# Patient Record
Sex: FEMALE | Race: WHITE | NOT HISPANIC OR LATINO | Employment: FULL TIME | URBAN - METROPOLITAN AREA
[De-identification: names, ages, dates, MRNs, and addresses within clinical notes are randomized per-mention and may not be internally consistent; named-entity substitution may affect disease eponyms.]

---

## 2017-02-24 ENCOUNTER — OFFICE VISIT (OUTPATIENT)
Dept: OPHTHALMOLOGY | Facility: CLINIC | Age: 52
End: 2017-02-24
Payer: COMMERCIAL

## 2017-02-24 ENCOUNTER — CLINICAL SUPPORT (OUTPATIENT)
Dept: OPHTHALMOLOGY | Facility: CLINIC | Age: 52
End: 2017-02-24
Payer: COMMERCIAL

## 2017-02-24 ENCOUNTER — OFFICE VISIT (OUTPATIENT)
Dept: OPTOMETRY | Facility: CLINIC | Age: 52
End: 2017-02-24
Payer: COMMERCIAL

## 2017-02-24 DIAGNOSIS — H52.4 PRESBYOPIA: ICD-10-CM

## 2017-02-24 DIAGNOSIS — Z96.1 PSEUDOPHAKIA OF RIGHT EYE: ICD-10-CM

## 2017-02-24 DIAGNOSIS — Z98.41 S/P CATARACT SURGERY, RIGHT: ICD-10-CM

## 2017-02-24 DIAGNOSIS — H40.89 GLAUCOMA DUE TO COMBINATION OF MECHANISMS: ICD-10-CM

## 2017-02-24 DIAGNOSIS — H40.043 STEROID RESPONDERS BORDERLINE GLAUCOMA, BILATERAL: ICD-10-CM

## 2017-02-24 DIAGNOSIS — H52.7 REFRACTIVE ERRORS: ICD-10-CM

## 2017-02-24 DIAGNOSIS — H40.89 GLAUCOMA DUE TO COMBINATION OF MECHANISMS: Primary | ICD-10-CM

## 2017-02-24 DIAGNOSIS — H25.12 NUCLEAR SCLEROSIS, LEFT: ICD-10-CM

## 2017-02-24 DIAGNOSIS — H43.813 POSTERIOR VITREOUS DETACHMENT, BILATERAL: Primary | ICD-10-CM

## 2017-02-24 PROCEDURE — 92133 CPTRZD OPH DX IMG PST SGM ON: CPT | Mod: S$GLB,,, | Performed by: OPHTHALMOLOGY

## 2017-02-24 PROCEDURE — 92015 DETERMINE REFRACTIVE STATE: CPT | Mod: S$GLB,,, | Performed by: OPTOMETRIST

## 2017-02-24 PROCEDURE — 92083 EXTENDED VISUAL FIELD XM: CPT | Mod: S$GLB,,, | Performed by: OPHTHALMOLOGY

## 2017-02-24 PROCEDURE — 99999 PR PBB SHADOW E&M-EST. PATIENT-LVL II: CPT | Mod: PBBFAC,,, | Performed by: OPHTHALMOLOGY

## 2017-02-24 PROCEDURE — 92014 COMPRE OPH EXAM EST PT 1/>: CPT | Mod: S$GLB,,, | Performed by: OPHTHALMOLOGY

## 2017-02-24 PROCEDURE — 99999 PR PBB SHADOW E&M-EST. PATIENT-LVL II: CPT | Mod: PBBFAC,,, | Performed by: OPTOMETRIST

## 2017-02-24 PROCEDURE — 92012 INTRM OPH EXAM EST PATIENT: CPT | Mod: S$GLB,,, | Performed by: OPTOMETRIST

## 2017-02-24 RX ORDER — TIMOLOL MALEATE 5 MG/ML
1 SOLUTION OPHTHALMIC EVERY MORNING
Qty: 5 ML | Refills: 12 | Status: SHIPPED | OUTPATIENT
Start: 2017-02-24 | End: 2018-02-23

## 2017-02-24 NOTE — PROGRESS NOTES
HPI     Glaucoma    Additional comments: HVF and OCT review today           Eye Problem    Additional comments: Pt c/o difficulty parking her car and depth   perception           Comments   DLS: 7/12/16 ([place] Pt)  **Pt saw  earlier this morning    1. MMG OU  2. PVD OU  3. Vitreous Opacity OD  4. PCIOL OD  5. Steroid Responder    MEDS:  T1/2 GFS QAM OU       Last edited by Ellie Dent MA on 2/24/2017  9:06 AM. (History)            Assessment /Plan     For exam results, see Encounter Report.    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 Mixed mechanism Glaucoma   H/O narrow angles - S/P PI's, + residual OHT   First HVF 1998   First photos 1999   Former [facility] , retired for a few years and went back to work as a    (mom with same problem with narrow angles and residual OHT)     Family history + mom - Narrow angles with residual OHT   Glaucoma meds - timolol gfs ou   H/O adverse rxn to glaucoma drops latanoprost - eye irritation  LASERS PI's ou   GLAUCOMA SURGERIES none   OTHER EYE SURGERIES - PC IOL OD 12/17/2014 - [facility]  CDR 0.4/0.4   Tbase 23-36 / 23-35 ou   Tmax 36/35   Ttarget 30/30  HVF 13 test 24-2 ss ou 1996 to 2014 ([facility])            3 SWAP test 2010 to 2012 - Full ou            ( repeat HVF from 8/2013 to 9/2013 - new SAD od confirmed)            [place] VF's - 1 test 2016 to 2016 - SNS /INS od // full os   Gonio +3 ou S/P PI's   /618   OCT 3 test 2005 -  2014 ([facility]) - RNFL - nl od/ nl os   HRT - 4 test at [facility] - 2014 to 2016 -MR -  nl od // nl os /// CDR 0.46 od // 0.47 os  - older test at [facility]  Disc photos 1999, 2004, 2013 - slides  // 5/13/2010 - [facility]     - Ttoday  24// 21  - Test done today - HVF/OCT/DFE    2. PVD ou - with dense vitreous veil OD Saw [facility] 4/23/2012     3. Vitreous opacity OD     4. Steroid responder - had an injection / ? Spironolactone / aldactone - not currently on any steroids    5. Refractive error   Was bilateral hyperope / presbyope   Is anisopmetropic post CE od    Glasses from [place] and doing well     6. H/O eyelid papilloma Left - S/P removal - [facility]     7. Pseudophakia od   PC IOL od 12/17/2014 - SN60wf 20.5   Had a large myopic shift pre surgery from +1.00 to -3.00    8.  I see her father in law - he has glaucoma - and I did his cataracts    I see her mother - she has similar issues to this patient     Plan   MMG  / OHT ou  S/P PI's ou   ? Early SAD changes od  IOP stable on timolol - tolerating well   Intolerant to latanoprost - red irritated eye    Cont timolol gfs ou q am     F/U 6 months with  HRT/Gonio - [place] - update flow sheet with todays  VF and OCT - 2/23/2017     Pt is going to try CTL's // ? Monovision with dr. June    I have seen and personally examined the patient.  I agree with the findings, assessment and plan of the resident and/or fellow.     Josey Camacho MD

## 2017-02-24 NOTE — MR AVS SNAPSHOT
[facility]  [address]  [facility] [address]  Phone: [phone]  Fax: [phone]                  Darling Quach   2017 8:00 AM   Office Visit    Description:  Female : 1965   Provider:  Jairon June, HORACIO   Department:  [facility]           Reason for Visit     Eye Exam                To Do List           Future Appointments        Provider Department Dept Phone    2017 8:45 AM PERIMETRY, HUMPH [facility] Ophthalmology [phone]    2017 9:45 AM MD [facility] Cespedes [facility] Ophthalmology [phone]    3/3/2017 1:30 PM [facility] [phone]    3/16/2017 1:30 PM El Mayes IV, MD [facility] - OB/GYN [address] [phone]    2017 1:00 PM Kimberly Ellison MD [facility] - Internal Medicine [phone]      Goals (5 Years of Data)     None      [facility] On Call     [facility] On Call Nurse Care Line -  Assistance  Registered nurses in the [facility] On Call Center provide clinical advisement, health education, appointment booking, and other advisory services.  Call for this free service at [phone].             Medications           Message regarding Medications     Verify the changes and/or additions to your medication regime listed below are the same as discussed with your clinician today.  If any of these changes or additions are incorrect, please notify your healthcare provider.             Verify that the below list of medications is an accurate representation of the medications you are currently taking.  If none reported, the list may be blank. If incorrect, please contact your healthcare provider. Carry this list with you in case of emergency.           Current Medications     conjugated estrogens (PREMARIN) vaginal cream Place 1 g vaginally twice a week.    ORACEA 40 mg capsule     timolol maleate 0.5% (TIMOPTIC-XE) 0.5 % SolG Place 1 drop into both eyes every morning.           Clinical  Reference Information           Allergies as of 2/24/2017     Latanoprost      Immunizations Administered on Date of Encounter - 2/24/2017     None      Instructions    Bilateral mixed-mechanism glaucoma. Followed by Dr. Camacho.  Using Timolol 0.5% non-gel-forming solution in both eyes every morning.  Continue drops in both eyes, and follow up with Dr. Camacho as she recommends.  Scheduled for repeat HVF and follow-up with Dr. Camacho today  S/P cataract surgery in the right eye, with posterior chamber intraocular lens.  Early nuclear sclerosis of lens of the left eye.  Refractive error in each eye, with very satisfactory correctable VA in each eye.  No change in refraction in OD, and only minor change in refraction in OS  Presbyopia.  New spectacle lens Rx issued for full-time wear.  Interested in trying CLs.  Discussed CL options.  Will return in one week for CL trial with monovision SCLs  Recheck refraction in one year.           Language Assistance Services     ATTENTION: Language assistance services are available, free of charge. Please call [phone].      ATENCIÓN: Si janessa ernst, tiene a ang disposición servicios gratuitos de asistencia lingüística. Llame al [phone].     MINNA Ý: N?u b?n nói Ti?ng Vi?t, có các d?ch v? h? tr? ngôn ng? mi?n phí dành cho b?n. G?i s? [phone].         [facility] complies with applicable Federal civil rights laws and does not discriminate on the basis of race, color, national origin, age, disability, or sex.

## 2017-02-24 NOTE — PROGRESS NOTES
HPI     Eye Exam    Additional comments: refraction only.  Will be having HVF test and   follow-up with Dr. Camacho later this morning.  Feels that Rx in   spectacle lenses needs modification, as she has notieced some decrease in   VA with glasses.            Comments   Patient's age: 51 y.o. WF    Wears glasses? yes     If yes, wears  Full-time or part-time?  Full-time  Present glasses are: Bifocal, SV Distance, SV Reading?  Progressive add   lenses  Approximate age of present glasses:  One year   Got new glasses following last exam, or subsequently?:  yes   Any problem with VA with glasses?  As noted above  Wears CLs?:  No, but interested in trying CLs - has never worn CLs.         Headaches?  no  Eye pain/discomfort?  no                                                                                     Flashes?  No more - flashes have gone away  Floaters?  no  Diplopia/Double vision?  no  Patient's Ocular History:         Any eye surgeries? Laser procedure for glaucoma OU - s/p cataract   surgery in OD only two years ago         Any eye injury?  no         Any treatment for eye disease?  Mixed mechanism glaucoma  Family history of eye disease?  M. Grandmother, mother: glaucoma  Significant patient medical history:         1. Diabetes?  no       If yes, IDDM or NIDDM? n/a   2. HBP?  no              3. Other (describe):  None    ! OTC eyedrops currently using:  no   ! Prescription eye meds currently using:  Timolol 0.5% (not gel-forming)   q AM OU   ! Any history of allergy/adverse reaction to any eye meds used   previously?  Adverse reaction to Timolol 0.5% GFS     ! Patient okay with use of anesthetic eyedrops to check eye pressure?  No   - will be having HVF and consult with Dr. Camacho later this morning       ! Patient okay with use of eyedrops to dilate pupils today?  See above   !  Allergies/Medications/Medical History/Family History reviewed today?    yes      PD =   61/57  Desired reading distance =   "17"                                                                    Last edited by Jairon June, OD on 2/24/2017  7:41 AM. (History)            Assessment /Plan     For exam results, see Encounter Report.    1. Glaucoma due to combination of mechanisms - Both Eyes     2. Nuclear sclerosis, left     3. S/P cataract surgery, right     4. Pseudophakia of right eye     5. Refractive errors     6. Presbyopia                    Bilateral mixed-mechanism glaucoma. Followed by Dr. Camacho.  Using Timolol 0.5% non-gel-forming solution in both eyes every morning.  Continue drops in both eyes, and follow up with Dr. Camacho as she recommends.  Scheduled for repeat HVF and follow-up with Dr. Camacho today  S/P cataract surgery in the right eye, with posterior chamber intraocular lens.  Early nuclear sclerosis of lens of the left eye.  Refractive error in each eye, with very satisfactory correctable VA in each eye.  No change in refraction in OD, and only minor change in refraction in OS  Presbyopia.  New spectacle lens Rx issued for full-time wear.  Interested in trying CLs.  Discussed CL options.  Will return in one week for CL trial with monovision SCLs  Recheck refraction in one year.        "

## 2017-02-24 NOTE — PATIENT INSTRUCTIONS
Bilateral mixed-mechanism glaucoma. Followed by Dr. Camacho.  Using Timolol 0.5% non-gel-forming solution in both eyes every morning.  Continue drops in both eyes, and follow up with Dr. Camacho as she recommends.  Scheduled for repeat HVF and follow-up with Dr. Camacho today  S/P cataract surgery in the right eye, with posterior chamber intraocular lens.  Early nuclear sclerosis of lens of the left eye.  Refractive error in each eye, with very satisfactory correctable VA in each eye.  No change in refraction in OD, and only minor change in refraction in OS  Presbyopia.  New spectacle lens Rx issued for full-time wear.  Interested in trying CLs.  Discussed CL options.  Will return in one week for CL trial with monovision SCLs  Recheck refraction in one year.

## 2017-03-03 ENCOUNTER — TELEPHONE (OUTPATIENT)
Dept: SURGERY | Facility: CLINIC | Age: 52
End: 2017-03-03

## 2017-03-03 NOTE — TELEPHONE ENCOUNTER
Pt wanted to know if she had to bring any records. She has not had surgery for hemorrhoids but has had a colonoscopy. Asked she bring that report.

## 2017-03-03 NOTE — TELEPHONE ENCOUNTER
----- Message from Megha Khan sent at 3/3/2017  8:10 AM CST -----  Contact: Pt# [phone]  Pt states she have some questions about her records and would like to speak to the nurse

## 2017-03-08 ENCOUNTER — OFFICE VISIT (OUTPATIENT)
Dept: SURGERY | Facility: CLINIC | Age: 52
End: 2017-03-08
Payer: COMMERCIAL

## 2017-03-08 VITALS
SYSTOLIC BLOOD PRESSURE: 103 MMHG | HEIGHT: 62 IN | DIASTOLIC BLOOD PRESSURE: 72 MMHG | HEART RATE: 79 BPM | BODY MASS INDEX: 24.18 KG/M2 | WEIGHT: 131.38 LBS

## 2017-03-08 DIAGNOSIS — K60.1 CHRONIC ANAL FISSURE: Primary | ICD-10-CM

## 2017-03-08 PROCEDURE — 99999 PR PBB SHADOW E&M-EST. PATIENT-LVL III: CPT | Mod: PBBFAC,,, | Performed by: COLON & RECTAL SURGERY

## 2017-03-08 PROCEDURE — 1160F RVW MEDS BY RX/DR IN RCRD: CPT | Mod: S$GLB,,, | Performed by: COLON & RECTAL SURGERY

## 2017-03-08 PROCEDURE — 99214 OFFICE O/P EST MOD 30 MIN: CPT | Mod: S$GLB,,, | Performed by: COLON & RECTAL SURGERY

## 2017-03-08 NOTE — PATIENT INSTRUCTIONS
Recommend:    1. SOAK TID in tub warm water only (or hand held shower to anus)  2. Diltiazem cream TID  3.  High fiber diet - 25 grams per day.  Emphasis on whole grain breads such as double fiber wheat bread and high fiber cereals and bars.    Drink 8 glasses of water per day 64 - 96 oz per day  Fiber supplements such as KONSYL, METAMUCIL can be taken 1-2 x per day  Regular exercise - 30 min brisk walking 5 days per week  4.  RTC 6 weeks.    5.  Begin miralax daily prn

## 2017-03-08 NOTE — MR AVS SNAPSHOT
[facility]  [address]  [facility] [address]  Phone: [phone]                  Darling Quach   3/8/2017 2:00 PM   Office Visit    Description:  Female : 1965   Provider:  LITZY Hurt MD   Department:  [facility]           Reason for Visit     Hemorrhoids                To Do List           Future Appointments        Provider Department Dept Phone    3/16/2017 1:30 PM El Mayes IV, MD [facility] - OB/GYN [address] [phone]    2017 1:00 PM Kimberly Ellison MD [facility] - Internal Medicine [phone]      Goals (5 Years of Data)     None       These Medications        Disp Refills Start End    DILTIAZEM HCL (DILTIAZEM 2% CREAM) 30 g 0 3/8/2017     Apply topically 3 (three) times daily. Apply topically to anal area. - Topical (Top)    Pharmacy: [facility] - [address] #: [phone]         [facility]     [facility] On [facility] Nurse Care Line -  Assistance  Registered nurses in the [facility] On Call Center provide clinical advisement, health education, appointment booking, and other advisory services.  Call for this free service at [phone].             Medications           Message regarding Medications     Verify the changes and/or additions to your medication regime listed below are the same as discussed with your clinician today.  If any of these changes or additions are incorrect, please notify your healthcare provider.        START taking these NEW medications        Refills    DILTIAZEM HCL (DILTIAZEM 2% CREAM) 0    Sig: Apply topically 3 (three) times daily. Apply topically to anal area.    Class: Print    Route: Topical (Top)           Verify that the below list of medications is an accurate representation of the medications you are currently taking.  If none reported, the list may be blank. If incorrect, please contact your healthcare provider. Carry this  "list with you in case of emergency.           Current Medications     timolol maleate 0.5% (TIMOPTIC-XE) 0.5 % SolG Place 1 drop into both eyes every morning.    conjugated estrogens (PREMARIN) vaginal cream Place 1 g vaginally twice a week.    DILTIAZEM HCL (DILTIAZEM 2% CREAM) Apply topically 3 (three) times daily. Apply topically to anal area.           Clinical Reference Information           Your Vitals Were     BP Pulse Height Weight BMI    103/72 79 5' 2" (1.575 m) 59.6 kg (131 lb 6.3 oz) 24.03 kg/m2      Blood Pressure          Most Recent Value    BP  103/72      Allergies as of 3/8/2017     Latanoprost      Immunizations Administered on Date of Encounter - 3/8/2017     None      Instructions    Recommend:    1. SOAK TID in tub warm water only (or hand held shower to anus)  2. Diltiazem cream TID  3.  High fiber diet - 25 grams per day.  Emphasis on whole grain breads such as double fiber wheat bread and high fiber cereals and bars.    Drink 8 glasses of water per day 64 - 96 oz per day  Fiber supplements such as KONSYL, METAMUCIL can be taken 1-2 x per day  Regular exercise - 30 min brisk walking 5 days per week  4.  RTC 6 weeks.    5.  Begin miralax daily prn       Language Assistance Services     ATTENTION: Language assistance services are available, free of charge. Please call [phone].      ATENCIÓN: Si habla español, tiene a ang disposición servicios gratuitos de asistencia lingüística. Llame al [phone].     [facility] Ý: N?u b?n nói Ti?ng Vi?t, có các d?ch v? h? tr? ngôn ng? mi?n phí dành cho b?n. G?i s? [phone].         [facility] complies with applicable Federal civil rights laws and does not discriminate on the basis of race, color, national origin, age, disability, or sex.        "

## 2017-03-08 NOTE — PROGRESS NOTES
Anal pain and bleeding with BM.  BM infrequent - 1 per week.  Started dulcolax last week to have a BM.  Always has had slow BM.  Once a week.  Now can't go without dulcolax  Pain lasts for 2 hours after BM.      Past Medical History:   Diagnosis Date    Cataract     Glaucoma     Hemangioma     cavernous    History of acne 2004    dr lopez prescribed accutane    Hyperlipidemia     Venous angioma of brain     Vertigo      Past Surgical History:   Procedure Laterality Date    CATARACT EXTRACTION W/  INTRAOCULAR LENS IMPLANT Right 12/17/14    Dr Camacho     HYSTERECTOMY       Review of patient's allergies indicates:   Allergen Reactions    Latanoprost Itching     Red itchy eyes     Current Outpatient Prescriptions on File Prior to Visit   Medication Sig Dispense Refill    timolol maleate 0.5% (TIMOPTIC-XE) 0.5 % SolG Place 1 drop into both eyes every morning. 5 mL 12    conjugated estrogens (PREMARIN) vaginal cream Place 1 g vaginally twice a week. 42.5 g 1     No current facility-administered medications on file prior to visit.      Family History   Problem Relation Age of Onset    Fibroids Mother     Migraines Mother     Glaucoma Mother     Heart disease Father     Depression Brother     No Known Problems Sister     Diabetes Maternal Grandmother     Glaucoma Maternal Grandmother     Cancer Maternal Aunt      breast    Breast cancer Maternal Aunt     No Known Problems Maternal Uncle     No Known Problems Paternal Aunt     No Known Problems Paternal Uncle     No Known Problems Maternal Grandfather     No Known Problems Paternal Grandmother     No Known Problems Paternal Grandfather     Macular degeneration Neg Hx     Blindness Neg Hx     Melanoma Neg Hx     Psoriasis Neg Hx     Lupus Neg Hx     Eczema Neg Hx     Acne Neg Hx     Cataracts Neg Hx     Amblyopia Neg Hx     Retinal detachment Neg Hx     Strabismus Neg Hx     Stroke Neg Hx     Thyroid disease Neg Hx      "Hypertension Neg Hx      Social History     Social History    Marital status:      Spouse name: N/A    Number of children: N/A    Years of education: N/A     Occupational History    Not on file.     Social History Main Topics    Smoking status: Never Smoker    Smokeless tobacco: Never Used    Alcohol use No    Drug use: No    Sexual activity: Yes     Partners: Male     Birth control/ protection: Surgical      Comment: TIN 9/2010     Other Topics Concern    Are You Pregnant Or Think You May Be? No    Breast-Feeding No     Social History Narrative     ROS:  GENERAL: No fever, chills, fatigability or weight loss.  Integument:  No rashes, redness, icterus  CHEST: Denies COLMENARES, cyanosis, wheezing, cough and sputum production.  CARDIOVASCULAR: Denies chest pain, PND, orthopnea or reduced exercise tolerance.  GI:  Denies abd pain, dysphagia, nausea, vomiting, no hematemesis  : Denies burning on urination, no hematuria, no bacteriuria  MSK:  No deformities, swelling, joint pain swelling  Neurologic:  No HAs, seizures, weakness, paresthesias, gait problems    Exam  Appears well  /72  Pulse 79  Ht 5' 2" (1.575 m)  Wt 59.6 kg (131 lb 6.3 oz)  BMI 24.03 kg/m2  Rectal      Posterior fissure in ano.    Marked anal spasm      Assessment  Acute anal fissure  Internal / external hemorrhoids  Constipation    Recommend:    1. SOAK TID in tub warm water only (or hand held shower to anus)  2. Diltiazem cream TID  3.  High fiber diet - 25 grams per day.  Emphasis on whole grain breads such as double fiber wheat bread and high fiber cereals and bars.    Drink 8 glasses of water per day 64 - 96 oz per day  Fiber supplements such as KONSYL, METAMUCIL can be taken 1-2 x per day  Regular exercise - 30 min brisk walking 5 days per week  4.  RTC 6 weeks.    5.  Begin miralax daily prn      "

## 2017-03-16 ENCOUNTER — OFFICE VISIT (OUTPATIENT)
Dept: OBSTETRICS AND GYNECOLOGY | Facility: CLINIC | Age: 52
End: 2017-03-16
Payer: COMMERCIAL

## 2017-03-16 VITALS
WEIGHT: 121.94 LBS | HEIGHT: 62 IN | BODY MASS INDEX: 22.44 KG/M2 | SYSTOLIC BLOOD PRESSURE: 100 MMHG | DIASTOLIC BLOOD PRESSURE: 50 MMHG

## 2017-03-16 DIAGNOSIS — Z12.39 BREAST CANCER SCREENING: ICD-10-CM

## 2017-03-16 DIAGNOSIS — Z01.419 ROUTINE GYNECOLOGICAL EXAMINATION: Primary | ICD-10-CM

## 2017-03-16 PROBLEM — Z90.710 S/P LAPAROSCOPIC HYSTERECTOMY: Status: ACTIVE | Noted: 2017-03-16

## 2017-03-16 PROCEDURE — 99999 PR PBB SHADOW E&M-EST. PATIENT-LVL III: CPT | Mod: PBBFAC,,, | Performed by: OBSTETRICS & GYNECOLOGY

## 2017-03-16 PROCEDURE — 99396 PREV VISIT EST AGE 40-64: CPT | Mod: S$GLB,,, | Performed by: OBSTETRICS & GYNECOLOGY

## 2017-03-16 NOTE — PROGRESS NOTES
Well Woman Exam:    HPI:  Darling Quach is a 51 y.o. year old  who presents for annual exam.  She is S/P hysterectomy.  Patient denies significant menopausal symptoms.  Patient reports mild vaginal dryness.  Patient is currently not on ERT.    Past Medical History:   Diagnosis Date    Cataract     Glaucoma     Hemangioma     cavernous    History of acne     dr lopez prescribed accutane    Hyperlipidemia     Venous angioma of brain     Vertigo        Past Surgical History:   Procedure Laterality Date    CATARACT EXTRACTION W/  INTRAOCULAR LENS IMPLANT Right 14    Dr Camacho     HYSTERECTOMY         OB History      Para Term  AB TAB SAB Ectopic Multiple Living    3 3        3          ROS:  GENERAL: Feeling well overall.   SKIN: Denies rash or lesions.   HEAD: Denies head injury or headache.   NODES: Denies enlarged lymph nodes.   CHEST: Denies chest pain or shortness of breath.   CARDIOVASCULAR: Denies palpitations or left sided chest pain.   ABDOMEN: No abdominal pain, nausea, vomiting or rectal bleeding.   URINARY: No dysuria, hematuria, or burning on urination.  REPRODUCTIVE: See HPI.   BREASTS: Denies pain, lumps, or nipple discharge.   HEMATOLOGIC: No easy bruisability or excessive bleeding.   MUSCULOSKELETAL: Denies joint pain or swelling.   NEUROLOGIC: Denies syncope or weakness.   PSYCHIATRIC: Denies depression.    PE:   APPEARANCE: Well nourished, well developed, in no acute distress.  NECK: Neck symmetric without masses or thyromegaly.  NODES: No inguinal lymph node enlargement.  ABDOMEN: Soft. No tenderness or masses. No hernias.  BREASTS: Symmetrical, no skin changes or visible lesions. No palpable masses, nipple discharge or adenopathy bilaterally.  PELVIC: Normal external female genitalia without lesions. Normal hair distribution. Adequate perineal body, normal urethral meatus. Vagina mildly atrophic - without lesions or discharge. No significant cystocele  or rectocele. Uterus and cervix surgically absent. Bimanual exam revealed no masses, tenderness or abnormality.  ANUS: Normal. + external hemorrhoids.    Diagnosis:  1. Routine gynecological examination    2. Breast cancer screening      PLAN:    Orders Placed This Encounter    Mammo Digital Screening Bilat with Tomosynthesis CAD       Patient was counseled today on postmenopausal issues.     Orders as above    Papsmear not done/not indicated.    ERT r/b/a discussed    Keep f/u with Dr. KENY Ellison (PCP)    Follow-up in 1 year.    El Mayes IV, MD

## 2017-03-16 NOTE — MR AVS SNAPSHOT
"    [facility] [address]  [address]  [place] [address]  Phone: [phone]  Fax: [phone]                  Darling Quach   3/16/2017 1:30 PM   Office Visit    Description:  Female : 1965   Provider:  El Mayes IV, MD   Department:  [facility] [address]           Reason for Visit     Well Woman                To Do List           Future Appointments        Provider Department Dept Phone    2017 1:00 PM MD [facility] - Internal Medicine [phone]    2017 2:15 PM MD [facility] [phone]      Goals (5 Years of Data)     None      [facility]     [facility] -  Assistance  Registered nurses in the [facility] provide clinical advisement, health education, appointment booking, and other advisory services.  Call for this free service at [phone].             Medications           Message regarding Medications     Verify the changes and/or additions to your medication regime listed below are the same as discussed with your clinician today.  If any of these changes or additions are incorrect, please notify your healthcare provider.        STOP taking these medications     conjugated estrogens (PREMARIN) vaginal cream Place 1 g vaginally twice a week.           Verify that the below list of medications is an accurate representation of the medications you are currently taking.  If none reported, the list may be blank. If incorrect, please contact your healthcare provider. Carry this list with you in case of emergency.           Current Medications     DILTIAZEM HCL (DILTIAZEM 2% CREAM) Apply topically 3 (three) times daily. Apply topically to anal area.    timolol maleate 0.5% (TIMOPTIC-XE) 0.5 % SolG Place 1 drop into both eyes every morning.           Clinical Reference Information           Your Vitals Were     BP Height Weight BMI       100/50 5' 2" (1.575 " m) 55.3 kg (121 lb 14.6 oz) 22.3 kg/m2       Blood Pressure          Most Recent Value    BP  (!)  100/50      Allergies as of 3/16/2017     Latanoprost      Immunizations Administered on Date of Encounter - 3/16/2017     None      Language Assistance Services     ATTENTION: Language assistance services are available, free of charge. Please call [phone].      ATENCIÓN: Si habla español, tiene a ang disposición servicios gratuitos de asistencia lingüística. Llame al [phone].     CHÚ Ý: N?u b?n nói Ti?ng Vi?t, có các d?ch v? h? tr? ngôn ng? mi?n phí dành cho b?n. G?i s? [phone].         [facility] [address] complies with applicable Federal civil rights laws and does not discriminate on the basis of race, color, national origin, age, disability, or sex.

## 2017-04-06 ENCOUNTER — OFFICE VISIT (OUTPATIENT)
Dept: INTERNAL MEDICINE | Facility: CLINIC | Age: 52
End: 2017-04-06
Payer: COMMERCIAL

## 2017-04-06 VITALS
HEART RATE: 67 BPM | WEIGHT: 117.5 LBS | OXYGEN SATURATION: 99 % | SYSTOLIC BLOOD PRESSURE: 118 MMHG | HEIGHT: 62 IN | DIASTOLIC BLOOD PRESSURE: 74 MMHG | BODY MASS INDEX: 21.62 KG/M2

## 2017-04-06 DIAGNOSIS — Z98.890 HX OF COLONOSCOPY: ICD-10-CM

## 2017-04-06 DIAGNOSIS — Z00.00 ANNUAL PHYSICAL EXAM: Primary | ICD-10-CM

## 2017-04-06 DIAGNOSIS — K60.2 ANAL FISSURE: ICD-10-CM

## 2017-04-06 DIAGNOSIS — D18.00 HEMANGIOMA: ICD-10-CM

## 2017-04-06 DIAGNOSIS — Z12.31 ENCOUNTER FOR SCREENING MAMMOGRAM FOR BREAST CANCER: ICD-10-CM

## 2017-04-06 DIAGNOSIS — H40.89 GLAUCOMA DUE TO COMBINATION OF MECHANISMS: ICD-10-CM

## 2017-04-06 DIAGNOSIS — Z00.00 ENCOUNTER FOR WELLNESS EXAMINATION: ICD-10-CM

## 2017-04-06 DIAGNOSIS — E78.00 PURE HYPERCHOLESTEROLEMIA: ICD-10-CM

## 2017-04-06 DIAGNOSIS — K60.3 ANAL FISTULA: ICD-10-CM

## 2017-04-06 PROBLEM — K60.30 ANAL FISTULA: Status: RESOLVED | Noted: 2017-04-06 | Resolved: 2017-04-06

## 2017-04-06 PROBLEM — K60.30 ANAL FISTULA: Status: ACTIVE | Noted: 2017-04-06

## 2017-04-06 PROCEDURE — 99999 PR PBB SHADOW E&M-EST. PATIENT-LVL III: CPT | Mod: PBBFAC,,, | Performed by: INTERNAL MEDICINE

## 2017-04-06 PROCEDURE — 99396 PREV VISIT EST AGE 40-64: CPT | Mod: S$GLB,,, | Performed by: INTERNAL MEDICINE

## 2017-04-06 NOTE — PROGRESS NOTES
Progress note:Annual Exam    Anal fissure: pain, patient will call    Menopausal symptoms:patient will call Dr. Mayes to institute hormones    Annual exam: 4/6/2017  Colonoscopy: 10/10/2016:follow up in 7 years  Mammogram: orders  Gyn: Dr. Mayes   Optho:glaucoma Cataract Surgery in 12/2014 and has appt scheduled twice a year, Dr. Camacho  Flu: 2016  Tetanus:7/7/2016  Shingles: not due  Pneumovax : not due  Prevnar: not due     Consultants:  Derm: Dr. Cristian June    Brother:Bipolar but health is good  Siblings: 5 all are good      All below are reviewed  Problem list is discussed in detail  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    Vitreous opacity - Right Eye    Venous angioma of brain    S/P hysterectomy    Hx of colonoscopy:10/10/2016    Anal fissure        Past Medical History:   Diagnosis Date    Cataract     Glaucoma     Hemangioma     cavernous    History of acne 2004    dr lopez prescribed accutane    Hyperlipidemia     Venous angioma of brain     Vertigo         Past Surgical History:   Procedure Laterality Date    CATARACT EXTRACTION W/  INTRAOCULAR LENS IMPLANT Right 12/17/14    Dr Camacho     HYSTERECTOMY          Review of patient's allergies indicates:   Allergen Reactions    Latanoprost Itching     Red itchy eyes       Meds updated     Family History   Problem Relation Age of Onset    Fibroids Mother     Migraines Mother     Glaucoma Mother     Heart disease Father     Depression Brother     No Known Problems Sister     Diabetes Maternal Grandmother     Glaucoma Maternal Grandmother     Cancer Maternal Aunt      breast    Breast cancer Maternal Aunt     No Known Problems Maternal Uncle     No Known Problems Paternal Aunt     No Known Problems  Paternal Uncle     No Known Problems Maternal Grandfather     No Known Problems Paternal Grandmother     No Known Problems Paternal Grandfather     Macular degeneration Neg Hx     Blindness Neg Hx     Melanoma Neg Hx     Psoriasis Neg Hx     Lupus Neg Hx     Eczema Neg Hx     Acne Neg Hx     Cataracts Neg Hx     Amblyopia Neg Hx     Retinal detachment Neg Hx     Strabismus Neg Hx     Stroke Neg Hx     Thyroid disease Neg Hx     Hypertension Neg Hx        Social History   Substance Use Topics    Smoking status: Never Smoker    Smokeless tobacco: Never Used    Alcohol use No       Labs reviewed        Review of Systems   Constitutional: Negative for chills, fever and unexpected weight change.   HENT: Negative for trouble swallowing.   Respiratory: Negative for cough, shortness of breath and wheezing.   Cardiovascular: Negative for chest pain and palpitations.   Gastrointestinal: Negative for abdominal distention, abdominal pain, blood in stool and vomiting.   Musculoskeletal: Negative for back pain.      Vitals:    04/06/17 1300   BP: 118/74   Pulse: 67           Physical Exam   Constitutional: She is oriented to person, place, and time. She appears well-developed and well-nourished. No distress.   Neck: Carotid bruit is not present. No thyromegaly present.   Cardiovascular: Normal rate, regular rhythm and normal heart sounds. PMI is not displaced.   Pulmonary/Chest: Effort normal and breath sounds normal. No respiratory distress.   Abdominal: Soft. Bowel sounds are normal. She exhibits no distension. There is no tenderness.   Musculoskeletal: She exhibits no edema.   Neurological: She is alert and oriented to person, place, and time.     Darling was seen today for annual exam.    Diagnoses and all orders for this visit:    Annual physical exam health maintenance reviewed and updated        Hx of colonoscopy:10/10/2016    Anal fissure followed in CRS    Encounter for screening mammogram for breast  cancer  -     Mammo Digital Screening Bilat with Tomosynthesis CAD; Future    Glaucoma due to combination of mechanisms - Both Eyes followed in ophthalmology    Hemangioma stable and asymptomatic    Pure hypercholesterolemia lipid profile will be ordered    Encounter for wellness examination  -     CBC auto differential; Future  -     Comprehensive metabolic panel; Future  -     Lipid panel; Future      Return in about 1 year (around 4/6/2018) for Annual exam.      Current Outpatient Prescriptions on File Prior to Visit   Medication Sig Dispense Refill    DILTIAZEM HCL (DILTIAZEM 2% CREAM) Apply topically 3 (three) times daily. Apply topically to anal area. 30 g 0    timolol maleate 0.5% (TIMOPTIC-XE) 0.5 % SolG Place 1 drop into both eyes every morning. 5 mL 12     No current facility-administered medications on file prior to visit.

## 2017-04-06 NOTE — MR AVS SNAPSHOT
[facility] - Internal Medicine  [address]  [facility] [address]  Phone: [phone]  Fax: [phone]                  Darling Quach   2017 1:00 PM   Office Visit    Description:  Female : 1965   Provider:  Kimberly Ellison MD   Department:  [facility] - Internal Medicine           Reason for Visit     Annual Exam           Diagnoses this Visit        Comments    Annual physical exam    -  Primary     Anal fistula         Hx of colonoscopy         Anal fissure         Encounter for screening mammogram for breast cancer         Glaucoma due to combination of mechanisms         Hemangioma         Pure hypercholesterolemia         Encounter for wellness examination                To Do List           Future Appointments        Provider Department Dept Phone    2017 8:40 AM LAB, APPOINTMENT [facility] IN[facility]ED [facility] [phone]    2017 2:15 PM LITZY Hurt MD [facility] [phone]    2017 9:00 AM [facility] MAMMO2 SCREEN [facility] [phone]      Goals (5 Years of Data)     None      Follow-Up and Disposition     Return in about 1 year (around 2018) for Annual exam.      [facility]     [facility] Nurse Care [facility] -  Assistance  Unless otherwise directed by your provider, please contact [facility], our nurse care line that is available for  assistance.     Registered nurses in the [facility] Center provide: appointment scheduling, clinical advisement, health education, and other advisory services.  Call: [phone] (toll free)               Medications           Message regarding Medications     Verify the changes and/or additions to your medication regime listed below are the same as discussed with your clinician today.  If any of these changes or additions are incorrect, please notify your healthcare provider.             Verify that the below list of medications is an accurate  "representation of the medications you are currently taking.  If none reported, the list may be blank. If incorrect, please contact your healthcare provider. Carry this list with you in case of emergency.           Current Medications     DILTIAZEM HCL (DILTIAZEM 2% CREAM) Apply topically 3 (three) times daily. Apply topically to anal area.    timolol maleate 0.5% (TIMOPTIC-XE) 0.5 % SolG Place 1 drop into both eyes every morning.           Clinical Reference Information           Your Vitals Were     BP Pulse Height Weight SpO2 BMI    118/74 67 5' 2" (1.575 m) 53.3 kg (117 lb 8.1 oz) 99% 21.49 kg/m2      Blood Pressure          Most Recent Value    BP  118/74      Allergies as of 4/6/2017     Latanoprost      Immunizations Administered on Date of Encounter - 4/6/2017     None      Orders Placed During Today's Visit     Future Labs/Procedures Expected by Expires    CBC auto differential  4/6/2017 6/5/2017    Comprehensive metabolic panel  4/6/2017 (Approximate) 6/5/2017    Lipid panel  4/6/2017 (Approximate) 6/5/2017    Mammo Digital Screening Bilat with Tomosynthesis CAD  4/6/2017 6/6/2018      Language Assistance Services     ATTENTION: Language assistance services are available, free of charge. Please call [phone].      ATENCIÓN: Si davidla klever, tiene a ang disposición servicios gratuitos de asistencia lingüística. Llame al [phone].     [facility] Ý: N?u b?n nói Ti?ng Vi?t, có các d?ch v? h? tr? ngôn ng? mi?n phí dành cho b?n. G?i s? [phone].         [facility] complies with applicable Federal civil rights laws and does not discriminate on the basis of race, color, national origin, age, disability, or sex.        "

## 2017-04-07 ENCOUNTER — LAB VISIT (OUTPATIENT)
Dept: LAB | Facility: HOSPITAL | Age: 52
End: 2017-04-07
Attending: INTERNAL MEDICINE
Payer: COMMERCIAL

## 2017-04-07 ENCOUNTER — PATIENT MESSAGE (OUTPATIENT)
Dept: SURGERY | Facility: CLINIC | Age: 52
End: 2017-04-07

## 2017-04-07 ENCOUNTER — PATIENT MESSAGE (OUTPATIENT)
Dept: OBSTETRICS AND GYNECOLOGY | Facility: CLINIC | Age: 52
End: 2017-04-07

## 2017-04-07 DIAGNOSIS — Z00.00 ENCOUNTER FOR WELLNESS EXAMINATION: ICD-10-CM

## 2017-04-07 LAB
ALBUMIN SERPL BCP-MCNC: 3.9 G/DL
ALP SERPL-CCNC: 58 U/L
ALT SERPL W/O P-5'-P-CCNC: 8 U/L
ANION GAP SERPL CALC-SCNC: 5 MMOL/L
AST SERPL-CCNC: 18 U/L
BASOPHILS # BLD AUTO: 0.09 K/UL
BASOPHILS NFR BLD: 2.2 %
BILIRUB SERPL-MCNC: 0.7 MG/DL
BUN SERPL-MCNC: 21 MG/DL
CALCIUM SERPL-MCNC: 9.5 MG/DL
CHLORIDE SERPL-SCNC: 103 MMOL/L
CHOLEST/HDLC SERPL: 2.9 {RATIO}
CO2 SERPL-SCNC: 32 MMOL/L
CREAT SERPL-MCNC: 1.1 MG/DL
DIFFERENTIAL METHOD: ABNORMAL
EOSINOPHIL # BLD AUTO: 0.2 K/UL
EOSINOPHIL NFR BLD: 4.1 %
ERYTHROCYTE [DISTWIDTH] IN BLOOD BY AUTOMATED COUNT: 12.4 %
EST. GFR  (AFRICAN AMERICAN): >60 ML/MIN/1.73 M^2
EST. GFR  (NON AFRICAN AMERICAN): 58.7 ML/MIN/1.73 M^2
GLUCOSE SERPL-MCNC: 86 MG/DL
HCT VFR BLD AUTO: 38.1 %
HDL/CHOLESTEROL RATIO: 35.1 %
HDLC SERPL-MCNC: 268 MG/DL
HDLC SERPL-MCNC: 94 MG/DL
HGB BLD-MCNC: 13.3 G/DL
LDLC SERPL CALC-MCNC: 160 MG/DL
LYMPHOCYTES # BLD AUTO: 1.3 K/UL
LYMPHOCYTES NFR BLD: 31 %
MCH RBC QN AUTO: 29 PG
MCHC RBC AUTO-ENTMCNC: 34.9 %
MCV RBC AUTO: 83 FL
MONOCYTES # BLD AUTO: 0.4 K/UL
MONOCYTES NFR BLD: 9.2 %
NEUTROPHILS # BLD AUTO: 2.2 K/UL
NEUTROPHILS NFR BLD: 53.5 %
NONHDLC SERPL-MCNC: 174 MG/DL
PLATELET # BLD AUTO: 220 K/UL
PMV BLD AUTO: 10.1 FL
POTASSIUM SERPL-SCNC: 4.1 MMOL/L
PROT SERPL-MCNC: 7.4 G/DL
RBC # BLD AUTO: 4.58 M/UL
SODIUM SERPL-SCNC: 140 MMOL/L
TRIGL SERPL-MCNC: 70 MG/DL
WBC # BLD AUTO: 4.13 K/UL

## 2017-04-07 PROCEDURE — 80053 COMPREHEN METABOLIC PANEL: CPT

## 2017-04-07 PROCEDURE — 85025 COMPLETE CBC W/AUTO DIFF WBC: CPT

## 2017-04-07 PROCEDURE — 36415 COLL VENOUS BLD VENIPUNCTURE: CPT

## 2017-04-07 PROCEDURE — 80061 LIPID PANEL: CPT

## 2017-04-07 NOTE — TELEPHONE ENCOUNTER
----- Message from Megha Khan sent at 4/7/2017  8:13 AM CDT -----  Contact: Pt# [phone]   Pt states she was calling to give the nurse her Pharmacy([email or web address])#[phone] so that she can get her Rx.

## 2017-04-07 NOTE — TELEPHONE ENCOUNTER
----- Message from Teresa Handy sent at 4/5/2017  3:19 PM CDT -----  Contact: Pt:[phone]  .                              Prescription Refill Request  Name of medication and dose:DILTIAZEM HCL (DILTIAZEM 2% CREAM)    Pharmacy :A compound pharmacy    Are you completely out of your medication Yes    Additional Information:

## 2017-04-18 ENCOUNTER — PATIENT MESSAGE (OUTPATIENT)
Dept: OBSTETRICS AND GYNECOLOGY | Facility: CLINIC | Age: 52
End: 2017-04-18

## 2017-04-20 ENCOUNTER — PATIENT MESSAGE (OUTPATIENT)
Dept: OBSTETRICS AND GYNECOLOGY | Facility: CLINIC | Age: 52
End: 2017-04-20

## 2017-04-20 ENCOUNTER — TELEPHONE (OUTPATIENT)
Dept: OBSTETRICS AND GYNECOLOGY | Facility: CLINIC | Age: 52
End: 2017-04-20

## 2017-04-25 ENCOUNTER — OFFICE VISIT (OUTPATIENT)
Dept: SURGERY | Facility: CLINIC | Age: 52
End: 2017-04-25
Payer: COMMERCIAL

## 2017-04-25 VITALS
HEIGHT: 62 IN | SYSTOLIC BLOOD PRESSURE: 107 MMHG | WEIGHT: 116.88 LBS | DIASTOLIC BLOOD PRESSURE: 68 MMHG | BODY MASS INDEX: 21.51 KG/M2 | HEART RATE: 78 BPM

## 2017-04-25 DIAGNOSIS — K64.4 EXTERNAL HEMORRHOIDS WITH OTHER COMPLICATION: ICD-10-CM

## 2017-04-25 DIAGNOSIS — K62.89 RECTAL OR ANAL PAIN: Primary | ICD-10-CM

## 2017-04-25 PROCEDURE — 1160F RVW MEDS BY RX/DR IN RCRD: CPT | Mod: S$GLB,,, | Performed by: COLON & RECTAL SURGERY

## 2017-04-25 PROCEDURE — 99999 PR PBB SHADOW E&M-EST. PATIENT-LVL III: CPT | Mod: PBBFAC,,, | Performed by: COLON & RECTAL SURGERY

## 2017-04-25 PROCEDURE — 99213 OFFICE O/P EST LOW 20 MIN: CPT | Mod: S$GLB,,, | Performed by: COLON & RECTAL SURGERY

## 2017-04-27 ENCOUNTER — PATIENT MESSAGE (OUTPATIENT)
Dept: INTERNAL MEDICINE | Facility: CLINIC | Age: 52
End: 2017-04-27

## 2017-04-28 ENCOUNTER — PATIENT MESSAGE (OUTPATIENT)
Dept: OBSTETRICS AND GYNECOLOGY | Facility: CLINIC | Age: 52
End: 2017-04-28

## 2017-04-28 ENCOUNTER — OFFICE VISIT (OUTPATIENT)
Dept: OPHTHALMOLOGY | Facility: CLINIC | Age: 52
End: 2017-04-28
Payer: COMMERCIAL

## 2017-04-28 DIAGNOSIS — Z96.1 PSEUDOPHAKIA OF RIGHT EYE: ICD-10-CM

## 2017-04-28 DIAGNOSIS — H25.12 NUCLEAR SCLEROSIS, LEFT: ICD-10-CM

## 2017-04-28 DIAGNOSIS — H52.7 REFRACTION ERROR: ICD-10-CM

## 2017-04-28 DIAGNOSIS — H43.813 POSTERIOR VITREOUS DETACHMENT, BILATERAL: ICD-10-CM

## 2017-04-28 DIAGNOSIS — H40.043 STEROID RESPONDERS BORDERLINE GLAUCOMA, BILATERAL: ICD-10-CM

## 2017-04-28 DIAGNOSIS — H40.89 GLAUCOMA DUE TO COMBINATION OF MECHANISMS: Primary | ICD-10-CM

## 2017-04-28 DIAGNOSIS — H10.11 ALLERGIC CONJUNCTIVITIS, RIGHT: ICD-10-CM

## 2017-04-28 PROCEDURE — 99999 PR PBB SHADOW E&M-EST. PATIENT-LVL II: CPT | Mod: PBBFAC,,, | Performed by: OPHTHALMOLOGY

## 2017-04-28 PROCEDURE — 92012 INTRM OPH EXAM EST PATIENT: CPT | Mod: S$GLB,,, | Performed by: OPHTHALMOLOGY

## 2017-04-28 NOTE — PROGRESS NOTES
"HPI     DLS: 2/24/17    Pt c/o OD itchy and pressure sensation but was blood shot red a couple of   days ago "My eye just felt like something was in it."    Meds: T 1/2 GFS qam ou             Sterile Original Eye drops (for redness reliever and irritated   eyes)    1. Posterior vitreous detachment, bilateral  2. Glaucoma due to combination of mechanisms, both eyes  3. Nuclear sclerosis, left  4. Steroid responders borderline glaucoma, bilateral  5. Pseudophakia, right eye       Last edited by Nivia Cox on 4/28/2017  8:46 AM.         Assessment /Plan     For exam results, see Encounter Report.    Glaucoma due to combination of mechanisms - Both Eyes    Posterior vitreous detachment, bilateral    Nuclear sclerosis, left    Steroid responders borderline glaucoma, bilateral    Refraction error    Pseudophakia of right eye    Allergic conjunctivitis, right      PT IS TRANSFER [facility]G FROM [facility] TO [place] - PHOTO file is no in  [facility]    1. Mixed mechanism Glaucoma   H/O narrow angles - S/P PI's, + residual OHT   First HVF 1998   First photos 1999   Former [facility] , retired for a few years and went back to work as a    (mom with same problem with narrow angles and residual OHT)     Family history + mom - Narrow angles with residual OHT   Glaucoma meds - timolol gfs ou   H/O adverse rxn to glaucoma drops latanoprost - eye irritation  LASERS PI's ou   GLAUCOMA SURGERIES none   OTHER EYE SURGERIES - PC IOL OD 12/17/2014 - [facility]  CDR 0.4/0.4   Tbase 23-36 / 23-35 ou   Tmax 36/35   Ttarget 30/30  HVF 13 test 24-2 ss ou 1996 to 2014 ([facility])            3 SWAP test 2010 to 2012 - Full ou            ( repeat HVF from 8/2013 to 9/2013 - new SAD od confirmed)            [facility] VF's - 1 test 2016 to 2016 - SNS /INS od // full os   Gonio +3 ou S/P PI's   /618   OCT 3 test 2005 -  2014 ([facility]) - RNFL - nl od/ nl os   HRT - 4 test at [facility] - 2014 to 2016 -MR -  nl od // nl os " /// CDR 0.46 od // 0.47 os  - older test at [facility]  Disc photos 1999, 2004, 2013 - slides // 5/13/2010 - [facility]     - Ttoday  24// 21  - Test done today - HVF/OCT/DFE    2. PVD ou - with dense vitreous veil OD Saw [facility] 4/23/2012     3. Vitreous opacity OD     4. Steroid responder - had an injection / ? Spironolactone / aldactone - not currently on any steroids    5. Refractive error   Was bilateral hyperope / presbyope   Is anisopmetropic post CE od    Glasses from [facility] and doing well     6. H/O eyelid papilloma Left - S/P removal - [facility]     7. Pseudophakia od   PC IOL od 12/17/2014 - SN60wf 20.5   Had a large myopic shift pre surgery from +1.00 to -3.00    8.  I see her father in law - he has glaucoma - and I did his cataracts    I see her mother - she has similar issues to this patient     Plan   MMG  / OHT ou  S/P PI's ou   ? Early SAD changes od  IOP stable on timolol - tolerating well   Intolerant to latanoprost - red irritated eye    Cont timolol gfs ou q am     F/U 6 months with  HRT/Gonio - [place] - update flow sheet with todays  VF and OCT - 2/23/2017     Pt is going to try CTL's // ? Monovision with dr. June       4/27/2016 - [facility] - red eye / irritated - OD   [t presents with slight red / irritated right eye   Has some conjunctival in growth towards her cataract incision - almost like a penguecula /pterygium   Slightly irritated   Try 2 week break from timolol gfs   Add pres free AT's qid   Ok to use szaditor or alaway allergy drops     Keep reugula F/U - 4 months - [facility] - with HRT / gonio

## 2017-05-01 DIAGNOSIS — N95.1 MENOPAUSAL SYMPTOMS: ICD-10-CM

## 2017-05-01 DIAGNOSIS — N95.2 VAGINAL ATROPHY: Primary | ICD-10-CM

## 2017-05-01 RX ORDER — ESTRADIOL 0.07 MG/D
1 FILM, EXTENDED RELEASE TRANSDERMAL
Qty: 8 PATCH | Refills: 11 | Status: SHIPPED | OUTPATIENT
Start: 2017-05-01 | End: 2017-09-12

## 2017-05-01 NOTE — TELEPHONE ENCOUNTER
Per Dr. Gerber Trinidad dot .75 to be sent to patient's pharmacy, if patient does not notice improvement.  medication can be increased.

## 2017-05-02 NOTE — PROGRESS NOTES
In general better.  Pain far less though intermittent.  Occasional swelling of hemorrhoids with difficult BM    Exam  Perianal swelling.  No thrombosis.    Posterior fissure - less deep  DESTINEY poorly tolerated.  No mass    Assessment  Anal fissure improved  Hemorrhoids    Recommend  Topical cream prn  High fiber diet - 25 grams per day.  Emphasis on whole grain breads such as double fiber wheat bread and high fiber cereals and bars.    Drink 8 glasses of water per day 64 - 96 oz per day  Fiber supplements such as KONSYL, METAMUCIL can be taken 1-2 x per day  Regular exercise - 30 min brisk walking 5 days per week  OV 6 weeks for follow up.

## 2017-06-22 ENCOUNTER — HOSPITAL ENCOUNTER (OUTPATIENT)
Dept: RADIOLOGY | Facility: HOSPITAL | Age: 52
Discharge: HOME | End: 2017-06-22
Attending: OBSTETRICS & GYNECOLOGY
Payer: COMMERCIAL

## 2017-06-22 VITALS — HEIGHT: 62 IN | BODY MASS INDEX: 21.35 KG/M2 | WEIGHT: 116 LBS

## 2017-06-22 DIAGNOSIS — Z12.39 BREAST CANCER SCREENING: ICD-10-CM

## 2017-06-22 DIAGNOSIS — Z12.31 VISIT FOR SCREENING MAMMOGRAM: ICD-10-CM

## 2017-06-22 PROCEDURE — 77067 SCR MAMMO BI INCL CAD: CPT | Mod: 26,,, | Performed by: RADIOLOGY

## 2017-06-22 PROCEDURE — 77067 SCR MAMMO BI INCL CAD: CPT | Mod: TC

## 2017-06-22 PROCEDURE — 77063 BREAST TOMOSYNTHESIS BI: CPT | Mod: 26,,, | Performed by: RADIOLOGY

## 2017-07-14 ENCOUNTER — TELEPHONE (OUTPATIENT)
Dept: NEUROSURGERY | Facility: CLINIC | Age: 52
End: 2017-07-14

## 2017-07-14 ENCOUNTER — HOSPITAL ENCOUNTER (OUTPATIENT)
Facility: HOSPITAL | Age: 52
Discharge: HOME | End: 2017-07-15
Attending: EMERGENCY MEDICINE | Admitting: EMERGENCY MEDICINE
Payer: COMMERCIAL

## 2017-07-14 ENCOUNTER — NURSE TRIAGE (OUTPATIENT)
Dept: ADMINISTRATIVE | Facility: CLINIC | Age: 52
End: 2017-07-14

## 2017-07-14 DIAGNOSIS — R56.9 SEIZURE: ICD-10-CM

## 2017-07-14 PROBLEM — E87.1 HYPONATREMIA: Status: ACTIVE | Noted: 2017-07-14

## 2017-07-14 PROBLEM — E83.39 HYPOPHOSPHATEMIA: Status: ACTIVE | Noted: 2017-07-14

## 2017-07-14 LAB
ALBUMIN SERPL BCP-MCNC: 3.7 G/DL
ALP SERPL-CCNC: 58 U/L
ALT SERPL W/O P-5'-P-CCNC: 15 U/L
AMPHET+METHAMPHET UR QL: NEGATIVE
ANION GAP SERPL CALC-SCNC: 9 MMOL/L
APTT BLDCRRT: 21.7 SEC
AST SERPL-CCNC: 25 U/L
BACTERIA #/AREA URNS AUTO: NORMAL /HPF
BARBITURATES UR QL SCN>200 NG/ML: NEGATIVE
BASOPHILS # BLD AUTO: 0.02 K/UL
BASOPHILS NFR BLD: 0.2 %
BENZODIAZ UR QL SCN>200 NG/ML: NEGATIVE
BILIRUB SERPL-MCNC: 0.6 MG/DL
BILIRUB UR QL STRIP: NEGATIVE
BUN SERPL-MCNC: 15 MG/DL
BZE UR QL SCN: NEGATIVE
CALCIUM SERPL-MCNC: 9 MG/DL
CANNABINOIDS UR QL SCN: NEGATIVE
CHLORIDE SERPL-SCNC: 98 MMOL/L
CLARITY UR REFRACT.AUTO: CLEAR
CO2 SERPL-SCNC: 24 MMOL/L
COLOR UR AUTO: ABNORMAL
CREAT SERPL-MCNC: 0.9 MG/DL
CREAT UR-MCNC: 38 MG/DL
DIFFERENTIAL METHOD: ABNORMAL
EOSINOPHIL # BLD AUTO: 0 K/UL
EOSINOPHIL NFR BLD: 0 %
ERYTHROCYTE [DISTWIDTH] IN BLOOD BY AUTOMATED COUNT: 12.6 %
EST. GFR  (AFRICAN AMERICAN): >60 ML/MIN/1.73 M^2
EST. GFR  (NON AFRICAN AMERICAN): >60 ML/MIN/1.73 M^2
ETHANOL UR-MCNC: <10 MG/DL
FOLATE SERPL-MCNC: 17.9 NG/ML
GLUCOSE SERPL-MCNC: 106 MG/DL
GLUCOSE UR QL STRIP: NEGATIVE
HCT VFR BLD AUTO: 34.4 %
HGB BLD-MCNC: 12.1 G/DL
HGB UR QL STRIP: ABNORMAL
INR PPP: 1
KETONES UR QL STRIP: NEGATIVE
LEUKOCYTE ESTERASE UR QL STRIP: NEGATIVE
LYMPHOCYTES # BLD AUTO: 1 K/UL
LYMPHOCYTES NFR BLD: 8.8 %
MAGNESIUM SERPL-MCNC: 2.1 MG/DL
MCH RBC QN AUTO: 28.2 PG
MCHC RBC AUTO-ENTMCNC: 35.2 %
MCV RBC AUTO: 80 FL
METHADONE UR QL SCN>300 NG/ML: NEGATIVE
MICROSCOPIC COMMENT: NORMAL
MONOCYTES # BLD AUTO: 0.8 K/UL
MONOCYTES NFR BLD: 7 %
NEUTROPHILS # BLD AUTO: 9.8 K/UL
NEUTROPHILS NFR BLD: 83.8 %
NITRITE UR QL STRIP: NEGATIVE
OPIATES UR QL SCN: NEGATIVE
PCP UR QL SCN>25 NG/ML: NEGATIVE
PH UR STRIP: 5 [PH] (ref 5–8)
PHOSPHATE SERPL-MCNC: 2.6 MG/DL
PLATELET # BLD AUTO: 228 K/UL
PMV BLD AUTO: 9.1 FL
POTASSIUM SERPL-SCNC: 3.5 MMOL/L
PROT SERPL-MCNC: 7.2 G/DL
PROT UR QL STRIP: NEGATIVE
PROTHROMBIN TIME: 10.6 SEC
RBC # BLD AUTO: 4.29 M/UL
RBC #/AREA URNS AUTO: 0 /HPF (ref 0–4)
SODIUM SERPL-SCNC: 131 MMOL/L
SP GR UR STRIP: 1 (ref 1–1.03)
SQUAMOUS #/AREA URNS AUTO: 2 /HPF
TOXICOLOGY INFORMATION: NORMAL
TSH SERPL DL<=0.005 MIU/L-ACNC: 1.32 UIU/ML
URN SPEC COLLECT METH UR: ABNORMAL
UROBILINOGEN UR STRIP-ACNC: NEGATIVE EU/DL
VIT B12 SERPL-MCNC: 437 PG/ML
WBC # BLD AUTO: 11.69 K/UL
WBC #/AREA URNS AUTO: 1 /HPF (ref 0–5)

## 2017-07-14 PROCEDURE — 93005 ELECTROCARDIOGRAM TRACING: CPT

## 2017-07-14 PROCEDURE — 81001 URINALYSIS AUTO W/SCOPE: CPT

## 2017-07-14 PROCEDURE — 85730 THROMBOPLASTIN TIME PARTIAL: CPT

## 2017-07-14 PROCEDURE — 80053 COMPREHEN METABOLIC PANEL: CPT

## 2017-07-14 PROCEDURE — 99219 PR INITIAL OBSERVATION CARE,LEVL II: CPT | Mod: ,,, | Performed by: HOSPITALIST

## 2017-07-14 PROCEDURE — 63600175 PHARM REV CODE 636 W HCPCS: Performed by: EMERGENCY MEDICINE

## 2017-07-14 PROCEDURE — 96365 THER/PROPH/DIAG IV INF INIT: CPT

## 2017-07-14 PROCEDURE — G0378 HOSPITAL OBSERVATION PER HR: HCPCS

## 2017-07-14 PROCEDURE — 84100 ASSAY OF PHOSPHORUS: CPT

## 2017-07-14 PROCEDURE — 93010 ELECTROCARDIOGRAM REPORT: CPT | Mod: ,,, | Performed by: INTERNAL MEDICINE

## 2017-07-14 PROCEDURE — 82746 ASSAY OF FOLIC ACID SERUM: CPT

## 2017-07-14 PROCEDURE — 25000003 PHARM REV CODE 250: Performed by: HOSPITALIST

## 2017-07-14 PROCEDURE — 99220 PR INITIAL OBSERVATION CARE,LEVL III: CPT | Mod: ,,, | Performed by: PSYCHIATRY & NEUROLOGY

## 2017-07-14 PROCEDURE — 25500020 PHARM REV CODE 255: Performed by: EMERGENCY MEDICINE

## 2017-07-14 PROCEDURE — A9585 GADOBUTROL INJECTION: HCPCS | Performed by: EMERGENCY MEDICINE

## 2017-07-14 PROCEDURE — 25000003 PHARM REV CODE 250: Performed by: EMERGENCY MEDICINE

## 2017-07-14 PROCEDURE — 80307 DRUG TEST PRSMV CHEM ANLYZR: CPT

## 2017-07-14 PROCEDURE — 99284 EMERGENCY DEPT VISIT MOD MDM: CPT | Mod: 25

## 2017-07-14 PROCEDURE — 84443 ASSAY THYROID STIM HORMONE: CPT

## 2017-07-14 PROCEDURE — 83735 ASSAY OF MAGNESIUM: CPT

## 2017-07-14 PROCEDURE — 85610 PROTHROMBIN TIME: CPT

## 2017-07-14 PROCEDURE — 85025 COMPLETE CBC W/AUTO DIFF WBC: CPT

## 2017-07-14 PROCEDURE — 99285 EMERGENCY DEPT VISIT HI MDM: CPT | Mod: ,,, | Performed by: EMERGENCY MEDICINE

## 2017-07-14 PROCEDURE — 82607 VITAMIN B-12: CPT

## 2017-07-14 RX ORDER — LEVETIRACETAM 500 MG/1
500 TABLET ORAL 2 TIMES DAILY
Status: DISCONTINUED | OUTPATIENT
Start: 2017-07-14 | End: 2017-07-15 | Stop reason: HOSPADM

## 2017-07-14 RX ORDER — SODIUM,POTASSIUM PHOSPHATES 280-250MG
1 POWDER IN PACKET (EA) ORAL
Status: COMPLETED | OUTPATIENT
Start: 2017-07-14 | End: 2017-07-15

## 2017-07-14 RX ORDER — LORAZEPAM 2 MG/ML
1 INJECTION INTRAMUSCULAR EVERY 4 HOURS PRN
Status: DISCONTINUED | OUTPATIENT
Start: 2017-07-14 | End: 2017-07-15 | Stop reason: HOSPADM

## 2017-07-14 RX ORDER — LORAZEPAM 2 MG/ML
2 INJECTION INTRAMUSCULAR EVERY 4 HOURS PRN
Status: DISCONTINUED | OUTPATIENT
Start: 2017-07-14 | End: 2017-07-14

## 2017-07-14 RX ORDER — TIMOLOL MALEATE 5 MG/ML
1 SOLUTION/ DROPS OPHTHALMIC EVERY MORNING
Status: DISCONTINUED | OUTPATIENT
Start: 2017-07-15 | End: 2017-07-15 | Stop reason: HOSPADM

## 2017-07-14 RX ORDER — LEVETIRACETAM 10 MG/ML
1000 INJECTION INTRAVASCULAR
Status: COMPLETED | OUTPATIENT
Start: 2017-07-14 | End: 2017-07-14

## 2017-07-14 RX ORDER — GADOBUTROL 604.72 MG/ML
5 INJECTION INTRAVENOUS
Status: COMPLETED | OUTPATIENT
Start: 2017-07-14 | End: 2017-07-14

## 2017-07-14 RX ORDER — ACETAMINOPHEN 500 MG
1000 TABLET ORAL
Status: COMPLETED | OUTPATIENT
Start: 2017-07-14 | End: 2017-07-14

## 2017-07-14 RX ORDER — ACETAMINOPHEN 325 MG/1
650 TABLET ORAL EVERY 6 HOURS PRN
Status: DISCONTINUED | OUTPATIENT
Start: 2017-07-14 | End: 2017-07-15 | Stop reason: HOSPADM

## 2017-07-14 RX ADMIN — POTASSIUM & SODIUM PHOSPHATES POWDER PACK 280-160-250 MG 1 PACKET: 280-160-250 PACK at 06:07

## 2017-07-14 RX ADMIN — POTASSIUM & SODIUM PHOSPHATES POWDER PACK 280-160-250 MG 1 PACKET: 280-160-250 PACK at 09:07

## 2017-07-14 RX ADMIN — LEVETIRACETAM 1000 MG: 10 INJECTION INTRAVENOUS at 11:07

## 2017-07-14 RX ADMIN — GADOBUTROL 5 ML: 604.72 INJECTION INTRAVENOUS at 04:07

## 2017-07-14 RX ADMIN — ACETAMINOPHEN 1000 MG: 500 TABLET ORAL at 01:07

## 2017-07-14 NOTE — ED TRIAGE NOTES
"Patient came home 4 hours ago, seizure like activity. No prior seizure activity, h/o brain tumor. Spouse states 1 1/2 min shaking, foaming at the mouth, he rolled her on her side, she was "completely unresponsive" and after approx 5 min, began moving left arm. Patient currently c/o severe headache and dizziness.  "

## 2017-07-14 NOTE — ED NOTES
Transport called @ [phone]. Spoke with Malia. Pt will go directly from MRI to assigned room 923B. Pt's family in ER 19 notified and assisted with directions to 9th floor. Pt belongings sent with pt's mother in pt belongings bag

## 2017-07-14 NOTE — ED PROVIDER NOTES
"Encounter Date: 7/14/2017    SCRIBE #1 NOTE: I, Zamzam Bazan, am scribing for, and in the presence of,  Dr. Hughes. I have scribed the entire note.       History     Chief Complaint   Patient presents with    Seizures     seizure this am at approx 0550     Time patient was seen by the provider: 10:54 AM      The patient is a 52 y.o. female with hx of: vertigo, HLD, and hemangioma followed by Dr. Matson that presents to the ED with a complaint of seizure which occurred 5 hours ago. Patient's spouse observed that while patient was laying in bed, she started experiencing tremors of her lower extremities and body while upper extremities were clenched and still. She was foaming at the mouth and unresponsive to any stimuli after  rolled her onto her side. Patient cannot remember what occurred this morning, and  reports that she was confused for 25-30 minutes. She reports feeling "kind of weak" for the last few days and mentions having a headache last night, which is not typical for her, for which she took 2 aspirin prior to going to bed. She denies any other pain, difficulty sleeping, recent routine changes, or new medications recently. Patient denies a history of seizures in the past.             Review of patient's allergies indicates:   Allergen Reactions    Latanoprost Itching     Red itchy eyes     Past Medical History:   Diagnosis Date    Cataract     Glaucoma     Hemangioma     cavernous    History of acne 2004    dr lopez prescribed accutane    Hyperlipidemia     Venous angioma of brain     Vertigo      Past Surgical History:   Procedure Laterality Date    CATARACT EXTRACTION W/  INTRAOCULAR LENS IMPLANT Right 12/17/14    Dr Camacho     HYSTERECTOMY       Family History   Problem Relation Age of Onset    Fibroids Mother     Migraines Mother     Glaucoma Mother     Heart disease Father     Depression Brother     No Known Problems Sister     Diabetes Maternal Grandmother  "    Glaucoma Maternal Grandmother     Cancer Maternal Aunt      breast    Breast cancer Maternal Aunt     No Known Problems Maternal Uncle     No Known Problems Paternal Aunt     No Known Problems Paternal Uncle     No Known Problems Maternal Grandfather     No Known Problems Paternal Grandmother     No Known Problems Paternal Grandfather     Macular degeneration Neg Hx     Blindness Neg Hx     Melanoma Neg Hx     Psoriasis Neg Hx     Lupus Neg Hx     Eczema Neg Hx     Acne Neg Hx     Cataracts Neg Hx     Amblyopia Neg Hx     Retinal detachment Neg Hx     Strabismus Neg Hx     Stroke Neg Hx     Thyroid disease Neg Hx     Hypertension Neg Hx      Social History   Substance Use Topics    Smoking status: Never Smoker    Smokeless tobacco: Never Used    Alcohol use No     Review of Systems   Constitutional: Negative for activity change, appetite change and chills.   HENT: Negative for congestion.    Eyes: Negative for redness.   Respiratory: Negative for cough and shortness of breath.    Cardiovascular: Negative for chest pain.   Gastrointestinal: Negative for abdominal pain and vomiting.   Genitourinary: Negative for difficulty urinating and dysuria.   Musculoskeletal: Negative for back pain and neck pain.   Skin: Negative for rash.   Neurological: Positive for seizures, weakness and headaches.   Psychiatric/Behavioral: Negative for sleep disturbance.       Physical Exam     Initial Vitals [07/14/17 1045]   BP Pulse Resp Temp SpO2   131/67 87 17 98.9 °F (37.2 °C) 100 %      MAP       88.33         Physical Exam    Nursing note and vitals reviewed.  Constitutional: She appears well-developed and well-nourished. She is not diaphoretic. No distress.   HENT:   Head: Normocephalic and atraumatic.   Mouth/Throat: Oropharynx is clear and moist.   Eyes: Conjunctivae and EOM are normal. Pupils are equal, round, and reactive to light.   Neck: Normal range of motion. Neck supple. No JVD present.    Cardiovascular: Normal rate, regular rhythm and normal heart sounds.   No murmur heard.  Pulmonary/Chest: Breath sounds normal. No respiratory distress. She has no wheezes. She has no rhonchi. She has no rales.   Abdominal: Soft. Bowel sounds are normal. She exhibits no distension and no mass. There is no tenderness. There is no rebound and no guarding.   Musculoskeletal: Normal range of motion. She exhibits no edema or tenderness.   Neurological: She is alert and oriented to person, place, and time. She has normal strength. No cranial nerve deficit or sensory deficit.   Skin: Skin is warm and dry. No rash noted.   Psychiatric: She has a normal mood and affect.         ED Course   Procedures  Labs Reviewed   CBC W/ AUTO DIFFERENTIAL - Abnormal; Notable for the following:        Result Value    Hematocrit 34.4 (*)     MCV 80 (*)     MPV 9.1 (*)     Gran # 9.8 (*)     Gran% 83.8 (*)     Lymph% 8.8 (*)     All other components within normal limits   COMPREHENSIVE METABOLIC PANEL - Abnormal; Notable for the following:     Sodium 131 (*)     All other components within normal limits   URINALYSIS - Abnormal; Notable for the following:     Occult Blood UA 1+ (*)     All other components within normal limits   PHOSPHORUS - Abnormal; Notable for the following:     Phosphorus 2.6 (*)     All other components within normal limits   APTT   PROTIME-INR   MAGNESIUM   URINALYSIS MICROSCOPIC   TOXICOLOGY SCREEN, URINE, RANDOM (COMPLIANCE)   TSH   VITAMIN B12   FOLATE   VITAMIN B12    Narrative:     ADD-ON VITAMIN B12 #[id number]; FOLATE #[id number] PER MARILEE TAN MD 16:09  07/14/2017    FOLATE   TOXICOLOGY SCREEN, URINE, RANDOM (COMPLIANCE)     EKG Readings: (Independently Interpreted)   Rhythm: Normal Sinus Rhythm. Heart Rate: 71.   No ischemic changes        X-Rays:   Independently Interpreted Readings:   Chest X-Ray: Normal heart and lungs   Head CT: No acute bleed     Medical Decision Making:   History:   Old Medical  Records: I decided to obtain old medical records.  Initial Assessment:   Patient with seizure and history of hemangioma. Will obtain head CT and basic labs. Will give initial dose of Keppra while we do a work up.   Independently Interpreted Test(s):   I have ordered and independently interpreted X-rays - see prior notes.  I have ordered and independently interpreted EKG Reading(s) - see prior notes  Clinical Tests:   Lab Tests: Ordered and Reviewed  Radiological Study: Ordered and Reviewed  Other:   I have discussed this case with another health care provider.            Scribe Attestation:   Scribe #1: I performed the above scribed service and the documentation accurately describes the services I performed. I attest to the accuracy of the note.    Attending Attestation:           Physician Attestation for Scribe:  Physician Attestation Statement for Scribe #1: I, Dr. Hughes, reviewed documentation, as scribed by Zamzam Bazan in my presence, and it is both accurate and complete.                 ED Course     Clinical Impression:   The encounter diagnosis was Seizure.    Disposition:   Disposition: Placed in Observation                        Akbar Hughes MD  07/14/17 5045

## 2017-07-14 NOTE — TELEPHONE ENCOUNTER
SW PTS , DISCUSSED SEIZURE LIKE EPISODE, CONVULSIONS & FOAMING AT THE MOUTH. PT REFUSED TO GO TO THE ED. ADVISED TO CALL DR DENNEY (PTS PCP) AND DISCUSS WITH HER. IF DR DENNEY FEELS THAT WE NEED TO SEE HER IN NEUROSURGERY, SHE WOULD NEED TO ORDER AM MRI BRAIN PRIOR TO APPT. SHE WAS LAST SEEN FOR A CAVERNOUS MALFORMATION AND WITH NO SIGN OF ENLARGEMENT IN 6 YRS. PT WAS DC FROM [facility]. IF DR DENNEY WANTS HER TO SEE DR TREJO WITH A NEW MRI THE PTS  CAN CALL ME BACK. HE UNDERSTOOD.

## 2017-07-14 NOTE — HPI
"Per ED: The patient is a 52 y.o. female with hx of: vertigo, HLD, and hemangioma followed by Dr. Matson that presents to the ED with a complaint of seizure which occurred 5 hours ago. Patient's spouse observed that while patient was laying in bed, she started experiencing tremors of her lower extremities and body while upper extremities were clenched and still. She was foaming at the mouth and unresponsive to any stimuli after  rolled her onto her side. Patient cannot remember what occurred this morning, and  reports that she was confused for 25-30 minutes. She reports feeling "kind of weak" for the last few days and mentions having a headache last night, which is not typical for her, for which she took 2 aspirin prior to going to bed. She denies any other pain, difficulty sleeping, recent routine changes, or new medications recently. Patient denies a history of seizures in the past.     Patient reports no prior history of seizures. Patient states that yesterday (07/13/17) afternoon she began to have a mild HA for which she took two aspirin. During this time she also states that she "felt a little dizzy". Patient denies any symptoms of syncope prior to this event. The patient states that she was asleep prior to "seizure" and that she has no recollection of anything leading up to the event. The first thing that she can remember is being on the floor with her  present. Per family the patient was profoundly confused for approximately 30 minutes following the event. The patient denies any loss of bowel or bladder function during this event. Per family the patient experienced shaking of her lower extremities and clenching of the upper extremities as well as foaming at the mouth. The event lasted approximately 2 minutes. Patient denies any fever or illness preceding this event. CT head shows motion limited examination without evidence for definite acute intercranial hemorrhage. Ill-defined hyperdense " lesion right superior medial temporal lobe compatible with known cavernoma correspond to abnormality seen on prior MRI. CXR shows no convincing evidence of intrathoracic disease identified. EKG shows normal sinus rhythm. Patient is currently awaiting MRI. Patient given Keppra 1000MG in ED.

## 2017-07-14 NOTE — TELEPHONE ENCOUNTER
Spoke to pt's  and he stated that pt had a seizure like activity this morning on 7/14/17. The episode lasted about 5 minutes with symptoms such as shaking, foaming at the mouth, and disoriented. Pt is currently awake and oriented but complaining of a major headache. Advised that PCP is out of the office today and advised  to take pt to the ED. He stated that he would like to check with pt's neurologist before bringing pt to the ED. Advised to give us a call back if having any questions or concerns. Please advise

## 2017-07-14 NOTE — H&P
[facility] Hospital Medicine  History & Physical    Patient Name: Darling Quach  MRN: [id number]  Admission Date: 7/14/2017  Attending Physician: Akbar Hughes MD   Primary Care Provider: Kimberly Ellison MD    [facility] Medicine Team: [facility] MED  Hayley Arredondo MD     Patient information was obtained from patient, relative(s), past medical records and ER records.     Subjective:     Principal Problem:Seizure    Chief Complaint:   Chief Complaint   Patient presents with    Seizures     seizure this am at approx 0550        HPI: 53 yo F w/ pmhx of glaucoma, hemangioma presented from home w/ report of seizure. Pt sedated on exam with her mother at the bedside. Pt's mother reports that around 6 AM the pt's  noticed she was foaming at the mouth and began to shake violently w/ clenching of the upper extremities. States it lasted for about 90 sec and then she became unresponsive per family until later she started to move her L arm and started to arouse. Family notes pt was confused after the episode. Denied loss of bowel/bladder or tongue biting. Per pt's mother no prior hx of seizures. Pt does report having a headache in the posterior portion of the head for the past few days which is unusual for her. Pt brought to ER. CT head showed stable cavernoma otherwise unremarkable. Pt given IV keppra x1 and neurology consulted. MRI/MRA brain ordered        Past Medical History:   Diagnosis Date    Cataract     Glaucoma     Hemangioma     cavernous    History of acne 2004    dr lopez prescribed accutane    Hyperlipidemia     Venous angioma of brain     Vertigo        Past Surgical History:   Procedure Laterality Date    CATARACT EXTRACTION W/  INTRAOCULAR LENS IMPLANT Right 12/17/14    Dr Camacho     HYSTERECTOMY         Review of patient's allergies indicates:   Allergen Reactions    Latanoprost Itching     Red itchy eyes       No current facility-administered medications on  file prior to encounter.      Current Outpatient Prescriptions on File Prior to Encounter   Medication Sig    DILTIAZEM HCL (DILTIAZEM 2% CREAM) Apply topically 3 (three) times daily. Apply topically to anal area.    estradiol (VIVELLE-DOT) 0.075 mg/24 hr Place 1 patch onto the skin twice a week.    timolol maleate 0.5% (TIMOPTIC-XE) 0.5 % SolG Place 1 drop into both eyes every morning.     Family History     Problem Relation (Age of Onset)    Breast cancer Maternal Aunt    Cancer Maternal Aunt    Depression Brother    Diabetes Maternal Grandmother    Fibroids Mother    Glaucoma Mother, Maternal Grandmother    Heart disease Father    Migraines Mother    No Known Problems Sister, Maternal Uncle, Paternal Aunt, Paternal Uncle, Maternal Grandfather, Paternal Grandmother, Paternal Grandfather        Social History Main Topics    Smoking status: Never Smoker    Smokeless tobacco: Never Used    Alcohol use No    Drug use: No    Sexual activity: Yes     Partners: Male     Birth control/ protection: Surgical      Comment: , [facility] 9/2010     Review of Systems   Constitutional: Negative for chills and fever.   Eyes: Negative.    Respiratory: Negative.    Genitourinary: Negative for difficulty urinating and dysuria.   Neurological: Positive for seizures, weakness and headaches.     Limited as pt sedated on exam. Does awaken to answer some questions   Objective:     Vital Signs (Most Recent):  Temp: 98.9 °F (37.2 °C) (07/14/17 1045)  Pulse: 61 (07/14/17 1528)  Resp: 17 (07/14/17 1528)  BP: (!) 102/55 (07/14/17 1528)  SpO2: 98 % (07/14/17 1528) Vital Signs (24h Range):  Temp:  [98.9 °F (37.2 °C)] 98.9 °F (37.2 °C)  Pulse:  [61-87] 61  Resp:  [17-18] 17  SpO2:  [98 %-100 %] 98 %  BP: (102-134)/(55-71) 102/55     Weight: 51.7 kg (114 lb)  Body mass index is 20.85 kg/m².    Physical Exam   Constitutional: She is oriented to person, place, and time. She appears well-developed. No distress.   HENT:   Head: Normocephalic  and atraumatic.   Nose: Nose normal.   Mouth/Throat: Oropharynx is clear and moist. No oropharyngeal exudate.   Eyes: Conjunctivae are normal. Pupils are equal, round, and reactive to light. Right eye exhibits no discharge. Left eye exhibits no discharge.   Neck: Normal range of motion. Neck supple.   Cardiovascular: Normal rate, regular rhythm and normal heart sounds.  Exam reveals no friction rub.    No murmur heard.  Pulmonary/Chest: Effort normal and breath sounds normal. No respiratory distress. She has no wheezes.   Abdominal: Soft. Bowel sounds are normal. She exhibits no distension. There is no tenderness.   Musculoskeletal: Normal range of motion. She exhibits no edema or deformity.   Neurological: She is oriented to person, place, and time. No cranial nerve deficit.   Sedated on exam   Skin: Skin is warm and dry.   Psychiatric: She has a normal mood and affect.   Vitals reviewed.       Significant Labs:   BMP:   Recent Labs  Lab 07/14/17  1115      *   K 3.5   CL 98   CO2 24   BUN 15   CREATININE 0.9   CALCIUM 9.0   MG 2.1     CBC:   Recent Labs  Lab 07/14/17  1115   WBC 11.69   HGB 12.1   HCT 34.4*        CMP:   Recent Labs  Lab 07/14/17  1115   *   K 3.5   CL 98   CO2 24      BUN 15   CREATININE 0.9   CALCIUM 9.0   PROT 7.2   ALBUMIN 3.7   BILITOT 0.6   ALKPHOS 58   AST 25   ALT 15   ANIONGAP 9   EGFRNONAA >60.0     Magnesium:   Recent Labs  Lab 07/14/17  1115   MG 2.1     TSH: No results for input(s): TSH in the last 4320 hours.  Urine Studies:   Recent Labs  Lab 07/14/17  1215   COLORU Straw   APPEARANCEUA Clear   PHUR 5.0   SPECGRAV 1.005   PROTEINUA Negative   GLUCUA Negative   KETONESU Negative   BILIRUBINUA Negative   OCCULTUA 1+*   NITRITE Negative   UROBILINOGEN Negative   LEUKOCYTESUR Negative   RBCUA 0   WBCUA 1   BACTERIA Rare   SQUAMEPITHEL 2     All pertinent labs within the past 24 hours have been reviewed.    Significant Imaging: CT: I have reviewed all  pertinent results/findings within the past 24 hours and my personal findings are:  no acute process. noted cavernoma- present on prior imaging  EKG: I have reviewed all pertinent results/findings within the past 24 hours and my personal findings are: NSR. no st elevation. t wave abnl- nonspecific   I have reviewed all pertinent imaging results/findings within the past 24 hours.  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 Ativan prn   Neurology consulted. Appreciate assistance            VTE Risk Mitigation         Ordered     Place LENIN hose  Until discontinued      07/14/17 1545     Medium Risk of VTE  Once      07/14/17 1406        Hayley Arredondo MD  Department of Hospital Medicine   [facility]

## 2017-07-14 NOTE — TELEPHONE ENCOUNTER
----- Message from Marcia Turner sent at 7/14/2017  8:05 AM CDT -----  Contact: griffin () @ [phone]  Would like to speak with dr kang concerning pts condition.  pls call.

## 2017-07-14 NOTE — TELEPHONE ENCOUNTER
Spoke to pt and notified pt that her PCP advised pt to go to the ED. Pt stated understanding and will go the ED

## 2017-07-14 NOTE — SUBJECTIVE & OBJECTIVE
Past Medical History:   Diagnosis Date    Cataract     Glaucoma     Hemangioma     cavernous    History of acne 2004    dr lopez prescribed accutane    Hyperlipidemia     Venous angioma of brain     Vertigo        Past Surgical History:   Procedure Laterality Date    CATARACT EXTRACTION W/  INTRAOCULAR LENS IMPLANT Right 12/17/14    Dr Camacho     HYSTERECTOMY         Review of patient's allergies indicates:   Allergen Reactions    Latanoprost Itching     Red itchy eyes       Current Neurological Medications: keppra 1000MG     No current facility-administered medications on file prior to encounter.      Current Outpatient Prescriptions on File Prior to Encounter   Medication Sig    DILTIAZEM HCL (DILTIAZEM 2% CREAM) Apply topically 3 (three) times daily. Apply topically to anal area.    estradiol (VIVELLE-DOT) 0.075 mg/24 hr Place 1 patch onto the skin twice a week.    timolol maleate 0.5% (TIMOPTIC-XE) 0.5 % SolG Place 1 drop into both eyes every morning.     Family History     Problem Relation (Age of Onset)    Breast cancer Maternal Aunt    Cancer Maternal Aunt    Depression Brother    Diabetes Maternal Grandmother    Fibroids Mother    Glaucoma Mother, Maternal Grandmother    Heart disease Father    Migraines Mother    No Known Problems Sister, Maternal Uncle, Paternal Aunt, Paternal Uncle, Maternal Grandfather, Paternal Grandmother, Paternal Grandfather        Social History Main Topics    Smoking status: Never Smoker    Smokeless tobacco: Never Used    Alcohol use No    Drug use: No    Sexual activity: Yes     Partners: Male     Birth control/ protection: Surgical      Comment: , [facility] 9/2010     Review of Systems   Constitutional: Negative for chills and fever.   Eyes: Negative for photophobia and visual disturbance.   Respiratory: Negative for cough, chest tightness and shortness of breath.    Cardiovascular: Negative for chest pain and palpitations.   Gastrointestinal: Negative  for abdominal pain, constipation, diarrhea and nausea.   Musculoskeletal: Negative for neck pain and neck stiffness.   Neurological: Positive for seizures, weakness and headaches. Negative for dizziness, syncope, facial asymmetry, speech difficulty, light-headedness and numbness.     Objective:     Vital Signs (Most Recent):  Temp: 98.9 °F (37.2 °C) (07/14/17 1045)  Pulse: 69 (07/14/17 1340)  Resp: 17 (07/14/17 1340)  BP: 117/60 (07/14/17 1340)  SpO2: 100 % (07/14/17 1340) Vital Signs (24h Range):  Temp:  [98.9 °F (37.2 °C)] 98.9 °F (37.2 °C)  Pulse:  [69-87] 69  Resp:  [17-18] 17  SpO2:  [100 %] 100 %  BP: (117-134)/(60-71) 117/60     Weight: 51.7 kg (114 lb)  Body mass index is 20.85 kg/m².    Physical Exam   Constitutional: She is oriented to person, place, and time. She appears well-developed and well-nourished. No distress.   HENT:   Head: Normocephalic and atraumatic.   Eyes: EOM are normal. Pupils are equal, round, and reactive to light.   Cardiovascular: Normal rate and normal heart sounds.    Pulmonary/Chest: Effort normal and breath sounds normal.   Abdominal: Soft. There is no tenderness.   Neurological: She is alert and oriented to person, place, and time. She has normal strength and normal reflexes. She displays normal reflexes. No cranial nerve deficit. She exhibits normal muscle tone.   Reflex Scores:       Bicep reflexes are 2+ on the right side and 2+ on the left side.       Brachioradialis reflexes are 2+ on the right side and 2+ on the left side.       Patellar reflexes are 2+ on the right side and 2+ on the left side.       Achilles reflexes are 2+ on the right side and 2+ on the left side.  Psychiatric: She has a normal mood and affect.       NEUROLOGICAL EXAMINATION:     MENTAL STATUS   Oriented to person, place, and time.   Level of consciousness: alert    CRANIAL NERVES   Cranial nerves II through XII intact.     CN III, IV, VI   Pupils are equal, round, and reactive to light.  Extraocular  motions are normal.     MOTOR EXAM   Muscle bulk: normal  Overall muscle tone: normal    Strength   Strength 5/5 throughout.     REFLEXES     Reflexes   Right brachioradialis: 2+  Left brachioradialis: 2+  Right biceps: 2+  Left biceps: 2+  Right patellar: 2+  Left patellar: 2+  Right achilles: 2+  Left achilles: 2+    SENSORY EXAM   Light touch normal.       Significant Labs:   CBC:   Recent Labs  Lab 07/14/17  1115   WBC 11.69   HGB 12.1   HCT 34.4*        CMP:   Recent Labs  Lab 07/14/17  1115      *   K 3.5   CL 98   CO2 24   BUN 15   CREATININE 0.9   CALCIUM 9.0   MG 2.1   PROT 7.2   ALBUMIN 3.7   BILITOT 0.6   ALKPHOS 58   AST 25   ALT 15   ANIONGAP 9   EGFRNONAA >60.0     Urine Studies:   Recent Labs  Lab 07/14/17  1215   COLORU Straw   APPEARANCEUA Clear   PHUR 5.0   SPECGRAV 1.005   PROTEINUA Negative   GLUCUA Negative   KETONESU Negative   BILIRUBINUA Negative   OCCULTUA 1+*   NITRITE Negative   UROBILINOGEN Negative   LEUKOCYTESUR Negative   RBCUA 0   WBCUA 1   BACTERIA Rare   SQUAMEPITHEL 2     All pertinent lab results from the past 24 hours have been reviewed.    Significant Imaging: I have reviewed all pertinent imaging results/findings within the past 24 hours.

## 2017-07-14 NOTE — TELEPHONE ENCOUNTER
called for patient- reports seizure like activity this morning. The episode lasted about 5 minutes in which the patient went from shaking to limp. He also reports foaming at the mouth. Pt currently is awake and oriented. She is complaining of a headache.     is not wanting to go to the ER- he would like to speak to the patient's PCP    Please contact  to advise at number listed below    [phone]    Reason for Disposition   Severe headache    (Note: most seizure victims will have a headache after a seizure)    Protocols used: ST [id number]

## 2017-07-14 NOTE — HPI
53 yo F w/ pmhx of glaucoma, hemangioma presented from home w/ report of seizure. Pt sedated on exam with her mother at the bedside. Pt's mother reports that around 6 AM the pt's  noticed she was foaming at the mouth and began to shake violently w/ clenching of the upper extremities. States it lasted for about 90 sec and then she became unresponsive per family until later she started to move her L arm and started to arouse. Family notes pt was confused after the episode. Denied loss of bowel/bladder or tongue biting. Per pt's mother no prior hx of seizures. Pt does report having a headache in the posterior portion of the head for the past few days which is unusual for her. Pt brought to ER. CT head showed stable cavernoma otherwise unremarkable. Pt given IV keppra x1 and neurology consulted. MRI/MRA brain ordered

## 2017-07-14 NOTE — ASSESSMENT & PLAN NOTE
Unclear etiology at this time  Check b12, folate, tsh, f/u electrolytes, urine tox   MRI/MRA brain ordered  Neuro checks q4h   Seizure precautions. Ativan prn   Neurology consulted. Appreciate assistance

## 2017-07-14 NOTE — SUBJECTIVE & OBJECTIVE
Past Medical History:   Diagnosis Date    Cataract     Glaucoma     Hemangioma     cavernous    History of acne 2004    dr lopez prescribed accutane    Hyperlipidemia     Venous angioma of brain     Vertigo        Past Surgical History:   Procedure Laterality Date    CATARACT EXTRACTION W/  INTRAOCULAR LENS IMPLANT Right 12/17/14    Dr Camacho     HYSTERECTOMY         Review of patient's allergies indicates:   Allergen Reactions    Latanoprost Itching     Red itchy eyes       No current facility-administered medications on file prior to encounter.      Current Outpatient Prescriptions on File Prior to Encounter   Medication Sig    DILTIAZEM HCL (DILTIAZEM 2% CREAM) Apply topically 3 (three) times daily. Apply topically to anal area.    estradiol (VIVELLE-DOT) 0.075 mg/24 hr Place 1 patch onto the skin twice a week.    timolol maleate 0.5% (TIMOPTIC-XE) 0.5 % SolG Place 1 drop into both eyes every morning.     Family History     Problem Relation (Age of Onset)    Breast cancer Maternal Aunt    Cancer Maternal Aunt    Depression Brother    Diabetes Maternal Grandmother    Fibroids Mother    Glaucoma Mother, Maternal Grandmother    Heart disease Father    Migraines Mother    No Known Problems Sister, Maternal Uncle, Paternal Aunt, Paternal Uncle, Maternal Grandfather, Paternal Grandmother, Paternal Grandfather        Social History Main Topics    Smoking status: Never Smoker    Smokeless tobacco: Never Used    Alcohol use No    Drug use: No    Sexual activity: Yes     Partners: Male     Birth control/ protection: Surgical      Comment: , DL 9/2010     Review of Systems   Constitutional: Negative for chills and fever.   Eyes: Negative.    Respiratory: Negative.    Genitourinary: Negative for difficulty urinating and dysuria.   Neurological: Positive for seizures, weakness and headaches.     Limited as pt sedated on exam. Does awaken to answer some questions   Objective:     Vital Signs  (Most Recent):  Temp: 98.9 °F (37.2 °C) (07/14/17 1045)  Pulse: 61 (07/14/17 1528)  Resp: 17 (07/14/17 1528)  BP: (!) 102/55 (07/14/17 1528)  SpO2: 98 % (07/14/17 1528) Vital Signs (24h Range):  Temp:  [98.9 °F (37.2 °C)] 98.9 °F (37.2 °C)  Pulse:  [61-87] 61  Resp:  [17-18] 17  SpO2:  [98 %-100 %] 98 %  BP: (102-134)/(55-71) 102/55     Weight: 51.7 kg (114 lb)  Body mass index is 20.85 kg/m².    Physical Exam   Constitutional: She is oriented to person, place, and time. She appears well-developed. No distress.   HENT:   Head: Normocephalic and atraumatic.   Nose: Nose normal.   Mouth/Throat: Oropharynx is clear and moist. No oropharyngeal exudate.   Eyes: Conjunctivae are normal. Pupils are equal, round, and reactive to light. Right eye exhibits no discharge. Left eye exhibits no discharge.   Neck: Normal range of motion. Neck supple.   Cardiovascular: Normal rate, regular rhythm and normal heart sounds.  Exam reveals no friction rub.    No murmur heard.  Pulmonary/Chest: Effort normal and breath sounds normal. No respiratory distress. She has no wheezes.   Abdominal: Soft. Bowel sounds are normal. She exhibits no distension. There is no tenderness.   Musculoskeletal: Normal range of motion. She exhibits no edema or deformity.   Neurological: She is oriented to person, place, and time. No cranial nerve deficit.   Sedated on exam   Skin: Skin is warm and dry.   Psychiatric: She has a normal mood and affect.   Vitals reviewed.       Significant Labs:   BMP:   Recent Labs  Lab 07/14/17  1115      *   K 3.5   CL 98   CO2 24   BUN 15   CREATININE 0.9   CALCIUM 9.0   MG 2.1     CBC:   Recent Labs  Lab 07/14/17  1115   WBC 11.69   HGB 12.1   HCT 34.4*        CMP:   Recent Labs  Lab 07/14/17  1115   *   K 3.5   CL 98   CO2 24      BUN 15   CREATININE 0.9   CALCIUM 9.0   PROT 7.2   ALBUMIN 3.7   BILITOT 0.6   ALKPHOS 58   AST 25   ALT 15   ANIONGAP 9   EGFRNONAA >60.0     Magnesium:   Recent  Labs  Lab 07/14/17  1115   MG 2.1     TSH: No results for input(s): TSH in the last 4320 hours.  Urine Studies:   Recent Labs  Lab 07/14/17  1215   COLORU Straw   APPEARANCEUA Clear   PHUR 5.0   SPECGRAV 1.005   PROTEINUA Negative   GLUCUA Negative   KETONESU Negative   BILIRUBINUA Negative   OCCULTUA 1+*   NITRITE Negative   UROBILINOGEN Negative   LEUKOCYTESUR Negative   RBCUA 0   WBCUA 1   BACTERIA Rare   SQUAMEPITHEL 2     All pertinent labs within the past 24 hours have been reviewed.    Significant Imaging: CT: I have reviewed all pertinent results/findings within the past 24 hours and my personal findings are:  no acute process. noted cavernoma- present on prior imaging  EKG: I have reviewed all pertinent results/findings within the past 24 hours and my personal findings are: NSR. no st elevation. t wave abnl- nonspecific   I have reviewed all pertinent imaging results/findings within the past 24 hours.  MRI and MRA brain: pending

## 2017-07-14 NOTE — ASSESSMENT & PLAN NOTE
Assessment:  -?Seizure vs Syncope  -Patient with observed seizure like activity, tonic/clonic, post-ictal confusion, no bladder bowel incontinence  -Patient with diagnosed ~2cm cavernoma   -CT head shows motion limited examination without evidence for definite acute intercranial hemorrhage. Ill-defined hyperdense lesion right superior medial temporal lobe compatible with known cavernoma correspond to abnormality seen on prior MRI.   -CXR shows no convincing evidence of intrathoracic disease identified.   -EKG shows normal sinus rhythm   -Hyponatremic; Na 131, phosphate 2.6; other electrolytes WNL  -UA negative   -Vitals stable    Plan:  -Lorazepam 1MG q15min for seizure breakthrough   -Keppra 500MG BID   -Correct and monitor electrolytes as needed  -Follow MRI brain  -Follow MRA brain   -Seizure precautions

## 2017-07-15 VITALS
HEART RATE: 60 BPM | RESPIRATION RATE: 18 BRPM | WEIGHT: 114 LBS | SYSTOLIC BLOOD PRESSURE: 100 MMHG | DIASTOLIC BLOOD PRESSURE: 46 MMHG | OXYGEN SATURATION: 97 % | HEIGHT: 62 IN | BODY MASS INDEX: 20.98 KG/M2 | TEMPERATURE: 99 F

## 2017-07-15 LAB
ALBUMIN SERPL BCP-MCNC: 3.2 G/DL
ALP SERPL-CCNC: 53 U/L
ALT SERPL W/O P-5'-P-CCNC: 13 U/L
ANION GAP SERPL CALC-SCNC: 7 MMOL/L
AST SERPL-CCNC: 24 U/L
BASOPHILS # BLD AUTO: 0.1 K/UL
BASOPHILS NFR BLD: 1.6 %
BILIRUB SERPL-MCNC: 0.5 MG/DL
BUN SERPL-MCNC: 17 MG/DL
CALCIUM SERPL-MCNC: 8.8 MG/DL
CHLORIDE SERPL-SCNC: 105 MMOL/L
CHOLEST/HDLC SERPL: 3 {RATIO}
CO2 SERPL-SCNC: 28 MMOL/L
CREAT SERPL-MCNC: 1 MG/DL
DIFFERENTIAL METHOD: ABNORMAL
EOSINOPHIL # BLD AUTO: 0.1 K/UL
EOSINOPHIL NFR BLD: 1.3 %
ERYTHROCYTE [DISTWIDTH] IN BLOOD BY AUTOMATED COUNT: 13.1 %
EST. GFR  (AFRICAN AMERICAN): >60 ML/MIN/1.73 M^2
EST. GFR  (NON AFRICAN AMERICAN): >60 ML/MIN/1.73 M^2
ESTIMATED AVG GLUCOSE: 105 MG/DL
GLUCOSE SERPL-MCNC: 77 MG/DL
HBA1C MFR BLD HPLC: 5.3 %
HCT VFR BLD AUTO: 35.7 %
HDL/CHOLESTEROL RATIO: 32.8 %
HDLC SERPL-MCNC: 259 MG/DL
HDLC SERPL-MCNC: 85 MG/DL
HGB BLD-MCNC: 11.8 G/DL
LDLC SERPL CALC-MCNC: 154 MG/DL
LYMPHOCYTES # BLD AUTO: 1.9 K/UL
LYMPHOCYTES NFR BLD: 30.9 %
MAGNESIUM SERPL-MCNC: 2 MG/DL
MCH RBC QN AUTO: 27.5 PG
MCHC RBC AUTO-ENTMCNC: 33.1 %
MCV RBC AUTO: 83 FL
MONOCYTES # BLD AUTO: 0.6 K/UL
MONOCYTES NFR BLD: 9.2 %
NEUTROPHILS # BLD AUTO: 3.5 K/UL
NEUTROPHILS NFR BLD: 56.8 %
NONHDLC SERPL-MCNC: 174 MG/DL
PHOSPHATE SERPL-MCNC: 3.2 MG/DL
PLATELET # BLD AUTO: 200 K/UL
PMV BLD AUTO: 9.8 FL
POTASSIUM SERPL-SCNC: 3.8 MMOL/L
PROT SERPL-MCNC: 6.4 G/DL
RBC # BLD AUTO: 4.29 M/UL
SODIUM SERPL-SCNC: 140 MMOL/L
TRIGL SERPL-MCNC: 100 MG/DL
WBC # BLD AUTO: 6.18 K/UL

## 2017-07-15 PROCEDURE — 85025 COMPLETE CBC W/AUTO DIFF WBC: CPT

## 2017-07-15 PROCEDURE — 99225 PR SUBSEQUENT OBSERVATION CARE,LEVEL II: CPT | Mod: ,,, | Performed by: PSYCHIATRY & NEUROLOGY

## 2017-07-15 PROCEDURE — 99217 PR OBSERVATION CARE DISCHARGE: CPT | Mod: ,,, | Performed by: HOSPITALIST

## 2017-07-15 PROCEDURE — 80061 LIPID PANEL: CPT

## 2017-07-15 PROCEDURE — 83036 HEMOGLOBIN GLYCOSYLATED A1C: CPT

## 2017-07-15 PROCEDURE — 36415 COLL VENOUS BLD VENIPUNCTURE: CPT

## 2017-07-15 PROCEDURE — 80053 COMPREHEN METABOLIC PANEL: CPT

## 2017-07-15 PROCEDURE — 84100 ASSAY OF PHOSPHORUS: CPT

## 2017-07-15 PROCEDURE — G0378 HOSPITAL OBSERVATION PER HR: HCPCS

## 2017-07-15 PROCEDURE — 83735 ASSAY OF MAGNESIUM: CPT

## 2017-07-15 PROCEDURE — 25000003 PHARM REV CODE 250: Performed by: HOSPITALIST

## 2017-07-15 RX ORDER — LEVETIRACETAM 500 MG/1
500 TABLET ORAL 2 TIMES DAILY
Qty: 60 TABLET | Refills: 3 | Status: SHIPPED | OUTPATIENT
Start: 2017-07-15 | End: 2017-07-18 | Stop reason: DRUGHIGH

## 2017-07-15 RX ADMIN — POTASSIUM & SODIUM PHOSPHATES POWDER PACK 280-160-250 MG 1 PACKET: 280-160-250 PACK at 09:07

## 2017-07-15 RX ADMIN — POTASSIUM & SODIUM PHOSPHATES POWDER PACK 280-160-250 MG 1 PACKET: 280-160-250 PACK at 11:07

## 2017-07-15 RX ADMIN — LEVETIRACETAM 500 MG: 500 TABLET, FILM COATED ORAL at 04:07

## 2017-07-15 RX ADMIN — TIMOLOL MALEATE 1 DROP: 5 SOLUTION OPHTHALMIC at 06:07

## 2017-07-15 RX ADMIN — LEVETIRACETAM 500 MG: 500 TABLET, FILM COATED ORAL at 09:07

## 2017-07-15 NOTE — ASSESSMENT & PLAN NOTE
Assessment:  -?Seizure vs Syncope  -Patient with observed seizure like activity, tonic/clonic, post-ictal confusion, no bladder bowel incontinence  -Patient with diagnosed ~2cm cavernoma   -CT head shows motion limited examination without evidence for definite acute intercranial hemorrhage. Ill-defined hyperdense lesion right superior medial temporal lobe compatible with known cavernoma correspond to abnormality seen on prior MRI.   -CXR shows no convincing evidence of intrathoracic disease identified.   -EKG shows normal sinus rhythm   -Hyponatremic; Na 131, phosphate 2.6; other electrolytes WNL  -UA negative   -Vitals stable    - F/u MRI:  Heterogeneous signal intra-axial lesion right mesial temporal lobe remains compatible with cavernoma overall slightly increased in size from prior. MRA negative for AVMs'  - no seizure events over the last 24 hrs    Plan:   -D/c on Keppra 500MG BID   -Counseled on Seizure precautions    Patient shall f/u in resident clinic with Dr. Gary Townsend / staffed by Dr. Barahona in 3-4 months.        Fall, initial encounter

## 2017-07-15 NOTE — PROGRESS NOTES
Notified Dr. Arredondo with IM BP 97/57. Pt asymptomatic. No new orders received. Will continue to monitor pt

## 2017-07-15 NOTE — SUBJECTIVE & OBJECTIVE
Past Medical History:   Diagnosis Date    Cataract     Glaucoma     Hemangioma     cavernous    History of acne 2004    dr lopez prescribed accutane    Hyperlipidemia     Venous angioma of brain     Vertigo        Past Surgical History:   Procedure Laterality Date    CATARACT EXTRACTION W/  INTRAOCULAR LENS IMPLANT Right 12/17/14    Dr Camacho     HYSTERECTOMY         Review of patient's allergies indicates:   Allergen Reactions    Latanoprost Itching     Red itchy eyes       Current Neurological Medications: keppra 1000MG     No current facility-administered medications on file prior to encounter.      Current Outpatient Prescriptions on File Prior to Encounter   Medication Sig    DILTIAZEM HCL (DILTIAZEM 2% CREAM) Apply topically 3 (three) times daily. Apply topically to anal area.    estradiol (VIVELLE-DOT) 0.075 mg/24 hr Place 1 patch onto the skin twice a week.    timolol maleate 0.5% (TIMOPTIC-XE) 0.5 % SolG Place 1 drop into both eyes every morning.     Family History     Problem Relation (Age of Onset)    Breast cancer Maternal Aunt    Cancer Maternal Aunt    Depression Brother    Diabetes Maternal Grandmother    Fibroids Mother    Glaucoma Mother, Maternal Grandmother    Heart disease Father    Migraines Mother    No Known Problems Sister, Maternal Uncle, Paternal Aunt, Paternal Uncle, Maternal Grandfather, Paternal Grandmother, Paternal Grandfather        Social History Main Topics    Smoking status: Never Smoker    Smokeless tobacco: Never Used    Alcohol use No    Drug use: No    Sexual activity: Yes     Partners: Male     Birth control/ protection: Surgical      Comment: , DL 9/2010     Review of Systems   Constitutional: Negative for chills and fever.   Eyes: Negative for photophobia and visual disturbance.   Respiratory: Negative for cough, chest tightness and shortness of breath.    Cardiovascular: Negative for chest pain and palpitations.   Gastrointestinal: Negative  for abdominal pain, constipation, diarrhea and nausea.   Musculoskeletal: Negative for neck pain and neck stiffness.   Neurological: Negative for dizziness, seizures, syncope, facial asymmetry, speech difficulty, weakness, light-headedness, numbness and headaches.     Objective:     Vital Signs (Most Recent):  Temp: 97.9 °F (36.6 °C) (07/15/17 0735)  Pulse: (!) 58 (07/15/17 0735)  Resp: 18 (07/15/17 0735)  BP: (!) 97/57 (07/15/17 0735)  SpO2: 95 % (07/15/17 0735) Vital Signs (24h Range):  Temp:  [97.9 °F (36.6 °C)-98.7 °F (37.1 °C)] 97.9 °F (36.6 °C)  Pulse:  [56-69] 58  Resp:  [17-18] 18  SpO2:  [95 %-100 %] 95 %  BP: ()/(55-64) 97/57     Weight: 51.7 kg (114 lb)  Body mass index is 20.85 kg/m².    Physical Exam   Constitutional: She is oriented to person, place, and time. She appears well-developed and well-nourished. No distress.   HENT:   Head: Normocephalic and atraumatic.   Eyes: EOM are normal. Pupils are equal, round, and reactive to light.   Cardiovascular: Normal rate and normal heart sounds.    Pulmonary/Chest: Effort normal and breath sounds normal.   Abdominal: Soft. There is no tenderness.   Neurological: She is alert and oriented to person, place, and time. She has normal strength and normal reflexes. She displays normal reflexes. No cranial nerve deficit. She exhibits normal muscle tone. She has a normal Finger-Nose-Finger Test.   Reflex Scores:       Bicep reflexes are 2+ on the right side and 2+ on the left side.       Brachioradialis reflexes are 2+ on the right side and 2+ on the left side.       Patellar reflexes are 2+ on the right side and 2+ on the left side.       Achilles reflexes are 2+ on the right side and 2+ on the left side.  Psychiatric: She has a normal mood and affect. Her speech is normal.       NEUROLOGICAL EXAMINATION:     MENTAL STATUS   Oriented to person, place, and time.   Registration: recalls 1 of 3 objects. Recall at 5 minutes: recalls 1 of 3 objects.   Attention:  normal. Concentration: normal.   Speech: speech is normal   Level of consciousness: alert    CRANIAL NERVES   Cranial nerves II through XII intact.     CN II   Visual fields full to confrontation.     CN III, IV, VI   Pupils are equal, round, and reactive to light.  Extraocular motions are normal.   CN III: no CN III palsy  CN VI: no CN VI palsy  Nystagmus: none     CN V   Facial sensation intact.     CN VIII   CN VIII normal.     CN IX, X   CN IX normal.   CN X normal.     CN XI   CN XI normal.     CN XII   CN XII normal.     MOTOR EXAM   Muscle bulk: normal  Overall muscle tone: normal    Strength   Strength 5/5 throughout.     REFLEXES     Reflexes   Right brachioradialis: 2+  Left brachioradialis: 2+  Right biceps: 2+  Left biceps: 2+  Right patellar: 2+  Left patellar: 2+  Right achilles: 2+  Left achilles: 2+    SENSORY EXAM   Light touch normal.   Vibration normal.   Proprioception normal.     GAIT AND COORDINATION      Coordination   Finger to nose coordination: normal    Tremor   Resting tremor: absent  Intention tremor: absent  Action tremor: absent      Significant Labs:   CBC:     Recent Labs  Lab 07/14/17  1115 07/15/17  0452   WBC 11.69 6.18   HGB 12.1 11.8*   HCT 34.4* 35.7*    200     CMP:     Recent Labs  Lab 07/14/17  1115 07/15/17  0452    77   * 140   K 3.5 3.8   CL 98 105   CO2 24 28   BUN 15 17   CREATININE 0.9 1.0   CALCIUM 9.0 8.8   MG 2.1 2.0   PROT 7.2 6.4   ALBUMIN 3.7 3.2*   BILITOT 0.6 0.5   ALKPHOS 58 53*   AST 25 24   ALT 15 13   ANIONGAP 9 7*   EGFRNONAA >60.0 >60.0     Urine Studies:     Recent Labs  Lab 07/14/17  1215   COLORU Straw   APPEARANCEUA Clear   PHUR 5.0   SPECGRAV 1.005   PROTEINUA Negative   GLUCUA Negative   KETONESU Negative   BILIRUBINUA Negative   OCCULTUA 1+*   NITRITE Negative   UROBILINOGEN Negative   LEUKOCYTESUR Negative   RBCUA 0   WBCUA 1   BACTERIA Rare   SQUAMEPITHEL 2     All pertinent lab results from the past 24 hours have been  reviewed.    Significant Imaging: I have reviewed all pertinent imaging results/findings within the past 24 hours.     MRI Brain:  Heterogeneous signal intra-axial lesion right mesial temporal lobe remains compatible with cavernoma overall slightly increased in size from prior.    No evidence for edema about this to suggest acute hemorrhage.    No evidence for acute infarction.    MRA: negative    Unremarkable MRA of the head specifically without evidence for focal stenosis or intracranial aneurysm.    No evidence for abnormal flow related enhancement associated with the known right temporal cavernoma to suggest arterial vascular malformation As detailed above

## 2017-07-15 NOTE — PROGRESS NOTES
[facility]  Neurology  Progress Note    Patient Name: Darling Quach  MRN: [id number]  Admission Date: 7/14/2017  Hospital Length of Stay: 0 days  Code Status: Full Code   Attending Provider: Hayley Arredondo MD  Primary Care Physician: Kimberly Ellison MD   Principal Problem:Seizure    Past Medical History:   Diagnosis Date    Cataract     Glaucoma     Hemangioma     cavernous    History of acne 2004    dr lopez prescribed accutane    Hyperlipidemia     Venous angioma of brain     Vertigo        Past Surgical History:   Procedure Laterality Date    CATARACT EXTRACTION W/  INTRAOCULAR LENS IMPLANT Right 12/17/14    Dr Camacho     HYSTERECTOMY         Review of patient's allergies indicates:   Allergen Reactions    Latanoprost Itching     Red itchy eyes       Current Neurological Medications: keppra 1000MG     No current facility-administered medications on file prior to encounter.      Current Outpatient Prescriptions on File Prior to Encounter   Medication Sig    DILTIAZEM HCL (DILTIAZEM 2% CREAM) Apply topically 3 (three) times daily. Apply topically to anal area.    estradiol (VIVELLE-DOT) 0.075 mg/24 hr Place 1 patch onto the skin twice a week.    timolol maleate 0.5% (TIMOPTIC-XE) 0.5 % SolG Place 1 drop into both eyes every morning.     Family History     Problem Relation (Age of Onset)    Breast cancer Maternal Aunt    Cancer Maternal Aunt    Depression Brother    Diabetes Maternal Grandmother    Fibroids Mother    Glaucoma Mother, Maternal Grandmother    Heart disease Father    Migraines Mother    No Known Problems Sister, Maternal Uncle, Paternal Aunt, Paternal Uncle, Maternal Grandfather, Paternal Grandmother, Paternal Grandfather        Social History Main Topics    Smoking status: Never Smoker    Smokeless tobacco: Never Used    Alcohol use No    Drug use: No    Sexual activity: Yes     Partners: Male     Birth control/ protection: Surgical      Comment:  , [facility] 9/2010     Interval History:     NAEON. No further concerns for seizures.     Review of Systems   Constitutional: Negative for chills and fever.   Eyes: Negative for photophobia and visual disturbance.   Respiratory: Negative for cough, chest tightness and shortness of breath.    Cardiovascular: Negative for chest pain and palpitations.   Gastrointestinal: Negative for abdominal pain, constipation, diarrhea and nausea.   Musculoskeletal: Negative for neck pain and neck stiffness.   Neurological: Negative for dizziness, seizures, syncope, facial asymmetry, speech difficulty, weakness, light-headedness, numbness and headaches.     Objective:     Vital Signs (Most Recent):  Temp: 97.9 °F (36.6 °C) (07/15/17 0735)  Pulse: (!) 58 (07/15/17 0735)  Resp: 18 (07/15/17 0735)  BP: (!) 97/57 (07/15/17 0735)  SpO2: 95 % (07/15/17 0735) Vital Signs (24h Range):  Temp:  [97.9 °F (36.6 °C)-98.7 °F (37.1 °C)] 97.9 °F (36.6 °C)  Pulse:  [56-69] 58  Resp:  [17-18] 18  SpO2:  [95 %-100 %] 95 %  BP: ()/(55-64) 97/57     Weight: 51.7 kg (114 lb)  Body mass index is 20.85 kg/m².    Physical Exam   Constitutional: She is oriented to person, place, and time. She appears well-developed and well-nourished. No distress.   HENT:   Head: Normocephalic and atraumatic.   Eyes: EOM are normal. Pupils are equal, round, and reactive to light.   Cardiovascular: Normal rate and normal heart sounds.    Pulmonary/Chest: Effort normal and breath sounds normal.   Abdominal: Soft. There is no tenderness.   Neurological: She is alert and oriented to person, place, and time. She has normal strength and normal reflexes. She displays normal reflexes. No cranial nerve deficit. She exhibits normal muscle tone. She has a normal Finger-Nose-Finger Test.   Reflex Scores:       Bicep reflexes are 2+ on the right side and 2+ on the left side.       Brachioradialis reflexes are 2+ on the right side and 2+ on the left side.       Patellar reflexes  are 2+ on the right side and 2+ on the left side.       Achilles reflexes are 2+ on the right side and 2+ on the left side.  Psychiatric: She has a normal mood and affect. Her speech is normal.       NEUROLOGICAL EXAMINATION:     MENTAL STATUS   Oriented to person, place, and time.   Registration: recalls 1 of 3 objects. Recall at 5 minutes: recalls 1 of 3 objects.   Attention: normal. Concentration: normal.   Speech: speech is normal   Level of consciousness: alert    CRANIAL NERVES   Cranial nerves II through XII intact.     CN II   Visual fields full to confrontation.     CN III, IV, VI   Pupils are equal, round, and reactive to light.  Extraocular motions are normal.   CN III: no CN III palsy  CN VI: no CN VI palsy  Nystagmus: none     CN V   Facial sensation intact.     CN VIII   CN VIII normal.     CN IX, X   CN IX normal.   CN X normal.     CN XI   CN XI normal.     CN XII   CN XII normal.     MOTOR EXAM   Muscle bulk: normal  Overall muscle tone: normal    Strength   Strength 5/5 throughout.     REFLEXES     Reflexes   Right brachioradialis: 2+  Left brachioradialis: 2+  Right biceps: 2+  Left biceps: 2+  Right patellar: 2+  Left patellar: 2+  Right achilles: 2+  Left achilles: 2+    SENSORY EXAM   Light touch normal.   Vibration normal.   Proprioception normal.     GAIT AND COORDINATION      Coordination   Finger to nose coordination: normal    Tremor   Resting tremor: absent  Intention tremor: absent  Action tremor: absent      Significant Labs:   CBC:     Recent Labs  Lab 07/14/17  1115 07/15/17  0452   WBC 11.69 6.18   HGB 12.1 11.8*   HCT 34.4* 35.7*    200     CMP:     Recent Labs  Lab 07/14/17  1115 07/15/17  0452    77   * 140   K 3.5 3.8   CL 98 105   CO2 24 28   BUN 15 17   CREATININE 0.9 1.0   CALCIUM 9.0 8.8   MG 2.1 2.0   PROT 7.2 6.4   ALBUMIN 3.7 3.2*   BILITOT 0.6 0.5   ALKPHOS 58 53*   AST 25 24   ALT 15 13   ANIONGAP 9 7*   EGFRNONAA >60.0 >60.0     Urine Studies:      Recent Labs  Lab 07/14/17  1215   COLORU Straw   APPEARANCEUA Clear   PHUR 5.0   SPECGRAV 1.005   PROTEINUA Negative   GLUCUA Negative   KETONESU Negative   BILIRUBINUA Negative   OCCULTUA 1+*   NITRITE Negative   UROBILINOGEN Negative   LEUKOCYTESUR Negative   RBCUA 0   WBCUA 1   BACTERIA Rare   SQUAMEPITHEL 2     All pertinent lab results from the past 24 hours have been reviewed.    Significant Imaging: I have reviewed all pertinent imaging results/findings within the past 24 hours.     MRI Brain:  Heterogeneous signal intra-axial lesion right mesial temporal lobe remains compatible with cavernoma overall slightly increased in size from prior.    No evidence for edema about this to suggest acute hemorrhage.    No evidence for acute infarction.    MRA: negative    Unremarkable MRA of the head specifically without evidence for focal stenosis or intracranial aneurysm.    No evidence for abnormal flow related enhancement associated with the known right temporal cavernoma to suggest arterial vascular malformation As detailed above    Assessment and Plan:     * Seizure    Assessment:  -?Seizure vs Syncope  -Patient with observed seizure like activity, tonic/clonic, post-ictal confusion, no bladder bowel incontinence  -Patient with diagnosed ~2cm cavernoma   -CT head shows motion limited examination without evidence for definite acute intercranial hemorrhage. Ill-defined hyperdense lesion right superior medial temporal lobe compatible with known cavernoma correspond to abnormality seen on prior MRI.   -CXR shows no convincing evidence of intrathoracic disease identified.   -EKG shows normal sinus rhythm   -Hyponatremic; Na 131, phosphate 2.6; other electrolytes WNL  -UA negative   -Vitals stable    - F/u MRI:  Heterogeneous signal intra-axial lesion right mesial temporal lobe remains compatible with cavernoma overall slightly increased in size from prior. MRA negative for AVMs'  - no seizure events over the last 24  hrs    Plan:   -D/c on Keppra 500MG BID   -Counseled on Seizure precautions    Patient shall f/u in resident clinic with Dr. Gary Townsend / staffed by Dr. Barahona in 3-4 months.                 VTE Risk Mitigation         Ordered     Place LENIN hose  Until discontinued      07/14/17 1545     Medium Risk of VTE  Once      07/14/17 1406          Gary Townsend MD  Neurology  [facility]

## 2017-07-17 ENCOUNTER — PATIENT MESSAGE (OUTPATIENT)
Dept: INTERNAL MEDICINE | Facility: CLINIC | Age: 52
End: 2017-07-17

## 2017-07-18 ENCOUNTER — OFFICE VISIT (OUTPATIENT)
Dept: NEUROLOGY | Facility: CLINIC | Age: 52
End: 2017-07-18
Payer: COMMERCIAL

## 2017-07-18 ENCOUNTER — PATIENT MESSAGE (OUTPATIENT)
Dept: OBSTETRICS AND GYNECOLOGY | Facility: CLINIC | Age: 52
End: 2017-07-18

## 2017-07-18 ENCOUNTER — OFFICE VISIT (OUTPATIENT)
Dept: INTERNAL MEDICINE | Facility: CLINIC | Age: 52
End: 2017-07-18
Payer: COMMERCIAL

## 2017-07-18 VITALS
WEIGHT: 115.31 LBS | SYSTOLIC BLOOD PRESSURE: 120 MMHG | HEART RATE: 72 BPM | BODY MASS INDEX: 21.22 KG/M2 | DIASTOLIC BLOOD PRESSURE: 85 MMHG | HEIGHT: 62 IN

## 2017-07-18 VITALS
BODY MASS INDEX: 21.06 KG/M2 | HEIGHT: 62 IN | HEART RATE: 86 BPM | SYSTOLIC BLOOD PRESSURE: 118 MMHG | WEIGHT: 114.44 LBS | DIASTOLIC BLOOD PRESSURE: 82 MMHG | OXYGEN SATURATION: 99 %

## 2017-07-18 DIAGNOSIS — R56.9 NEW ONSET SEIZURE: Primary | ICD-10-CM

## 2017-07-18 DIAGNOSIS — D18.00 HEMANGIOMA: ICD-10-CM

## 2017-07-18 DIAGNOSIS — G40.209 COMPLEX PARTIAL SEIZURES EVOLVING TO GENERALIZED TONIC-CLONIC SEIZURES: Primary | ICD-10-CM

## 2017-07-18 PROCEDURE — 99999 PR PBB SHADOW E&M-EST. PATIENT-LVL IV: CPT | Mod: PBBFAC,,, | Performed by: INTERNAL MEDICINE

## 2017-07-18 PROCEDURE — 99214 OFFICE O/P EST MOD 30 MIN: CPT | Mod: S$GLB,,, | Performed by: INTERNAL MEDICINE

## 2017-07-18 PROCEDURE — 99999 PR PBB SHADOW E&M-EST. PATIENT-LVL III: CPT | Mod: PBBFAC,,, | Performed by: PSYCHIATRY & NEUROLOGY

## 2017-07-18 PROCEDURE — 99215 OFFICE O/P EST HI 40 MIN: CPT | Mod: S$GLB,,, | Performed by: PSYCHIATRY & NEUROLOGY

## 2017-07-18 RX ORDER — LEVETIRACETAM 500 MG/1
1000 TABLET, EXTENDED RELEASE ORAL DAILY
Qty: 60 TABLET | Refills: 0 | Status: SHIPPED | OUTPATIENT
Start: 2017-07-18 | End: 2018-02-23

## 2017-07-18 NOTE — LETTER
July 18, 2017      Kimberly Ellison MD  [address]  [facility] [address]           [facility] - Neurology Epilepsy  [address], [address]  [facility] [address]  Phone: [phone]  Fax: [phone]          Patient: Darling Quach   MR Number: [id number]   YOB: 1965   Date of Visit: 7/18/2017       Dear Dr. Kimberly Ellison:    Thank you for referring Darling Quach to me for evaluation. Attached you will find relevant portions of my assessment and plan of care.    If you have questions, please do not hesitate to call me. I look forward to following Darling Quach along with you.    Sincerely,    Josie Sahu MD    Enclosure  CC:  No Recipients    If you would like to receive this communication electronically, please contact [email or web address] or [phone] to request more information on [employer] access.    For providers and/or their staff who would like to refer a patient to [facility], please contact us through our one-stop-shop provider referral line, [facility], at [phone].    If you feel you have received this communication in error or would no longer like to receive these types of communications, please e-mail [email or web address]

## 2017-07-18 NOTE — ASSESSMENT & PLAN NOTE
MRI Brain on 7/14/17: Impression:   Heterogeneous signal intra-axial lesion right mesial temporal lobe remains compatible with cavernoma overall slightly increased in size from prior.    - need NSG follow-up: patient has appt scheduled with  on 7/20/17

## 2017-07-18 NOTE — ASSESSMENT & PLAN NOTE
53yo RH F presenting after 1st seizure on 7/14/17  Has hx of right temporal cavernous malformation on MRI - previously f/u by NSG (last visit in 2013)    - currently on Levetiracetam 500mg bid: reports feeling tired but no other side-effects    Continue Levetiracetam - change to XR 1000mg q day (per patient preference)  - check LEV levels in 2-3 weeks    Extended (> 1 hour) EEG

## 2017-07-18 NOTE — PROGRESS NOTES
EPILEPSY CLINIC:   INITIAL VISIT    Name: Darling Quach  MRN:[id number]   CSN: [id number]  Date of service: 7/18/2017    Age:52 y.o.   Gender:female     Referring Physician/Service: Kimberly Ellison MD  [address] [place] [address]   The patient is here today with mother  History obtained from the patient and her mother.    CHIEF COMPLAINT:   - evaluation of 1st seizure on 7/14/17    PRESENT ILLNESS:    This is a 52 y.o. right handed female who presents with a chief complaint evaluation of the 1st seizure .    Hx of occasional headaches that are preceded by a weird taste with nausea - not consistently - last such episode was in 2012.  However, patient reports at least 2-3 such aura in the preceding week - last episode was 2 days prior the seizure.    On early morning of 7/14/17, patient was asleep and reportedly her  felt the bed shaking: found patient to be stiff and shaking all over with foaming at the mouth, unresponsive, lasting about 1 1/2 min. No hx of tongue bite or bladder/bowel incontinence.   sprayed water on her face and shortly after patient was responsive but confused and slept afterward.   did not call EMS but family brought her to ED (see notes below)    Triggers for seizures: Started estrogen patch q 2 weeks since Apr 2017.  Compliance with anti-epileptic medications:  Yes        Possible lateralizing signs by history: (Neurological signs): none    Recent admission: 7/14/17  Per ED: The patient is a 52 y.o. female with hx of: vertigo, HLD, and hemangioma followed by Dr. Matson that presents to the ED with a complaint of seizure which occurred 5 hours ago. Patient's spouse observed that while patient was laying in bed, she started experiencing tremors of her lower extremities and body while upper extremities were clenched and still. She was foaming at the mouth and unresponsive to any stimuli after  rolled her onto her side. Patient cannot remember what occurred  "this morning, and  reports that she was confused for 25-30 minutes. She reports feeling "kind of weak" for the last few days and mentions having a headache last night, which is not typical for her, for which she took 2 aspirin prior to going to bed. She denies any other pain, difficulty sleeping, recent routine changes, or new medications recently. Patient denies a history of seizures in the past.   Per Neurology consult:   Patient reports no prior history of seizures. Patient states that yesterday (07/13/17) afternoon she began to have a mild HA for which she took two aspirin. During this time she also states that she "felt a little dizzy". Patient denies any symptoms of syncope prior to this event. The patient states that she was asleep prior to "seizure" and that she has no recollection of anything leading up to the event. The first thing that she can remember is being on the floor with her  present. Per family the patient was profoundly confused for approximately 30 minutes following the event. The patient denies any loss of bowel or bladder function during this event. Per family the patient experienced shaking of her lower extremities and clenching of the upper extremities as well as foaming at the mouth. The event lasted approximately 2 minutes. Patient denies any fever or illness preceding this event. CT head shows motion limited examination without evidence for definite acute intercranial hemorrhage. Ill-defined hyperdense lesion right superior medial temporal lobe compatible with known cavernoma correspond to abnormality seen on prior MRI. CXR shows no convincing evidence of intrathoracic disease identified. EKG shows normal sinus rhythm. Patient is currently awaiting MRI. Patient given Keppra 1000MG in ED.     Any other relevant information:  Per last NSG () notes of 2013 (last clinic visit): Onset of HA x 2005 and evaluation led to the diagnosis of right temporal cavernous " malformation; no enlargement of the lesion over the six-year period and no evidence for bleeding.    PREVIOUS EVALUATIONS:    Previous EEGs: none  Previous CT Head: as noted    MRI Brain:  Results for orders placed or performed during the hospital encounter of 07/14/17   MRI Brain W WO Contrast    Narrative    Procedure: MRI the brain with andwithout contrast.    Technique: Sagittal and axial T1, axial T2, axial FLAIR, axial gradient, axial diffusion, and axial, sagittal, and coronal postcontrast T1 images of the whole brain. 9 ml of Gadavist injected intravenously.         Comparison: CT earlier today and MRI 05/16/2013    Findings: Heterogeneous signal intra-axial focus within the mesial right temporal lobe with complex T1/T2 hyper-and hypointensity with predominance of intermediate signal and internal enhancement. This measures 1.4 X 1.5 X 1.6 CM AP X TV X CC dimensions respectively. Previously measured approximately 1.2 x 1.4 x 1.5 CM remains most compatible with cavernoma overall slightly increased from prior. There is no surrounding T2 or flair signal hyperintensities to suggest recent hemorrhage with scattered subacute to chronic regions of hemorrhage.    Localized mass effect without midline shift. Ventricles stable without hydrocephalus. The major and cranial T2 flow voids are present. There is no restricted diffusion to suggest acute infarction. Miguelangel. intracranial T2 flow voids are present.    Remote operative changes from right cataract repair.    Impression     Heterogeneous signal intra-axial lesion right mesial temporal lobe remains compatible with cavernoma overall slightly increased in size from prior.    No evidence for edema about this to suggest acute hemorrhage.    No evidence for acute infarction.    Ventricles stable without hydrocephalus. Clinical correlation and continued followup advised.      Electronically signed by: NAUN SAN DO  Date:     07/14/17  Time:    17:05    MRA Brain without  contrast    Narrative    Procedure: MRA of the head     Technique: noncontrast 3-D time of flight MRA head. please note mra head technique includes skull base to superior 2/3 of the cerebral hemispheres.        Comparison: None    Findings:    MRA head:    Anterior circulation:  The bilateral distal cervical, Gisela, cavernous and supraclinoid segments of the ICA's and the visualized bilateral anterior and middle cerebral arteries are patent without evidence for significant focal stenosis or aneurysm. There is no abnormal flow related enhancement within the known right mesial temporal lobe cavernoma to suggest arterial vascular malformation allowing for T1 hyperintense signal related to subacute age blood products.    Posterior circulation:  The distal vertebral arteries, basilar artery and posterior cerebral arteries are patent without evidence for significant focal stenosis or aneurysm.    Impression     Unremarkable MRA of the head specifically without evidence for focal stenosis or intracranial aneurysm.    No evidence for abnormal flow related enhancement associated with the known right temporal cavernoma to suggest arterial vascular malformation As detailed above        Electronically signed by: NAUN SAN DO  Date:     07/14/17  Time:    17:00    CT Head Without Contrast    Narrative    CT brain without contrast.    Comparison: MRI 06/08/2012    Technique: Multiple 5 mm axial images of the head were obtained without intravenous contrast.    Results: Ill-defined region of hyperdensity within the right superior medial temporal lobe corresponding to known cavernoma identified on prior MRI. Allowing for patient motion there is no evidence for acute intracranial hemorrhage. Ventricles stable without hydrocephalus. Clinical correlation and further evaluation is warranted.    Impression     Motion limited examination without evidence for definite acute intercranial hemorrhage.    Ill-defined hyperdense lesion  right superior medial temporal lobe compatible with known cavernoma correspond to abnormality seen on prior MRI.    Further evaluation as warranted clinically.          Electronically signed by: NAUN SAN DO  Date:     07/14/17  Time:    12:32       Additional tests:  1)CT Scan: as noted   2) EEG\Video Monitoring: no  3) PET Scan: no  4) Neuropsychological evaluation: no  5) DEXA Scan: no   6) Others: no    RISK FACTORS FOR SEIZURES:    1. Head Trauma:  No    2. CNS Infections:  No  3. CNS Tumors: Yes     4. CNS Vascular Disease: No     5. Febrile Seizures: No    6. Developmental Delay: No       7. Family History of Seizures: No    8. Birth history: FTNVD    Pregnancy/Labor/Delivery: FTNVD    CURRENT MEDICATIONS:   Current Outpatient Prescriptions   Medication Sig Dispense Refill    DILTIAZEM HCL (DILTIAZEM 2% CREAM) Apply topically 3 (three) times daily. Apply topically to anal area. 30 g 0    estradiol (VIVELLE-DOT) 0.075 mg/24 hr Place 1 patch onto the skin twice a week. 8 patch 11    levetiracetam (KEPPRA) 500 MG Tab Take 1 tablet (500 mg total) by mouth 2 (two) times daily. 60 tablet 3    timolol maleate 0.5% (TIMOPTIC-XE) 0.5 % SolG Place 1 drop into both eyes every morning. 5 mL 12     No current facility-administered medications for this visit.       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 12/18/2012    Vitreous opacity - Right Eye 12/18/2012    Venous angioma of brain 12/15/2014    S/P hysterectomy 03/16/2017    Hx of colonoscopy:10/10/2016 04/06/2017    Anal fissure 04/06/2017    Seizure  07/14/2017    Hyponatremia 07/14/2017    Hypophosphatemia 07/14/2017     Resolved Ambulatory Problems     Diagnosis Date Noted    Nuclear sclerosis - Both Eyes 12/18/2012    Glaucoma 12/15/2014    Cataract 12/15/2014    Post-operative state 12/23/2014    Anal fistula 04/06/2017     Past Medical History:   Diagnosis Date    Cataract     Glaucoma     Hemangioma     History of acne 2004    Hyperlipidemia     Venous angioma of brain     Vertigo       PAST PSYCHIATRIC HISTORY:  no    PAST SURGICAL HISTORY including Epilepsy surgery:   Past Surgical History:   Procedure Laterality Date    CATARACT EXTRACTION W/  INTRAOCULAR LENS IMPLANT Right 12/17/14    Dr Camacho     HYSTERECTOMY          FAMILY HISTORY: Brother and maternal cousins - bipolar disorder; Maternal grand-uncle - schizophrenia  Family History   Problem Relation Age of Onset    Fibroids Mother     Migraines Mother     Glaucoma Mother     Heart disease Father     Depression Brother     No Known Problems Sister     Diabetes Maternal Grandmother     Glaucoma Maternal Grandmother     Cancer Maternal Aunt      breast    Breast cancer Maternal Aunt     No Known Problems Maternal Uncle     No Known Problems Paternal Aunt     No Known Problems Paternal Uncle     No Known Problems Maternal Grandfather     No Known Problems Paternal Grandmother     No Known Problems Paternal Grandfather     Macular degeneration Neg Hx     Blindness Neg Hx     Melanoma Neg Hx     Psoriasis Neg Hx     Lupus Neg Hx     Eczema Neg Hx     Acne Neg Hx     Cataracts Neg Hx     Amblyopia Neg Hx     Retinal detachment Neg Hx     Strabismus Neg Hx     Stroke Neg Hx     Thyroid disease Neg Hx     Hypertension Neg Hx          SOCIAL HISTORY:   Social History     Social History    Marital status:      Spouse name: N/A    Number of children: N/A    Years of education: N/A     Occupational History    Not on file.     Social History Main  Topics    Smoking status: Never Smoker    Smokeless tobacco: Never Used    Alcohol use No    Drug use: No    Sexual activity: Yes     Partners: Male     Birth control/ protection: Surgical      Comment: , DLH 9/2010     Other Topics Concern    Are You Pregnant Or Think You May Be? No    Breast-Feeding No     Social History Narrative    No narrative on file      a) Marital status:                                                     b) Living situation: patient lives with , 1 (of 3) daughter and a dog  c) Employed/Unemployed/Other: Employed full time     DRIVING HISTORY:  Currently driving: No      LEVEL OF EDUCATION: Masters degree in [employer] science    SUBSTANCE USE: no hx of tobacco, etoh or other substance use    ALLERGIES: Latanoprost     REVIEW OF SYSTEMS:  Review of Systems   Constitutional: Negative for appetite change and fatigue.   HENT: Negative for dental problem and sore throat.    Eyes: Negative for photophobia and pain.   Respiratory: Negative for cough and shortness of breath.    Cardiovascular: Negative for chest pain and palpitations.   Gastrointestinal: Negative for nausea and vomiting.   Endocrine: Negative for polydipsia and polyuria.   Genitourinary: Negative for menstrual problem and vaginal bleeding.   Musculoskeletal: Negative for arthralgias and joint swelling.   Skin: Negative for rash and wound.   Allergic/Immunologic: Negative for immunocompromised state.   All other systems reviewed and are negative.      GENERAL EXAMINATION:  There were no vitals taken for this visit.     Limited exam due to lack of time    This is an average built female who appears well.  HEENT: There are no dysmorphic features  NEUROLOGICAL EXAMINATION:  Mental status: Alert and oriented x 4; pleasant and cooperative with exam  Memory: Recent memory intact, Remote memory intact   Attention and concentration: intact  Fund of knowledge: adequate  Speech: normal  Dysarthria: No    Eyes: PERRL; EOM intact; No nystagmus.  Fundoscopic Exam: not tested  No facial asymmetry.  Hearing was intact  Motor examination: No focal deficits noted; abnormal movements: none  Sensory examination: not tested  Gait: Normal gait and station     OTHER RELEVANT LABS AND TESTS: none    IMPRESSION:  Complex partial seizures evolving to generalized tonic-clonic seizures  53yo RH F presenting after 1st seizure on 7/14/17  Has hx of right temporal cavernous malformation on MRI - previously f/u by NSG (last visit in 2013)    - currently on Levetiracetam 500mg bid: reports feeling tired but no other side-effects    Continue Levetiracetam - change to XR 1000mg q day (per patient preference)  - check LEV levels in 2-3 weeks    Extended (> 1 hour) EEG    Hemangioma  MRI Brain on 7/14/17: Impression:   Heterogeneous signal intra-axial lesion right mesial temporal lobe remains compatible with cavernoma overall slightly increased in size from prior.    - need NSG follow-up: patient has appt scheduled with  on 7/20/17     The patient was asked to call me/the clinic 1 week after the test(s) are done to obtain results.    >1 hour time was spent with patient and more than 50% of the time with the patient (as well as her mother) was spent on face-to-face counseling:  Some of the issues discussed include:  1. Diagnosis, plans, prognosis, medications and possible side-effects, risks and benefits of treatment, other alternatives to AEDs.  2. Risks related to continued seizures, status epilepticus, SUDEP, driving restrictions and seizure precautions ( no baths but showers are ok, no swimming unsupervised, no use of heavy machinery, no use of sharp moving objects, avoid heights).   3. Issues related to pregnancy, OCP and breast feeding as it relates to epilepsy.  4. The potential of teratogenicity and suicidal risks of anti-epileptic medications.  5.Avoid any activity that compromise patient safety related to  seizures.    Questions and concerns raised by the patient and family/care-giver(s) were addressed and they indicated understanding of everything discussed and agreed to plans as above.    Return in 3 months or earlier gabriella Sahu MD, HERNANDO(), FACNS.  Neurology-Epilepsy.

## 2017-07-18 NOTE — PROGRESS NOTES
Subjective:      Patient ID: Darling Quach is a 52 y.o. female.    Chief Complaint: Hospital Follow Up (discuss meds )    HPI:  HPI   Patient is here for follow up of new onset seizure:    Reviewed ER and hospitalization:Patient has known cavernous hemangioma and followed by Dr. Vicente Matson. She had a headache before going to bed on Thursday waking up on Friday with a witnessed seizure by her .    ER HPI is below    HPI: 53 yo F w/ pmhx of glaucoma, hemangioma presented from home w/ report of seizure. Pt sedated on exam with her mother at the bedside. Pt's mother reports that around 6 AM the pt's  noticed she was foaming at the mouth and began to shake violently w/ clenching of the upper extremities. States it lasted for about 90 sec and then she became unresponsive per family until later she started to move her L arm and started to arouse. Family notes pt was confused after the episode. Denied loss of bowel/bladder or tongue biting. Per pt's mother no prior hx of seizures. Pt does report having a headache in the posterior portion of the head for the past few days which is unusual for her. Pt brought to ER. CT head showed stable cavernoma otherwise unremarkable. Pt given IV keppra x1 and neurology consulted. MRI/MRA brain ordered     Patient was discharged on Keppra 500 mg twice a day, has not taken it consistently because she does not feel well when taking it.    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    Vitreous opacity - Right Eye    Venous angioma of brain    S/P hysterectomy    Hx of colonoscopy:10/10/2016    Anal fissure    Seizure    Hyponatremia    Hypophosphatemia     Past Medical History:   Diagnosis Date    Cataract     Glaucoma     Hemangioma     cavernous    History of acne 2004     "dr lopez prescribed accutane    Hyperlipidemia     Venous angioma of brain     Vertigo      Past Surgical History:   Procedure Laterality Date    CATARACT EXTRACTION W/  INTRAOCULAR LENS IMPLANT Right 12/17/14    Dr Camacho     HYSTERECTOMY       Family History   Problem Relation Age of Onset    Fibroids Mother     Migraines Mother     Glaucoma Mother     Heart disease Father     Depression Brother     No Known Problems Sister     Diabetes Maternal Grandmother     Glaucoma Maternal Grandmother     Cancer Maternal Aunt      breast    Breast cancer Maternal Aunt     No Known Problems Maternal Uncle     No Known Problems Paternal Aunt     No Known Problems Paternal Uncle     No Known Problems Maternal Grandfather     No Known Problems Paternal Grandmother     No Known Problems Paternal Grandfather     Macular degeneration Neg Hx     Blindness Neg Hx     Melanoma Neg Hx     Psoriasis Neg Hx     Lupus Neg Hx     Eczema Neg Hx     Acne Neg Hx     Cataracts Neg Hx     Amblyopia Neg Hx     Retinal detachment Neg Hx     Strabismus Neg Hx     Stroke Neg Hx     Thyroid disease Neg Hx     Hypertension Neg Hx      Review of Systems   Constitutional: Negative for chills, fever and unexpected weight change.        Patient feels poorly on the Keppra   HENT: Negative for trouble swallowing.    Respiratory: Negative for cough, shortness of breath and wheezing.    Cardiovascular: Negative for chest pain and palpitations.   Gastrointestinal: Negative for abdominal distention, abdominal pain, blood in stool and vomiting.   Musculoskeletal: Negative for back pain.     Objective:     Vitals:    07/18/17 0900   BP: 118/82   Pulse: 86   SpO2: 99%   Weight: 51.9 kg (114 lb 6.7 oz)   Height: 5' 2" (1.575 m)   PainSc:   2   PainLoc: Head     Body mass index is 20.93 kg/m².  Physical Exam   Constitutional: She is oriented to person, place, and time. She appears well-developed and well-nourished. No " distress.   Neck: Carotid bruit is not present. No thyromegaly present.   Cardiovascular: Normal rate, regular rhythm and normal heart sounds.  PMI is not displaced.    Pulmonary/Chest: Effort normal and breath sounds normal. No respiratory distress.   Abdominal: Soft. Bowel sounds are normal. She exhibits no distension. There is no tenderness.   Musculoskeletal: She exhibits no edema.   Neurological: She is alert and oriented to person, place, and time.     Assessment:     1. New onset seizure    2. Hemangioma      Plan:   Darling was seen today for hospital follow up.    Diagnoses and all orders for this visit:    New onset seizure  Comments:  It is important for the patient to see an epilepsy specialist as soon as possible  Orders:  -     Ambulatory consult to Neurology  -     Ambulatory consult to Neurosurgery    Hemangioma  Comments:  Known cavernous hemangioma  Orders:  -     Ambulatory consult to Neurosurgery    The etiology of this seizure is not clear to me and I would like her to see Neurology and Neurosurgery as soon as possible    Return in about 1 month (around 8/18/2017) for Follow up.         Medication List          Accurate as of 7/18/17 10:59 AM. If you have any questions, ask your nurse or doctor.               CONTINUE taking these medications    DILTIAZEM 2% CREAM  Apply topically 3 (three) times daily. Apply topically to anal area.     estradiol 0.075 mg/24 hr  Commonly known as:  VIVELLE-DOT  Place 1 patch onto the skin twice a week.     levetiracetam 500 MG Tab  Commonly known as:  KEPPRA  Take 1 tablet (500 mg total) by mouth 2 (two) times daily.     timolol maleate 0.5% 0.5 % Solg  Commonly known as:  TIMOPTIC-XE  Place 1 drop into both eyes every morning.

## 2017-07-20 ENCOUNTER — PATIENT MESSAGE (OUTPATIENT)
Dept: NEUROLOGY | Facility: CLINIC | Age: 52
End: 2017-07-20

## 2017-07-20 ENCOUNTER — OFFICE VISIT (OUTPATIENT)
Dept: NEUROSURGERY | Facility: CLINIC | Age: 52
End: 2017-07-20
Payer: COMMERCIAL

## 2017-07-20 VITALS
SYSTOLIC BLOOD PRESSURE: 96 MMHG | TEMPERATURE: 98 F | HEIGHT: 62 IN | DIASTOLIC BLOOD PRESSURE: 57 MMHG | HEART RATE: 72 BPM | WEIGHT: 115 LBS | BODY MASS INDEX: 21.16 KG/M2

## 2017-07-20 DIAGNOSIS — Q28.3 CEREBRAL CAVERNOUS MALFORMATION: Primary | ICD-10-CM

## 2017-07-20 DIAGNOSIS — G40.909 SEIZURE DISORDER: ICD-10-CM

## 2017-07-20 PROCEDURE — 99999 PR PBB SHADOW E&M-EST. PATIENT-LVL III: CPT | Mod: PBBFAC,,, | Performed by: NEUROLOGICAL SURGERY

## 2017-07-20 PROCEDURE — 99204 OFFICE O/P NEW MOD 45 MIN: CPT | Mod: S$GLB,,, | Performed by: NEUROLOGICAL SURGERY

## 2017-07-20 NOTE — PROGRESS NOTES
This office note has been dictated.  July 20, 2017    Kimberly Ellison M.D.  Department of Internal Medicine  [facility]    RE:  DARLING KENNEDY  [facility] No:  [id number]    Dear Kimberly:    Darling Kennedy was seen in neurosurgical consultation at the office this morning.    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  She complained of headaches and some dizziness and   disorientation in 2005 and MRI in 2007 showed a cavernous malformation in the   right mesial temporal lobe.  It was never convincing that she was having   seizures.  I followed her for the next few years and last saw her on June 2013.    She continued to have intermittent headaches, but otherwise was neurologically   asymptomatic.  On 07/14/17, her  noted her shaking in bed at 5:30 a.m.    He thought she was having a nightmare, but then realized that she was shaking   and having a probable seizure.  This probably lasted 2-3 minutes.  Afterwards,   she was drowsy and confused and taken to [facility], where an MRI again showed   the right temporal cavernous malformation.  By that time she was awake, she was   given intravenous Keppra and has been followed by Dr. Sahu in on the Neurology   Epilepsy Service.  She is currently taking Keppra 1000 mg once a day.  She does   feel that the medication makes her more sluggish and affects her concentration.    She has had no recurrent seizures.  She has no difficulty with vision or   hearing, no problem speaking or swallowing, weakness, or numbness in   extremities, difficulty with gait or balance.  She has had no new medical issues   over the past several years.  She is not diabetic or hypertensive.    On physical examination she is a well-developed, thin white lady, who is alert   and cooperative.  Examination of the head shows no tenderness over the scalp.    Eyes show full extraocular movements and equal, reactive pupils.  Disk margins    appear clear.  I could not see the entire fundus.  Hearing is intact to finger   rubbing.  The neck is supple.  On neurological examination, she is speaking   clearly and memory seems good.  Finger-to-nose and gait were unremarkable.    Cranial nerves are otherwise intact.  She has normal facial sensation and   movement.  The tongue protrudes in the midline.  She shows good strength in   extremities, normal sensation, quite hyperactive, but symmetrical deep tendon   reflexes.    CT scan of the head done at [facility] on 07/14/17 was reviewed.  The cavernous   malformation is seen as a hyperdense lesion in the mesial right temporal lobe.    There is no evidence for acute bleeding.  MRI done the same day again shows the   cavernous malformation.  This was compared to her study of 07/11/07.  There has   been mild increase in the size of the lesion.  The overall spherical shape is   unchanged.  There is no edema in the surrounding brain.  No real atrophy of the   parahippocampal gyrus.    ASSESSMENT:  1.  Right temporal cavernous malformation.  2.  Seizure disorder.    RECOMMENDATIONS:  She will need to be on anticonvulsant therapy for now.  The   location of the malformation is in an epileptogenic area, but this is her first   seizure at age 52.  I will have a followup MRI done in about six months to see   if there is any change in the lesion.    Thank you for the opportunity to see her in neurosurgical consultation.    Sincerely yours,      TYE  dd: 07/20/2017 11:42:35 (CDT)  td: 07/21/2017 00:09:42 (CDT)  Doc ID   #[id number]  Job ID #[id number]    CC: Darling Ellisno M.D.

## 2017-07-20 NOTE — LETTER
July 20, 2017      Kimberly Ellison MD  [address]  [facility] [address]           [facility] 7th Fl  [address]  [facility] [address]  Phone: [phone]          Patient: Darling Quach   MR Number: [id number]   YOB: 1965   Date of Visit: 7/20/2017       Dear Dr. Kimberly Ellison:    Thank you for referring Darling Quach to me for evaluation. Attached you will find relevant portions of my assessment and plan of care.    If you have questions, please do not hesitate to call me. I look forward to following Darling Quach along with you.    Sincerely,    Vicente Matson MD    Enclosure  CC:  No Recipients    If you would like to receive this communication electronically, please contact [email or web address] or [phone] to request more information on [email or web address] access.    For providers and/or their staff who would like to refer a patient to [facility], please contact us through our one-stop-shop provider referral line, [facility], at [phone].    If you feel you have received this communication in error or would no longer like to receive these types of communications, please e-mail [email or web address]

## 2017-07-27 ENCOUNTER — HOSPITAL ENCOUNTER (OUTPATIENT)
Dept: NEUROLOGY | Facility: CLINIC | Age: 52
Discharge: HOME | End: 2017-07-27
Payer: COMMERCIAL

## 2017-07-27 ENCOUNTER — PATIENT MESSAGE (OUTPATIENT)
Dept: NEUROLOGY | Facility: CLINIC | Age: 52
End: 2017-07-27

## 2017-07-27 DIAGNOSIS — D18.00 HEMANGIOMA: ICD-10-CM

## 2017-07-27 DIAGNOSIS — G40.209 COMPLEX PARTIAL SEIZURES EVOLVING TO GENERALIZED TONIC-CLONIC SEIZURES: ICD-10-CM

## 2017-07-27 PROCEDURE — 95816 EEG AWAKE AND DROWSY: CPT | Mod: S$GLB,,, | Performed by: PSYCHIATRY & NEUROLOGY

## 2017-07-27 PROCEDURE — 95816 PR EEG,W/AWAKE & DROWSY RECORD: ICD-10-PCS | Mod: S$GLB,,, | Performed by: PSYCHIATRY & NEUROLOGY

## 2017-07-27 NOTE — DISCHARGE SUMMARY
[facility] Hospital Medicine  Discharge Summary      Patient Name: Darling Quach  MRN: [id number]  Admission Date: 7/14/2017  Hospital Length of Stay: 0 days  Discharge Date and Time: 7/15/2017 12:59 PM  Attending Physician: No att. providers found   Discharging Provider: Hayley Arredondo MD  Primary Care Provider: Kimberly Ellison MD  [facility] Medicine Team: [facility] G Hayley Arredondo MD    HPI:   53 yo F w/ pmhx of glaucoma, hemangioma presented from home w/ report of seizure. Pt sedated on exam with her mother at the bedside. Pt's mother reports that around 6 AM the pt's  noticed she was foaming at the mouth and began to shake violently w/ clenching of the upper extremities. States it lasted for about 90 sec and then she became unresponsive per family until later she started to move her L arm and started to arouse. Family notes pt was confused after the episode. Denied loss of bowel/bladder or tongue biting. Per pt's mother no prior hx of seizures. Pt does report having a headache in the posterior portion of the head for the past few days which is unusual for her. Pt brought to ER. CT head showed stable cavernoma otherwise unremarkable. Pt given IV keppra x1 and neurology consulted. MRI/MRA brain ordered        * No surgery found *      Indwelling Lines/Drains at time of discharge:   Lines/Drains/Airways          No matching active lines, drains, or airways        Hospital Course:   Admitted for observation. Neurology consulted during stay. Pt placed on keppra. No further seizures. D/cd home in stable condition. F/u with pcp and neurology in clinic.      Consults:   Consults         Status Ordering Provider     Inpatient consult to neurology  Once     Provider:  (Not yet assigned)    Completed YVON MITCHELL           Review of Systems   Constitutional: Negative for chills and fever.   Eyes: Negative.    Respiratory: Negative.    Genitourinary: Negative for difficulty urinating  and dysuria.   Neurological: Negative for seizures or Headache    Objective:      Vitals:    07/15/17 1109   BP: (!) 100/46   Pulse: 60   Resp: 18   Temp: 98.5 °F (36.9 °C)       Physical Exam   Constitutional: She is oriented to person, place, and time. She appears well-developed. No distress.   HENT:   Head: Normocephalic and atraumatic.   Nose: Nose normal.   Mouth/Throat: Oropharynx is clear and moist. No oropharyngeal exudate.   Eyes: Conjunctivae are normal. Pupils are equal, round, and reactive to light. Right eye exhibits no discharge. Left eye exhibits no discharge.   Neck: Normal range of motion. Neck supple.   Cardiovascular: Normal rate, regular rhythm and normal heart sounds.  Exam reveals no friction rub.    No murmur heard.  Pulmonary/Chest: Effort normal and breath sounds normal. No respiratory distress. She has no wheezes.   Abdominal: Soft. Bowel sounds are normal. She exhibits no distension. There is no tenderness.   Musculoskeletal: Normal range of motion. She exhibits no edema or deformity.   Neurological: She is oriented to person, place, and time. No cranial nerve deficit. Awake, alert  Skin: Skin is warm and dry.   Psychiatric: She has a normal mood and affect.   Vitals reviewed.        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 Results showed no acute abnormality   Neuro checks q4h   Seizure precautions. Ativan prn   Neurology consulted. Appreciate assistance  Pt placed on keppra  No further episodes  D/c home and f/u in neuro clinic               Discharged Condition: stable    Disposition: Home or Self Care    Follow Up:  Follow-up Information     Kimberly Ellison MD In 1 week.    Specialty:  Internal Medicine  Contact information:  [address]  [place] [address]  [phone]             [facility] - Neurology.    Specialty:  Neurology  Contact information:  [address] [facility] [address] [phone]  Additional information:  7th Floor - [facility] [place]               Patient Instructions:     Ambulatory Referral to Neurology   Referral Priority: Routine Referral Type: Consultation   Referral Reason: Specialty Services Required    Referred to Provider: FERNANDO ALMANZAR Requested Specialty: Neurology   Number of Visits Requested: 1      Diet general     Activity as tolerated     Call MD for:  increased confusion or weakness       Medications:  Reconciled Home Medications:   Discharge Medication List as of 7/15/2017 11:56 AM      START taking these medications    Details   levetiracetam (KEPPRA) 500 MG Tab Take 1 tablet (500 mg total) by mouth 2 (two) times daily., Starting Sat 7/15/2017, Until Sun 7/15/2018, Normal         CONTINUE these medications which have NOT CHANGED    Details   DILTIAZEM HCL (DILTIAZEM 2% CREAM) Apply topically 3 (three) times daily. Apply topically to anal area., Starting 4/25/2017, Until Discontinued, Print      estradiol (VIVELLE-DOT) 0.075 mg/24 hr Place 1 patch onto the skin twice a week., Starting 5/1/2017, Until Tue 5/1/18, Normal      timolol maleate 0.5% (TIMOPTIC-XE) 0.5 % SolG Place 1 drop into both eyes every morning., Starting 2/24/2017, Until Discontinued,  Normal           Time spent on the discharge of patient: 40 minutes    Hayley Arredondo MD  Department of Hospital Medicine  [facility]

## 2017-07-27 NOTE — ASSESSMENT & PLAN NOTE
Unclear etiology at this time  Check b12, folate, tsh, f/u electrolytes, urine tox   MRI/MRA brain ordered. Results showed no acute abnormality   Neuro checks q4h   Seizure precautions. Ativan prn   Neurology consulted. Appreciate assistance  Pt placed on keppra  No further episodes  D/c home and f/u in neuro clinic

## 2017-07-27 NOTE — HOSPITAL COURSE
Admitted for observation. Neurology consulted during stay. Pt placed on keppra. No further seizures. D/cd home in stable condition. F/u with pcp and neurology in clinic.

## 2017-07-28 ENCOUNTER — PATIENT MESSAGE (OUTPATIENT)
Dept: NEUROLOGY | Facility: CLINIC | Age: 52
End: 2017-07-28

## 2017-07-28 NOTE — PROCEDURES
DATE OF SERVICE:  07/27/2017    LOCATION:  [facility].    ELECTROENCEPHALOGRAM REPORT    METHODOLOGY:  Electroencephalographic (EEG) recording is recorded with   electrodes placed according to the International 10-20 placement system.  Thirty   two (32) channels of digital signal (sampling rate of 512/sec), including T1   and T2, were simultaneously recorded from the scalp and may include EKG, EMG,   and/or eye monitors.  Recording band pass was 0.1 to 512 Hz.  Digital video   recording of the patient is simultaneously recorded with the EEG.  The patient   is instructed to report clinical symptoms which may occur during the recording   session.  EEG and video recording are stored and archived in digital format.    Activation procedures, which include photic stimulation, hyperventilation and   instructing patients to perform simple tasks, are done in selected patients.    The EEG is displayed on a monitor screen and can be reviewed using different   montages.  Computer assisted-analysis is employed to detect spike and   electrographic seizure activity.  The entire record is submitted for computer   analysis.  The entire recording is visually reviewed, and the times identified   by computer analysis as being spikes or seizures are reviewed again.    Compressed spectral analysis (CSA) is also performed on the activity recorded   from each individual channel.  This is displayed as a power display of   frequencies from 0 to 30 Hz over time.  The CSA is reviewed looking for   asymmetries in power between homologous areas of the scalp, then compared with   the original EEG recording.    Qwiqq software was also utilized in the review of this study.  This software   suite analyzes the EEG recording in multiple domains.  Coherence and rhythmicity   are computed to identify EEG sections which may contain organized seizures.    Each channel undergoes analysis to detect the presence of spike and sharp waves   which have  special and morphological characteristics of epileptic activity.  The   routine EEG recording is converted from special into frequency domain.  This is   then displayed comparing homologous areas to identify areas of significant   asymmetry.  Algorithm to identify non-cortically generated artifact is used to   separate artifact from the EEG.    EEG FINDINGS:  At the onset, the study begins with the patient awake.  An   appropriate mix of alpha and beta frequency activity and eye blink artifact seen   bilaterally.  As the study progresses, photic stimulation is initially   performed revealing a bilateral symmetric driving response.    Intermittent slowing is seen in the temporal regions, independently left and   right temporal, but far more in the left temporal region than in the right   temporal region.  Additionally, sharp waves, sometimes in trains and sometimes   associated with rhythmic delta activity, are seen, particularly in the left   temporal region.  To some extent, similar findings are seen in the right   temporal region, but to a much lesser extent.    Otherwise, the record consisted of an appropriate posterior dominant rhythm at   10 Hz and an appropriate of drowsiness in the middle of the study and   eventually, stage N2 sleep with K complexes and sleep spindles clearly evident.    During N2 sleep, the left-sided sharp discharges, which are broad field and   centered at the T1 electrode with a field that extends from F7 to T3, become   more evident.  No seizures were seen during this study.    INTERPRETATION:  Abnormal EEG due to bitemporal slowing and sharp waves, worse   in the left temporal region than the right temporal region with sharp activity,   maximal at the T1 electrode, most likely representing interictal epileptiform   discharges.      HUSAM/CAMRYN  dd: 07/27/2017 11:02:32 (CDT)  td: 07/28/2017 01:51:26 (CDT)  Doc ID   #[id number]  Job ID #[id number]    CC:

## 2017-07-29 ENCOUNTER — PATIENT MESSAGE (OUTPATIENT)
Dept: NEUROLOGY | Facility: CLINIC | Age: 52
End: 2017-07-29

## 2017-07-31 ENCOUNTER — PATIENT MESSAGE (OUTPATIENT)
Dept: NEUROLOGY | Facility: CLINIC | Age: 52
End: 2017-07-31

## 2017-08-09 ENCOUNTER — TELEPHONE (OUTPATIENT)
Dept: NEUROLOGY | Facility: CLINIC | Age: 52
End: 2017-08-09

## 2017-08-09 ENCOUNTER — LAB VISIT (OUTPATIENT)
Dept: LAB | Facility: HOSPITAL | Age: 52
End: 2017-08-09
Attending: PSYCHIATRY & NEUROLOGY
Payer: COMMERCIAL

## 2017-08-09 DIAGNOSIS — D18.00 HEMANGIOMA: ICD-10-CM

## 2017-08-09 DIAGNOSIS — G40.209 COMPLEX PARTIAL SEIZURES EVOLVING TO GENERALIZED TONIC-CLONIC SEIZURES: ICD-10-CM

## 2017-08-09 PROCEDURE — 80177 DRUG SCRN QUAN LEVETIRACETAM: CPT

## 2017-08-09 PROCEDURE — 36415 COLL VENOUS BLD VENIPUNCTURE: CPT

## 2017-08-09 NOTE — TELEPHONE ENCOUNTER
----- Message from Neptali Lopez sent at 8/9/2017  2:59 PM CDT -----  Contact: Self @ [phone]  Pt is calling to speak with someone about lab doctor is recommending. Pls call.

## 2017-08-11 DIAGNOSIS — G40.209 COMPLEX PARTIAL SEIZURES EVOLVING TO GENERALIZED TONIC-CLONIC SEIZURES: ICD-10-CM

## 2017-08-11 DIAGNOSIS — D18.00 HEMANGIOMA: ICD-10-CM

## 2017-08-11 LAB — LEVETIRACETAM SERPL-MCNC: 23.3 UG/ML (ref 3–60)

## 2017-08-11 RX ORDER — LEVETIRACETAM 500 MG/1
1000 TABLET, EXTENDED RELEASE ORAL DAILY
Qty: 60 TABLET | Refills: 0 | Status: CANCELLED | OUTPATIENT
Start: 2017-08-11 | End: 2017-09-10

## 2017-08-11 NOTE — TELEPHONE ENCOUNTER
----- Message from Angela Ashley sent at 8/11/2017 12:17 PM CDT -----  Contact: PT  PT calling to get Medication needed refilled:    levetiracetam XR (KEPPRA XR) 500 mg Tb24 24 hr tablet    Callback: [phone]

## 2017-08-15 DIAGNOSIS — D18.00 HEMANGIOMA: ICD-10-CM

## 2017-08-15 DIAGNOSIS — G40.209 COMPLEX PARTIAL SEIZURES EVOLVING TO GENERALIZED TONIC-CLONIC SEIZURES: ICD-10-CM

## 2017-08-15 RX ORDER — LEVETIRACETAM 500 MG/1
1000 TABLET, EXTENDED RELEASE ORAL DAILY
Qty: 60 TABLET | Refills: 0 | Status: CANCELLED | OUTPATIENT
Start: 2017-08-15 | End: 2017-09-14

## 2017-08-15 NOTE — TELEPHONE ENCOUNTER
Patient is requesting a refill on her Keppra.Please advise.Patient is running out of her medication.

## 2017-08-16 ENCOUNTER — PATIENT MESSAGE (OUTPATIENT)
Dept: NEUROLOGY | Facility: CLINIC | Age: 52
End: 2017-08-16

## 2017-08-16 NOTE — TELEPHONE ENCOUNTER
Called in Keppra per orders of Dr Sahu to [facility].Patient requests 1,000 mg tablets.Change OK'd by Dr Sahu.

## 2017-08-17 NOTE — PROGRESS NOTES
EPILEPSY CLINIC:   FOLLOW UP VISIT    Name: Darling Quach  MRN:[id number]   CSN: [id number]  Date of service: 8/17/2017    Last (1st) clinic visit: 7/18/17    Age:52 y.o.   Gender:female     The patient is here today with her mother  History obtained from patient and her mother    CHIEF COMPLAINT:    - follow-up for seizures    INTERVAL HISTORY (Since last visit):    This is a 52 y.o. right handed female who presents for follow-up of seizures    Reports feeling fatigued, memory difficulties and irritability since starting Levetiracetam - states that side-effects are interfering with her work and life.  Mother also reinforces significant behavioral changes    No seizures since last visit (last sz: 7/14/17)    Was seen by  ([facility] [place] on [place]20/17 - plan is to follow-up with repeat MRI q 6months    SEIZURE HISTORY: 1st clinic visit 7/18/17:  Hx of occasional headaches that are preceded by a weird taste with nausea - not consistently - last such episode was in 2012.  However, patient reports at least 2-3 such aura in the preceding week - last episode was 2 days prior the seizure.  On early morning of 7/14/17, patient was asleep and reportedly her  felt the bed shaking: found patient to be stiff and shaking all over with foaming at the mouth, unresponsive, lasting about 1 1/2 min. No hx of tongue bite or bladder/bowel incontinence.   sprayed water on her face and shortly after patient was responsive but confused and slept afterward.   did not call EMS but family brought her to ED (see notes below)  Triggers for seizures: Started estrogen patch q 2 weeks since Apr 2017.  Compliance with anti-epileptic medications:  Yes    Possible lateralizing signs by history: (Neurological signs): none     Recent admission: 7/14/17  Per ED: The patient is a 52 y.o. female with hx of: vertigo, HLD, and hemangioma followed by Dr. Matson that presents to the ED with a complaint of seizure which occurred 5 hours ago.  "Patient's spouse observed that while patient was laying in bed, she started experiencing tremors of her lower extremities and body while upper extremities were clenched and still. She was foaming at the mouth and unresponsive to any stimuli after  rolled her onto her side. Patient cannot remember what occurred this morning, and  reports that she was confused for 25-30 minutes. She reports feeling "kind of weak" for the last few days and mentions having a headache last night, which is not typical for her, for which she took 2 aspirin prior to going to bed. She denies any other pain, difficulty sleeping, recent routine changes, or new medications recently. Patient denies a history of seizures in the past.   Per Neurology consult:   Patient reports no prior history of seizures. Patient states that yesterday (07/13/17) afternoon she began to have a mild HA for which she took two aspirin. During this time she also states that she "felt a little dizzy". Patient denies any symptoms of syncope prior to this event. The patient states that she was asleep prior to "seizure" and that she has no recollection of anything leading up to the event. The first thing that she can remember is being on the floor with her  present. Per family the patient was profoundly confused for approximately 30 minutes following the event. The patient denies any loss of bowel or bladder function during this event. Per family the patient experienced shaking of her lower extremities and clenching of the upper extremities as well as foaming at the mouth. The event lasted approximately 2 minutes. Patient denies any fever or illness preceding this event. CT head shows motion limited examination without evidence for definite acute intercranial hemorrhage. Ill-defined hyperdense lesion right superior medial temporal lobe compatible with known cavernoma correspond to abnormality seen on prior MRI. CXR shows no convincing evidence of " intrathoracic disease identified. EKG shows normal sinus rhythm. Patient is currently awaiting MRI. Patient given Keppra 1000MG in ED.      Any other relevant information:  Seen by NSGY () notes of 2013 (last clinic visit): Onset of HA x 2005 and evaluation led to the diagnosis of right temporal cavernous malformation; no enlargement of the lesion over the six-year period and no evidence for bleeding.    RELEVANT LABS AND TESTS SINCE LAST VISIT:   R-EEG, 8/9/17: INTERPRETATION:  Abnormal EEG due to bitemporal slowing and sharp waves, worse in the left temporal region than the right temporal region with sharp activity, maximal at the T1 electrode, most likely representing interictal epileptiform discharges.    MRI Brain:  Results for orders placed or performed during the hospital encounter of 07/14/17   MRI Brain W WO Contrast    Narrative    Procedure: MRI the brain with andwithout contrast.    Technique: Sagittal and axial T1, axial T2, axial FLAIR, axial gradient, axial diffusion, and axial, sagittal, and coronal postcontrast T1 images of the whole brain. 9 ml of Gadavist injected intravenously.         Comparison: CT earlier today and MRI 05/16/2013    Findings: Heterogeneous signal intra-axial focus within the mesial right temporal lobe with complex T1/T2 hyper-and hypointensity with predominance of intermediate signal and internal enhancement. This measures 1.4 X 1.5 X 1.6 CM AP X TV X CC dimensions respectively. Previously measured approximately 1.2 x 1.4 x 1.5 CM remains most compatible with cavernoma overall slightly increased from prior. There is no surrounding T2 or flair signal hyperintensities to suggest recent hemorrhage with scattered subacute to chronic regions of hemorrhage.    Localized mass effect without midline shift. Ventricles stable without hydrocephalus. The major and cranial T2 flow voids are present. There is no restricted diffusion to suggest acute infarction. Miguelangel. intracranial T2  flow voids are present.    Remote operative changes from right cataract repair.    Impression     Heterogeneous signal intra-axial lesion right mesial temporal lobe remains compatible with cavernoma overall slightly increased in size from prior.    No evidence for edema about this to suggest acute hemorrhage.    No evidence for acute infarction.    Ventricles stable without hydrocephalus. Clinical correlation and continued followup advised.      Electronically signed by: NAUN SAN DO  Date:     07/14/17  Time:    17:05    MRA Brain without contrast    Narrative    Procedure: MRA of the head     Technique: noncontrast 3-D time of flight MRA head. please note mra head technique includes skull base to superior 2/3 of the cerebral hemispheres.        Comparison: None    Findings:    MRA head:    Anterior circulation:  The bilateral distal cervical, Gisela, cavernous and supraclinoid segments of the ICA's and the visualized bilateral anterior and middle cerebral arteries are patent without evidence for significant focal stenosis or aneurysm. There is no abnormal flow related enhancement within the known right mesial temporal lobe cavernoma to suggest arterial vascular malformation allowing for T1 hyperintense signal related to subacute age blood products.    Posterior circulation:  The distal vertebral arteries, basilar artery and posterior cerebral arteries are patent without evidence for significant focal stenosis or aneurysm.    Impression     Unremarkable MRA of the head specifically without evidence for focal stenosis or intracranial aneurysm.    No evidence for abnormal flow related enhancement associated with the known right temporal cavernoma to suggest arterial vascular malformation As detailed above        Electronically signed by: NAUN SAN DO  Date:     07/14/17  Time:    17:00    CT Head Without Contrast    Narrative    CT brain without contrast.    Comparison: MRI 06/08/2012    Technique: Multiple 5  mm axial images of the head were obtained without intravenous contrast.    Results: Ill-defined region of hyperdensity within the right superior medial temporal lobe corresponding to known cavernoma identified on prior MRI. Allowing for patient motion there is no evidence for acute intracranial hemorrhage. Ventricles stable without hydrocephalus. Clinical correlation and further evaluation is warranted.    Impression     Motion limited examination without evidence for definite acute intercranial hemorrhage.    Ill-defined hyperdense lesion right superior medial temporal lobe compatible with known cavernoma correspond to abnormality seen on prior MRI.    Further evaluation as warranted clinically.          Electronically signed by: NAUN SAN DO  Date:     07/14/17  Time:    12:32       Additional tests:  1)CT Scan: as noted   2) EEG\Video Monitoring: no  3) PET Scan: no  4) Neuropsychological evaluation: no  5) DEXA Scan: no   6) Others: no     RISK FACTORS FOR SEIZURES:    1. Head Trauma:  No    2. CNS Infections:  No  3. CNS Tumors: Yes     4. CNS Vascular Disease: No     5. Febrile Seizures: No    6. Developmental Delay: No       7. Family History of Seizures: No    8. Birth history: FTNVD     Pregnancy/Labor/Delivery: FTNVD    CURRENT MEDICATIONS:   Current Outpatient Prescriptions   Medication Sig Dispense Refill    DILTIAZEM HCL (DILTIAZEM 2% CREAM) Apply topically 3 (three) times daily. Apply topically to anal area. 30 g 0    estradiol (VIVELLE-DOT) 0.075 mg/24 hr Place 1 patch onto the skin twice a week. 8 patch 11    levetiracetam XR (KEPPRA XR) 500 mg Tb24 24 hr tablet Take 2 tablets (1,000 mg total) by mouth once daily. 60 tablet 0    timolol maleate 0.5% (TIMOPTIC-XE) 0.5 % SolG Place 1 drop into both eyes every morning. 5 mL 12     No current facility-administered medications for this visit.       Folic acid: no    CURRENT ANTI EPILEPTIC MEDICATIONS:   - Levetiracetam XR 1000mg q day     Results  for JENY KENNEDY (MRN [id number]) as of 8/17/2017 18:15   Ref. 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 12/18/2012    Posterior vitreous detachment - Both Eyes 12/18/2012    Steroid responders borderline glaucoma - Both Eyes 12/18/2012    Vitreous opacity - Right Eye 12/18/2012    Venous angioma of brain 12/15/2014    S/P hysterectomy 03/16/2017    Hx of colonoscopy:10/10/2016 04/06/2017    Anal fissure 04/06/2017    Hyponatremia 07/14/2017    Hypophosphatemia 07/14/2017    Complex partial seizures evolving to generalized tonic-clonic seizures 07/18/2017     Resolved Ambulatory Problems     Diagnosis Date Noted    Nuclear sclerosis - Both Eyes 12/18/2012    Glaucoma 12/15/2014    Cataract 12/15/2014    Post-operative state 12/23/2014    Anal fistula 04/06/2017    Seizure 07/14/2017     Past Medical History:   Diagnosis Date    Cataract     Glaucoma     Hemangioma     History of acne 2004    Hyperlipidemia     Venous angioma of brain     Vertigo       PAST PSYCHIATRIC HISTORY:  no    PAST SURGICAL HISTORY including Epilepsy surgery:   Past Surgical History:   Procedure Laterality Date    CATARACT EXTRACTION W/  INTRAOCULAR LENS IMPLANT Right 12/17/14    Dr Camacho     HYSTERECTOMY          FAMILY HISTORY:   Family History   Problem Relation Age of Onset    Fibroids Mother     Migraines Mother     Glaucoma Mother     Heart disease Father     Depression Brother     No Known Problems Sister     Diabetes Maternal Grandmother     Glaucoma Maternal Grandmother     Cancer Maternal Aunt      breast    Breast cancer Maternal Aunt     No Known Problems Maternal Uncle     No Known Problems Paternal Aunt      No Known Problems Paternal Uncle     No Known Problems Maternal Grandfather     No Known Problems Paternal Grandmother     No Known Problems Paternal Grandfather     Macular degeneration Neg Hx     Blindness Neg Hx     Melanoma Neg Hx     Psoriasis Neg Hx     Lupus Neg Hx     Eczema Neg Hx     Acne Neg Hx     Cataracts Neg Hx     Amblyopia Neg Hx     Retinal detachment Neg Hx     Strabismus Neg Hx     Stroke Neg Hx     Thyroid disease Neg Hx     Hypertension Neg Hx          SOCIAL HISTORY:   Social History     Social History    Marital status:      Spouse name: N/A    Number of children: N/A    Years of education: N/A     Occupational History    Not on file.     Social History Main Topics    Smoking status: Never Smoker    Smokeless tobacco: Never Used    Alcohol use No    Drug use: No    Sexual activity: Yes     Partners: Male     Birth control/ protection: Surgical      Comment: , [facility] 9/2010     Other Topics Concern    Are You Pregnant Or Think You May Be? No    Breast-Feeding No     Social History Narrative    No narrative on file      a) Marital status:                                                     b) Living situation: patient lives with , 1 (of 3) daughter and a dog  c) Employed/Unemployed/Other: Employed full time      DRIVING HISTORY:  Currently driving: No       LEVEL OF EDUCATION: Masters degree in "[employer] science     SUBSTANCE USE: no hx of tobacco, etoh or other substance use     ALLERGIES: Latanoprost     REVIEW OF SYSTEMS:  Review of Systems     GENERAL EXAMINATION:  There were no vitals taken for this visit.     GENERAL EXAMINATION:  General Appearance:    This is an average built female who appears well.  HEENT: There are no dysmorphic features  Skin: There are no obvious stigmata for neurocutaneous disorders.  Neck: supple   Cardiovascular: regular rate and rhythm  Lungs: clear  Abdomen: deferred  The spine is non-tender.   Kyphosis:  NoScoliosis: No   Extremities: Warm and well perfused    NEUROLOGICAL EXAMINATION:  Mental status: Alert and oriented x 4; pleasant and cooperative with exam  Memory: Recent memory intact, Remote memory intact, Age correct, Month correct  Attention and concentration: intact  Fund of knowledge: adequate  Speech: normal  Dysarthria: No   Eyes: PERRL; EOM intact; No nystagmus.The visual pursuits  were smooth with normal saccadic eye movements.   Fundoscopic Exam: deferred  No facial asymmetry. Intact facial sensation bilaterally.  Hearing was intact bilaterally to finger rub  Tongue and palate was in the midline  Intact SCM and trapezii bilaterally     Motor examination:  Normal bulk and tone bilaterally. Power: no pronater drift; 5/5 bilaterally symmetric UE/LE  Abnormal movements: none  Deep tendon reflexes: 2+ bilaterally symmetric UE/LE with flexor plantars  Dysmetria: No     Sensory examination:   Normal, light touch, pin prick, and vibration bilaterally symmetric UE/LE    Gait:  Normal gait and station; able to tandem walk without any difficulty    OTHER RELEVANT LABS AND TESTS:    IMPRESSION:   Complex partial seizures evolving to generalized tonic-clonic seizures  53yo RH F with hx of seizures x 7/14/17  - R temporal cavernous malformation    No seizures since maintained of Levetiracetam XR 1000mg q day - however reporting significant (especially behavioral) side-effects    Plan:  Will change LEV to BRIV   Wants EEG in a disc to take to [facility] for 2nd opinion    Plan of care was discussed in detail with patient and her mother - answered/addressed all their questions and concerns      Cavernoma  - followed by NSGY    The patient was asked to call me/the clinic 1 week after the test(s) are done to obtain results.    More than 50% of the time with the patient (as well as family/caregiver(s) was spent on face-to-face counseling about:  1. Diagnosis, plans, prognosis, medications and possible  side-effects, risks and benefits of treatment, other alternatives to AEDs.  2. Risks related to continued seizures, status epilepticus, SUDEP, driving restrictions and seizure precautions ( no baths but showers are ok, no swimming unsupervised, no use of heavy machinery, no use of sharp moving objects, avoid heights).   3. Issues related to pregnancy, OCP and breast feeding as it relates to epilepsy (in female patients).  4. The potential of teratogenicity (for female patients) and suicidal risks of anti-epileptic medications.  5.Avoid any activity that compromise patient safety related to seizures.    Questions and concerns raised by the patient and family/care-giver(s) were addressed and they indicated understanding of everything discussed and agreed to plans as above.    Return in ~ 3 months    Josie Sahu MD, HERNANDO(), FACNS.  Neurology-Epilepsy.

## 2017-08-18 ENCOUNTER — OFFICE VISIT (OUTPATIENT)
Dept: NEUROLOGY | Facility: CLINIC | Age: 52
End: 2017-08-18
Payer: COMMERCIAL

## 2017-08-18 ENCOUNTER — OFFICE VISIT (OUTPATIENT)
Dept: OPHTHALMOLOGY | Facility: CLINIC | Age: 52
End: 2017-08-18
Attending: OPHTHALMOLOGY
Payer: COMMERCIAL

## 2017-08-18 ENCOUNTER — TELEPHONE (OUTPATIENT)
Dept: NEUROLOGY | Facility: CLINIC | Age: 52
End: 2017-08-18

## 2017-08-18 VITALS
DIASTOLIC BLOOD PRESSURE: 76 MMHG | WEIGHT: 116.38 LBS | SYSTOLIC BLOOD PRESSURE: 126 MMHG | BODY MASS INDEX: 21.42 KG/M2 | HEIGHT: 62 IN | HEART RATE: 65 BPM

## 2017-08-18 DIAGNOSIS — Z51.81 ENCOUNTER FOR MEDICATION TITRATION: ICD-10-CM

## 2017-08-18 DIAGNOSIS — G40.209 COMPLEX PARTIAL SEIZURES EVOLVING TO GENERALIZED TONIC-CLONIC SEIZURES: Primary | ICD-10-CM

## 2017-08-18 DIAGNOSIS — H52.7 REFRACTION ERROR: ICD-10-CM

## 2017-08-18 DIAGNOSIS — H40.89 GLAUCOMA DUE TO COMBINATION OF MECHANISMS: Primary | ICD-10-CM

## 2017-08-18 DIAGNOSIS — D18.00 CAVERNOMA: ICD-10-CM

## 2017-08-18 DIAGNOSIS — H40.89 GLAUCOMA DUE TO COMBINATION OF MECHANISMS: ICD-10-CM

## 2017-08-18 DIAGNOSIS — Z96.1 PSEUDOPHAKIA OF RIGHT EYE: ICD-10-CM

## 2017-08-18 DIAGNOSIS — H43.813 POSTERIOR VITREOUS DETACHMENT, BILATERAL: ICD-10-CM

## 2017-08-18 DIAGNOSIS — H25.12 NUCLEAR SCLEROSIS, LEFT: ICD-10-CM

## 2017-08-18 PROCEDURE — 3008F BODY MASS INDEX DOCD: CPT | Mod: S$GLB,,, | Performed by: PSYCHIATRY & NEUROLOGY

## 2017-08-18 PROCEDURE — 92012 INTRM OPH EXAM EST PATIENT: CPT | Mod: S$GLB,,, | Performed by: OPHTHALMOLOGY

## 2017-08-18 PROCEDURE — 92020 GONIOSCOPY: CPT | Mod: S$GLB,,, | Performed by: OPHTHALMOLOGY

## 2017-08-18 PROCEDURE — 99999 PR PBB SHADOW E&M-EST. PATIENT-LVL I: CPT | Mod: PBBFAC,,, | Performed by: OPHTHALMOLOGY

## 2017-08-18 PROCEDURE — 92134 CPTRZ OPH DX IMG PST SGM RTA: CPT | Mod: S$GLB,,, | Performed by: OPHTHALMOLOGY

## 2017-08-18 PROCEDURE — 99215 OFFICE O/P EST HI 40 MIN: CPT | Mod: S$GLB,,, | Performed by: PSYCHIATRY & NEUROLOGY

## 2017-08-18 PROCEDURE — 99999 PR PBB SHADOW E&M-EST. PATIENT-LVL III: CPT | Mod: PBBFAC,,, | Performed by: PSYCHIATRY & NEUROLOGY

## 2017-08-18 NOTE — TELEPHONE ENCOUNTER
briviact 100mg BID called into pharmacy but needs approval. I called [employer] for peer to peer review, but could only leave a message

## 2017-08-18 NOTE — LETTER
August 21, 2017      Kimberly Ellison MD  [address]  [facility] [address]           [facility] - Neurology Epilepsy  [address]  [facility] [address]  Phone: [phone]  Fax: [phone]          Patient: Darling Quach   MR Number: [id number]   YOB: 1965   Date of Visit: 8/18/2017       Dear Dr. Kimberly Ellison:    Thank you for referring Darling Quach to me for evaluation. Attached you will find relevant portions of my assessment and plan of care.    If you have questions, please do not hesitate to call me. I look forward to following Darling Quach along with you.    Sincerely,    Josie Sahu MD    Enclosure  CC:  No Recipients    If you would like to receive this communication electronically, please contact [email or web address] or [phone] to request more information on [employer] access.    For providers and/or their staff who would like to refer a patient to [facility], please contact us through our one-stop-shop provider referral line, [facility], at [phone].    If you feel you have received this communication in error or would no longer like to receive these types of communications, please e-mail [email or web address]

## 2017-08-18 NOTE — PROGRESS NOTES
HPI     DLS:4/28/17    Pt here for HRT review;  Pt states about 5 weeks ago she had a seizure. Pt states she has been off   her Timolol drops for about a month now.    Meds: T 1/2 GFS qam ou                 1. Glaucoma due to combination of mechanisms, both eyes  2. Posterior vitreous detachment, bilateral  3. Nuclear sclerosis, left   4. Steroid responders borderline glaucoma, bilateral  5. Refraction error  6. Pseudophakia, right eye  7. Allergic conjunctivitis, right       Last edited by Nivia Cox on 8/18/2017  8:41 AM. (History)          Assessment /Plan     For exam results, see Encounter Report.    Glaucoma due to combination of mechanisms - Both Eyes    Posterior vitreous detachment, bilateral    Nuclear sclerosis, left    Refraction error    Pseudophakia of right eye        PT IS TRANSFER ING FROM [facility] TO [place] - PHOTO file is no in  [facility]    1. Mixed mechanism Glaucoma   H/O narrow angles - S/P PI's, + residual OHT   First HVF 1998   First photos 1999   Former [facility] , retired for a few years and went back to work as a    (mom with same problem with narrow angles and residual OHT)     Family history + mom - Narrow angles with residual OHT   Glaucoma meds - timolol gfs ou   H/O adverse rxn to glaucoma drops latanoprost - eye irritation  LASERS PI's ou   GLAUCOMA SURGERIES none   OTHER EYE SURGERIES - PC IOL OD 12/17/2014 - [facility]  CDR 0.4/0.4   Tbase 23-36 / 23-35 ou   Tmax 36/35   Ttarget 30/30  HVF 13 test 24-2 ss ou 1996 to 2014 ([facility])            3 SWAP test 2010 to 2012 - Full ou            ( repeat HVF from 8/2013 to 9/2013 - new SAD od confirmed)            [place] VF's - 1 test 2016 to 2016 - SNS /INS od // full os   Gonio +3 ou S/P PI's   /618   OCT 3 test 2005 -  2014 ([facility]) - RNFL - nl od/ nl os   HRT - 4 test at [facility] - 2014 to 2016 -MR -  nl od // nl os /// CDR 0.46 od // 0.47 os  - older test at [facility]  Disc photos 1999,  2004, 2013 - slides // 5/13/2010 - [facility]     - Ttoday  26//25  - Test done today - HRT / gonio    2. PVD ou - with dense vitreous veil OD Saw [facility] 4/23/2012     3. Vitreous opacity OD     4. Steroid responder - had an injection / ? Spironolactone / aldactone - not currently on any steroids    5. Refractive error   Was bilateral hyperope / presbyope   Is anisopmetropic post CE od    Glasses from [facility] and doing well     6. H/O eyelid papilloma Left - S/P removal - [facility]     7. Pseudophakia od   PC IOL od 12/17/2014 - SN60wf 20.5   Had a large myopic shift pre surgery from +1.00 to -3.00    8.  I see her father in law - he has glaucoma - and I did his cataracts    I see her mother - she has similar issues to this patient     Plan   MMG  / OHT ou  S/P PI's ou   ? Early SAD changes od  IOP stable on timolol - tolerating well   Intolerant to latanoprost - red irritated eye    Cont timolol gfs ou q am     Pt is going to try CTL's // ? Monovision with dr. June       4/27/2016 - [facility] - red eye / irritated - OD   [t presents with slight red / irritated right eye   Has some conjunctival in growth towards her cataract incision - almost like a penguecula /pterygium   Slightly irritated   Stay off timolol for now - reconsider after next VF's and if IOP goes up to 30   Add pres free AT's qid   Ok to use szaditor or alaway allergy drops     Keep reugula F/U - 6 months - Friday - her day off - main campus with HVF / DFE / OCT

## 2017-08-20 ENCOUNTER — PATIENT MESSAGE (OUTPATIENT)
Dept: INTERNAL MEDICINE | Facility: CLINIC | Age: 52
End: 2017-08-20

## 2017-08-20 ENCOUNTER — PATIENT MESSAGE (OUTPATIENT)
Dept: NEUROLOGY | Facility: CLINIC | Age: 52
End: 2017-08-20

## 2017-08-21 ENCOUNTER — TELEPHONE (OUTPATIENT)
Dept: NEUROLOGY | Facility: CLINIC | Age: 52
End: 2017-08-21

## 2017-08-21 NOTE — ASSESSMENT & PLAN NOTE
53yo RH F with hx of seizures x 7/14/17  - R temporal cavernous malformation    No seizures since maintained of Levetiracetam XR 1000mg q day - however reporting significant (especially behavioral) side-effects    Plan:  Will change LEV to BRIV   Wants EEG in a disc to take to [facility] for 2nd opinion    Plan of care was discussed in detail with patient and her mother - answered/addressed all their questions and concerns

## 2017-08-25 ENCOUNTER — TELEPHONE (OUTPATIENT)
Dept: NEUROLOGY | Facility: CLINIC | Age: 52
End: 2017-08-25

## 2017-08-25 NOTE — TELEPHONE ENCOUNTER
----- Message from Dianeaster Ludwig sent at 8/25/2017  9:57 AM CDT -----  Contact: Patient [phone]  Patient is calling to see if she can get a new patient appt with Dr. LEWIS for Seizures patient states she is not pleased with Dr. Sahu and her staff response time to messages and requests for her concerns, patient states she heard that Dr. LEWIS was a very good doctor, I mentioned that Ramonita is a very nice nurse, patient would like to speak to nurse to get a feel for how the office is and to see the return time on messages sent, this means a lot to patient in respect to her care. Please call

## 2017-08-25 NOTE — TELEPHONE ENCOUNTER
Spoke to patient who has been advised that Dr. Garrido is a memory disorder specialist, and does not treat for seizure disorder. Suggested to her  at [facility] or Dr.Robin Mesa in [place] [phone]# [phone]. She will contact  office to discuss possibility of appointment.

## 2017-08-29 ENCOUNTER — TELEPHONE (OUTPATIENT)
Dept: NEUROLOGY | Facility: CLINIC | Age: 52
End: 2017-08-29

## 2017-08-29 ENCOUNTER — PATIENT MESSAGE (OUTPATIENT)
Dept: NEUROLOGY | Facility: CLINIC | Age: 52
End: 2017-08-29

## 2017-08-29 ENCOUNTER — TELEPHONE (OUTPATIENT)
Dept: OBSTETRICS AND GYNECOLOGY | Facility: CLINIC | Age: 52
End: 2017-08-29

## 2017-08-29 NOTE — TELEPHONE ENCOUNTER
Pt states she should be available Friday morning to come  the EEG disc. I advised her to call me so I would know to expect her.

## 2017-08-29 NOTE — TELEPHONE ENCOUNTER
Patient states since stopping HRT 7/14/2017, due to seizures, having hot flashes and trouble sleeping. Patient informed SSRIs have been given to help with symptoms, Brisdelle is prescribed for hot flashes. Patient will f/u with neurology.

## 2017-08-29 NOTE — TELEPHONE ENCOUNTER
----- Message from Angel Doan sent at 8/28/2017  4:53 PM CDT -----  Contact: pt  x_ 1st Request   _ 2nd Request   _ 3rd Request     Who: JENY KENNEDY [[id number]]    Why: pt is requesting a call back in reference to discussing options to Rx estradiol (VIVELLE-DOT) 0.075 mg/24 hr.  She has been off since her seizure.  Pt asked if you can not reach her by phone please send her [facility] message.  .  What Number to Call Back: [phone]    When to Expect a call back: (Before the end of the day)   -- if call after 3:00 call back will be tomorrow.

## 2017-08-29 NOTE — TELEPHONE ENCOUNTER
Left VM advising the patient that I have a copy of her EEG CD here available in clinic. My direct callback # has been provided to arrange a pick-up day/time.

## 2017-08-30 ENCOUNTER — PATIENT MESSAGE (OUTPATIENT)
Dept: NEUROLOGY | Facility: CLINIC | Age: 52
End: 2017-08-30

## 2017-08-30 ENCOUNTER — TELEPHONE (OUTPATIENT)
Dept: NEUROLOGY | Facility: CLINIC | Age: 52
End: 2017-08-30

## 2017-08-30 NOTE — TELEPHONE ENCOUNTER
I sent a message to patient about Dr. Sahu's taper schedule for briviact. I also called into [facility] pharmacy for ativan 1mg prn breakthrough seizure activity while she is changing meds. 2 pills with no refills

## 2017-09-01 ENCOUNTER — TELEPHONE (OUTPATIENT)
Dept: NEUROLOGY | Facility: CLINIC | Age: 52
End: 2017-09-01

## 2017-09-01 NOTE — TELEPHONE ENCOUNTER
Left message and call back number       -- Message from Marcia Turner sent at 9/1/2017  8:41 AM CDT -----  Contact: self @ [phone]  Pt says she is getting off signals from dr hart and would like to change dr's.  Would like to see dr singleton.

## 2017-09-01 NOTE — TELEPHONE ENCOUNTER
Left message and call back number       - Message from Gustavo Dunlap sent at 9/1/2017  1:48 PM CDT -----  Contact: Patient @ [phone]  Patient is returning a missed call from wei Arita return call

## 2017-09-07 ENCOUNTER — PATIENT MESSAGE (OUTPATIENT)
Dept: NEUROLOGY | Facility: CLINIC | Age: 52
End: 2017-09-07

## 2017-09-12 ENCOUNTER — OFFICE VISIT (OUTPATIENT)
Dept: INTERNAL MEDICINE | Facility: CLINIC | Age: 52
End: 2017-09-12
Payer: COMMERCIAL

## 2017-09-12 VITALS
DIASTOLIC BLOOD PRESSURE: 72 MMHG | OXYGEN SATURATION: 99 % | BODY MASS INDEX: 21.94 KG/M2 | HEART RATE: 68 BPM | HEIGHT: 62 IN | SYSTOLIC BLOOD PRESSURE: 120 MMHG | WEIGHT: 119.25 LBS

## 2017-09-12 DIAGNOSIS — G40.909 SEIZURE DISORDER: Primary | ICD-10-CM

## 2017-09-12 PROCEDURE — 99214 OFFICE O/P EST MOD 30 MIN: CPT | Mod: S$GLB,,, | Performed by: INTERNAL MEDICINE

## 2017-09-12 PROCEDURE — 99999 PR PBB SHADOW E&M-EST. PATIENT-LVL III: CPT | Mod: PBBFAC,,, | Performed by: INTERNAL MEDICINE

## 2017-09-12 PROCEDURE — 3008F BODY MASS INDEX DOCD: CPT | Mod: S$GLB,,, | Performed by: INTERNAL MEDICINE

## 2017-09-12 NOTE — PROGRESS NOTES
Subjective:      Patient ID: Darling Quach is a 52 y.o. female.    Chief Complaint: Follow-up (2 months f/u)    HPI:  HPI   Patient presents with her mother in follow-up of seizure disorder.  There is still a question as to which neurologist will be directing her care.  Patient has an appointment with a new neurologist the end of September.  Additionally as it was believed that estrogen may have contributed to her seizure she has stopped this and is having significant symptoms related to hot flashes and an inability to sleep.  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    Vitreous opacity - Right Eye    Venous angioma of brain    S/P hysterectomy    Hx of colonoscopy:10/10/2016    Anal fissure    Hyponatremia    Hypophosphatemia    Complex partial seizures evolving to generalized tonic-clonic seizures     Past Medical History:   Diagnosis Date    Cataract     Glaucoma     Hemangioma     cavernous    History of acne 2004    dr lopez prescribed accutane    Hyperlipidemia     Venous angioma of brain     Vertigo      Past Surgical History:   Procedure Laterality Date    CATARACT EXTRACTION W/  INTRAOCULAR LENS IMPLANT Right 12/17/14    Dr Camacho     HYSTERECTOMY       Family History   Problem Relation Age of Onset    Fibroids Mother     Migraines Mother     Glaucoma Mother     Heart disease Father     Depression Brother     No Known Problems Sister     Diabetes Maternal Grandmother     Glaucoma Maternal Grandmother     Cancer Maternal Aunt      breast    Breast cancer Maternal Aunt     No Known Problems Maternal Uncle     No Known Problems Paternal Aunt     No Known Problems Paternal Uncle     No Known Problems Maternal Grandfather     No Known Problems Paternal Grandmother     No Known  "Problems Paternal Grandfather     Macular degeneration Neg Hx     Blindness Neg Hx     Melanoma Neg Hx     Psoriasis Neg Hx     Lupus Neg Hx     Eczema Neg Hx     Acne Neg Hx     Cataracts Neg Hx     Amblyopia Neg Hx     Retinal detachment Neg Hx     Strabismus Neg Hx     Stroke Neg Hx     Thyroid disease Neg Hx     Hypertension Neg Hx      Review of Systems   Constitutional: Negative for activity change, chills, fever and unexpected weight change.   Respiratory: Negative for cough, chest tightness, shortness of breath and wheezing.    Cardiovascular: Negative for chest pain, palpitations and leg swelling.    hot flashes, lack of sleep are her major complaints no evidence of seizure  Objective:     Vitals:    09/12/17 1602   BP: 120/72   Pulse: 68   SpO2: 99%   Weight: 54.1 kg (119 lb 4.3 oz)   Height: 5' 2" (1.575 m)   PainSc: 0-No pain     Body mass index is 21.81 kg/m².  Physical Exam   Constitutional: She appears well-developed and well-nourished.   Neck: No JVD present. No thyromegaly present.   Cardiovascular: Normal rate, normal heart sounds and intact distal pulses.    Pulmonary/Chest: Effort normal and breath sounds normal. No respiratory distress.     Assessment:     1. Seizure disorder      Plan:   Darling was seen today for follow-up.    Diagnoses and all orders for this visit:    Seizure disorder: I discussed with the patient the first thing to do was select a neurologist that we are going to work with long-term.  If the neurologist cannot suggest a medication that will help her with her hot flashes as well as her sleeplessness I will be happy to call them and make sure what I suggest is compatible with her seizure disorder.  The patient is anxious to get on with her life and I told her before we make any aggressive plans that this basic plan for medication and her neurologist must be in place.  She has had no new seizures but she tells me her EEG does show a seizure focus.  She is adhering " to all the recommendations made for a patient with a new seizure disorder.  She has been able to return to work.  Discussion included a 20 minute counseling with the patient    Return in about 3 weeks (around 10/4/2017) for FU book at 3:30 coming at  4:00.         Medication List          Accurate as of 9/12/17  5:00 PM. If you have any questions, ask your nurse or doctor.               CONTINUE taking these medications    DILTIAZEM 2% CREAM  Apply topically 3 (three) times daily. Apply topically to anal area.     estradiol 0.075 mg/24 hr  Commonly known as:  VIVELLE-DOT  Place 1 patch onto the skin twice a week.     levetiracetam  mg Tb24 24 hr tablet  Commonly known as:  KEPPRA XR  Take 2 tablets (1,000 mg total) by mouth once daily.     timolol maleate 0.5% 0.5 % Solg  Commonly known as:  TIMOPTIC-XE  Place 1 drop into both eyes every morning.

## 2017-10-04 ENCOUNTER — PATIENT MESSAGE (OUTPATIENT)
Dept: INTERNAL MEDICINE | Facility: CLINIC | Age: 52
End: 2017-10-04

## 2018-01-02 ENCOUNTER — OFFICE VISIT (OUTPATIENT)
Dept: INTERNAL MEDICINE | Facility: CLINIC | Age: 53
End: 2018-01-02
Payer: COMMERCIAL

## 2018-01-02 VITALS
HEIGHT: 62 IN | OXYGEN SATURATION: 98 % | HEART RATE: 83 BPM | WEIGHT: 121.69 LBS | BODY MASS INDEX: 22.39 KG/M2 | SYSTOLIC BLOOD PRESSURE: 122 MMHG | DIASTOLIC BLOOD PRESSURE: 78 MMHG

## 2018-01-02 DIAGNOSIS — G40.909 SEIZURE DISORDER: Primary | ICD-10-CM

## 2018-01-02 PROCEDURE — 99999 PR PBB SHADOW E&M-EST. PATIENT-LVL III: CPT | Mod: PBBFAC,,, | Performed by: INTERNAL MEDICINE

## 2018-01-02 PROCEDURE — 99214 OFFICE O/P EST MOD 30 MIN: CPT | Mod: S$GLB,,, | Performed by: INTERNAL MEDICINE

## 2018-02-23 ENCOUNTER — OFFICE VISIT (OUTPATIENT)
Dept: OPHTHALMOLOGY | Facility: CLINIC | Age: 53
End: 2018-02-23
Payer: COMMERCIAL

## 2018-02-23 DIAGNOSIS — H43.813 POSTERIOR VITREOUS DETACHMENT, BILATERAL: ICD-10-CM

## 2018-02-23 DIAGNOSIS — H52.7 REFRACTION ERROR: ICD-10-CM

## 2018-02-23 DIAGNOSIS — H25.12 NUCLEAR SCLEROSIS, LEFT: ICD-10-CM

## 2018-02-23 DIAGNOSIS — H40.043 STEROID RESPONDERS BORDERLINE GLAUCOMA, BILATERAL: ICD-10-CM

## 2018-02-23 DIAGNOSIS — Z96.1 PSEUDOPHAKIA OF RIGHT EYE: ICD-10-CM

## 2018-02-23 DIAGNOSIS — H40.89 GLAUCOMA DUE TO COMBINATION OF MECHANISMS: Primary | ICD-10-CM

## 2018-02-23 PROCEDURE — 99999 PR PBB SHADOW E&M-EST. PATIENT-LVL II: CPT | Mod: PBBFAC,,, | Performed by: OPHTHALMOLOGY

## 2018-02-23 PROCEDURE — 92012 INTRM OPH EXAM EST PATIENT: CPT | Mod: S$GLB,,, | Performed by: OPHTHALMOLOGY

## 2018-02-23 RX ORDER — LEVETIRACETAM 750 MG/1
750 TABLET, EXTENDED RELEASE ORAL NIGHTLY
COMMUNITY
Start: 2018-01-26 | End: 2022-04-21 | Stop reason: SDUPTHER

## 2018-02-23 NOTE — PROGRESS NOTES
HPI     Glaucoma    Additional comments: IOP ck today           Comments   DLS: 8/18/17  **Pt recently had a seizure and did not feel comfortable having HVF test   and being dilated today.     1. MMG/OHT OU  2. PVD OU  3. Vitreous Opacity OD  4. PCIOL OD  5. Steroid Responder    MEDS:  AT's PRN OU       Last edited by Ellie Dent MA on 2/23/2018  3:23 PM. (History)            Assessment /Plan     For exam results, see Encounter Report.    Glaucoma due to combination of mechanisms - Both Eyes    Posterior vitreous detachment, bilateral    Nuclear sclerosis, left    Refraction error    Pseudophakia of right eye    Steroid responders borderline glaucoma, bilateral          PT IS TRANSFER ING FROM [facility] TO [place] - PHOTO file is no in  [facility]  TRANSFER BACK TO [facility] - [place]     1. Mixed mechanism Glaucoma   H/O narrow angles - S/P PI's, + residual OHT   First HVF 1998   First photos 1999   Former [facility] , retired for a few years and went back to work as a    (mom with same problem with narrow angles and residual OHT)     Family history + mom - Narrow angles with residual OHT   Glaucoma meds - timolol gfs ou   H/O adverse rxn to glaucoma drops latanoprost - eye irritation  LASERS PI's ou   GLAUCOMA SURGERIES none   OTHER EYE SURGERIES - PC IOL OD 12/17/2014 - [facility]  CDR 0.4/0.4   Tbase 23-36 / 23-35 ou   Tmax 36/35   Ttarget 30/30  HVF 13 test 24-2 ss ou 1996 to 2014 ([facility])            3 SWAP test 2010 to 2012 - Full ou            ( repeat HVF from 8/2013 to 9/2013 - new SAD od confirmed)            [facility] VF's - 1 test 2016 to 2016 - SNS /INS od // full os   Gonio +3 ou S/P PI's   /618   OCT 3 test 2005 -  2014 ([facility]) - RNFL - nl od/ nl os   HRT - 4 test at [facility] - 2014 to 2016 -MR -  nl od // nl os /// CDR 0.46 od // 0.47 os  - older test at [facility]  Disc photos 1999, 2004, 2013 - slides // 5/13/2010 - [facility]     - Ttoday  26//27  - Test done today  - NONE WAS TO HAVE A hvf - cX 2/2 RECNET EPHRAIM AND concern of flashing lights triggering something     2. PVD ou - with dense vitreous veil OD Saw Arend 4/23/2012     3. Vitreous opacity OD     4. Steroid responder - had an injection / ? Spironolactone / aldactone - not currently on any steroids    5. Refractive error   Was bilateral hyperope / presbyope   Is anisopmetropic post CE od    Glasses from [place] and doing well     6. H/O eyelid papilloma Left - S/P removal - Christiano     7. Pseudophakia od   PC IOL od 12/17/2014 - SN60wf 20.5   Had a large myopic shift pre surgery from +1.00 to -3.00    8.  I see her father in law - he has glaucoma - and I did his cataracts    I see her mother - she has similar issues to this patient     Plan   MMG  / OHT ou  S/P PI's ou   ? Early SAD changes od  IOP stable on timolol - tolerating well   Intolerant to latanoprost - red irritated eye    Cont timolol gfs ou q am     Pt is going to try CTL's // ? Monovision with dr. June        F/U - 6 months - Friday - her day off - main campus with HVF / DFE / OCT

## 2018-03-02 ENCOUNTER — TELEPHONE (OUTPATIENT)
Dept: OBSTETRICS AND GYNECOLOGY | Facility: CLINIC | Age: 53
End: 2018-03-02

## 2018-04-03 ENCOUNTER — OFFICE VISIT (OUTPATIENT)
Dept: INTERNAL MEDICINE | Facility: CLINIC | Age: 53
End: 2018-04-03
Payer: COMMERCIAL

## 2018-04-03 VITALS
HEIGHT: 62 IN | BODY MASS INDEX: 22.15 KG/M2 | HEART RATE: 79 BPM | DIASTOLIC BLOOD PRESSURE: 68 MMHG | OXYGEN SATURATION: 98 % | SYSTOLIC BLOOD PRESSURE: 119 MMHG | WEIGHT: 120.38 LBS

## 2018-04-03 DIAGNOSIS — H90.5 SENSORINEURAL HEARING LOSS (SNHL), UNSPECIFIED LATERALITY: ICD-10-CM

## 2018-04-03 DIAGNOSIS — D18.00 CAVERNOMA: ICD-10-CM

## 2018-04-03 DIAGNOSIS — K60.2 ANAL FISSURE: ICD-10-CM

## 2018-04-03 DIAGNOSIS — H40.89 GLAUCOMA DUE TO COMBINATION OF MECHANISMS: ICD-10-CM

## 2018-04-03 DIAGNOSIS — E78.00 PURE HYPERCHOLESTEROLEMIA: ICD-10-CM

## 2018-04-03 DIAGNOSIS — Z00.00 PHYSICAL EXAM: Primary | ICD-10-CM

## 2018-04-03 DIAGNOSIS — G40.209 COMPLEX PARTIAL SEIZURES EVOLVING TO GENERALIZED TONIC-CLONIC SEIZURES: ICD-10-CM

## 2018-04-03 PROBLEM — E83.39 HYPOPHOSPHATEMIA: Status: RESOLVED | Noted: 2017-07-14 | Resolved: 2018-04-03

## 2018-04-03 PROBLEM — E87.1 HYPONATREMIA: Status: RESOLVED | Noted: 2017-07-14 | Resolved: 2018-04-03

## 2018-04-03 PROCEDURE — 99396 PREV VISIT EST AGE 40-64: CPT | Mod: S$GLB,,, | Performed by: INTERNAL MEDICINE

## 2018-04-03 PROCEDURE — 99999 PR PBB SHADOW E&M-EST. PATIENT-LVL III: CPT | Mod: PBBFAC,,, | Performed by: INTERNAL MEDICINE

## 2018-04-03 NOTE — PROGRESS NOTES
Subjective:      Patient ID: Darling Quach is a 52 y.o. female.    Chief Complaint: Follow-up    HPI:  HPI   Patient is seeing her neurologist every 3 months and on Keppra 750mg daily and multitasking is better and focus is sometimes hard. Dr. Becca Brito.  MRI is scheduled for the fall.    Dr. Mayes is scheduled    Progress note:Annual Exam     Anal fissure: pain, patient will call     Menopausal symptoms:patient will call Dr. Mayes to institute hormones    Hemorrhoids have returned:Rec CRS.     Annual exam: 4/3/2018  Colonoscopy: 10/10/2016:follow up in 7 years  Mammogram: due after 6/23/2018  Gyn: Dr. Mayes   Optho:glaucoma Cataract Surgery in 12/2014 and has appt scheduled twice a year, Dr. Camacho  Flu: 2017  Tetanus:7/7/2016  Shingles: Shingrix suggested  Pneumovax : not due  Prevnar: not due     Consultants:  Derm: Dr. 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    Vitreous opacity - Right Eye    Venous angioma of brain    S/P hysterectomy    Hx of colonoscopy:10/10/2016    Anal fissure    Complex partial seizures evolving to generalized tonic-clonic seizures     Past Medical History:   Diagnosis Date    Cataract     Glaucoma     Hemangioma     cavernous    History of acne 2004    dr lopez prescribed accutane    Hyperlipidemia     Venous angioma of brain     Vertigo      Past Surgical History:   Procedure Laterality Date    CATARACT EXTRACTION W/  INTRAOCULAR LENS IMPLANT Right 12/17/14    Dr Camacho     HYSTERECTOMY       Family History   Problem Relation Age of Onset    Fibroids Mother     Migraines Mother     Glaucoma Mother     Heart disease  "Father     Depression Brother     No Known Problems Sister     Diabetes Maternal Grandmother     Glaucoma Maternal Grandmother     Cancer Maternal Aunt      breast    Breast cancer Maternal Aunt     No Known Problems Maternal Uncle     No Known Problems Paternal Aunt     No Known Problems Paternal Uncle     No Known Problems Maternal Grandfather     No Known Problems Paternal Grandmother     No Known Problems Paternal Grandfather     Macular degeneration Neg Hx     Blindness Neg Hx     Melanoma Neg Hx     Psoriasis Neg Hx     Lupus Neg Hx     Eczema Neg Hx     Acne Neg Hx     Cataracts Neg Hx     Amblyopia Neg Hx     Retinal detachment Neg Hx     Strabismus Neg Hx     Stroke Neg Hx     Thyroid disease Neg Hx     Hypertension Neg Hx      Review of Systems  Objective:     Vitals:    04/03/18 1312   BP: 119/68   Pulse: 79   SpO2: 98%   Weight: 54.6 kg (120 lb 5.9 oz)   Height: 5' 2" (1.575 m)   PainSc: 0-No pain     Body mass index is 22.02 kg/m².  Physical Exam  Assessment:     1. Physical exam    2. Anal fissure    3. Cavernoma    4. Complex partial seizures evolving to generalized tonic-clonic seizures    5. 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 Pure hypercholesterolemia      Plan:   Darling was seen today for follow-up.    Diagnoses and all orders for this visit:    Physical exam: updated and reviewed    Anal fissure/hemorroids : will follow with CRS    Cavernoma: MRI scheduled for fall    Complex partial seizures evolving to generalized tonic-clonic seizures: appt with Dr. 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 Contreras hypercholesterolemia:labs      Follow-up in about 1 year (around 4/3/2019) for Annual exam.     Medication List          Accurate as of 4/3/18  1:43 PM. If you have any questions, ask your nurse or doctor.               CONTINUE taking these medications    levetiracetam  mg Tb24 tablet  Commonly known " as:  KEPPRA XR

## 2018-04-06 ENCOUNTER — LAB VISIT (OUTPATIENT)
Dept: LAB | Facility: HOSPITAL | Age: 53
End: 2018-04-06
Attending: INTERNAL MEDICINE
Payer: COMMERCIAL

## 2018-04-06 DIAGNOSIS — G40.909 SEIZURE DISORDER: ICD-10-CM

## 2018-04-06 LAB
25(OH)D3+25(OH)D2 SERPL-MCNC: 17 NG/ML
ALBUMIN SERPL BCP-MCNC: 4.2 G/DL
ALP SERPL-CCNC: 59 U/L
ALT SERPL W/O P-5'-P-CCNC: 11 U/L
ANION GAP SERPL CALC-SCNC: 8 MMOL/L
AST SERPL-CCNC: 17 U/L
BASOPHILS # BLD AUTO: 0.09 K/UL
BASOPHILS NFR BLD: 2.1 %
BILIRUB SERPL-MCNC: 0.6 MG/DL
BUN SERPL-MCNC: 19 MG/DL
CALCIUM SERPL-MCNC: 10 MG/DL
CHLORIDE SERPL-SCNC: 103 MMOL/L
CHOLEST SERPL-MCNC: 273 MG/DL
CHOLEST/HDLC SERPL: 2.6 {RATIO}
CO2 SERPL-SCNC: 29 MMOL/L
CREAT SERPL-MCNC: 1.1 MG/DL
DIFFERENTIAL METHOD: ABNORMAL
EOSINOPHIL # BLD AUTO: 0.2 K/UL
EOSINOPHIL NFR BLD: 4.4 %
ERYTHROCYTE [DISTWIDTH] IN BLOOD BY AUTOMATED COUNT: 12.5 %
EST. GFR  (AFRICAN AMERICAN): >60 ML/MIN/1.73 M^2
EST. GFR  (NON AFRICAN AMERICAN): 58.3 ML/MIN/1.73 M^2
GLUCOSE SERPL-MCNC: 90 MG/DL
HCT VFR BLD AUTO: 39.4 %
HDLC SERPL-MCNC: 106 MG/DL
HDLC SERPL: 38.8 %
HGB BLD-MCNC: 13.3 G/DL
IMM GRANULOCYTES # BLD AUTO: 0 K/UL
IMM GRANULOCYTES NFR BLD AUTO: 0 %
LDLC SERPL CALC-MCNC: 155.8 MG/DL
LYMPHOCYTES # BLD AUTO: 1.5 K/UL
LYMPHOCYTES NFR BLD: 33.4 %
MCH RBC QN AUTO: 29 PG
MCHC RBC AUTO-ENTMCNC: 33.8 G/DL
MCV RBC AUTO: 86 FL
MONOCYTES # BLD AUTO: 0.4 K/UL
MONOCYTES NFR BLD: 8.1 %
NEUTROPHILS # BLD AUTO: 2.3 K/UL
NEUTROPHILS NFR BLD: 52 %
NONHDLC SERPL-MCNC: 167 MG/DL
NRBC BLD-RTO: 0 /100 WBC
PLATELET # BLD AUTO: 222 K/UL
PMV BLD AUTO: 9.7 FL
POTASSIUM SERPL-SCNC: 4.2 MMOL/L
PROT SERPL-MCNC: 7.6 G/DL
RBC # BLD AUTO: 4.59 M/UL
SODIUM SERPL-SCNC: 140 MMOL/L
TRIGL SERPL-MCNC: 56 MG/DL
TSH SERPL DL<=0.005 MIU/L-ACNC: 1.5 UIU/ML
VIT B12 SERPL-MCNC: 333 PG/ML
WBC # BLD AUTO: 4.34 K/UL

## 2018-04-06 PROCEDURE — 80053 COMPREHEN METABOLIC PANEL: CPT

## 2018-04-06 PROCEDURE — 82306 VITAMIN D 25 HYDROXY: CPT

## 2018-04-06 PROCEDURE — 36415 COLL VENOUS BLD VENIPUNCTURE: CPT

## 2018-04-06 PROCEDURE — 84443 ASSAY THYROID STIM HORMONE: CPT

## 2018-04-06 PROCEDURE — 80061 LIPID PANEL: CPT

## 2018-04-06 PROCEDURE — 85025 COMPLETE CBC W/AUTO DIFF WBC: CPT

## 2018-04-06 PROCEDURE — 82607 VITAMIN B-12: CPT

## 2018-04-17 ENCOUNTER — PATIENT MESSAGE (OUTPATIENT)
Dept: OBSTETRICS AND GYNECOLOGY | Facility: CLINIC | Age: 53
End: 2018-04-17

## 2018-05-11 ENCOUNTER — OFFICE VISIT (OUTPATIENT)
Dept: SURGERY | Facility: CLINIC | Age: 53
End: 2018-05-11
Payer: COMMERCIAL

## 2018-05-11 VITALS
BODY MASS INDEX: 22.07 KG/M2 | WEIGHT: 119.94 LBS | HEART RATE: 77 BPM | HEIGHT: 62 IN | SYSTOLIC BLOOD PRESSURE: 124 MMHG | DIASTOLIC BLOOD PRESSURE: 85 MMHG

## 2018-05-11 DIAGNOSIS — K64.4 RESIDUAL HEMORRHOIDAL SKIN TAGS: Primary | ICD-10-CM

## 2018-05-11 PROCEDURE — 99213 OFFICE O/P EST LOW 20 MIN: CPT | Mod: S$GLB,,, | Performed by: COLON & RECTAL SURGERY

## 2018-05-11 PROCEDURE — 99999 PR PBB SHADOW E&M-EST. PATIENT-LVL III: CPT | Mod: PBBFAC,,, | Performed by: COLON & RECTAL SURGERY

## 2018-05-11 PROCEDURE — 3008F BODY MASS INDEX DOCD: CPT | Mod: CPTII,S$GLB,, | Performed by: COLON & RECTAL SURGERY

## 2018-05-11 RX ORDER — HYDROCORTISONE 25 MG/G
CREAM TOPICAL 2 TIMES DAILY
Qty: 1 TUBE | Refills: 5 | Status: SHIPPED | OUTPATIENT
Start: 2018-05-11 | End: 2019-06-12

## 2018-05-11 RX ORDER — CEFDINIR 300 MG/1
CAPSULE ORAL
COMMUNITY
Start: 2018-04-29 | End: 2018-05-31

## 2018-05-11 NOTE — PROGRESS NOTES
"CRS Office Visit Follow-up  Referring Md:   No referring provider defined for this encounter.    SUBJECTIVE:     Chief Complaint: hemorrhoids    History of Present Illness:  Patient is a 53 y.o. female presents with hemorrhoids. The patient is a established patient to this practice.     Course is as follows:  2014:  Seen by NP for hemorrhoids.  Conservative mgmt recommended  2017:  Seen by Blu for anal fissure.  Treated with dilt cream.     Over the past week, she has had increasing constipation and straining.  This resulted in significant pain and swelling of her hemorrhoids.  Pain worse with bowel movements and sitting.  She normally does not have constipation and has 1 bowel movement per day.  She has intermittent constipation once every 3-6 months.  She is currently taking MiraLAX but just started yesterday.  No bleeding.   No family history of colorectal cancer.     Last Colonoscopy: 2016  At [facility].  Normal.     Review of Systems:  Review of Systems   Constitutional: Negative for chills, diaphoresis, fever, malaise/fatigue and weight loss.   HENT: Negative for congestion.    Respiratory: Negative for shortness of breath.    Cardiovascular: Negative for chest pain and leg swelling.   Gastrointestinal: Positive for constipation. Negative for abdominal pain, blood in stool, nausea and vomiting.   Genitourinary: Negative for dysuria.   Musculoskeletal: Negative for back pain and myalgias.   Skin: Negative for rash.   Neurological: Negative for dizziness and weakness.   Endo/Heme/Allergies: Does not bruise/bleed easily.   Psychiatric/Behavioral: Negative for depression.       OBJECTIVE:     Vital Signs (Most Recent)  /85 (BP Location: Left arm, Patient Position: Sitting, BP Method: Large (Automatic))   Pulse 77   Ht 5' 2" (1.575 m)   Wt 54.4 kg (119 lb 14.9 oz)   BMI 21.94 kg/m²     Physical Exam:  General: White female in no distress   Neuro: alert and oriented x 4.  Moves all " extremities.     HEENT: no icterus.  Trachea midline  Respiratory: respirations are even and unlabored  Cardiac: regular rate  Abdomen: soft, nontender, no masses  Extremities: Warm dry and intact  Skin: no rashes  Anorectal: External exam demonstrates external hemorrhoids with smaller prolapsed tissue anteriorly.  This is gently reduced.  Digital exam was refused secondary to patient discomfort.    Labs: none    Imaging: none      ASSESSMENT/PLAN:     Darling was seen today for constipation, hemorrhoids and rectal pain.    Diagnoses and all orders for this visit:    Residual hemorrhoidal skin tags    Other orders  -     hydrocortisone 2.5 % cream; Apply topically 2 (two) times daily.      53-year-old woman with predominately external hemorrhoids that are nonthrombosed with a small amount of prolapsed internal tissue.  We discussed excisional hemorrhoidectomy versus medical management with steroid cream.  Since her hemorrhoidal tissue very rarely causes her problems, I've encouraged her to pursue medical management and defer any intervention.  We will treat her underlying constipation with MiraLAX twice per day.  Steroid cream was given as a topical relief agent.  She'll follow-up with me if no improvement    LOREE Chan MD  Staff Surgeon  Colon & Rectal Surgery

## 2018-05-31 ENCOUNTER — OFFICE VISIT (OUTPATIENT)
Dept: OBSTETRICS AND GYNECOLOGY | Facility: CLINIC | Age: 53
End: 2018-05-31
Payer: COMMERCIAL

## 2018-05-31 VITALS
WEIGHT: 121.06 LBS | SYSTOLIC BLOOD PRESSURE: 122 MMHG | HEIGHT: 62 IN | BODY MASS INDEX: 22.28 KG/M2 | DIASTOLIC BLOOD PRESSURE: 84 MMHG

## 2018-05-31 DIAGNOSIS — Z01.419 ENCOUNTER FOR GYNECOLOGICAL EXAMINATION WITHOUT ABNORMAL FINDING: Primary | ICD-10-CM

## 2018-05-31 DIAGNOSIS — Z12.31 VISIT FOR SCREENING MAMMOGRAM: ICD-10-CM

## 2018-05-31 DIAGNOSIS — N95.1 MENOPAUSAL SYMPTOMS: ICD-10-CM

## 2018-05-31 PROCEDURE — 99999 PR PBB SHADOW E&M-EST. PATIENT-LVL III: CPT | Mod: PBBFAC,,, | Performed by: OBSTETRICS & GYNECOLOGY

## 2018-05-31 PROCEDURE — 99396 PREV VISIT EST AGE 40-64: CPT | Mod: S$GLB,,, | Performed by: OBSTETRICS & GYNECOLOGY

## 2018-05-31 NOTE — PROGRESS NOTES
Well Woman Exam    Darling Quach is a 53 y.o. year old  who presents for annual exam.  She is S/P hysterectomy.  Patient reports worsening menopausal symptoms.  Patient was pulled off ERT due to seizure (2017).    Past Medical History:   Diagnosis Date    Cataract     Glaucoma     Hemangioma     cavernous    History of acne     dr lopez prescribed accutane    Hyperlipidemia     Venous angioma of brain     Vertigo        Past Surgical History:   Procedure Laterality Date    CATARACT EXTRACTION W/  INTRAOCULAR LENS IMPLANT Right 14    Dr Camacho     HYSTERECTOMY  2011    [facility]        OB History      Para Term  AB Living    3 3 0     3    SAB TAB Ectopic Multiple Live Births                       ROS:  GENERAL: Feeling well overall.   SKIN: Denies rash or lesions.   HEAD: Denies head injury or headache.   NODES: Denies enlarged lymph nodes.   CHEST: Denies chest pain or shortness of breath.   CARDIOVASCULAR: Denies palpitations or left sided chest pain.   ABDOMEN: No abdominal pain, nausea, vomiting or rectal bleeding.   URINARY: No dysuria, hematuria, or burning on urination.  REPRODUCTIVE: See HPI.   BREASTS: Denies pain, lumps, or nipple discharge.   HEMATOLOGIC: No easy bruisability or excessive bleeding.   MUSCULOSKELETAL: Denies joint pain or swelling.   NEUROLOGIC: Denies syncope or weakness.   PSYCHIATRIC: Denies depression.    PE:   APPEARANCE: Well nourished, well developed, in no acute distress.  NECK: Neck symmetric without masses or thyromegaly.  NODES: No inguinal lymph node enlargement.  ABDOMEN: Soft. No tenderness or masses. No hernias.  BREASTS: Symmetrical, no skin changes or visible lesions. No palpable masses, nipple discharge or adenopathy bilaterally.  PELVIC: Normal external female genitalia without lesions. Normal hair distribution. Adequate perineal body, normal urethral meatus. Vagina mildly atrophic without lesions or discharge. No  significant cystocele or rectocele. Uterus and cervix surgically absent. Bimanual exam revealed no masses, tenderness or abnormality.  ANUS: Normal.    Diagnosis:  1. Encounter for gynecological examination without abnormal finding    2. Menopausal symptoms    3. Visit for screening mammogram      PLAN:    Orders Placed This Encounter    Mammo Digital Screening Bilat with Tomosynthesis CAD       Patient was counseled today on postmenopausal issues.     Orders as above     Papsmear not done/not indicated.     ERT r/b/a discussed.  Patient declines due to seizure (7/2017)     Keep f/u with Dr. KENY Ellison (PCP)    Follow-up in 1 year.

## 2018-06-27 ENCOUNTER — HOSPITAL ENCOUNTER (OUTPATIENT)
Dept: RADIOLOGY | Facility: HOSPITAL | Age: 53
Discharge: HOME | End: 2018-06-27
Attending: OBSTETRICS & GYNECOLOGY
Payer: COMMERCIAL

## 2018-06-27 DIAGNOSIS — Z12.31 VISIT FOR SCREENING MAMMOGRAM: ICD-10-CM

## 2018-06-27 PROCEDURE — 77063 BREAST TOMOSYNTHESIS BI: CPT | Mod: 26,,, | Performed by: RADIOLOGY

## 2018-06-27 PROCEDURE — 77067 SCR MAMMO BI INCL CAD: CPT | Mod: 26,,, | Performed by: RADIOLOGY

## 2018-06-27 PROCEDURE — 77067 SCR MAMMO BI INCL CAD: CPT | Mod: TC,PO

## 2018-06-28 ENCOUNTER — TELEPHONE (OUTPATIENT)
Dept: OBSTETRICS AND GYNECOLOGY | Facility: CLINIC | Age: 53
End: 2018-06-28

## 2018-06-28 DIAGNOSIS — Z12.31 VISIT FOR SCREENING MAMMOGRAM: Primary | ICD-10-CM

## 2018-06-28 NOTE — TELEPHONE ENCOUNTER
"----- Message from Becca Stack sent at 6/28/2018  3:56 PM CDT -----  Contact: Request from patient portal  I was unable to reschedule as the order is still attached in the "arrived" status.  Thank you.      ----- Message -----     From: Darling Quach     Sent: 6/26/2018 10:16 PM CDT       To: Patient Appointment Schedule Request Mailing List  Subject: Appointment Request    Appointment Request From: Darling Quach    With Provider: Other - (see comments)    Would Accept With:Request to schedule a test or procedure    Preferred Date Range: From 6/27/2018 To 6/29/2018    Preferred Times: Any    Reason for visit: Request an Appt    Comments:  I need to reschedule my annual mammogram.  Thank you.  Please call me - [phone].    Thank you.    "

## 2018-09-27 NOTE — PROGRESS NOTES
CRS Office Visit Follow-up    SUBJECTIVE:     Chief Complaint: hemorrhoids and constipation    History of Present Illness:  Patient is a 53 y.o. female presents with hemorrhoids. The patient is a established patient to this practice.     Course is as follows:  2014:  Seen by NP for hemorrhoids.  Conservative mgmt recommended  2017:  Seen by Blu for anal fissure.  Treated with dilt cream.   5/2018: seen by me for worsening hemorrhoid tissue.  Found to have predominately external hemorrhoids that are nonthrombosed with a small amount of prolapsed internal tissue.  Treated conservatively.      9/28/18:  Presents today for follow-up.  Feels that hemorrhoids are not improving.  Has multiple episodes of pain, bleeding, itching, trouble with cleanliness.  Impacts her quality of life.  She is taking MiraLax daily.  With that, she is having 2-3 bowel movements per week.  She spend less than 5 min on the toilet for each bowel movement.  Feel similar to her prior fissure.  In regards to treatment from her prior fissure, she stopped diltiazem cream once she had a seizure.  She is now on Keppra daily.  No family history of colorectal cancer.      Last Colonoscopy: 2016  At [facility].  Normal.     Review of Systems:  Review of Systems   Constitutional: Negative for chills, diaphoresis, fever, malaise/fatigue and weight loss.   HENT: Negative for congestion.    Respiratory: Negative for shortness of breath.    Cardiovascular: Negative for chest pain and leg swelling.   Gastrointestinal: Positive for constipation. Negative for abdominal pain, blood in stool, nausea and vomiting.   Genitourinary: Negative for dysuria.   Musculoskeletal: Negative for back pain and myalgias.   Skin: Negative for rash.   Neurological: Negative for dizziness and weakness.   Endo/Heme/Allergies: Does not bruise/bleed easily.   Psychiatric/Behavioral: Negative for depression.       OBJECTIVE:     Vital Signs (Most Recent)  /74 (BP  "Location: Right arm, Patient Position: Sitting, BP Method: Large (Automatic))   Pulse 80   Ht 5' 2" (1.575 m)   Wt 55 kg (121 lb 4.8 oz)   BMI 22.19 kg/m²     Physical Exam:  General: White female in no distress   Neuro: alert and oriented x 4.  Moves all extremities.     HEENT: no icterus.  Trachea midline  Respiratory: respirations are even and unlabored  Cardiac: regular rate  Abdomen: soft, nontender, no masses  Extremities: Warm dry and intact  Skin: no rashes  Anorectal: External exam demonstrates external hemorrhoids .  Most significant external hemorrhoid is on the left side. Digital exam is attempted but was refused secondary to discomfort.  Pain is mostly in the posterior midline    Labs: H/H = 13/39    Imaging: none      ASSESSMENT/PLAN:     Darling was seen today for hemorrhoids and constipation.    Diagnoses and all orders for this visit:    Residual hemorrhoidal skin tags    Perianal fissure    Other orders  -     diltiazem HCl (DILTIAZEM 2% - LIDOCAINE 5% CREAM); Apply 1 application topically 3 (three) times daily.  -     linaclotide (LINZESS) 145 mcg Cap capsule; Take 1 capsule (145 mcg total) by mouth once daily.         53-year-old woman with predominately external hemorrhoids mostly on the left side with posterior midline tenderness consistent with an anal fissure.  I discussed with her the etiology for anal fissure.  We discussed treatment options to include topical diltiazem cream versus Botox versus limited lateral internal anal sphincterotomy.  I do feel that she is a reasonable candidate for excision of perianal skin tags with limited lateral internal anal sphincterotomy versus Botox pending evaluation of her anal sphincter tone intraoperatively.  She wishes to have this done in the summer.  In the meantime, we will work to the improve her constipation and trial Linzess.  Additionally, we will use diltiazem and lidocaine cream for her anal fissure.  She will follow up with me in the " irene Chan MD  Staff Surgeon  Colon & Rectal Surgery

## 2018-09-28 ENCOUNTER — OFFICE VISIT (OUTPATIENT)
Dept: SURGERY | Facility: CLINIC | Age: 53
End: 2018-09-28
Payer: COMMERCIAL

## 2018-09-28 VITALS
HEART RATE: 80 BPM | BODY MASS INDEX: 22.33 KG/M2 | SYSTOLIC BLOOD PRESSURE: 133 MMHG | DIASTOLIC BLOOD PRESSURE: 74 MMHG | WEIGHT: 121.31 LBS | HEIGHT: 62 IN

## 2018-09-28 DIAGNOSIS — K64.4 RESIDUAL HEMORRHOIDAL SKIN TAGS: Primary | ICD-10-CM

## 2018-09-28 DIAGNOSIS — K60.2 PERIANAL FISSURE: ICD-10-CM

## 2018-09-28 PROCEDURE — 99999 PR PBB SHADOW E&M-EST. PATIENT-LVL III: CPT | Mod: PBBFAC,,, | Performed by: COLON & RECTAL SURGERY

## 2018-09-28 PROCEDURE — 3008F BODY MASS INDEX DOCD: CPT | Mod: CPTII,S$GLB,, | Performed by: COLON & RECTAL SURGERY

## 2018-09-28 PROCEDURE — 99213 OFFICE O/P EST LOW 20 MIN: CPT | Mod: S$GLB,,, | Performed by: COLON & RECTAL SURGERY

## 2019-02-04 ENCOUNTER — TELEPHONE (OUTPATIENT)
Dept: INTERNAL MEDICINE | Facility: CLINIC | Age: 54
End: 2019-02-04

## 2019-02-04 DIAGNOSIS — E78.00 PURE HYPERCHOLESTEROLEMIA: ICD-10-CM

## 2019-02-04 DIAGNOSIS — E55.9 MILD VITAMIN D DEFICIENCY: Primary | ICD-10-CM

## 2019-02-04 NOTE — PROGRESS NOTES
Assessment /Plan     For exam results, see Encounter Report.    Glaucoma due to combination of mechanisms - Both Eyes    Nuclear sclerosis, left    Pseudophakia of right eye    Steroid responders borderline glaucoma, bilateral    Posterior vitreous detachment, bilateral          PT IS TRANSFER ING FROM [facility] TO [place] - PHOTO file is no in  [facility]  TRANSFER BACK TO [facility] - [place]     1. Mixed mechanism Glaucoma   H/O narrow angles - S/P PI's, + residual OHT   First HVF 1998   First photos 1999   Former [employer] , retired for a few years and went back to work as a    (mom with same problem with narrow angles and residual OHT)     Family history + mom - Narrow angles with residual OHT   Glaucoma meds - timolol gfs ou   H/O adverse rxn to glaucoma drops latanoprost - eye irritation  LASERS PI's ou   GLAUCOMA SURGERIES none   OTHER EYE SURGERIES - PC IOL OD 12/17/2014 - [facility]  CDR 0.4/0.4   Tbase 23-36 / 23-35 ou   Tmax 36/35   Ttarget 30/30  HVF 15 test 24-2 ss ou 1996 to 2019 ([facility])            3 SWAP test 2010 to 2012 - Full ou            ( repeat HVF from 8/2013 to 9/2013 - new SAD od confirmed)            [place] VF's - 1 test 2016 to 2016 - SNS /INS od // full os   2019 VF full od/near full os   Gonio +3 ou S/P PI's   /618   OCT 3 test 2005 -  2014 ([facility]) - RNFL - nl od/ nl os   HRT - 4 test at [facility] - 2014 to 2016 -MR -  nl od // nl os /// CDR 0.46 od // 0.47 os  - older test at [facility]  Disc photos 1999, 2004, 2013 - slides // 5/13/2010 - OIS     - Ttoday  26//27  - Test done today - HVF / DFE / OCT     2. PVD ou - with dense vitreous veil OD Saw Arend 4/23/2012     3. Vitreous opacity OD     4. Steroid responder - had an injection / ? Spironolactone / aldactone - not currently on any steroids    5. Refractive error   Was bilateral hyperope / presbyope   Is anisopmetropic post CE od    Glasses from [facility] and doing well     6. H/O  eyelid papilloma Left - S/P removal - Christiano     7. Pseudophakia od   PC IOL od 12/17/2014 - SN60wf 20.5   Had a large myopic shift pre surgery from +1.00 to -3.00    8.  I see her father in law - he has glaucoma - and I did his cataracts    I see her mother - she has similar issues to this patient     Plan   MMG  / OHT ou  S/P PI's ou   HVF today near full OU  IOP stable on timolol - tolerating well   Intolerant to latanoprost - red irritated eye    Cont timolol gfs ou q am     Pt is going to try CTL's // ? Monovision with dr. June        F/U - 6 months -HRT Friday - her day off - main campus with HRT

## 2019-02-04 NOTE — TELEPHONE ENCOUNTER
----- Message from Janet Doan sent at 2/1/2019  2:39 PM CST -----  Contact: Patient [phone]  Pt is calling to speak with someone in regards to getting an appt scheduled for her annual. She did not want the first appt that was available on 04/18/19. She states that she would be out of town. She asked if the nurse could call her back in regards to getting the appt scheduled for the date that she is looking for. Please call back and advise.      Thanks

## 2019-02-05 ENCOUNTER — CLINICAL SUPPORT (OUTPATIENT)
Dept: OPHTHALMOLOGY | Facility: CLINIC | Age: 54
End: 2019-02-05
Payer: COMMERCIAL

## 2019-02-05 ENCOUNTER — OFFICE VISIT (OUTPATIENT)
Dept: OPHTHALMOLOGY | Facility: CLINIC | Age: 54
End: 2019-02-05
Payer: COMMERCIAL

## 2019-02-05 DIAGNOSIS — H40.043 STEROID RESPONDERS BORDERLINE GLAUCOMA, BILATERAL: ICD-10-CM

## 2019-02-05 DIAGNOSIS — H25.12 NUCLEAR SCLEROSIS, LEFT: ICD-10-CM

## 2019-02-05 DIAGNOSIS — H40.89 GLAUCOMA DUE TO COMBINATION OF MECHANISMS: Primary | ICD-10-CM

## 2019-02-05 DIAGNOSIS — H40.89 GLAUCOMA DUE TO COMBINATION OF MECHANISMS: ICD-10-CM

## 2019-02-05 DIAGNOSIS — H43.813 POSTERIOR VITREOUS DETACHMENT, BILATERAL: ICD-10-CM

## 2019-02-05 DIAGNOSIS — Z96.1 PSEUDOPHAKIA OF RIGHT EYE: ICD-10-CM

## 2019-02-05 PROCEDURE — 99999 PR PBB SHADOW E&M-EST. PATIENT-LVL II: CPT | Mod: PBBFAC,,, | Performed by: OPHTHALMOLOGY

## 2019-02-05 PROCEDURE — 92083 HUMPHREY VISUAL FIELD - OU - BOTH EYES: ICD-10-PCS | Mod: S$GLB,,, | Performed by: OPHTHALMOLOGY

## 2019-02-05 PROCEDURE — 92133 POSTERIOR SEGMENT OCT OPTIC NERVE(OCULAR COHERENCE TOMOGRAPHY) - OU - BOTH EYES: ICD-10-PCS | Mod: S$GLB,,, | Performed by: OPHTHALMOLOGY

## 2019-02-05 PROCEDURE — 92133 CPTRZD OPH DX IMG PST SGM ON: CPT | Mod: S$GLB,,, | Performed by: OPHTHALMOLOGY

## 2019-02-05 PROCEDURE — 92083 EXTENDED VISUAL FIELD XM: CPT | Mod: S$GLB,,, | Performed by: OPHTHALMOLOGY

## 2019-02-05 PROCEDURE — 92014 COMPRE OPH EXAM EST PT 1/>: CPT | Mod: S$GLB,,, | Performed by: OPHTHALMOLOGY

## 2019-02-05 PROCEDURE — 92014 PR EYE EXAM, EST PATIENT,COMPREHESV: ICD-10-PCS | Mod: S$GLB,,, | Performed by: OPHTHALMOLOGY

## 2019-02-05 PROCEDURE — 99999 PR PBB SHADOW E&M-EST. PATIENT-LVL II: ICD-10-PCS | Mod: PBBFAC,,, | Performed by: OPHTHALMOLOGY

## 2019-03-06 ENCOUNTER — OFFICE VISIT (OUTPATIENT)
Dept: INTERNAL MEDICINE | Facility: CLINIC | Age: 54
End: 2019-03-06
Payer: COMMERCIAL

## 2019-03-06 VITALS
WEIGHT: 122.38 LBS | OXYGEN SATURATION: 99 % | SYSTOLIC BLOOD PRESSURE: 116 MMHG | HEART RATE: 73 BPM | HEIGHT: 62 IN | BODY MASS INDEX: 22.52 KG/M2 | DIASTOLIC BLOOD PRESSURE: 60 MMHG

## 2019-03-06 DIAGNOSIS — Z00.00 WELLNESS EXAMINATION: ICD-10-CM

## 2019-03-06 DIAGNOSIS — G40.209 COMPLEX PARTIAL SEIZURES EVOLVING TO GENERALIZED TONIC-CLONIC SEIZURES: ICD-10-CM

## 2019-03-06 DIAGNOSIS — Z00.00 PHYSICAL EXAM: Primary | ICD-10-CM

## 2019-03-06 DIAGNOSIS — D18.00 CAVERNOMA: ICD-10-CM

## 2019-03-06 PROCEDURE — 99999 PR PBB SHADOW E&M-EST. PATIENT-LVL III: ICD-10-PCS | Mod: PBBFAC,,, | Performed by: INTERNAL MEDICINE

## 2019-03-06 PROCEDURE — 99396 PR PREVENTIVE VISIT,EST,40-64: ICD-10-PCS | Mod: S$GLB,,, | Performed by: INTERNAL MEDICINE

## 2019-03-06 PROCEDURE — 99396 PREV VISIT EST AGE 40-64: CPT | Mod: S$GLB,,, | Performed by: INTERNAL MEDICINE

## 2019-03-06 PROCEDURE — 99999 PR PBB SHADOW E&M-EST. PATIENT-LVL III: CPT | Mod: PBBFAC,,, | Performed by: INTERNAL MEDICINE

## 2019-03-06 NOTE — PROGRESS NOTES
Subjective:      Patient ID: Darling Quach is a 53 y.o. female.    Chief Complaint: Annual Exam    HPI:  HPI     Patient is seeing her neurologist every 3 months and on Keppra 750mg ER daily and multitasking is better and . Dr. Becca Brito. Discussed medication.    Hemorrhoids: Visit and take next option    Botox:to face    Vitamins:    Annual exam  Colonoscopy: 10/10/2016:follow up in 7 years  Mammogram: due after 6/23/2019  Gyn: Dr. Mayes  6/2019  Optho:glaucoma Cataract Surgery in 12/2014 and has appt scheduled twice a year, Dr. Camacho  Flu: 2018  Tetanus:7/7/2016  Shingles: Shingrix suggested  Pneumovax : not due  Prevnar: not due     Consultants:  Derm: Dr. Cristian Brito     Brother:Bipolar but health is good  Siblings: 5 all are good     MRI of Brain: Dr. Pendleton      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    Vitreous opacity - Right Eye    Venous angioma of brain    S/P hysterectomy    Hx of colonoscopy:10/10/2016    Anal fissure    Complex partial seizures evolving to generalized tonic-clonic seizures     Past Medical History:   Diagnosis Date    Cataract     Glaucoma     Hemangioma     cavernous    History of acne 2004    dr lopez prescribed accutane    Hyperlipidemia     Venous angioma of brain     Vertigo      Past Surgical History:   Procedure Laterality Date    CATARACT EXTRACTION W/  INTRAOCULAR LENS IMPLANT Right 12/17/14    Dr Camacho     HYSTERECTOMY  09/20/2011    [facility]     INSERTION-INTRAOCULAR LENS (IOL) Right 12/17/2014    Performed by Josey [facility]acho [facility] at [facility] OR 1ST FLR    PHACOEMULSIFICATION-ASPIRATION-CATARACT Right 12/17/2014    Performed by Josey Camacho MD at [facility] OR 1ST FLR     Family History   Problem  "Relation Age of Onset    Fibroids Mother     Migraines Mother     Glaucoma Mother     Heart disease Father     Depression Brother     No Known Problems Sister     Diabetes Maternal Grandmother     Glaucoma Maternal Grandmother     Cancer Maternal Aunt         breast    Breast cancer Maternal Aunt 50    No Known Problems Maternal Uncle     No Known Problems Paternal Aunt     No Known Problems Paternal Uncle     No Known Problems Maternal Grandfather     No Known Problems Paternal Grandmother     No Known Problems Paternal Grandfather     Macular degeneration Neg Hx     Blindness Neg Hx     Melanoma Neg Hx     Psoriasis Neg Hx     Lupus Neg Hx     Eczema Neg Hx     Acne Neg Hx     Cataracts Neg Hx     Amblyopia Neg Hx     Retinal detachment Neg Hx     Strabismus Neg Hx     Stroke Neg Hx     Thyroid disease Neg Hx     Hypertension Neg Hx     Ovarian cancer Neg Hx     Colon cancer Neg Hx      Review of Systems   Constitutional: Negative for chills, fever and unexpected weight change.   HENT: Negative for trouble swallowing.    Respiratory: Negative for cough, shortness of breath and wheezing.    Cardiovascular: Negative for chest pain and palpitations.   Gastrointestinal: Negative for abdominal distention, abdominal pain, blood in stool and vomiting.   Musculoskeletal: Negative for back pain.     Objective:     Vitals:    03/06/19 1440   BP: 116/60   Pulse: 73   SpO2: 99%   Weight: 55.5 kg (122 lb 5.7 oz)   Height: 5' 2" (1.575 m)   PainSc: 0-No pain     Body mass index is 22.38 kg/m².  Physical Exam   Constitutional: She is oriented to person, place, and time. She appears well-developed and well-nourished. No distress.   Neck: Carotid bruit is not present. No thyromegaly present.   Cardiovascular: Normal rate, regular rhythm and normal heart sounds. PMI is not displaced.   Pulmonary/Chest: Effort normal and breath sounds normal. No respiratory distress.   Abdominal: Soft. Bowel sounds " are normal. She exhibits no distension. There is no tenderness.   Musculoskeletal: She exhibits no edema.   Neurological: She is alert and oriented to person, place, and time.     Assessment:     1. Physical exam    2. Wellness examination    3. Cavernoma    4. Complex partial seizures evolving to generalized tonic-clonic seizures      Plan:   Darling was seen today for annual exam.    Diagnoses and all orders for this visit:    Physical exam:  Overall I feel the the patient's medical problems are stable.  I certainly wish her medications did not have the side effects they potentially do.    Wellness examination:  Health maintenance reviewed    Cavernoma:  MRI has been done within the last year    Complex partial seizures evolving to generalized tonic-clonic seizures:  Patient does follow with a neurologist every 3 months and her seizures are under control uncertain is whether she is having significant side effects to the medication.        Problem List Items Addressed This Visit     Cavernoma    Overview     cavernous         Complex partial seizures evolving to generalized tonic-clonic seizures      Other Visit Diagnoses     Physical exam    -  Primary    Wellness examination            No orders of the defined types were placed in this encounter.    Follow-up in about 1 year (around 3/6/2020) for Annual exam.     Medication List           Accurate as of 3/6/19  3:33 PM. If you have any questions, ask your nurse or doctor.               CONTINUE taking these medications    ADVIL ORAL     DILTIAZEM 2% - LIDOCAINE 5% CREAM  Apply 1 application topically 3 (three) times daily.     hydrocortisone 2.5 % cream  Apply topically 2 (two) times daily.     levetiracetam  mg Tb24 tablet  Commonly known as:  KEPPRA XR     VITAMIN D ORAL        STOP taking these medications    linaclotide 145 mcg Cap capsule  Commonly known as:  LINZESS  Stopped by:  Kimberly Ellison MD

## 2019-03-15 ENCOUNTER — OFFICE VISIT (OUTPATIENT)
Dept: OPTOMETRY | Facility: CLINIC | Age: 54
End: 2019-03-15
Payer: COMMERCIAL

## 2019-03-15 ENCOUNTER — LAB VISIT (OUTPATIENT)
Dept: LAB | Facility: HOSPITAL | Age: 54
End: 2019-03-15
Attending: INTERNAL MEDICINE
Payer: COMMERCIAL

## 2019-03-15 DIAGNOSIS — H25.12 NUCLEAR SCLEROSIS, LEFT: ICD-10-CM

## 2019-03-15 DIAGNOSIS — H40.053 OCULAR HYPERTENSION, BILATERAL: Primary | ICD-10-CM

## 2019-03-15 DIAGNOSIS — H26.491 POSTERIOR CAPSULAR OPACIFICATION OF RIGHT EYE, OBSCURING VISION: ICD-10-CM

## 2019-03-15 DIAGNOSIS — E55.9 MILD VITAMIN D DEFICIENCY: ICD-10-CM

## 2019-03-15 DIAGNOSIS — E78.00 PURE HYPERCHOLESTEROLEMIA: ICD-10-CM

## 2019-03-15 DIAGNOSIS — H52.4 PRESBYOPIA: ICD-10-CM

## 2019-03-15 DIAGNOSIS — Z98.41 S/P CATARACT SURGERY, RIGHT: ICD-10-CM

## 2019-03-15 DIAGNOSIS — H52.7 REFRACTIVE ERRORS: ICD-10-CM

## 2019-03-15 LAB
25(OH)D3+25(OH)D2 SERPL-MCNC: 31 NG/ML
ALBUMIN SERPL BCP-MCNC: 4 G/DL
ALP SERPL-CCNC: 59 U/L
ALT SERPL W/O P-5'-P-CCNC: 11 U/L
ANION GAP SERPL CALC-SCNC: 8 MMOL/L
AST SERPL-CCNC: 19 U/L
BASOPHILS # BLD AUTO: 0.08 K/UL
BASOPHILS NFR BLD: 1.8 %
BILIRUB SERPL-MCNC: 0.3 MG/DL
BUN SERPL-MCNC: 19 MG/DL
CALCIUM SERPL-MCNC: 9.8 MG/DL
CHLORIDE SERPL-SCNC: 103 MMOL/L
CHOLEST SERPL-MCNC: 247 MG/DL
CHOLEST/HDLC SERPL: 2.6 {RATIO}
CO2 SERPL-SCNC: 29 MMOL/L
CREAT SERPL-MCNC: 1.1 MG/DL
DIFFERENTIAL METHOD: NORMAL
EOSINOPHIL # BLD AUTO: 0.2 K/UL
EOSINOPHIL NFR BLD: 4.7 %
ERYTHROCYTE [DISTWIDTH] IN BLOOD BY AUTOMATED COUNT: 12.4 %
EST. GFR  (AFRICAN AMERICAN): >60 ML/MIN/1.73 M^2
EST. GFR  (NON AFRICAN AMERICAN): 57.9 ML/MIN/1.73 M^2
GLUCOSE SERPL-MCNC: 89 MG/DL
HCT VFR BLD AUTO: 39.1 %
HDLC SERPL-MCNC: 96 MG/DL
HDLC SERPL: 38.9 %
HGB BLD-MCNC: 12.8 G/DL
IMM GRANULOCYTES # BLD AUTO: 0.01 K/UL
IMM GRANULOCYTES NFR BLD AUTO: 0.2 %
LDLC SERPL CALC-MCNC: 140.6 MG/DL
LYMPHOCYTES # BLD AUTO: 1.4 K/UL
LYMPHOCYTES NFR BLD: 30.6 %
MCH RBC QN AUTO: 28.6 PG
MCHC RBC AUTO-ENTMCNC: 32.7 G/DL
MCV RBC AUTO: 87 FL
MONOCYTES # BLD AUTO: 0.4 K/UL
MONOCYTES NFR BLD: 7.8 %
NEUTROPHILS # BLD AUTO: 2.5 K/UL
NEUTROPHILS NFR BLD: 54.9 %
NONHDLC SERPL-MCNC: 151 MG/DL
NRBC BLD-RTO: 0 /100 WBC
PLATELET # BLD AUTO: 261 K/UL
PMV BLD AUTO: 9.8 FL
POTASSIUM SERPL-SCNC: 4.2 MMOL/L
PROT SERPL-MCNC: 7.2 G/DL
RBC # BLD AUTO: 4.48 M/UL
SODIUM SERPL-SCNC: 140 MMOL/L
TRIGL SERPL-MCNC: 52 MG/DL
WBC # BLD AUTO: 4.47 K/UL

## 2019-03-15 PROCEDURE — 92012 PR EYE EXAM, EST PATIENT,INTERMED: ICD-10-PCS | Mod: S$GLB,,, | Performed by: OPTOMETRIST

## 2019-03-15 PROCEDURE — 82306 VITAMIN D 25 HYDROXY: CPT

## 2019-03-15 PROCEDURE — 99999 PR PBB SHADOW E&M-EST. PATIENT-LVL III: ICD-10-PCS | Mod: PBBFAC,,, | Performed by: OPTOMETRIST

## 2019-03-15 PROCEDURE — 85025 COMPLETE CBC W/AUTO DIFF WBC: CPT

## 2019-03-15 PROCEDURE — 92012 INTRM OPH EXAM EST PATIENT: CPT | Mod: S$GLB,,, | Performed by: OPTOMETRIST

## 2019-03-15 PROCEDURE — 92015 DETERMINE REFRACTIVE STATE: CPT | Mod: S$GLB,,, | Performed by: OPTOMETRIST

## 2019-03-15 PROCEDURE — 99999 PR PBB SHADOW E&M-EST. PATIENT-LVL III: CPT | Mod: PBBFAC,,, | Performed by: OPTOMETRIST

## 2019-03-15 PROCEDURE — 80053 COMPREHEN METABOLIC PANEL: CPT

## 2019-03-15 PROCEDURE — 80061 LIPID PANEL: CPT

## 2019-03-15 PROCEDURE — 92015 PR REFRACTION: ICD-10-PCS | Mod: S$GLB,,, | Performed by: OPTOMETRIST

## 2019-03-15 PROCEDURE — 36415 COLL VENOUS BLD VENIPUNCTURE: CPT

## 2019-03-15 NOTE — PROGRESS NOTES
"HPI     Patient's age: 53 y.o. WF  Occupation:     Approximate date of last eye examination:  Hair Gutierrez MD/Bekah     Name of last eye doctor seen: 02/09/2019  City/State: [facility]  Wears glasses? Yes      If yes, wears  Full-time or part-time?  Full time   Present glasses are: Bifocal, SV Distance, SV Reading?  Progressive lens   Approximate age of present glasses:  2 yrs    Got new glasses following last exam, or subsequently?:  Yes    Any problem with VA with glasses?  Pt co around changes in vision   Wears CLs?:  no  Headaches?  No   Eye pain/discomfort?  No                                                                                      Flashes?  No   Floaters?  No   Diplopia/Double vision?  No   Patient's Ocular History:         Any eye surgeries? No          Any eye injury?  No          Any treatment for eye disease?  No   Family history of eye disease?   M. Grandmother, mother: glaucoma/P   grandmother cataract   Significant patient medical history:         1. Diabetes?  No        If yes, IDDM or NIDDM? n/a   2. HBP?  No               3. Other (describe):  No    ! OTC eyedrops currently using:  No    ! Prescription eye meds currently using:  No    ! Any history of allergy/adverse reaction to any eye meds used   previously? Adverse reaction to Timolol 0.5% GFS     ! Any history of allergy/adverse reaction to eyedrops used during prior   eye exam(s)?  See above    ! Any history of allergy/adverse reaction to Novacaine or similar meds?   No    ! Any history of allergy/adverse reaction to Epinephrine or similar meds?   No     ! Patient okay with use of anesthetic eyedrops to check eye pressure?    Yes         ! Patient okay with use of eyedrops to dilate pupils today?  Yes    !  Allergies/Medications/Medical History/Family History reviewed today?    Yes       PD =   61/57  Desired reading distance =  17"                                                               Last edited by Jairon KIMBROUGH" Sahylee, OD on 3/15/2019  9:13 AM. (History)            Assessment /Plan     For exam results, see Encounter Report.    1. Ocular hypertension, bilateral  Ambulatory Referral to Ophthalmology   2. Posterior capsular opacification of right eye, obscuring vision  Ambulatory Referral to Ophthalmology   3. S/P cataract surgery, right     4. Nuclear sclerosis, left     5. Refractive errors     6. Presbyopia                  S/P cataract surgery of the right eye with posterior chamber intraocular lens.  Potentially visually significant posterior capsular opacification of the intact posterior lens capsule.  Mrs. Quach aware of decreased VA in the right eye.   Residual hyperopic refractive error in the right eye with best-corrected VA of 20/30 (-), and hyperopia with astigmatism in the left eye with best-corrected VA of 20/20/  Defer spectacle lens Rx.  Generate referral to Dr. Camacho for evaluation of apparent need for YAG laser posterior capsulotomy in the right eye.  Early nuclear sclerosis of lens of the left eye.  No need for cataract surgery in the left eye.    Return to me (Dr. June) for repeat of refraction and new spectacle lens Rx approximately two weeks after YAG laser posterior capsulotomy done in the right eye.    Note bilateral ocular hypertension (28 mm Hg in the right eye, and 24 mm Hg in the left eye).  Being monitored by Dr. Camacho

## 2019-03-15 NOTE — PATIENT INSTRUCTIONS
S/P cataract surgery of the right eye with posterior chamber intraocular lens.  Potentially visually significant posterior capsular opacification of the intact posterior lens capsule.  Mrs. Quach aware of decreased VA in the right eye.   Residual hyperopic refractive error in the right eye with best-corrected VA of 20/30 (-), and hyperopia with astigmatism in the left eye with best-corrected VA of 20/20/  Defer spectacle lens Rx.  Generate referral to Dr. Camacho for evaluation of apparent need for YAG laser posterior capsulotomy in the right eye.  Early nuclear sclerosis of lens of the left eye.  No need for cataract surgery in the left eye.    Return to me (Dr. June) for repeat of refraction and new spectacle lens Rx approximately two weeks after YAG laser posterior capsulotomy done in the right eye.    Note bilateral ocular hypertension (28 mm Hg in the right eye, and 24 mm Hg in the left eye).  Being monitored by Dr. Camacho    Generated referral to Dr. Camacho.

## 2019-03-26 ENCOUNTER — OFFICE VISIT (OUTPATIENT)
Dept: SURGERY | Facility: CLINIC | Age: 54
End: 2019-03-26
Payer: COMMERCIAL

## 2019-03-26 VITALS
HEIGHT: 62 IN | HEART RATE: 76 BPM | BODY MASS INDEX: 22.28 KG/M2 | SYSTOLIC BLOOD PRESSURE: 111 MMHG | DIASTOLIC BLOOD PRESSURE: 70 MMHG | WEIGHT: 121.06 LBS

## 2019-03-26 DIAGNOSIS — K60.2 ANAL FISSURE: Primary | ICD-10-CM

## 2019-03-26 DIAGNOSIS — K64.4 RESIDUAL HEMORRHOIDAL SKIN TAGS: ICD-10-CM

## 2019-03-26 PROCEDURE — 99213 OFFICE O/P EST LOW 20 MIN: CPT | Mod: S$GLB,,, | Performed by: COLON & RECTAL SURGERY

## 2019-03-26 PROCEDURE — 99999 PR PBB SHADOW E&M-EST. PATIENT-LVL III: ICD-10-PCS | Mod: PBBFAC,,, | Performed by: COLON & RECTAL SURGERY

## 2019-03-26 PROCEDURE — 99999 PR PBB SHADOW E&M-EST. PATIENT-LVL III: CPT | Mod: PBBFAC,,, | Performed by: COLON & RECTAL SURGERY

## 2019-03-26 PROCEDURE — 3008F PR BODY MASS INDEX (BMI) DOCUMENTED: ICD-10-PCS | Mod: CPTII,S$GLB,, | Performed by: COLON & RECTAL SURGERY

## 2019-03-26 PROCEDURE — 3008F BODY MASS INDEX DOCD: CPT | Mod: CPTII,S$GLB,, | Performed by: COLON & RECTAL SURGERY

## 2019-03-26 PROCEDURE — 99213 PR OFFICE/OUTPT VISIT, EST, LEVL III, 20-29 MIN: ICD-10-PCS | Mod: S$GLB,,, | Performed by: COLON & RECTAL SURGERY

## 2019-03-26 NOTE — PROGRESS NOTES
CRS Office Visit Follow-up    SUBJECTIVE:     Chief Complaint: hemorrhoids and constipation    History of Present Illness:  Patient is a 53 y.o. female presents with hemorrhoids. The patient is a established patient to this practice.     Course is as follows:  2014:  Seen by NP for hemorrhoids.  Conservative mgmt recommended  2017:  Seen by Blu for anal fissure.  Treated with dilt cream.   5/2018: seen by me for worsening hemorrhoid tissue.  Found to have predominately external hemorrhoids that are nonthrombosed with a small amount of prolapsed internal tissue.  Treated conservatively.      9/28/18:  Presents today for follow-up.  Feels that hemorrhoids are not improving.  Has multiple episodes of pain, bleeding, itching, trouble with cleanliness.  Impacts her quality of life.  She is taking MiraLax daily.  With that, she is having 2-3 bowel movements per week.  She spend less than 5 min on the toilet for each bowel movement.  Feel similar to her prior fissure.  In regards to treatment from her prior fissure, she stopped diltiazem cream once she had a seizure.  She is now on Keppra daily.  No family history of colorectal cancer.     3/26/19:  Complains of frequent bleeding with her bowel movements.  Associated tearing pain with her bowel movements.  Difficulty with cleanliness due to external hemorrhoids.  Recently has improved secondary to changes in her diet.     Last Colonoscopy: 2016  At [facility].  Normal.     Review of Systems:  Review of Systems   Constitutional: Negative for chills, diaphoresis, fever, malaise/fatigue and weight loss.   HENT: Negative for congestion.    Respiratory: Negative for shortness of breath.    Cardiovascular: Negative for chest pain and leg swelling.   Gastrointestinal: Positive for constipation. Negative for abdominal pain, blood in stool, nausea and vomiting.   Genitourinary: Negative for dysuria.   Musculoskeletal: Negative for back pain and myalgias.   Skin:  "Negative for rash.   Neurological: Negative for dizziness and weakness.   Endo/Heme/Allergies: Does not bruise/bleed easily.   Psychiatric/Behavioral: Negative for depression.       OBJECTIVE:     Vital Signs (Most Recent)  /70 (BP Location: Left arm, Patient Position: Sitting, BP Method: Large (Automatic))   Pulse 76   Ht 5' 2" (1.575 m)   Wt 54.9 kg (121 lb 0.5 oz)   BMI 22.14 kg/m²     Physical Exam:  General: White female in no distress   Neuro: alert and oriented x 4.  Moves all extremities.     HEENT: no icterus.  Trachea midline  Respiratory: respirations are even and unlabored  Cardiac: regular rate  Abdomen: soft, nontender, no masses  Extremities: Warm dry and intact  Skin: no rashes  Anorectal: External exam demonstrates external hemorrhoids .  Most significant external hemorrhoid is on the left side. Digital exam is attempted but was refused secondary to discomfort.  Pain is mostly in the posterior midline    Labs: H/H = 13/39    Imaging: none      ASSESSMENT/PLAN:     Darling was seen today for hemorrhoids and constipation.    Diagnoses and all orders for this visit:    Anal fissure  -     Case Request Operating Room: SPHINCTEROTOMY, ANAL, INTERNAL, LATERAL & EXCISION OF PERIANAL SKIN TAGS, prone    Residual hemorrhoidal skin tags  -     Case Request Operating Room: SPHINCTEROTOMY, ANAL, INTERNAL, LATERAL & EXCISION OF PERIANAL SKIN TAGS, prone         53-year-old woman with predominately external hemorrhoids mostly on the left side with posterior midline tenderness consistent with an anal fissure.  She has previously tried topical diltiazem without any improvement.  I therefore recommended limited lateral internal anal sphincterotomy.  At the same time, she can have excision of her perianal skin tags performed.  She will need to be in prone position.  Consents were signed today.  All questions were answered.    She will need to see the preoperative clinic to assist in management of her seizures " and brain tumor.    LOREE Chan MD  Staff Surgeon  Colon & Rectal Surgery

## 2019-03-27 ENCOUNTER — TELEPHONE (OUTPATIENT)
Dept: PREADMISSION TESTING | Facility: HOSPITAL | Age: 54
End: 2019-03-27

## 2019-03-27 NOTE — TELEPHONE ENCOUNTER
----- Message from Alyse Brumfield LPN sent at 3/26/2019  4:55 PM CDT -----  Pt is scheduled for surgery with Dr Chan on 5/27/19. She is having a LIAS and skin tag removal. Dr Chan wants her to see anesthia due to a brain tumor.  Thanks   Alyse

## 2019-04-23 NOTE — PROGRESS NOTES
Assessment /Plan     For exam results, see Encounter Report.    Glaucoma due to combination of mechanisms - Both Eyes  -     Posterior Segment OCT Optic Nerve- Both eyes    Steroid responders borderline glaucoma, bilateral    Nuclear sclerosis, left    Pseudophakia of right eye    Glaucoma due to combination of mechanisms  -     Posterior Segment OCT Optic Nerve- Both eyes            PT IS TRANSFER ING FROM [facility] TO [place] - PHOTO file is no in  [facility]  TRANSFER BACK TO [facility] - [place]     1. 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    (mom with same problem with narrow angles and residual OHT)     Family history + mom - Narrow angles with residual OHT   Glaucoma meds - timolol gfs ou   H/O adverse rxn to glaucoma drops latanoprost - eye irritation  LASERS PI's ou   GLAUCOMA SURGERIES none   OTHER EYE SURGERIES - PC IOL OD 12/17/2014 - [facility]  CDR 0.4/0.4   Tbase 23-36 / 23-35 ou   Tmax 36/35   Ttarget 30/30  HVF 15 test 24-2 ss ou 1996 to 2019 ([facility])            3 SWAP test 2010 to 2012 - Full ou            ( repeat HVF from 8/2013 to 9/2013 - new SAD od confirmed)            [place] VF's - 1 test 2016 to 2016 - SNS /INS od // full os   2019 VF full od/near full os   Gonio +3 ou S/P PI's   /618   OCT 3 test 2005 -  2014 ([facility]) - RNFL - nl od/ nl os   HRT - 4 test at [facility] - 2014 to 2016 -MR -  nl od // nl os /// CDR 0.46 od // 0.47 os  - older test at [facility]  Disc photos 1999, 2004, 2013 - slides // 5/13/2010 - OIS     - Ttoday  31/??  - Test done today - OCT // yag cap od - per Sh[facility]     2. PVD ou - with dense vitreous veil OD Saw Arend 4/23/2012 4/2019 - pt is noticing the floaters again (had not noticed them for several years)     3. Vitreous opacity OD     4. Steroid responder - had an  injection / ? Spironolactone / aldactone - not currently on any steroids    5. Refractive error   Was bilateral hyperope / presbyope   Is anisopmetropic post CE od    Glasses from [facility] and doing well     6. H/O eyelid papilloma Left - S/P removal - Christiano     7. Pseudophakia od   PC IOL od 12/17/2014 - SN60wf 20.5   Had a large myopic shift pre surgery from +1.00 to -3.00    8.  I see her father in law - he has glaucoma - and I did his cataracts    I see her mother - she has similar issues to this patient     Plan   MMG  / OHT ou  S/P PI's ou   HVF today near full OU  ?? If using timolol or not - check with pt next visit   Intolerant to latanoprost - red irritated eye    Cont timolol gfs ou q am     YAG CAP OD - 4/25/2019 - STEROID TAPER     Back to [facility] for glasses post yag cap od // ? CTL's         F/U - 3 months  With DFE and disc photos - none since 2013

## 2019-04-25 ENCOUNTER — OFFICE VISIT (OUTPATIENT)
Dept: OPHTHALMOLOGY | Facility: CLINIC | Age: 54
End: 2019-04-25
Payer: COMMERCIAL

## 2019-04-25 DIAGNOSIS — Z96.1 PSEUDOPHAKIA OF RIGHT EYE: ICD-10-CM

## 2019-04-25 DIAGNOSIS — H26.491 PCO (POSTERIOR CAPSULAR OPACIFICATION), RIGHT: ICD-10-CM

## 2019-04-25 DIAGNOSIS — H40.043 STEROID RESPONDERS BORDERLINE GLAUCOMA, BILATERAL: ICD-10-CM

## 2019-04-25 DIAGNOSIS — H40.89 GLAUCOMA DUE TO COMBINATION OF MECHANISMS: Primary | ICD-10-CM

## 2019-04-25 DIAGNOSIS — H25.12 NUCLEAR SCLEROSIS, LEFT: ICD-10-CM

## 2019-04-25 DIAGNOSIS — H40.89 GLAUCOMA DUE TO COMBINATION OF MECHANISMS: ICD-10-CM

## 2019-04-25 PROCEDURE — 66821 YAG CAPSULOTOMY - OD - RIGHT EYE: ICD-10-PCS | Mod: RT,S$GLB,, | Performed by: OPHTHALMOLOGY

## 2019-04-25 PROCEDURE — 92133 POSTERIOR SEGMENT OCT OPTIC NERVE(OCULAR COHERENCE TOMOGRAPHY) - OU - BOTH EYES: ICD-10-PCS | Mod: S$GLB,,, | Performed by: OPHTHALMOLOGY

## 2019-04-25 PROCEDURE — 99999 PR PBB SHADOW E&M-EST. PATIENT-LVL II: CPT | Mod: PBBFAC,,, | Performed by: OPHTHALMOLOGY

## 2019-04-25 PROCEDURE — 66821 AFTER CATARACT LASER SURGERY: CPT | Mod: RT,S$GLB,, | Performed by: OPHTHALMOLOGY

## 2019-04-25 PROCEDURE — 99499 UNLISTED E&M SERVICE: CPT | Mod: S$GLB,,, | Performed by: OPHTHALMOLOGY

## 2019-04-25 PROCEDURE — 92133 CPTRZD OPH DX IMG PST SGM ON: CPT | Mod: S$GLB,,, | Performed by: OPHTHALMOLOGY

## 2019-04-25 PROCEDURE — 99499 NO LOS: ICD-10-PCS | Mod: S$GLB,,, | Performed by: OPHTHALMOLOGY

## 2019-04-25 PROCEDURE — 99999 PR PBB SHADOW E&M-EST. PATIENT-LVL II: ICD-10-PCS | Mod: PBBFAC,,, | Performed by: OPHTHALMOLOGY

## 2019-05-09 ENCOUNTER — OFFICE VISIT (OUTPATIENT)
Dept: OPTOMETRY | Facility: CLINIC | Age: 54
End: 2019-05-09
Payer: COMMERCIAL

## 2019-05-09 DIAGNOSIS — H52.7 REFRACTIVE ERRORS: ICD-10-CM

## 2019-05-09 DIAGNOSIS — Z98.41 S/P CATARACT SURGERY, RIGHT: ICD-10-CM

## 2019-05-09 DIAGNOSIS — H25.12 NUCLEAR SCLEROSIS, LEFT: ICD-10-CM

## 2019-05-09 DIAGNOSIS — Z98.890 POST-OPERATIVE STATE: Primary | ICD-10-CM

## 2019-05-09 PROCEDURE — 99999 PR PBB SHADOW E&M-EST. PATIENT-LVL III: ICD-10-PCS | Mod: PBBFAC,,, | Performed by: OPTOMETRIST

## 2019-05-09 PROCEDURE — 99999 PR PBB SHADOW E&M-EST. PATIENT-LVL III: CPT | Mod: PBBFAC,,, | Performed by: OPTOMETRIST

## 2019-05-09 PROCEDURE — 92015 PR REFRACTION: ICD-10-PCS | Mod: S$GLB,,, | Performed by: OPTOMETRIST

## 2019-05-09 PROCEDURE — 92015 DETERMINE REFRACTIVE STATE: CPT | Mod: S$GLB,,, | Performed by: OPTOMETRIST

## 2019-05-09 PROCEDURE — 99024 POSTOP FOLLOW-UP VISIT: CPT | Mod: S$GLB,,, | Performed by: OPTOMETRIST

## 2019-05-09 PROCEDURE — 99024 PR POST-OP FOLLOW-UP VISIT: ICD-10-PCS | Mod: S$GLB,,, | Performed by: OPTOMETRIST

## 2019-05-09 NOTE — PATIENT INSTRUCTIONS
S/P cataract surgery of the right eye with posterior chamber intraocular lens.  Prior finding of visually significant posterior capsular opacification of the intact posterior lens capsule in the right eye.  S/P YAG laser posterior capsulotomy in the right eye.    Early nuclear sclerosis of lens of the left eye     Residual hyperopic refractive error in the right eye, and hyperopia with astigmatism in the left eye.    New spectacle lens Rx issued for full-time wear.      Note prior finding of bilateral ocular hypertension.  Being monitored by Dr. Camacho

## 2019-05-20 ENCOUNTER — ANESTHESIA EVENT (OUTPATIENT)
Dept: SURGERY | Facility: HOSPITAL | Age: 54
End: 2019-05-20
Payer: COMMERCIAL

## 2019-05-20 ENCOUNTER — HOSPITAL ENCOUNTER (OUTPATIENT)
Dept: PREADMISSION TESTING | Facility: HOSPITAL | Age: 54
Discharge: HOME | End: 2019-05-20
Attending: ANESTHESIOLOGY
Payer: COMMERCIAL

## 2019-05-20 VITALS
TEMPERATURE: 98 F | BODY MASS INDEX: 21.79 KG/M2 | SYSTOLIC BLOOD PRESSURE: 106 MMHG | DIASTOLIC BLOOD PRESSURE: 65 MMHG | WEIGHT: 118.38 LBS | OXYGEN SATURATION: 97 % | HEART RATE: 74 BPM | HEIGHT: 62 IN

## 2019-05-20 NOTE — ANESTHESIA PREPROCEDURE EVALUATION
05/20/2019  [facility]  Anesthesia Pre-Operative Evaluation         Patient Name: Darling Quach  YOB: 1965  MRN: [id number]    SUBJECTIVE:     Pre-operative evaluation for Procedure(s) (LRB):  SPHINCTEROTOMY, ANAL, INTERNAL, LATERAL & EXCISION OF PERIANAL SKIN TAGS, prone (N/A)     05/26/2019    Darling Quach is a 54 y.o. female w/ a significant PMHx of HLD, Seizure disorder.      Patient now presents for the above procedure.    LDA:        Peripheral IV - Single Lumen 07/14/17 1154 Right Wrist (Active)   Number of days: 681       Prev airway: None documented.    Drips: None documented.      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      Current Outpatient Medications on File Prior to Encounter   Medication Sig Dispense Refill    diltiazem HCl (DILTIAZEM 2% - LIDOCAINE 5% CREAM) Apply 1 application topically 3 (three) times daily. 50 g 5    hydrocortisone 2.5 % cream Apply topically 2 (two) times daily. 1 Tube 5    ibuprofen (ADVIL ORAL) Take 200 mg by mouth daily as needed.       levetiracetam XR (KEPPRA XR) 750 mg Tb24 tablet Take 750 mg by mouth every evening.          Past Surgical History:   Procedure Laterality Date    CATARACT EXTRACTION W/   INTRAOCULAR LENS IMPLANT Right 12/17/2014        HYSTERECTOMY  09/20/2011    Replaced by [facility]     INSERTION-INTRAOCULAR LENS (IOL) Right 12/17/2014    Performed by [facility]y [facility] MD at [facility] OR [address] [place] [address]    PHACOEMULSIFICATION-ASPIRATION-CATARACT Right 12/17/2014    Performed by Josey Camacho MD at [facility] OR [address] [place] [address]       Social History     Socioeconomic History    Marital status:      Spouse name: Not on file    Number of children: Not on file    Years of education: Not on file    Highest education level: Not on file   Occupational History    Not on file   Social Needs    Financial resource strain: Not on file    Food insecurity:     Worry: Not on file     Inability: Not on file    Transportation needs:     Medical: Not on file     Non-medical: Not on file   Tobacco Use    Smoking status: Never Smoker    Smokeless tobacco: Never Used   Substance and Sexual Activity    Alcohol use: No    Drug use: No    Sexual activity: Yes     Partners: Male     Birth control/protection: Surgical     Comment: , Replaced by [facility] 9/2010   Lifestyle    Physical activity:     Days per week: Not on file     Minutes per session: Not on file    Stress: Not on file   Relationships    Social connections:     Talks on phone: Not on file     Gets together: Not on file     Attends [employer] service: Not on file     Active member of club or organization: Not on file     Attends meetings of clubs or organizations: Not on file     Relationship status: Not on file   Other Topics Concern    Are you pregnant or think you may be? No    Breast-feeding No   Social History Narrative    Not on file       OBJECTIVE:     Vital Signs Range (Last 24H):         Significant Labs:  Lab Results   Component Value Date    WBC 4.47 03/15/2019    HGB 12.8 03/15/2019    HCT 39.1 03/15/2019     03/15/2019    CHOL 247 (H) 03/15/2019    TRIG 52 03/15/2019    HDL 96 (H) 03/15/2019    ALT 11 03/15/2019    AST 19 03/15/2019      03/15/2019    K 4.2 03/15/2019     03/15/2019    CREATININE 1.1 03/15/2019    BUN 19 03/15/2019    CO2 29 03/15/2019    TSH 1.505 04/06/2018    INR 1.0 07/14/2017    HGBA1C 5.3 07/15/2017       Diagnostic Studies: No relevant studies.    EKG:      Vent. Rate : 071 BPM     Atrial Rate : 071 BPM     P-R Int : 164 ms          QRS Dur : 090 ms      QT Int : 412 ms       P-R-T Axes : 082 078 070 degrees     QTc Int : 447 ms    Normal sinus rhythm  Nonspecific ST abnormality  Abnormal ECG  When compared with ECG of 23-MAR-2010 08:07,  No significant change was found  Confirmed by DARIUSZ RACHEL MD ([id number]) on 7/15/2017 11:25:18 AM        2D ECHO:  No results found for this or any previous visit.      ASSESSMENT/PLAN:         Anesthesia Evaluation      I have reviewed the Medications.   Steroids Taken In Past Year:     Review of Systems  Anesthesia Hx:  No problems with previous Anesthesia History of prior surgery of interest to airway management or planning: Previous anesthesia: General colonoscopy 2016 with general anesthesia.  Denies Family Hx of Anesthesia complications.   Denies Personal Hx of Anesthesia complications.   Social:  Non-Smoker, No Alcohol Use    Hematology/Oncology:  Hematology Normal   Oncology Normal     EENT/Dental:   States had steroid injection 2 months ago for URI; glasses, glaucoma   Cardiovascular:   Denies Hypertension.  hyperlipidemia  Functional Capacity good / => 4 METS, walks dog 20 min daily, climb stairs in home, housework; denies CP, SOB    Pulmonary:   Denies Asthma.  Denies Shortness of breath.  Denies Sleep Apnea.    Renal/:  Renal/ Normal     Hepatic/GI:   Denies GERD. Anal fissure   Musculoskeletal:  Musculoskeletal Normal    Neurological:   Seizures (1st 7/2017 and only; takes keppra), well controlled H/o cavernoma-followed by neurologist, Dr Brito; Denies Pain    Endocrine:   Denies Diabetes.    Psych:   anxiety          Physical Exam  General:  Well nourished     Airway/Jaw/Neck:  Airway Findings: Mouth Opening: Normal Tongue: Normal  General Airway Assessment: Adult  Mallampati: II  Improves to I with phonation.  TM Distance: Normal, at least 6 cm  Jaw/Neck Findings:     Neck ROM: Normal ROM      Dental:  Dental Findings: In tact   Chest/Lungs:  Chest/Lungs Findings: Clear to auscultation, Normal Respiratory Rate     Heart/Vascular:  Heart Findings: Rate: Normal  Rhythm: Regular Rhythm  Sounds: Normal        Mental Status:  Mental Status Findings:  Cooperative, Alert and Oriented       Pt was seen in POC 5/20/19; findings discussed with Dr Leopold/Jackie Crabtree RN          Anesthesia Plan  Type of Anesthesia, risks & benefits discussed:  Anesthesia Type:  general  Patient's Preference: GA  Intra-op Monitoring Plan: standard ASA monitors  Intra-op Monitoring Plan Comments:   Post Op Pain Control Plan: per primary service following discharge from PACU, IV/PO Opioids PRN and multimodal analgesia  Post Op Pain Control Plan Comments:   Induction:   IV  Beta Blocker:  Patient is not currently on a Beta-Blocker (No further documentation required).       Informed Consent: Patient understands risks and agrees with Anesthesia plan.  Questions answered. Anesthesia consent signed with patient.  ASA Score: 2     Day of Surgery Review of History & Physical:  There are no significant changes.  H&P update referred to the provider.         Ready For Surgery From Anesthesia Perspective.

## 2019-05-20 NOTE — DISCHARGE INSTRUCTIONS
Your surgery has been scheduled for:__________________________________________    You should report to:  ____[facility], located on the [place] side of the first floor of the           [facility] ([phone])  ____[facility], located on the [facility] side of the            Second floor of the [facility] ([phone])  ____3rd Floor [facility] located on the [facility] side of the [facility] [phone]  Please Note   - Tell your doctor if you take Aspirin, products containing Aspirin, herbal medications  or blood thinners, such as Coumadin, Ticlid, or Plavix.  (Consult your provider regarding holding or stopping before surgery).  - Arrange for someone to drive you home following surgery.  You will not be allowed to leave the surgical facility alone or drive yourself home following sedation and anesthesia.  Before Surgery  - Stop taking all herbal medications 14days prior to surgery  - No Motrin/Advil (Ibuprofen) 7 days before surgery  - No Aleve (Naproxen) 7 days before surgery  - Stop Taking Asprin, products containing Asprin _____days before surgery  - Stop taking blood thinners_______days before surgery  - No Goody's/BC  Powder 7 days before surgery  - Refrain from drinking alcoholic beverages for 24hours before and after surgery  - Stop or limit smoking _________days before surgery  - You may take Tylenol for pain    Night before Surgery  NOTHING TO EAT OR DRINK AFTER MIDNIGHT OR FOLLOW SURGEON'S INSTRUCTIONS  - Take a shower or bath (shower is recommended).  Bathe with Hibiclens soap or an antibacterial soap from the neck down.  If not supplied by your surgeon, hibiclens soap will need to be purchased over the counter in pharmacy.  Rinse soap off thoroughly.  - Shampoo your hair with your regular shampoo  The Day of Surgery  · If you are told to take medication on the morning of surgery, it may be taken with  a sip of water.   - Take another bath or shower with hibiclens or any antibacterial soap, to reduce the chance of infection.  - Take heart and blood pressure medications with a small sip of water, as advised by the perioperative team.  - Do not take fluid pills  - You may brush your teeth and rinse your mouth, but do not swall any additional water.   - Do not apply perfumes, powder, body lotions or deodorant on the day of surgery.  - Nail polish should be removed.  - Do not wear makeup or moisturizer  - Wear comfortable clothes, such as a button front shirt and loose fitting pants.  - Leave all jewelry, including body piercings, and valuables at home.    - Bring any devices you will neeed after surgery such as crutches or canes.  - If you have sleep apnea, please bring your CPAP machine  In the event that your physical condition changes including the onset of a cold or respiratory illness, or if you have to delay or cancel your surgery, please notify your surgeon.      Anesthesia: General Anesthesia  Youre due to have surgery. During surgery, youll be given medication called anesthesia. (It is also called anesthetic.) This will keep you comfortable and pain-free. Your anesthesia provider will use general anesthesia. This sheet tells you more about it.  What is general anesthesia?     You are watched continuously during your procedure by the anesthesia provider   General anesthesia puts you into a state like deep sleep. It goes into the bloodstream (IV anesthetics), into the lungs (gas anesthetics), or both. You feel nothing during the procedure. You will not remember it. During the procedure, the anesthesia provider monitors you continuously. He or she checks your heart rate and rhythm, blood pressure, breathing, and blood oxygen.  · IV Anesthetics. IV anesthetics are given through an IV line in your arm. Theyre often given first. This is so you are asleep before a gas anesthetic is started. Some kinds of IV  anesthetics relieve pain. Others relax you. Your doctor will decide which kind is best in your case.  · Gas Anesthetics. Gas anesthetics are breathed into the lungs. They are often used to keep you asleep. They can be given through a facemask or a tube placed in your larynx or trachea (breathing tube).  ? If you have a facemask, your anesthesia provider will most likely place it over your nose and mouth while youre still awake. Youll breathe oxygen through the mask as your IV anesthetic is started. Gas anesthetic may be added through the mask.  ? If you have a tube in the larynx or trachea, it will be inserted into your throat after youre asleep.  Anesthesia tools and medications  You will likely have:  · IV anesthetics. These are put into an IV line into your bloodstream.  · Gas anesthetics. You breathe these anesthetics into your lungs, where they pass into your bloodstream.  · Pulse oximeter. This is a small clip that is attached to the end of your finger. This measures your blood oxygen level.  · Electrocardiography leads (electrodes). These are small sticky pads that are placed on your chest. They record your heart rate and rhythm.  · Blood pressure cuff. This reads your blood pressure.  Risks and possible complications  General anesthesia has some risks. These include:  · Breathing problems  · Nausea and vomiting  · Sore throat or hoarseness (usually temporary)  · Allergic reaction to the anesthetic  · Irregular heartbeat (rare)  · Cardiac arrest (rare)   Anesthesia safety  · Follow all instructions you are given for how long not to eat or drink before your procedure.  · Be sure your doctor knows what medications and drugs you take. This includes over-the-counter medications, herbs, supplements, alcohol or other drugs. You will be asked when those were last taken.  · Have an adult family member or friend drive you home after the procedure.  · For the first 24 hours after your surgery:  ? Do not drive or use  heavy equipment.  ? Have a trusted family member or spouse make important decisions or sign documents.  ? Avoid alcohol.  ? Have a responsible adult stay with you. He or she can watch for problems and help keep you safe.  Date Last Reviewed: 10/16/2014  © 7886-2046 [employer]. [address] [place] [address]. All rights reserved. This information is not intended as a substitute for professional medical care. Always follow your healthcare professional's instructions.

## 2019-05-24 ENCOUNTER — TELEPHONE (OUTPATIENT)
Dept: SURGERY | Facility: CLINIC | Age: 54
End: 2019-05-24

## 2019-05-27 ENCOUNTER — ANESTHESIA (OUTPATIENT)
Dept: SURGERY | Facility: HOSPITAL | Age: 54
End: 2019-05-27
Payer: COMMERCIAL

## 2019-05-27 ENCOUNTER — HOSPITAL ENCOUNTER (OUTPATIENT)
Facility: HOSPITAL | Age: 54
Discharge: HOME | End: 2019-05-27
Attending: COLON & RECTAL SURGERY | Admitting: COLON & RECTAL SURGERY
Payer: COMMERCIAL

## 2019-05-27 ENCOUNTER — NURSE TRIAGE (OUTPATIENT)
Dept: ADMINISTRATIVE | Facility: CLINIC | Age: 54
End: 2019-05-27

## 2019-05-27 VITALS
WEIGHT: 118 LBS | DIASTOLIC BLOOD PRESSURE: 62 MMHG | RESPIRATION RATE: 14 BRPM | OXYGEN SATURATION: 98 % | HEIGHT: 62 IN | SYSTOLIC BLOOD PRESSURE: 108 MMHG | HEART RATE: 72 BPM | TEMPERATURE: 97 F | BODY MASS INDEX: 21.71 KG/M2

## 2019-05-27 DIAGNOSIS — K60.2 FISSURE IN ANO: ICD-10-CM

## 2019-05-27 PROCEDURE — 25000003 PHARM REV CODE 250: Performed by: NURSE PRACTITIONER

## 2019-05-27 PROCEDURE — 36000706: Performed by: COLON & RECTAL SURGERY

## 2019-05-27 PROCEDURE — 88304 TISSUE EXAM BY PATHOLOGIST: CPT | Performed by: PATHOLOGY

## 2019-05-27 PROCEDURE — 88304 TISSUE SPECIMEN TO PATHOLOGY - SURGERY: ICD-10-PCS | Mod: 26,,, | Performed by: PATHOLOGY

## 2019-05-27 PROCEDURE — 25000003 PHARM REV CODE 250: Performed by: COLON & RECTAL SURGERY

## 2019-05-27 PROCEDURE — 94761 N-INVAS EAR/PLS OXIMETRY MLT: CPT

## 2019-05-27 PROCEDURE — 63600175 PHARM REV CODE 636 W HCPCS: Performed by: ANESTHESIOLOGY

## 2019-05-27 PROCEDURE — 27201423 OPTIME MED/SURG SUP & DEVICES STERILE SUPPLY: Performed by: COLON & RECTAL SURGERY

## 2019-05-27 PROCEDURE — 63600175 PHARM REV CODE 636 W HCPCS: Performed by: STUDENT IN AN ORGANIZED HEALTH CARE EDUCATION/TRAINING PROGRAM

## 2019-05-27 PROCEDURE — C9290 INJ, BUPIVACAINE LIPOSOME: HCPCS | Performed by: COLON & RECTAL SURGERY

## 2019-05-27 PROCEDURE — 71000015 HC POSTOP RECOV 1ST HR: Performed by: COLON & RECTAL SURGERY

## 2019-05-27 PROCEDURE — 27000221 HC OXYGEN, UP TO 24 HOURS

## 2019-05-27 PROCEDURE — 37000008 HC ANESTHESIA 1ST 15 MINUTES: Performed by: COLON & RECTAL SURGERY

## 2019-05-27 PROCEDURE — D9220A PRA ANESTHESIA: ICD-10-PCS | Mod: ,,, | Performed by: ANESTHESIOLOGY

## 2019-05-27 PROCEDURE — 71000033 HC RECOVERY, INTIAL HOUR: Performed by: COLON & RECTAL SURGERY

## 2019-05-27 PROCEDURE — 46260 REMOVE IN/EX HEM GROUPS 2+: CPT | Mod: ,,, | Performed by: COLON & RECTAL SURGERY

## 2019-05-27 PROCEDURE — 37000009 HC ANESTHESIA EA ADD 15 MINS: Performed by: COLON & RECTAL SURGERY

## 2019-05-27 PROCEDURE — 63600175 PHARM REV CODE 636 W HCPCS

## 2019-05-27 PROCEDURE — 71000039 HC RECOVERY, EACH ADD'L HOUR: Performed by: COLON & RECTAL SURGERY

## 2019-05-27 PROCEDURE — 63600175 PHARM REV CODE 636 W HCPCS: Performed by: COLON & RECTAL SURGERY

## 2019-05-27 PROCEDURE — 25000003 PHARM REV CODE 250: Performed by: STUDENT IN AN ORGANIZED HEALTH CARE EDUCATION/TRAINING PROGRAM

## 2019-05-27 PROCEDURE — 36000707: Performed by: COLON & RECTAL SURGERY

## 2019-05-27 PROCEDURE — 88304 TISSUE EXAM BY PATHOLOGIST: CPT | Mod: 26,,, | Performed by: PATHOLOGY

## 2019-05-27 PROCEDURE — 46260 PR HEMORRHOIDECTOMY,INT/EXT, 2+ COLUMNS/GROUPS: ICD-10-PCS | Mod: ,,, | Performed by: COLON & RECTAL SURGERY

## 2019-05-27 PROCEDURE — D9220A PRA ANESTHESIA: Mod: ,,, | Performed by: ANESTHESIOLOGY

## 2019-05-27 PROCEDURE — 71000016 HC POSTOP RECOV ADDL HR: Performed by: COLON & RECTAL SURGERY

## 2019-05-27 RX ORDER — MUPIROCIN 20 MG/G
OINTMENT TOPICAL
Status: DISCONTINUED | OUTPATIENT
Start: 2019-05-27 | End: 2022-02-11

## 2019-05-27 RX ORDER — DEXAMETHASONE SODIUM PHOSPHATE 4 MG/ML
INJECTION, SOLUTION INTRA-ARTICULAR; INTRALESIONAL; INTRAMUSCULAR; INTRAVENOUS; SOFT TISSUE
Status: DISCONTINUED | OUTPATIENT
Start: 2019-05-27 | End: 2019-05-27

## 2019-05-27 RX ORDER — BUPIVACAINE HYDROCHLORIDE 2.5 MG/ML
INJECTION, SOLUTION EPIDURAL; INFILTRATION; INTRACAUDAL
Status: DISCONTINUED | OUTPATIENT
Start: 2019-05-27 | End: 2019-05-27 | Stop reason: HOSPADM

## 2019-05-27 RX ORDER — ACETAMINOPHEN 10 MG/ML
INJECTION, SOLUTION INTRAVENOUS
Status: DISCONTINUED | OUTPATIENT
Start: 2019-05-27 | End: 2019-05-27

## 2019-05-27 RX ORDER — HYDROMORPHONE HYDROCHLORIDE 1 MG/ML
0.2 INJECTION, SOLUTION INTRAMUSCULAR; INTRAVENOUS; SUBCUTANEOUS EVERY 5 MIN PRN
Status: COMPLETED | OUTPATIENT
Start: 2019-05-27 | End: 2019-05-27

## 2019-05-27 RX ORDER — HYDROCODONE BITARTRATE AND ACETAMINOPHEN 5; 325 MG/1; MG/1
1-2 TABLET ORAL EVERY 4 HOURS PRN
Qty: 50 TABLET | Refills: 0 | Status: SHIPPED | OUTPATIENT
Start: 2019-05-27 | End: 2019-06-12

## 2019-05-27 RX ORDER — FENTANYL CITRATE 50 UG/ML
INJECTION, SOLUTION INTRAMUSCULAR; INTRAVENOUS
Status: DISCONTINUED | OUTPATIENT
Start: 2019-05-27 | End: 2019-05-27

## 2019-05-27 RX ORDER — NEOSTIGMINE METHYLSULFATE 1 MG/ML
INJECTION, SOLUTION INTRAVENOUS
Status: DISCONTINUED | OUTPATIENT
Start: 2019-05-27 | End: 2019-05-27

## 2019-05-27 RX ORDER — GLYCOPYRROLATE 0.2 MG/ML
INJECTION INTRAMUSCULAR; INTRAVENOUS
Status: DISCONTINUED | OUTPATIENT
Start: 2019-05-27 | End: 2019-05-27

## 2019-05-27 RX ORDER — SODIUM CHLORIDE 9 MG/ML
INJECTION, SOLUTION INTRAVENOUS CONTINUOUS
Status: DISCONTINUED | OUTPATIENT
Start: 2019-05-27 | End: 2022-02-11

## 2019-05-27 RX ORDER — FENTANYL CITRATE 50 UG/ML
25 INJECTION, SOLUTION INTRAMUSCULAR; INTRAVENOUS EVERY 5 MIN PRN
Status: COMPLETED | OUTPATIENT
Start: 2019-05-27 | End: 2019-05-27

## 2019-05-27 RX ORDER — HYDROMORPHONE HYDROCHLORIDE 1 MG/ML
INJECTION, SOLUTION INTRAMUSCULAR; INTRAVENOUS; SUBCUTANEOUS
Status: COMPLETED
Start: 2019-05-27 | End: 2019-05-27

## 2019-05-27 RX ORDER — LIDOCAINE HCL/PF 100 MG/5ML
SYRINGE (ML) INTRAVENOUS
Status: DISCONTINUED | OUTPATIENT
Start: 2019-05-27 | End: 2019-05-27

## 2019-05-27 RX ORDER — ROCURONIUM BROMIDE 10 MG/ML
INJECTION, SOLUTION INTRAVENOUS
Status: DISCONTINUED | OUTPATIENT
Start: 2019-05-27 | End: 2019-05-27

## 2019-05-27 RX ORDER — SODIUM CHLORIDE 0.9 % (FLUSH) 0.9 %
10 SYRINGE (ML) INJECTION
Status: DISCONTINUED | OUTPATIENT
Start: 2019-05-27 | End: 2019-05-27 | Stop reason: HOSPADM

## 2019-05-27 RX ORDER — ONDANSETRON 2 MG/ML
4 INJECTION INTRAMUSCULAR; INTRAVENOUS DAILY PRN
Status: COMPLETED | OUTPATIENT
Start: 2019-05-27 | End: 2019-05-27

## 2019-05-27 RX ORDER — PROPOFOL 10 MG/ML
VIAL (ML) INTRAVENOUS
Status: DISCONTINUED | OUTPATIENT
Start: 2019-05-27 | End: 2019-05-27

## 2019-05-27 RX ORDER — NALOXONE HCL 0.4 MG/ML
VIAL (ML) INJECTION
Status: DISCONTINUED | OUTPATIENT
Start: 2019-05-27 | End: 2019-05-27

## 2019-05-27 RX ORDER — LIDOCAINE HYDROCHLORIDE 10 MG/ML
1 INJECTION, SOLUTION EPIDURAL; INFILTRATION; INTRACAUDAL; PERINEURAL ONCE
Status: COMPLETED | OUTPATIENT
Start: 2019-05-27 | End: 2019-05-27

## 2019-05-27 RX ORDER — OXYCODONE AND ACETAMINOPHEN 5; 325 MG/1; MG/1
1 TABLET ORAL EVERY 4 HOURS PRN
Status: DISCONTINUED | OUTPATIENT
Start: 2019-05-27 | End: 2019-05-27 | Stop reason: HOSPADM

## 2019-05-27 RX ORDER — MIDAZOLAM HYDROCHLORIDE 1 MG/ML
INJECTION, SOLUTION INTRAMUSCULAR; INTRAVENOUS
Status: DISCONTINUED | OUTPATIENT
Start: 2019-05-27 | End: 2019-05-27

## 2019-05-27 RX ADMIN — FENTANYL CITRATE 50 MCG: 50 INJECTION, SOLUTION INTRAMUSCULAR; INTRAVENOUS at 07:05

## 2019-05-27 RX ADMIN — HYDROMORPHONE HYDROCHLORIDE 0.2 MG: 1 INJECTION, SOLUTION INTRAMUSCULAR; INTRAVENOUS; SUBCUTANEOUS at 09:05

## 2019-05-27 RX ADMIN — LIDOCAINE HYDROCHLORIDE: 10 INJECTION, SOLUTION EPIDURAL; INFILTRATION; INTRACAUDAL; PERINEURAL at 06:05

## 2019-05-27 RX ADMIN — DEXAMETHASONE SODIUM PHOSPHATE 4 MG: 4 INJECTION, SOLUTION INTRAMUSCULAR; INTRAVENOUS at 07:05

## 2019-05-27 RX ADMIN — FENTANYL CITRATE 25 MCG: 50 INJECTION, SOLUTION INTRAMUSCULAR; INTRAVENOUS at 08:05

## 2019-05-27 RX ADMIN — ONDANSETRON 4 MG: 2 INJECTION INTRAMUSCULAR; INTRAVENOUS at 07:05

## 2019-05-27 RX ADMIN — PROPOFOL 160 MG: 10 INJECTION, EMULSION INTRAVENOUS at 06:05

## 2019-05-27 RX ADMIN — NEOSTIGMINE METHYLSULFATE 5 MG: 1 INJECTION INTRAVENOUS at 07:05

## 2019-05-27 RX ADMIN — OXYCODONE HYDROCHLORIDE AND ACETAMINOPHEN 1 TABLET: 5; 325 TABLET ORAL at 08:05

## 2019-05-27 RX ADMIN — SODIUM CHLORIDE: 0.9 INJECTION, SOLUTION INTRAVENOUS at 06:05

## 2019-05-27 RX ADMIN — ROCURONIUM BROMIDE 30 MG: 10 INJECTION, SOLUTION INTRAVENOUS at 06:05

## 2019-05-27 RX ADMIN — FENTANYL CITRATE 50 MCG: 50 INJECTION, SOLUTION INTRAMUSCULAR; INTRAVENOUS at 06:05

## 2019-05-27 RX ADMIN — GLYCOPYRROLATE 0.4 MG: 0.2 INJECTION, SOLUTION INTRAMUSCULAR; INTRAVENOUS at 07:05

## 2019-05-27 RX ADMIN — MUPIROCIN: 20 OINTMENT TOPICAL at 06:05

## 2019-05-27 RX ADMIN — SODIUM CHLORIDE: 0.9 INJECTION, SOLUTION INTRAVENOUS at 08:05

## 2019-05-27 RX ADMIN — MIDAZOLAM HYDROCHLORIDE 2 MG: 1 INJECTION, SOLUTION INTRAMUSCULAR; INTRAVENOUS at 06:05

## 2019-05-27 RX ADMIN — NALOXONE HYDROCHLORIDE 0.08 MG: 0.4 INJECTION, SOLUTION INTRAMUSCULAR; INTRAVENOUS; SUBCUTANEOUS at 08:05

## 2019-05-27 RX ADMIN — LIDOCAINE HYDROCHLORIDE 75 MG: 20 INJECTION, SOLUTION INTRAVENOUS at 06:05

## 2019-05-27 RX ADMIN — ACETAMINOPHEN 1000 MG: 10 INJECTION, SOLUTION INTRAVENOUS at 07:05

## 2019-05-27 NOTE — BRIEF OP NOTE
[facility]  Brief Operative Note     SUMMARY     Surgery Date: 5/27/2019     Surgeon(s) and Role:     * Nirmal Chan MD - Primary     * Babar Briceño MD - Resident - Assisting        Pre-op Diagnosis:  Anal fissure [K60.2]  Residual hemorrhoidal skin tags [K64.4]    Post-op Diagnosis:  Post-Op Diagnosis Codes:     * Anal fissure [K60.2]     * Residual hemorrhoidal skin tags [K64.4]    Procedure(s) (LRB):  HEMORRHOIDECTOMY (N/A)  SPHINCTEROTOMY, ANAL    Anesthesia: General    Description of the findings of the procedure:   1. Three quadrant internal/external hemorrhoids  2. Chronic appearing PML fissure  3. Tight internal anal sphincter muscle    Technical procedure:   1. Three quadrant internal/external hemorrhoidectomy  2. LIAS    Estimated Blood Loss: * No values recorded between 5/27/2019  7:12 AM and 5/27/2019  7:52 AM *         Specimens:   Specimen (12h ago, onward)    Start     Ordered    05/27/19 0747  Specimen to Pathology - Surgery  Once     Comments:  Pre-op Diagnosis: Anal fissure [K60.2]Residual hemorrhoidal skin tags [K64.4]Procedure(s):SPHINCTEROTOMY, ANAL, INTERNAL, LATERAL & EXCISION OF PERIANAL SKIN TAGS, prone Number of specimens: Name of specimens: 1. Left Lateral Hemorrhoid - permanent2. Right Anterior Hemorrhoid - permanent3. Right Posterior Hemorrhoid -permanent     Start Status     05/27/19 0747 Collected (05/27/19 0746) Order ID: [id number]       05/27/19 0746          Discharge Note    SUMMARY     Admit Date: 5/27/2019    Discharge Date and Time:  05/27/2019 7:53 AM    Hospital Course (synopsis of major diagnoses, care, treatment, and services provided during the course of the hospital stay): Darling Quach is a 54 y.o. female with anal fissure and hemorrhoids who presented for the above procedure. She recovered in PACU and was discharged home in stable condition     Final Diagnosis: Post-Op Diagnosis Codes:     * Anal fissure [K60.2]     * Residual  hemorrhoidal skin tags [K64.4]    Disposition: Home or Self Care    Follow Up/Patient Instructions: f/u with Dr. Chan in 3 weeks    Medications:  Reconciled Home Medications:      Medication List      ASK your doctor about these medications    ADVIL ORAL  Take 200 mg by mouth daily as needed.     CALCET CREAMY BITES 500 mg calcium -400 unit Chew  Generic drug:  calcium citrate-vitamin D3  Take 1 capsule by mouth once daily.     DILTIAZEM 2% - LIDOCAINE 5% CREAM  Apply 1 application topically 3 (three) times daily.     hydrocortisone 2.5 % cream  Apply topically 2 (two) times daily.     levetiracetam  mg Tb24 tablet  Commonly known as:  KEPPRA XR  Take 750 mg by mouth every evening.          No discharge procedures on file.

## 2019-05-27 NOTE — OP NOTE
JENY KENNEDY  [id number]  [id number]    OPERATIVE NOTE:    DATE OF OPERATION: 05/27/2019    PREOPERATIVE DIAGNOSIS:  External skin tags and posterior midline anal fissure    POSTOPERATIVE DIAGNOSIS:  3 quadrant hemorrhoids and posterior midline anal fissure    PROCEDURE PERFORMED:  3 quadrant excisional hemorrhoidectomy and open lateral internal anal sphincterotomy    ATTENDING SURGEON: LOREE Chan MD    ASSISTING SURGEON: Keyanna    ANESTHESIA: General    ESTIMATED BLOOD LOSS: 20mL    FINDINGS:  1.  Posterior midline anal fissure and redundant mixed 3 column hemorrhoids    SPECIMENS:  Right posterior hemorrhoid, left lateral hemorrhoid, right anterior hemorrhoid    COMPLICATIONS:  None apparent    DISPOSITION: PACU    CONDITION: good    INDICATION FOR PROCEDURE:  Jeny Kennedy is a 54 y.o. female who presented with perianal pain and bleeding. She is found have a posterior midline anal fissure.  This was treated with diltiazem with no improvement.  She also had significant external hemorrhoids that she wished to have addressed.  Therefore, we discussed excisional hemorrhoidectomy and lateral internal anal sphincterotomy.    DESCRIPTION OF PROCEDURE:   After informed consent was reviewed, the patient was taken to the operating room and placed under general anesthesia.  she was placed in the prone cherelle-knife position.  The buttocks were taped apart and the perianal skin was prepped draped in usual sterile fashion.  A time-out was then performed.  Digital exam confirmed a hypertonic internal anal sphincter.  Detailed anoscopy was then performed.  She had 3 quadrant internal and external mixed hemorrhoids.  The left lateral side was addressed 1st.  Elliptical incision was made in the skin.  The avascular plane between the internal anal sphincter in the hemorrhoidal tissue was then entered and traced proximally to the base of the hemorrhoid.  The apex was then grasped with a tonsil clamp and ligated with a  3 0 Vicryl tie.  The defect in the anal canal was then reapproximated with a 3 0 Vicryl in running locked fashion.  The right anterior side was the next most prominent area and was similarly addressed.  Lastly, the right posterior side was addressed.  After the apex of the hemorrhoid was ligated with a 3 0 Vicryl tie, we then proceeded with open lateral internal anal sphincterotomy.  The intersphincteric plane was dissected with a tonsil clamp.  The internal anal sphincter was spastic and was divided with electrocautery. This allowed excellent release of the muscle spasm.  The defect in the anal canal overlying this was then reapproximated with a 3 0 Vicryl in running locked fashion. 50 cc of 0.25% Marcaine mixed with Exparel were injected as a long-acting local anesthetic.  Two piece of Gel-Foam were placed in the anal canal as a topical hemostatic agent.  There were no hematomas at the apices of the hemorrhoidal excisions.  Following excision, there was approximately 2 cm between each incision.  Fluff dressings were applied.  She tolerated the procedure well. There were no apparent complications.  She was extubated and taken to PACU in stable condition.      LOREE Chan MD  Staff Surgeon  Colon & Rectal Surgery

## 2019-05-27 NOTE — DISCHARGE INSTRUCTIONS
Anal Surgery Post Op Instructions:    You had a sphincterotomy for the fissure along with a 3 quadrant excisional hemorrhoidectomy performed today.  Once you went to sleep, there was more internal hemorrhoidal tissue seen.  This was excised as well as the external skin tags.  Everything went well.       Wound care:  Expect some drainage over the next few weeks as the wound heals.  Please wear a pad to help with drainage.     You have no activity restrictions.   No dietary restrictions.    Medications:  A narcotic pain medication has been prescribed.  Please start to wean the pain medication over the following few days.  You can take tylenol instead of the pain medication.      Please take miralax 1 capful at night to prevent constipation associated with narcotic pain medications.      Follow up:  Return to clinic in 4 weeks for follow up and wound check.    LOREE Chan MD  Staff Surgeon  Colon & Rectal Surgery

## 2019-05-27 NOTE — TELEPHONE ENCOUNTER
Reason for Disposition   Caller has medication question about med not prescribed by PCP and triager unable to answer question (e.g., compatibility with other med, storage)    Protocols used: MEDICATION QUESTION CALL-A-AH    Daughter was calling with questions about patient taking Norco and having seizures. Advised to contact pharmacy at drug compatibility and specific side effects of medications

## 2019-05-27 NOTE — TRANSFER OF CARE
"Anesthesia Transfer of Care Note    Patient: Darling Quach    Procedure(s) Performed: Procedure(s) (LRB):  HEMORRHOIDECTOMY (N/A)  SPHINCTEROTOMY, ANAL    Patient location: PACU    Anesthesia Type: general    Transport from OR: Transported from OR on 6-10 L/min O2 by face mask with adequate spontaneous ventilation    Post pain: adequate analgesia    Post assessment: no apparent anesthetic complications    Post vital signs: stable    Level of consciousness: sedated    Nausea/Vomiting: no nausea/vomiting    Complications: none    Transfer of care protocol was followedComments: Gave narcan at end of case prior to PACU      Last vitals:   Visit Vitals  /69 (BP Location: Left arm, Patient Position: Lying)   Pulse 65   Temp 36.6 °C (97.9 °F) (Oral)   Resp 18   Ht 5' 2" (1.575 m)   Wt 53.5 kg (118 lb)   SpO2 98%   Breastfeeding? No   BMI 21.58 kg/m²     "

## 2019-05-27 NOTE — ANESTHESIA POSTPROCEDURE EVALUATION
Anesthesia Post Evaluation    Patient: Darling Quach    Procedure(s) Performed: Procedure(s) (LRB):  HEMORRHOIDECTOMY (N/A)  SPHINCTEROTOMY, ANAL    Final Anesthesia Type: general  Patient location during evaluation: PACU  Patient participation: Yes- Able to Participate  Level of consciousness: awake and alert  Post-procedure vital signs: reviewed and stable  Pain management: adequate  Airway patency: patent  PONV status at discharge: No PONV  Anesthetic complications: no      Cardiovascular status: blood pressure returned to baseline  Respiratory status: unassisted  Hydration status: euvolemic  Follow-up not needed.          Vitals Value Taken Time   /62 5/27/2019 11:20 AM   Temp 36 °C (96.8 °F) 5/27/2019 10:34 AM   Pulse 72 5/27/2019 11:20 AM   Resp 14 5/27/2019 11:20 AM   SpO2 98 % 5/27/2019 11:20 AM         Event Time     Out of Recovery 10:33:26          Pain/Chintan Score: Pain Rating Prior to Med Admin: 5 (5/27/2019  9:50 AM)  Pain Rating Post Med Admin: 5 (tolerable) (5/27/2019  9:51 AM)

## 2019-05-27 NOTE — H&P
CRS Office Visit Follow-up    SUBJECTIVE:     Chief Complaint: hemorrhoids and constipation    History of Present Illness:  Patient is a 54 y.o. female presents with hemorrhoids. The patient is a established patient to this practice.     Course is as follows:  2014:  Seen by NP for hemorrhoids.  Conservative mgmt recommended  2017:  Seen by Blu for anal fissure.  Treated with dilt cream.   5/2018: seen by me for worsening hemorrhoid tissue.  Found to have predominately external hemorrhoids that are nonthrombosed with a small amount of prolapsed internal tissue.  Treated conservatively.      9/28/18:  Presents today for follow-up.  Feels that hemorrhoids are not improving.  Has multiple episodes of pain, bleeding, itching, trouble with cleanliness.  Impacts her quality of life.  She is taking MiraLax daily.  With that, she is having 2-3 bowel movements per week.  She spend less than 5 min on the toilet for each bowel movement.  Feel similar to her prior fissure.  In regards to treatment from her prior fissure, she stopped diltiazem cream once she had a seizure.  She is now on Keppra daily.  No family history of colorectal cancer.     3/26/19:  Complains of frequent bleeding with her bowel movements.  Associated tearing pain with her bowel movements.  Difficulty with cleanliness due to external hemorrhoids.  Recently has improved secondary to changes in her diet.     Last Colonoscopy: 2016  At [facility].  Normal.     Review of Systems:  Review of Systems   Constitutional: Negative for chills, diaphoresis, fever, malaise/fatigue and weight loss.   HENT: Negative for congestion.    Respiratory: Negative for shortness of breath.    Cardiovascular: Negative for chest pain and leg swelling.   Gastrointestinal: Positive for constipation. Negative for abdominal pain, blood in stool, nausea and vomiting.   Genitourinary: Negative for dysuria.   Musculoskeletal: Negative for back pain and myalgias.   Skin:  "Negative for rash.   Neurological: Negative for dizziness and weakness.   Endo/Heme/Allergies: Does not bruise/bleed easily.   Psychiatric/Behavioral: Negative for depression.       OBJECTIVE:     Vital Signs (Most Recent)  /69 (BP Location: Left arm, Patient Position: Lying)   Pulse 65   Temp 97.9 °F (36.6 °C) (Oral)   Resp 18   Ht 5' 2" (1.575 m)   Wt 53.5 kg (118 lb)   SpO2 98%   Breastfeeding? No   BMI 21.58 kg/m²     Physical Exam:  General: White female in no distress   Neuro: alert and oriented x 4.  Moves all extremities.     HEENT: no icterus.  Trachea midline  Respiratory: respirations are even and unlabored  Cardiac: regular rate  Abdomen: soft, nontender, no masses  Extremities: Warm dry and intact  Skin: no rashes  Anorectal: External exam demonstrates external hemorrhoids .  Most significant external hemorrhoid is on the left side. Digital exam is attempted but was refused secondary to discomfort.  Pain is mostly in the posterior midline    Labs: H/H = 13/39    Imaging: none      ASSESSMENT/PLAN:     Darling was seen today for hemorrhoids and constipation.    Diagnoses and all orders for this visit:    Anal fissure  -     Case Request Operating Room: SPHINCTEROTOMY, ANAL, INTERNAL, LATERAL & EXCISION OF PERIANAL SKIN TAGS, prone    Residual hemorrhoidal skin tags  -     Case Request Operating Room: SPHINCTEROTOMY, ANAL, INTERNAL, LATERAL & EXCISION OF PERIANAL SKIN TAGS, prone         53-year-old woman with predominately external hemorrhoids mostly on the left side with posterior midline tenderness consistent with an anal fissure.  She has previously tried topical diltiazem without any improvement.  I therefore recommended limited lateral internal anal sphincterotomy.  At the same time, she can have excision of her perianal skin tags performed.  She will need to be in prone position.  Consents were signed today.  All questions were answered.    She will need to see the preoperative clinic " to assist in management of her seizures and brain tumor.

## 2019-05-28 ENCOUNTER — TELEPHONE (OUTPATIENT)
Dept: SURGERY | Facility: CLINIC | Age: 54
End: 2019-05-28

## 2019-05-28 NOTE — TELEPHONE ENCOUNTER
Pt was very worried to pass gas due to it being painful. Told her she can sit in a tub of warm water and pass the gas there.

## 2019-05-28 NOTE — TELEPHONE ENCOUNTER
----- Message from Benjamin Lund sent at 5/28/2019 10:48 AM CDT -----  Contact: pt: [phone]  Needs Advice    Reason for call: pt has surgery done yesterday and has a couple of questions for nurse         Communication Preference: pt: [phone]

## 2019-05-30 ENCOUNTER — TELEPHONE (OUTPATIENT)
Dept: OBSTETRICS AND GYNECOLOGY | Facility: CLINIC | Age: 54
End: 2019-05-30

## 2019-05-30 ENCOUNTER — TELEPHONE (OUTPATIENT)
Dept: SURGERY | Facility: CLINIC | Age: 54
End: 2019-05-30

## 2019-05-30 NOTE — TELEPHONE ENCOUNTER
Pt has not had a bm since surgery Monday. She feels it is affecting her having a good flow of urine. Instructed her to take a dose of MOM today and call me tomorrow if no BM.

## 2019-05-30 NOTE — TELEPHONE ENCOUNTER
----- Message from Ambika Villatoro MA sent at 5/30/2019  9:40 AM CDT -----  Contact: [phone]  Needs Advice    Reason for call: post op pt having issues with bowel movements        Communication Preference:  [phone]    Additional Information: please call

## 2019-05-30 NOTE — TELEPHONE ENCOUNTER
----- Message from Chiquis Dhaliwal sent at 5/30/2019  3:08 PM CDT -----  Contact: self  Patient states that her annual appt is coming up (06/06/20190 and she have a coupke questions she would like to speak with the nurse about. Patient can be contacted at [phone].

## 2019-05-30 NOTE — TELEPHONE ENCOUNTER
Called and spoke to pt. On Monday had a hemorrhoidectomy, unsure if she could still come in for wwe on 6/6. Informed her that I will ask provider and call back tomorrow. Pt verbalized understanding and no further questions.

## 2019-05-31 ENCOUNTER — TELEPHONE (OUTPATIENT)
Dept: SURGERY | Facility: CLINIC | Age: 54
End: 2019-05-31

## 2019-05-31 NOTE — TELEPHONE ENCOUNTER
Pt concerned because she hasn't had a bm yet. She has passed a blood clot and specks of stool. Now has an increase in pain told ehr the increase in pain is normal after passing anything through the rectum. Told her to take 2 doses of MiraLax a day.

## 2019-05-31 NOTE — TELEPHONE ENCOUNTER
----- Message from Irais Milligan sent at 5/31/2019 10:51 AM CDT -----  Contact: Self- [phone]  Dustin- pt called to speak with Alyse to give an update of her condition- please contact pt at [phone]

## 2019-06-03 ENCOUNTER — TELEPHONE (OUTPATIENT)
Dept: OBSTETRICS AND GYNECOLOGY | Facility: CLINIC | Age: 54
End: 2019-06-03

## 2019-06-03 ENCOUNTER — TELEPHONE (OUTPATIENT)
Dept: SURGERY | Facility: CLINIC | Age: 54
End: 2019-06-03

## 2019-06-03 NOTE — TELEPHONE ENCOUNTER
----- Message from Ashley Moore sent at 6/3/2019  4:54 PM CDT -----  Contact: PT  Needs Advice    Reason for call: PT states she's extremely constipated and would like a call back for advice Please call to advise.        Communication Preference: [phone]    Additional Information:

## 2019-06-03 NOTE — TELEPHONE ENCOUNTER
----- Message from Sonny Doll sent at 6/3/2019  8:45 AM CDT -----  Contact: JENY KENNEDY [[id number]]  Name of Who is Calling:JENY KENNEDY [[id number]]        What is the request in detail: Pt requesting to speak regarding rescheduling her 6.6.19 appointment. Contact at your earliest convenience.           Can the clinic reply by [facility]: Y    What [facility]: [phone]

## 2019-06-03 NOTE — TELEPHONE ENCOUNTER
Pt feels she has a large piece of stool in the rectum and it will not come out. Instructed her to do 5-6 doses of MiraLax every 20 min per Dasha Guerra NP and call me in the morning.

## 2019-06-03 NOTE — TELEPHONE ENCOUNTER
Called and spoke to pt. Pt had recent hemorrhoidectomy and would like to push back wwe. Rescheduled to 7/2 at 3:15p with Dr. Mayes. Pt instructed on time and location. Pt verbalized understanding and no further questions.

## 2019-06-04 ENCOUNTER — TELEPHONE (OUTPATIENT)
Dept: SURGERY | Facility: CLINIC | Age: 54
End: 2019-06-04

## 2019-06-04 NOTE — TELEPHONE ENCOUNTER
Pt did the 5 doses of MiraLax last night and she past a lot of liquid with pieces of stool. She believes she has a stool ball which is breaking up slowly. Told her today to get out and walk before it gets too hot and drink a lot of fluid. She no bm by late this afternoon she can try the miraLax again.

## 2019-06-04 NOTE — TELEPHONE ENCOUNTER
----- Message from Barbara Friend sent at 6/4/2019  8:13 AM CDT -----  Contact: pt#[phone]  Needs Advice    Reason for call:Pt wants to speak with nurse but she did not want to give reason       Communication Preference:call    Additional Information:

## 2019-06-05 ENCOUNTER — TELEPHONE (OUTPATIENT)
Dept: SURGERY | Facility: CLINIC | Age: 54
End: 2019-06-05

## 2019-06-05 NOTE — TELEPHONE ENCOUNTER
----- Message from Karo Shields sent at 6/5/2019  1:54 PM CDT -----  Contact: Pt  Pt is requesting a callback from nurse regarding her surgery need to know if she cna use something for her back     Pt can be reached at [phone]

## 2019-06-05 NOTE — TELEPHONE ENCOUNTER
Spoke with patient. States area around rectum is raw do to frequent stools. Recommended OTC Calmoseptine ointment.

## 2019-06-06 ENCOUNTER — TELEPHONE (OUTPATIENT)
Dept: SURGERY | Facility: CLINIC | Age: 54
End: 2019-06-06

## 2019-06-06 NOTE — TELEPHONE ENCOUNTER
----- Message from Barbara Friend sent at 6/6/2019  8:37 AM CDT -----  Contact: pt#[phone]  Needs Advice    Reason for call:Pt states that she's experiencing extreme constipation. She wants an appt today         Communication Preference:call    Additional Information:

## 2019-06-06 NOTE — TELEPHONE ENCOUNTER
Spoke with patient. Called to ask if she can take Miralax 2-3 doses again today. States feels like she still has residual stool, constipation. Per Dasha Guerra-ok to take Miralax 1 dose-one cap in  8 ounces of clear liquid- every 20-30 minutes x 3.  Patient instructed to call clinic today 12:00 pm with an update.

## 2019-06-12 ENCOUNTER — OFFICE VISIT (OUTPATIENT)
Dept: SURGERY | Facility: CLINIC | Age: 54
End: 2019-06-12
Payer: COMMERCIAL

## 2019-06-12 VITALS
HEIGHT: 62 IN | SYSTOLIC BLOOD PRESSURE: 96 MMHG | WEIGHT: 116.38 LBS | BODY MASS INDEX: 21.42 KG/M2 | DIASTOLIC BLOOD PRESSURE: 62 MMHG | HEART RATE: 82 BPM

## 2019-06-12 DIAGNOSIS — Z98.890 POST-OPERATIVE STATE: Primary | ICD-10-CM

## 2019-06-12 PROCEDURE — 99999 PR PBB SHADOW E&M-EST. PATIENT-LVL III: ICD-10-PCS | Mod: PBBFAC,,, | Performed by: NURSE PRACTITIONER

## 2019-06-12 PROCEDURE — 99999 PR PBB SHADOW E&M-EST. PATIENT-LVL III: CPT | Mod: PBBFAC,,, | Performed by: NURSE PRACTITIONER

## 2019-06-12 PROCEDURE — 99024 POSTOP FOLLOW-UP VISIT: CPT | Mod: S$GLB,,, | Performed by: NURSE PRACTITIONER

## 2019-06-12 PROCEDURE — 99024 PR POST-OP FOLLOW-UP VISIT: ICD-10-PCS | Mod: S$GLB,,, | Performed by: NURSE PRACTITIONER

## 2019-06-12 NOTE — PROGRESS NOTES
Subjective:       Patient ID: Darling Quach is a 54 y.o. female.    Chief Complaint: No chief complaint on file.    HPI   54 F S/p 3 quadrant excisional hemorrhoidectomy and open lateral internal anal sphincterotomy by Dr Chan 5/27/19. She presents today for a wound check. She is leaving for [place] in a few weeks and wants to make sure everything is healing appropriately. She is taking miralax and smooth move tea daily. Daily BMs. Pain improving, no longer needing narcotics. Taking Advil/tylenol. Afebrile. Was surprised about the degree of pain post operatively.        Review of Systems   Constitutional: Negative for fatigue, fever and unexpected weight change.   Respiratory: Negative for shortness of breath.    Cardiovascular: Negative for chest pain.   Gastrointestinal: Positive for rectal pain. Negative for abdominal distention, abdominal pain, anal bleeding, blood in stool, constipation, diarrhea, nausea and vomiting.       Objective:      Physical Exam   Constitutional: She is oriented to person, place, and time. She appears well-developed and well-nourished. No distress.   Eyes: Conjunctivae and EOM are normal.   Pulmonary/Chest: Effort normal. No respiratory distress.   Abdominal: Soft. She exhibits no distension. There is no tenderness.   Genitourinary:   Genitourinary Comments: Healing post op incisions.Still very tender.    Musculoskeletal: Normal range of motion.   Neurological: She is alert and oriented to person, place, and time.   Skin: Skin is warm and dry.   Psychiatric: She has a normal mood and affect. Her behavior is normal.       Assessment:       1. Post-operative state        Plan:       Patient reassured  She will keep her f/u with Dr Chan at the end of the month  Call with any questions

## 2019-06-12 NOTE — LETTER
June 12, 2019      Kimberly Ellison MD  [address]  [facility] [address]           [facility]  [address]  [facility] [address]  Phone: [phone]          Patient: Darling Quach   MR Number: [id number]   YOB: 1965   Date of Visit: 6/12/2019       Dear Dr. Kimberly Ellison:    Thank you for referring Darling Quach to me for evaluation. Attached you will find relevant portions of my assessment and plan of care.    If you have questions, please do not hesitate to call me. I look forward to following Darling Quach along with you.    Sincerely,    Kiana Agarwal NP    Enclosure  CC:  No Recipients    If you would like to receive this communication electronically, please contact [email or web address] or [phone] to request more information on [employer] access.    For providers and/or their staff who would like to refer a patient to [facility], please contact us through our one-stop-shop provider referral line, [facility], at [phone].    If you feel you have received this communication in error or would no longer like to receive these types of communications, please e-mail [email or web address]

## 2019-06-17 ENCOUNTER — PATIENT MESSAGE (OUTPATIENT)
Dept: INTERNAL MEDICINE | Facility: CLINIC | Age: 54
End: 2019-06-17

## 2019-06-17 ENCOUNTER — PATIENT MESSAGE (OUTPATIENT)
Dept: SURGERY | Facility: CLINIC | Age: 54
End: 2019-06-17

## 2019-06-24 NOTE — PROGRESS NOTES
"  CRS Office Post Operative Visit    SUBJECTIVE:     Chief Complaint: followup from surgery.     Procedure:   5/27/19: 3 quadrant excisional hemorrhoidectomy and open lateral internal anal sphincterotomy     Indication: Posterior midline anal fissure and redundant mixed 3 column hemorrhoids     Significant post operative events:  significant pain postoperatively.  Had challenges in communication with office     Pathology: Histologic changes consistent with hemorrhoid.    Current Status:  Presents for her follow-up.  Her pain has improved.  She has recovered from the shingles.  She is not having any fecal incontinence.  Intermittent fecal urgency.  No bleeding.  Intermittent drainage but does not have to wear a pad.  Overall, she is pleased with her surgical result.    Review of Systems:  Review of Systems   Constitutional: Negative for chills, diaphoresis, fever, malaise/fatigue and weight loss.   HENT: Negative for congestion.    Respiratory: Negative for shortness of breath.    Cardiovascular: Negative for chest pain and leg swelling.   Gastrointestinal: Negative for abdominal pain, blood in stool, constipation, nausea and vomiting.   Genitourinary: Negative for dysuria.   Musculoskeletal: Negative for back pain and myalgias.   Skin: Negative for rash.   Neurological: Negative for dizziness and weakness.   Endo/Heme/Allergies: Does not bruise/bleed easily.   Psychiatric/Behavioral: Negative for depression.       OBJECTIVE:     Vital Signs (Most Recent)  /61 (BP Location: Right arm, Patient Position: Sitting, BP Method: Large (Automatic))   Pulse 83   Ht 5' 2" (1.575 m)   Wt 52 kg (114 lb 10.2 oz)   BMI 20.97 kg/m²     Physical Exam:  General: White female in no distress   Respiratory: respirations are even and unlabored  Cardiac: regular rate  Abdomen:  Soft, nontender, no masses  Extremities: Warm dry and intact  Anorectal:  External exam demonstrates healing incisions from 3 quadrant excisional " hemorrhoidectomy.  Overall appears as expected.    ASSESSMENT/PLAN:     Darling was seen today for post-op evaluation.    Diagnoses and all orders for this visit:    Hemorrhoids, unspecified hemorrhoid type        54 y.o. female post op from 3 quadrant excisional hemorrhoidectomy an open lateral internal anal sphincterotomy performed on 05/27/2019.  She is overall healing as expected.  She will continue to improve over the next 2-3 months.  She can follow up with me as needed.    LOREE Chan MD  Staff Surgeon  Colon & Rectal Surgery

## 2019-06-25 ENCOUNTER — OFFICE VISIT (OUTPATIENT)
Dept: SURGERY | Facility: CLINIC | Age: 54
End: 2019-06-25
Payer: COMMERCIAL

## 2019-06-25 VITALS
DIASTOLIC BLOOD PRESSURE: 61 MMHG | HEART RATE: 83 BPM | HEIGHT: 62 IN | WEIGHT: 114.63 LBS | BODY MASS INDEX: 21.1 KG/M2 | SYSTOLIC BLOOD PRESSURE: 112 MMHG

## 2019-06-25 DIAGNOSIS — K64.9 HEMORRHOIDS, UNSPECIFIED HEMORRHOID TYPE: Primary | ICD-10-CM

## 2019-06-25 PROCEDURE — 99024 PR POST-OP FOLLOW-UP VISIT: ICD-10-PCS | Mod: S$GLB,,, | Performed by: COLON & RECTAL SURGERY

## 2019-06-25 PROCEDURE — 99999 PR PBB SHADOW E&M-EST. PATIENT-LVL III: ICD-10-PCS | Mod: PBBFAC,,, | Performed by: COLON & RECTAL SURGERY

## 2019-06-25 PROCEDURE — 99024 POSTOP FOLLOW-UP VISIT: CPT | Mod: S$GLB,,, | Performed by: COLON & RECTAL SURGERY

## 2019-06-25 PROCEDURE — 99999 PR PBB SHADOW E&M-EST. PATIENT-LVL III: CPT | Mod: PBBFAC,,, | Performed by: COLON & RECTAL SURGERY

## 2019-07-09 ENCOUNTER — HOSPITAL ENCOUNTER (OUTPATIENT)
Dept: RADIOLOGY | Facility: HOSPITAL | Age: 54
Discharge: HOME | End: 2019-07-09
Attending: OBSTETRICS & GYNECOLOGY
Payer: COMMERCIAL

## 2019-07-09 DIAGNOSIS — Z12.31 VISIT FOR SCREENING MAMMOGRAM: ICD-10-CM

## 2019-07-09 PROCEDURE — 77067 SCR MAMMO BI INCL CAD: CPT | Mod: TC

## 2019-07-09 PROCEDURE — 77063 MAMMO DIGITAL SCREENING BILAT WITH TOMOSYNTHESIS_CAD: ICD-10-PCS | Mod: 26,,, | Performed by: RADIOLOGY

## 2019-07-09 PROCEDURE — 77063 BREAST TOMOSYNTHESIS BI: CPT | Mod: 26,,, | Performed by: RADIOLOGY

## 2019-07-09 PROCEDURE — 77067 MAMMO DIGITAL SCREENING BILAT WITH TOMOSYNTHESIS_CAD: ICD-10-PCS | Mod: 26,,, | Performed by: RADIOLOGY

## 2019-07-09 PROCEDURE — 77067 SCR MAMMO BI INCL CAD: CPT | Mod: 26,,, | Performed by: RADIOLOGY

## 2019-07-16 NOTE — PROGRESS NOTES
HPI     Glaucoma      Additional comments: 3 month ck and pt states no changes since last exam                Comments     DLS: 4/25/19    1. MMG/OHT OU  2. PVD OU  3. Vitreous Opacity OD  4. PCIOL OD  5. Steroid Responder  6.  Hx Removal Left Eyelid Papilloma (Dr. Alvarado)    MEDS:  AT's PRN OU          Last edited by Ellie Dent MA on 7/19/2019  8:11 AM. (History)            Assessment /Plan     For exam results, see Encounter Report.    Glaucoma due to combination of mechanisms - Both Eyes    Steroid responders borderline glaucoma, bilateral    Nuclear sclerosis, left    Posterior vitreous detachment, bilateral    PCO (posterior capsular opacification), right    Pseudophakia of right eye    Refraction error      PT IS TRANSFER ING FROM [facility] TO [facility] - PHOTO file is no in  [facility]  TRANSFER BACK TO [facility] - [facility]     1. Mixed mechanism Glaucoma   H/O narrow angles - S/P PI's, + residual OHT   First HVF 1998   First photos 1999   Former [facility] , retired for a few years and went back to work as a    (mom with same problem with narrow angles and residual OHT)     Family history + mom - Narrow angles with residual OHT   Glaucoma meds - off gtts (use to use timolol)   H/O adverse rxn to glaucoma drops - latanoprost - eye irritation  LASERS PI's ou // yag cap od 4/25/2019  GLAUCOMA SURGERIES none   OTHER EYE SURGERIES - PC IOL OD 12/17/2014 - [facility]  CDR 0.4/0.4   Tbase 23-36 / 23-35 ou   Tmax 36/35   Ttarget 30/30  HVF 15 test 24-2 ss ou 1996 to 2019 ([facility])            3 SWAP test 2010 to 2012 - Full ou            ( repeat HVF from 8/2013 to 9/2013 - new SAD od confirmed)            [place] VF's - 1 test 2016 to 2016 - SNS /INS od // full os   2019 VF full od/near full os   [place] +3 ou S/P PI's   /618   OCT 3 test 2005 -  2014 ([facility]) - RNFL - nl od/ nl os   HRT - 4 test at [facility] - 2014 to 2016 -MR -  nl od // nl os /// CDR 0.46 od // 0.47 os  - older  test at [facility]  Disc photos 1999, 2004, 2013 - slides // 5/13/2010 - // 2019 - OIS     - Ttoday  28/30  - Test done today - DFE / photos     2. PVD ou - with dense vitreous veil OD Saw [facility] 4/23/2012 4/2019 - pt is noticing the floaters again (had not noticed them for several years)     3. Vitreous opacity OD     4. Steroid responder - had an injection / ? Spironolactone / aldactone - not currently on any steroids    5. Refractive error   Was bilateral hyperope / presbyope   Is anisopmetropic post CE od    Glasses from [facility] and doing well     6. H/O eyelid papilloma Left - S/P removal - [facility]     7. Pseudophakia od   PC IOL od 12/17/2014 - SN60wf 20.5   Had a large myopic shift pre surgery from +1.00 to -3.00    8.  I see her father in law - he has glaucoma - and I did his cataracts    I see her mother - she has similar issues to this patient     Plan   MMG  / OHT ou  S/P PI's ou   HVF today near full OU    Intolerant to latanoprost - red irritated eye  Off all glaucoma gtts - (use to use timolol) 7/2019     [place] Rx - Boudreuax - 5/9/2019 - says yag cap helped a lot            F/U - 6 months  With HVF / OCT - no need to dilate

## 2019-07-18 ENCOUNTER — OFFICE VISIT (OUTPATIENT)
Dept: OBSTETRICS AND GYNECOLOGY | Facility: CLINIC | Age: 54
End: 2019-07-18
Payer: COMMERCIAL

## 2019-07-18 VITALS
HEIGHT: 62 IN | BODY MASS INDEX: 21.22 KG/M2 | WEIGHT: 115.31 LBS | SYSTOLIC BLOOD PRESSURE: 104 MMHG | DIASTOLIC BLOOD PRESSURE: 74 MMHG

## 2019-07-18 DIAGNOSIS — Z01.419 ENCOUNTER FOR GYNECOLOGICAL EXAMINATION WITHOUT ABNORMAL FINDING: Primary | ICD-10-CM

## 2019-07-18 DIAGNOSIS — Z12.31 BREAST CANCER SCREENING BY MAMMOGRAM: ICD-10-CM

## 2019-07-18 PROCEDURE — 99396 PR PREVENTIVE VISIT,EST,40-64: ICD-10-PCS | Mod: S$GLB,,, | Performed by: OBSTETRICS & GYNECOLOGY

## 2019-07-18 PROCEDURE — 99999 PR PBB SHADOW E&M-EST. PATIENT-LVL III: ICD-10-PCS | Mod: PBBFAC,,, | Performed by: OBSTETRICS & GYNECOLOGY

## 2019-07-18 PROCEDURE — 99396 PREV VISIT EST AGE 40-64: CPT | Mod: S$GLB,,, | Performed by: OBSTETRICS & GYNECOLOGY

## 2019-07-18 PROCEDURE — 99999 PR PBB SHADOW E&M-EST. PATIENT-LVL III: CPT | Mod: PBBFAC,,, | Performed by: OBSTETRICS & GYNECOLOGY

## 2019-07-18 RX ORDER — VALACYCLOVIR HYDROCHLORIDE 500 MG/1
500 TABLET, FILM COATED ORAL 2 TIMES DAILY
COMMUNITY
End: 2020-03-10

## 2019-07-18 NOTE — PROGRESS NOTES
Well-woman exam    Darling Quach is a 54 y.o. year old  who presents for annual exam.  She is S/P DaVinci assisted hysterectomy with bilateral salpingectomy.  No gynecologic complaints today.  Patient with some mild discomfort under right breast.  Patient recently diagnosed with left sub mammary shingles.    Past Medical History:   Diagnosis Date    Cataract     Glaucoma     Hemangioma     cavernous    History of acne     dr lopez prescribed accutane    Hyperlipidemia     Seizures     Venous angioma of brain     Vertigo        Past Surgical History:   Procedure Laterality Date    CATARACT EXTRACTION W/  INTRAOCULAR LENS IMPLANT Right 2014        HEMORRHOIDECTOMY N/A 2019    Performed [facility] at [facility] 2ND FLR    HYSTERECTOMY  2010    [facility] ov in situ     INSERTION-INTRAOCULAR LENS (IOL) Right 2014    Performed by Josey Camacho [facility] at [facility] OR 1ST FLR    PHACOEMULSIFICATION-ASPIRATION-CATARACT Right 2014    Performed by Josey [facility] MD at [facility] OR 1ST FLR    SPHINCTEROTOMY, ANAL  2019    Performed by [facility]al Chan MD at [facility] OR 2ND FLR       OB History        3    Para   3    Term   3            AB        Living   3       SAB        TAB        Ectopic        Multiple        Live Births                     ROS:  GENERAL: Feeling well overall.   SKIN: Denies rash or lesions.   HEAD: Denies head injury or headache.   NODES: Denies enlarged lymph nodes.   CHEST: Denies chest pain or shortness of breath.   CARDIOVASCULAR: Denies palpitations or left sided chest pain.   ABDOMEN: No abdominal pain, nausea, vomiting or rectal bleeding.   URINARY: No dysuria, hematuria, or burning on urination.  REPRODUCTIVE: See HPI.   BREASTS: Denies pain, lumps, or nipple discharge.   HEMATOLOGIC: No easy bruisability or excessive bleeding.   MUSCULOSKELETAL: Denies joint pain or swelling.   NEUROLOGIC: Denies  syncope or weakness.   PSYCHIATRIC: Denies depression.    PE:   APPEARANCE: Well nourished, well developed, in no acute distress.  NECK: Neck symmetric without masses or thyromegaly.  NODES: No inguinal lymph node enlargement.  ABDOMEN: Soft. No tenderness or masses. No hernias.  Laparoscopic port sites healed  BREASTS: Symmetrical, no skin changes or visible lesions. No palpable masses, nipple discharge or adenopathy bilaterally.  PELVIC: Normal external female genitalia without lesions. Normal hair distribution. Adequate perineal body, normal urethral meatus. Vagina mildly atrophic without lesions or discharge. No significant cystocele or rectocele. Uterus and cervix surgically absent. Bimanual exam revealed no masses, tenderness or abnormality.  ANUS: Normal.    Diagnosis:  1. Encounter for gynecological examination without abnormal finding    2. Breast cancer screening by mammogram        PLAN:    Orders Placed This Encounter    Mammo Digital Screening Bilat w/ Eric       Patient was counseled today on postmenopausal issues.     Age specific counseling    Continue annual screening mammogram    Pap smear not done/not indicated    Patient counseled on risks, benefits, and alternatives to estrogen replacement therapy.  Local estrogen replacement therapy reviewed and patient has been instructed to contact office if vaginal estrogen desired.  Follow-up in 1 year.    El Mayes IV, MD

## 2019-07-19 ENCOUNTER — OFFICE VISIT (OUTPATIENT)
Dept: OPHTHALMOLOGY | Facility: CLINIC | Age: 54
End: 2019-07-19
Payer: COMMERCIAL

## 2019-07-19 DIAGNOSIS — H40.89 GLAUCOMA DUE TO COMBINATION OF MECHANISMS: Primary | ICD-10-CM

## 2019-07-19 DIAGNOSIS — H26.491 PCO (POSTERIOR CAPSULAR OPACIFICATION), RIGHT: ICD-10-CM

## 2019-07-19 DIAGNOSIS — H40.89 GLAUCOMA DUE TO COMBINATION OF MECHANISMS: ICD-10-CM

## 2019-07-19 DIAGNOSIS — H40.043 STEROID RESPONDERS BORDERLINE GLAUCOMA, BILATERAL: ICD-10-CM

## 2019-07-19 DIAGNOSIS — H43.813 POSTERIOR VITREOUS DETACHMENT, BILATERAL: ICD-10-CM

## 2019-07-19 DIAGNOSIS — H25.12 NUCLEAR SCLEROSIS, LEFT: ICD-10-CM

## 2019-07-19 DIAGNOSIS — Z96.1 PSEUDOPHAKIA OF RIGHT EYE: ICD-10-CM

## 2019-07-19 DIAGNOSIS — H52.7 REFRACTION ERROR: ICD-10-CM

## 2019-07-19 PROCEDURE — 92014 COMPRE OPH EXAM EST PT 1/>: CPT | Mod: S$GLB,,, | Performed by: OPHTHALMOLOGY

## 2019-07-19 PROCEDURE — 92250 COLOR FUNDUS PHOTOGRAPHY - OU - BOTH EYES: ICD-10-PCS | Mod: S$GLB,,, | Performed by: OPHTHALMOLOGY

## 2019-07-19 PROCEDURE — 99999 PR PBB SHADOW E&M-EST. PATIENT-LVL II: ICD-10-PCS | Mod: PBBFAC,,, | Performed by: OPHTHALMOLOGY

## 2019-07-19 PROCEDURE — 99999 PR PBB SHADOW E&M-EST. PATIENT-LVL II: CPT | Mod: PBBFAC,,, | Performed by: OPHTHALMOLOGY

## 2019-07-19 PROCEDURE — 92250 FUNDUS PHOTOGRAPHY W/I&R: CPT | Mod: S$GLB,,, | Performed by: OPHTHALMOLOGY

## 2019-07-19 PROCEDURE — 92014 PR EYE EXAM, EST PATIENT,COMPREHESV: ICD-10-PCS | Mod: S$GLB,,, | Performed by: OPHTHALMOLOGY

## 2020-01-08 NOTE — PROGRESS NOTES
HPI     DLS: 7/19/19    Pt here for HVF review;    Meds: AT's PRN OU    1. MMG/OHT OU   2. PVD OU   3. Vitreous Opacity OD   4. PCIOL OD   5. Steroid Responder   6.  Hx Removal Left Eyelid Papilloma (Dr. Alvarado)     Last edited by Nivia Cox on 1/10/2020  3:24 PM. (History)              Assessment /Plan     For exam results, see Encounter Report.    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 Mixed mechanism Glaucoma   H/O narrow angles - S/P PI's, + residual OHT   First HVF 1998   First photos 1999   Former [facility] , retired for a few years and went back to work as a    (mom with same problem with narrow angles and residual OHT)     Family history + mom - Narrow angles with residual OHT   Glaucoma meds - off gtts (use to use timolol)   H/O adverse rxn to glaucoma drops - latanoprost - eye irritation  LASERS PI's ou // yag cap od 4/25/2019  GLAUCOMA SURGERIES none   OTHER EYE SURGERIES - PC IOL OD 12/17/2014 - [facility]  CDR 0.4/0.4   Tbase 23-36 / 23-35 ou   Tmax 36/35   Ttarget 30/30  HVF 16 test 24-2 ss ou 1996 to 2020 ([facility]) - hint SAD od // full os            3 SWAP test 2010 to 2012 - Full ou            ( repeat HVF from 8/2013 to 9/2013 - new SAD od confirmed)            [place] VF's - 1 test 2016 to 2016 - SNS /INS od // full os   2019 VF full od/near full os   Gonio +3 ou S/P PI's   /618   OCT 4 test 2005 -  2020 ([facility]) - RNFL - bord TI  od/ nl os   HRT - 4 test at [facility] - 2014 to 2016 -MR -  nl od // nl os /// CDR 0.46 od // 0.47 os  - older test at [facility]  Disc photos 1999, 2004, 2013 - slides // 5/13/2010 - //  2019 - OIS     - Ttoday  26/27  - Test done today - HVF / OCT     2. PVD ou - with dense vitreous veil OD Saw [facility] 4/23/2012 4/2019 - pt is noticing the floaters again (had not noticed them for several years)     3. Vitreous opacity OD     4. Steroid responder - had an injection / ? Spironolactone / aldactone - not currently on any steroids    5. Refractive error   Was bilateral hyperope / presbyope   Is anisopmetropic post CE od    Glasses from [place] and doing well     6. H/O eyelid papilloma Left - S/P removal - [facility]     7. Pseudophakia od   PC IOL od 12/17/2014 - SN60wf 20.5   Had a large myopic shift pre surgery from +1.00 to -3.00    8.  I see her father in law - he has glaucoma - and I did his cataracts    I see her mother - she has similar issues to this patient     Plan   MMG  / OHT ou  S/P PI's ou   HVF today near full OU    Intolerant to latanoprost - red irritated eye  Off all glaucoma gtts - (use to use timolol) 7/2019     [place] [facility] - Boudreuax - 5/9/2019 - says yag cap helped a lot       F/U - 6 months  With HRT / gonio / IOP    Consider re-starting timolol prn - ?? Mild prog od

## 2020-01-10 ENCOUNTER — CLINICAL SUPPORT (OUTPATIENT)
Dept: OPHTHALMOLOGY | Facility: CLINIC | Age: 55
End: 2020-01-10
Payer: COMMERCIAL

## 2020-01-10 ENCOUNTER — OFFICE VISIT (OUTPATIENT)
Dept: OPHTHALMOLOGY | Facility: CLINIC | Age: 55
End: 2020-01-10
Payer: COMMERCIAL

## 2020-01-10 DIAGNOSIS — Z96.1 PSEUDOPHAKIA OF RIGHT EYE: ICD-10-CM

## 2020-01-10 DIAGNOSIS — H40.89 GLAUCOMA DUE TO COMBINATION OF MECHANISMS: Primary | ICD-10-CM

## 2020-01-10 DIAGNOSIS — H40.043 STEROID RESPONDERS BORDERLINE GLAUCOMA, BILATERAL: ICD-10-CM

## 2020-01-10 DIAGNOSIS — H40.89 GLAUCOMA DUE TO COMBINATION OF MECHANISMS: ICD-10-CM

## 2020-01-10 DIAGNOSIS — H26.491 PCO (POSTERIOR CAPSULAR OPACIFICATION), RIGHT: ICD-10-CM

## 2020-01-10 DIAGNOSIS — H25.12 NUCLEAR SCLEROSIS, LEFT: ICD-10-CM

## 2020-01-10 DIAGNOSIS — H43.813 POSTERIOR VITREOUS DETACHMENT, BILATERAL: ICD-10-CM

## 2020-01-10 PROCEDURE — 92083 EXTENDED VISUAL FIELD XM: CPT | Mod: S$GLB,,, | Performed by: OPHTHALMOLOGY

## 2020-01-10 PROCEDURE — 92133 CPTRZD OPH DX IMG PST SGM ON: CPT | Mod: S$GLB,,, | Performed by: OPHTHALMOLOGY

## 2020-01-10 PROCEDURE — 99999 PR PBB SHADOW E&M-EST. PATIENT-LVL I: CPT | Mod: PBBFAC,,,

## 2020-01-10 PROCEDURE — 99999 PR PBB SHADOW E&M-EST. PATIENT-LVL I: ICD-10-PCS | Mod: PBBFAC,,,

## 2020-01-10 PROCEDURE — 92083 HUMPHREY VISUAL FIELD - OU - BOTH EYES: ICD-10-PCS | Mod: S$GLB,,, | Performed by: OPHTHALMOLOGY

## 2020-01-10 PROCEDURE — 92133 POSTERIOR SEGMENT OCT OPTIC NERVE(OCULAR COHERENCE TOMOGRAPHY) - OU - BOTH EYES: ICD-10-PCS | Mod: S$GLB,,, | Performed by: OPHTHALMOLOGY

## 2020-01-10 PROCEDURE — 92012 INTRM OPH EXAM EST PATIENT: CPT | Mod: S$GLB,,, | Performed by: OPHTHALMOLOGY

## 2020-01-10 PROCEDURE — 99999 PR PBB SHADOW E&M-EST. PATIENT-LVL II: CPT | Mod: PBBFAC,,, | Performed by: OPHTHALMOLOGY

## 2020-01-10 PROCEDURE — 99999 PR PBB SHADOW E&M-EST. PATIENT-LVL II: ICD-10-PCS | Mod: PBBFAC,,, | Performed by: OPHTHALMOLOGY

## 2020-01-10 PROCEDURE — 92012 PR EYE EXAM, EST PATIENT,INTERMED: ICD-10-PCS | Mod: S$GLB,,, | Performed by: OPHTHALMOLOGY

## 2020-03-10 ENCOUNTER — OFFICE VISIT (OUTPATIENT)
Dept: INTERNAL MEDICINE | Facility: CLINIC | Age: 55
End: 2020-03-10
Payer: COMMERCIAL

## 2020-03-10 VITALS
SYSTOLIC BLOOD PRESSURE: 110 MMHG | BODY MASS INDEX: 21.94 KG/M2 | OXYGEN SATURATION: 97 % | HEIGHT: 62 IN | DIASTOLIC BLOOD PRESSURE: 62 MMHG | WEIGHT: 119.25 LBS | HEART RATE: 69 BPM

## 2020-03-10 DIAGNOSIS — Z00.00 WELLNESS EXAMINATION: ICD-10-CM

## 2020-03-10 DIAGNOSIS — D18.00 CAVERNOMA: ICD-10-CM

## 2020-03-10 DIAGNOSIS — E78.5 HYPERLIPIDEMIA, UNSPECIFIED HYPERLIPIDEMIA TYPE: ICD-10-CM

## 2020-03-10 DIAGNOSIS — Z00.00 PHYSICAL EXAM: Primary | ICD-10-CM

## 2020-03-10 DIAGNOSIS — G40.209 COMPLEX PARTIAL SEIZURES EVOLVING TO GENERALIZED TONIC-CLONIC SEIZURES: ICD-10-CM

## 2020-03-10 PROCEDURE — 99396 PR PREVENTIVE VISIT,EST,40-64: ICD-10-PCS | Mod: S$GLB,,, | Performed by: INTERNAL MEDICINE

## 2020-03-10 PROCEDURE — 99999 PR PBB SHADOW E&M-EST. PATIENT-LVL III: CPT | Mod: PBBFAC,,, | Performed by: INTERNAL MEDICINE

## 2020-03-10 PROCEDURE — 99999 PR PBB SHADOW E&M-EST. PATIENT-LVL III: ICD-10-PCS | Mod: PBBFAC,,, | Performed by: INTERNAL MEDICINE

## 2020-03-10 PROCEDURE — 99396 PREV VISIT EST AGE 40-64: CPT | Mod: S$GLB,,, | Performed by: INTERNAL MEDICINE

## 2020-03-10 RX ORDER — KETOCONAZOLE 20 MG/ML
SHAMPOO, SUSPENSION TOPICAL
COMMUNITY
Start: 2020-02-13 | End: 2021-02-19

## 2020-03-10 RX ORDER — HALOBETASOL PROPIONATE 0.5 MG/G
AEROSOL, FOAM TOPICAL
COMMUNITY
Start: 2020-02-12 | End: 2021-07-29

## 2020-03-10 NOTE — PROGRESS NOTES
Subjective:      Patient ID: Darling Quach is a 54 y.o. female.    Chief Complaint: Annual Exam    HPI:  HPI   Health Maintenance:  HIV Screening  Shingrix    Patient is here for Annual Exam:    Patient is seeing her neurologist every 6 months and on Keppra 750mg ER daily . Dr. Becca Brito.          Annual exam: 3/10/2020  Colonoscopy: 10/10/2016:follow up in 7 years  Mammogram: 7/9/2019  Gyn: Dr. Mayes  6/2019  Optho:glaucoma Cataract Surgery in 12/2014 and has appt scheduled twice a year, Dr. Camacho  Flu: 2019  Tetanus:7/7/2016  Shingles: Shingrix suggested  Pneumovax : not due  Prevnar: not due     Consultants:  Derm: Dr.Greishaber Dr. Janice Brito MRI done in August for follow up of cavernoma     Brother:Bipolar but health is good  Siblings: 5 all are good     MRI of Brain: Dr. Pendleton         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    Vitreous opacity - Right Eye    Venous angioma of brain    S/P hysterectomy    Hx of colonoscopy:10/10/2016    Anal fissure    Complex partial seizures evolving to generalized tonic-clonic seizures    Fissure in ano     Past Medical History:   Diagnosis Date    Cataract     Glaucoma     Hemangioma     cavernous    History of acne 2004    dr lopez prescribed accutane    Hyperlipidemia     Seizures     Venous angioma of brain     Vertigo      Past Surgical History:   Procedure Laterality Date    ANAL SPHINCTEROTOMY  5/27/2019    Procedure: SPHINCTEROTOMY, ANAL;  Surgeon: Nirmal Chan MD;  [facility] OR [address] [place] [address];  Service: Colon and Rectal;;    CATARACT EXTRACTION W/  INTRAOCULAR LENS IMPLANT Right 12/17/2014        EXCISIONAL HEMORRHOIDECTOMY N/A 5/27/2019    Procedure: HEMORRHOIDECTOMY;  Surgeon:  "Nirmal Chan MD;  Location: [facility] OR [address] [place] [address];  Service: Colon and Rectal;  Laterality: N/A;    HYSTERECTOMY  2010    DLH ov in situ      Family History   Problem Relation Age of Onset    Fibroids Mother     Migraines Mother     Glaucoma Mother     Heart disease Father     Depression Brother     No Known Problems Sister     Diabetes Maternal Grandmother     Glaucoma Maternal Grandmother     Cancer Maternal Aunt         breast    Breast cancer Maternal Aunt 50    No Known Problems Maternal Uncle     No Known Problems Paternal Aunt     No Known Problems Paternal Uncle     No Known Problems Maternal Grandfather     No Known Problems Paternal Grandmother     No Known Problems Paternal Grandfather     Macular degeneration Neg Hx     Blindness Neg Hx     Melanoma Neg Hx     Psoriasis Neg Hx     Lupus Neg Hx     Eczema Neg Hx     Acne Neg Hx     Cataracts Neg Hx     Amblyopia Neg Hx     Retinal detachment Neg Hx     Strabismus Neg Hx     Stroke Neg Hx     Thyroid disease Neg Hx     Hypertension Neg Hx     Ovarian cancer Neg Hx     Colon cancer Neg Hx      Review of Systems   Constitutional: Negative for chills, fever and unexpected weight change.   HENT: Negative for trouble swallowing.    Respiratory: Negative for cough, shortness of breath and wheezing.    Cardiovascular: Negative for chest pain and palpitations.   Gastrointestinal: Negative for abdominal distention, abdominal pain, blood in stool and vomiting.   Musculoskeletal: Negative for back pain.     Objective:     Vitals:    03/10/20 1509   BP: 110/62   Pulse: 69   SpO2: 97%   Weight: 54.1 kg (119 lb 4.3 oz)   Height: 5' 2" (1.575 m)   PainSc: 0-No pain     Body mass index is 21.81 kg/m².  Physical Exam   Constitutional: She is oriented to person, place, and time. She appears well-developed and well-nourished. No distress.   Neck: Carotid bruit is not present. No thyromegaly present.   Cardiovascular: Normal rate, regular " rhythm and normal heart sounds. PMI is not displaced.   Pulmonary/Chest: Effort normal and breath sounds normal. No respiratory distress.   Abdominal: Soft. Bowel sounds are normal. She exhibits no distension. There is no tenderness.   Musculoskeletal: She exhibits no edema.   Neurological: She is alert and oriented to person, place, and time.     Assessment:     1. Physical exam    2. Wellness examination    3. Cavernoma    4. Complex partial seizures evolving to generalized tonic-clonic seizures    5. Hyperlipidemia, unspecified hyperlipidemia type      Plan:   Darling was seen today for annual exam.    Diagnoses and all orders for this visit:    Physical exam  Comments:  Completed, no new findings    Wellness examination  Comments:  Discussed health maintenance plan for screening  Orders:  -     CBC auto differential; Future  -     Comprehensive metabolic panel; Future  -     Lipid panel; Future  -     Vitamin B12; Future  -     Vitamin D; Future    Cavernoma  Comments:  Yearly MRI with her neurologist    Complex partial seizures evolving to generalized tonic-clonic seizures  Comments:  Followed by Neurology    Hyperlipidemia, unspecified hyperlipidemia type  Comments:  Lab ordered        Problem List Items Addressed This Visit     Cavernoma    Overview     cavernous         Hyperlipidemia    Complex partial seizures evolving to generalized tonic-clonic seizures      Other Visit Diagnoses     Physical exam    -  Primary    Completed, no new findings    Wellness examination        Discussed health maintenance plan for screening    Relevant Orders    CBC auto differential    Comprehensive metabolic panel    Lipid panel    Vitamin B12    Vitamin D        Orders Placed This Encounter   Procedures    CBC auto differential     Standing Status:   Future     Standing Expiration Date:   6/10/2021    Comprehensive metabolic panel     Standing Status:   Future     Standing Expiration Date:   6/10/2021    Lipid panel      Standing Status:   Future     Standing Expiration Date:   6/10/2021    Vitamin B12     Standing Status:   Future     Standing Expiration Date:   6/10/2021    Vitamin D     Standing Status:   Future     Standing Expiration Date:   6/10/2021     Follow up in about 1 year (around 3/10/2021) for Annual exam.     Medication List           Accurate as of March 10, 2020  8:43 PM. If you have any questions, ask your nurse or doctor.               CONTINUE taking these medications    ADVIL ORAL     ketoconazole 2 % shampoo  Commonly known as:  NIZORAL     levetiracetam  mg Tb24 tablet  Commonly known as:  KEPPRA XR     LEXETTE 0.05 % Foam  Generic drug:  halobetasol propionate

## 2020-05-27 ENCOUNTER — PATIENT MESSAGE (OUTPATIENT)
Dept: INTERNAL MEDICINE | Facility: CLINIC | Age: 55
End: 2020-05-27

## 2020-05-27 DIAGNOSIS — F41.9 ANXIETY: Primary | ICD-10-CM

## 2020-06-02 ENCOUNTER — TELEPHONE (OUTPATIENT)
Dept: BEHAVIORAL HEALTH | Facility: CLINIC | Age: 55
End: 2020-06-02

## 2020-06-02 NOTE — PROGRESS NOTES
Behavioral Health Community Health Worker  Initial Assessment  Completed by:  Karley Rose    Date:  6/2/2020    Patient Enrollment in Behavioral Health Program:  · Patient verbalized understanding of Behavioral Health Integration services to include:  · Patient understands that CHW, LCSW, PharmD and consulting Psychiatrist are members of the care team working collaboratively with his/her primary care provider: Yes  · Patient understands that activation of their [facility] patient portal account is required for accessing the full scope of team services: Yes  · Patient understands that some counseling sessions may occur via video: Yes  · Clinic visits with the psychiatrist may be subject to a co-pay based on your insurance: Yes  · Patient consents to enroll in BHI program: Yes    Assessments     Single Item Health Literacy Scale:  · How often do you need to have someone help you read instructions, pamphlets or other written material from your doctor or pharmacy?: Never    Promis 10:  · Promis 10 Responses  · In general, would you say your health is: Good  · In general, would you say your quality of life is: Excellent  · In general, how would you rate your physical health?: Good  · In general, how would you rate your mental health, including your mood and your ability to think?: Good  · In general, how would you rate your satisfaction with your social activities and relationships?: Good  · In general, please rate how well you carry out your usual social activities and roles. (This includes activities at home, at work and in your community, and responsibilities as a parent, child, spouse, employee, friend, etc.): Good  · To what extent are you able to carry out your everyday physical activities such as walking, climbing stairs, carrying groceries, or moving a chair? : Completely  · In the past 7 days, how often have you been bothered by emotional problems such as feeling anxious, depressed or irritable?:  Sometimes  · In the past 7 days, how would you rate your fatigue on average?: Mild  · In the past 7 days, on a scale of 0 to 10 (where 0 is no pain and 10 is the worst pain imaginable) how would you rate your pain on average?: 0  · Global Physical Health: 10  · Global Mental health Score: 14    Depression PHQ:  PHQ9 6/2/2020   Total Score 4         Generalized Anxiety Disorder 7-Item Scale:  GAD7 6/2/2020   1. Feeling nervous, anxious, or on edge? 1   2. Not being able to stop or control worrying? 2   3. Worrying too much about different things? 2   4. Trouble relaxing? 1   5. Being so restless that it is hard to sit still? 0   6. Becoming easily annoyed or irritable? 1   7. Feeling afraid as if something awful might happen? 3   PHUONG-7 Score 10       History     Social History     Socioeconomic History    Marital status:      Spouse name: Not on file    Number of children: Not on file    Years of education: Not on file    Highest education level: Not on file   Occupational History    Not on file   Social Needs    Financial resource strain: Not hard at all    Food insecurity:     Worry: Never true     Inability: Never true    Transportation needs:     Medical: No     Non-medical: No   Tobacco Use    Smoking status: Never Smoker    Smokeless tobacco: Never Used   Substance and Sexual Activity    Alcohol use: No     Frequency: Never     Drinks per session: Patient refused     Binge frequency: Never    Drug use: No    Sexual activity: Yes     Partners: Male     Birth control/protection: Surgical     Comment: , [facility] 9/2010   Lifestyle    Physical activity:     Days per week: 7 days     Minutes per session: 20 min    Stress: Only a little   Relationships    Social connections:     Talks on phone: More than three times a week     Gets together: More than three times a week     Attends Yarsani service: Not on file     Active member of club or organization: Yes     Attends meetings of clubs or  "organizations: More than 4 times per year     Relationship status:    Other Topics Concern    Are you pregnant or think you may be? No    Breast-feeding No   Social History Narrative    Not on file       Call Summary     Patient was referred to the BHI (Non-opioid) program by Primary Care Provider, Dr. Terra CORONEL contacted Darling Quach who reports anxiety  that limits [her] activities of daily living (ADLs).   Patient scored "4" on the PHQ9 and "10" on the PHUONG 7. Based on these scores patient is eligible for the Behavioral health Integration (Non-opioid) Program. [facility] completed the intake and scheduled an appointment for patient with Kit Gamino LCSW, on Thursday June 4,2020 at 9AM .       "

## 2020-06-04 ENCOUNTER — OFFICE VISIT (OUTPATIENT)
Dept: BEHAVIORAL HEALTH | Facility: CLINIC | Age: 55
End: 2020-06-04
Payer: COMMERCIAL

## 2020-06-04 ENCOUNTER — LAB VISIT (OUTPATIENT)
Dept: LAB | Facility: HOSPITAL | Age: 55
End: 2020-06-04
Attending: INTERNAL MEDICINE
Payer: COMMERCIAL

## 2020-06-04 DIAGNOSIS — Z00.00 WELLNESS EXAMINATION: ICD-10-CM

## 2020-06-04 DIAGNOSIS — F41.9 ANXIETY: ICD-10-CM

## 2020-06-04 LAB
25(OH)D3+25(OH)D2 SERPL-MCNC: 25 NG/ML (ref 30–96)
ALBUMIN SERPL BCP-MCNC: 4.1 G/DL (ref 3.5–5.2)
ALP SERPL-CCNC: 61 U/L (ref 55–135)
ALT SERPL W/O P-5'-P-CCNC: 10 U/L (ref 10–44)
ANION GAP SERPL CALC-SCNC: 10 MMOL/L (ref 8–16)
AST SERPL-CCNC: 18 U/L (ref 10–40)
BASOPHILS # BLD AUTO: 0.09 K/UL (ref 0–0.2)
BASOPHILS NFR BLD: 2 % (ref 0–1.9)
BILIRUB SERPL-MCNC: 0.4 MG/DL (ref 0.1–1)
BUN SERPL-MCNC: 21 MG/DL (ref 6–20)
CALCIUM SERPL-MCNC: 9.6 MG/DL (ref 8.7–10.5)
CHLORIDE SERPL-SCNC: 103 MMOL/L (ref 95–110)
CHOLEST SERPL-MCNC: 266 MG/DL (ref 120–199)
CHOLEST/HDLC SERPL: 2.6 {RATIO} (ref 2–5)
CO2 SERPL-SCNC: 29 MMOL/L (ref 23–29)
CREAT SERPL-MCNC: 1.1 MG/DL (ref 0.5–1.4)
DIFFERENTIAL METHOD: ABNORMAL
EOSINOPHIL # BLD AUTO: 0.2 K/UL (ref 0–0.5)
EOSINOPHIL NFR BLD: 3.7 % (ref 0–8)
ERYTHROCYTE [DISTWIDTH] IN BLOOD BY AUTOMATED COUNT: 12.3 % (ref 11.5–14.5)
EST. GFR  (AFRICAN AMERICAN): >60 ML/MIN/1.73 M^2
EST. GFR  (NON AFRICAN AMERICAN): 57.1 ML/MIN/1.73 M^2
GLUCOSE SERPL-MCNC: 84 MG/DL (ref 70–110)
HCT VFR BLD AUTO: 40.2 % (ref 37–48.5)
HDLC SERPL-MCNC: 102 MG/DL (ref 40–75)
HDLC SERPL: 38.3 % (ref 20–50)
HGB BLD-MCNC: 12.9 G/DL (ref 12–16)
IMM GRANULOCYTES # BLD AUTO: 0.01 K/UL (ref 0–0.04)
IMM GRANULOCYTES NFR BLD AUTO: 0.2 % (ref 0–0.5)
LDLC SERPL CALC-MCNC: 154.8 MG/DL (ref 63–159)
LYMPHOCYTES # BLD AUTO: 1.4 K/UL (ref 1–4.8)
LYMPHOCYTES NFR BLD: 30.1 % (ref 18–48)
MCH RBC QN AUTO: 28.6 PG (ref 27–31)
MCHC RBC AUTO-ENTMCNC: 32.1 G/DL (ref 32–36)
MCV RBC AUTO: 89 FL (ref 82–98)
MONOCYTES # BLD AUTO: 0.4 K/UL (ref 0.3–1)
MONOCYTES NFR BLD: 7.6 % (ref 4–15)
NEUTROPHILS # BLD AUTO: 2.6 K/UL (ref 1.8–7.7)
NEUTROPHILS NFR BLD: 56.4 % (ref 38–73)
NONHDLC SERPL-MCNC: 164 MG/DL
NRBC BLD-RTO: 0 /100 WBC
PLATELET # BLD AUTO: 240 K/UL (ref 150–350)
PMV BLD AUTO: 10.2 FL (ref 9.2–12.9)
POTASSIUM SERPL-SCNC: 4.3 MMOL/L (ref 3.5–5.1)
PROT SERPL-MCNC: 7.2 G/DL (ref 6–8.4)
RBC # BLD AUTO: 4.51 M/UL (ref 4–5.4)
SODIUM SERPL-SCNC: 142 MMOL/L (ref 136–145)
TRIGL SERPL-MCNC: 46 MG/DL (ref 30–150)
VIT B12 SERPL-MCNC: 339 PG/ML (ref 210–950)
WBC # BLD AUTO: 4.58 K/UL (ref 3.9–12.7)

## 2020-06-04 PROCEDURE — 80061 LIPID PANEL: CPT

## 2020-06-04 PROCEDURE — 99999 PR PBB SHADOW E&M-EST. PATIENT-LVL III: CPT | Mod: PBBFAC,,, | Performed by: SOCIAL WORKER

## 2020-06-04 PROCEDURE — 90791 PR PSYCHIATRIC DIAGNOSTIC EVALUATION: ICD-10-PCS | Mod: S$GLB,,, | Performed by: SOCIAL WORKER

## 2020-06-04 PROCEDURE — 99999 PR PBB SHADOW E&M-EST. PATIENT-LVL III: ICD-10-PCS | Mod: PBBFAC,,, | Performed by: SOCIAL WORKER

## 2020-06-04 PROCEDURE — 85025 COMPLETE CBC W/AUTO DIFF WBC: CPT

## 2020-06-04 PROCEDURE — 36415 COLL VENOUS BLD VENIPUNCTURE: CPT

## 2020-06-04 PROCEDURE — 80053 COMPREHEN METABOLIC PANEL: CPT

## 2020-06-04 PROCEDURE — 82306 VITAMIN D 25 HYDROXY: CPT

## 2020-06-04 PROCEDURE — 82607 VITAMIN B-12: CPT

## 2020-06-04 PROCEDURE — 90791 PSYCH DIAGNOSTIC EVALUATION: CPT | Mod: S$GLB,,, | Performed by: SOCIAL WORKER

## 2020-06-04 NOTE — LETTER
June 4, 2020      Kimberly Ellison MD  [address]  [facility] [address]           [facility] Primary [facility] Behavioral Health Integration  [address]  [facility] [address]  Phone: [phone]  Fax: [phone]          Patient: Darling Quach   MR Number: [id number]   YOB: 1965   Date of Visit: 6/4/2020       Dear Dr. Kimberly Ellison:    Thank you for referring Darling Quach to me for evaluation. Attached you will find relevant portions of my assessment and plan of care.    If you have questions, please do not hesitate to call me. I look forward to following Darling Quach along with you.    Sincerely,    Kit Gamino, [facility]    Enclosure  CC:  No Recipients    If you would like to receive this communication electronically, please contact [email or web address] or [phone] to request more information on [employer] Link access.    For providers and/or their staff who would like to refer a patient to [facility], please contact us through our one-stop-shop provider referral line, [facility], at [phone].    If you feel you have received this communication in error or would no longer like to receive these types of communications, please e-mail [email or web address]

## 2020-06-04 NOTE — PROGRESS NOTES
"[facility] BEHAVIORAL HEALTH INTEGRATION INTAKE    DATE:  6/4/2020  REFERRAL SOURCE:  Kimberly Ellison MD  TYPE OF VISIT:  In person  LENGTH OF SESSION: 60  .  HISTORY OF PRESENTING ILLNESS:  Darling Quach, a 55 y.o. female with history of Anxiety disorders; anxiety, unspecified [F41.9].    CHIEF COMPLAINT/REASON FOR ENCOUNTER: Pt presented for initial evaluation for the Primary Care Behavioral Health Integration Program. Met with patient. Pt's chief complaint includes the following: anxiety.    Patient does not currently have a psychiatrist.   Patient does not currently have a therapist.    Pt has never been on medication and is not interested in medication at this time.     Current symptoms:  · Depression: worthlessness/guilt.  · Anxiety: excessive worrying, restlessness and obsessions, night sweats menopause (patch triggered the seizure- see Libia), not sleep well, 5.5-6 (broken), also anxiety keeps her awake  · Insomnia: frequent night time awakening and difficulty falling asleep.  ·  Tsering:  denies.  · Psychosis: denies .    PHQ9 6/2/2020   Total Score 4     GAD7 6/2/2020   1. Feeling nervous, anxious, or on edge? 1   2. Not being able to stop or control worrying? 2   3. Worrying too much about different things? 2   4. Trouble relaxing? 1   5. Being so restless that it is hard to sit still? 0   6. Becoming easily annoyed or irritable? 1   7. Feeling afraid as if something awful might happen? 3   PHUONG-7 Score 10        Current social stressors:   Pt reported she worries about her 3 Children:   -Daughter (Mai) had a break down, she did not get to complete her clinical internship, trying to get into PA school. Pt reported being worried about her tolerance of frustration, and reported "Mai doesn't have a filter." Pt reported over memorial day Mai and Gab's girl-friend (Graciela) got into a verbal fight, which led Gab to defend his g/f at the expense of putting down Mai (stating, "You are always " "running to mom... You just need to grow up"). Pt reported she sought out therapy initially for her daughter, but it came to be that she met the qualifications for [facility].  -Pt reported she worried about her son's (Gab) safety (riots), living in [place], [place], working as a . Pt also worries about his relationship with Graciela, due to seeing herself in him, catering to her "checking on her constantly to see if she needed anything" while they were staying with them for 2 week, as she did/does with her  Luis E. She shared that Gab likes politics, and got into [employer] for law school. Pt believes he is going into that field because he is "always" trying to get the approval of his father, who is also a .   -Dede: She will he a senior at [employer] in the fall, and pt described her as a "people pleaser," and a peace-maker, similar to herself. Pt reported she wants to go far away when she does go to college, and pt is supportive, because she believes all of her children "need to get away from their father." PT reported her daughter is interested in food science, becoming a dietician or something similar. PT reported with her  emotional manipulation, Luis E has verbalized to all the children "many times" that Dede is his favorite, which also has made Dede feel guilty.     Pt's Health is also of concern, she has a benign tumor and worries that it will grow, or that memories issues will manifest. She had a seizure, they believe was triggered by a patch prescribed for menopause. Pt reported ever since her seizure, "I have not been as sharp." She also reported she was prescribed KEPRA, which for the "longest time" made her feel foggy.  PT reported she also worries about her friends, and neighbors during the pandemic, as she described having "a list of people I need to check on."       -Pt's #1 stressor has been her 's (Luis E) manipulation. PT reported she feels like she is " ""constantly.... Walking on eggshells" trying to please him and meet his needs. Pt reported he has pinned the kids against one another, "playing mind-games." She stated, "Nothing is ever his fault." PT reported she finds herself taking responsibility for things that are not her responsibility. She described role in the family as the "peace-keeper" and "" between her  and her kids, who often did not want their father to know mistake they made, due to the fact that he would "explode." Therapist heard that Luis E has unrealistic expectations of [place]. For example, when pt was in another room and things were escalated between the siblings and Graciela Luis E stated, "You didn't get didn't there fast enough, I saw what was going on." Pt reported often feeling like "a retard" as she described Luis E to be very condescending, commenting on pt's awareness, focus, and cognitive abilities, as he rolls his eyes and stated, "You're oblivious Darling." Pt reported Luis E does not know how she feels and then stated, "I don't think he cares." Pt reported in the past when she has attempted to express her feelings, he becomes aggressive and "I retreat."   -Gab and Time both struggle from an autoimmune disease.     Risk assessment:  Patient reports no suicidal ideation  Patient reports no homicidal ideation  Patient reports no self-injurious behavior  Patient reports no violent behavior    PSYCHIATRIC HISTORY:  History of Tesring or diagnosis of Bipolar Disorder in the past:  No  History of Psychosis or diagnosis of Schizophrenia in the past:  No  Previous Psychiatric Hospitalizations:  No  Previous SI/HI:   No  Previous Suicide Attempts:  No  Previous Medication Trials: No  Previous Psychiatric Outpatient Treatment:  No  History of Trauma:  No  History of Violence:  No  Access to a Gun:  No    SUBSTANCE ABUSE HISTORY:  Tobacco:  No   Alcohol: none  Illicit Substances: No  Misuse of Prescription Medications:  No    MEDICAL " "HISTORY:  Past Medical History:   Diagnosis Date    Cataract     Glaucoma     Hemangioma     cavernous    History of acne 2004    dr lopez prescribed accutane    Hyperlipidemia     Seizures     Venous angioma of brain     Vertigo        NEUROLOGIC HISTORY:  Seizures:  No, on KEPRA (slows everything down for me- opporating in a fog) July of 2017, short-term memory affected (yulisa tumor) angioma (benign)  Head trauma:  No  Memory loss:  Yes, since seizure (See above).     SOCIAL HISTORY (MARRIAGE, EMPLOYMENT, etc.):  Living Situation: Lives with  and daughter, Dede   Family Life Cycle:  Pt is oldest and was born and raised by her parents (Jayme and Salvatore , she is one of 5, she has 1 sister (oldest with sister, Zamzam; who is currently living in [place], [place]) and 4 brothers (also out of state). Parents have been together for 56 years. Siblings include: Darling (pt.), Zamzam,Aurelio,  And then there was a big gap before Farooq (10 years younger than pt, her mom had 3 miscarriages during that time period), Prieto, and Shira ("the baby"). Pt described her childhood to be "happy go-elma." Pt reported growing up and in her nuclear family she has always been the peace-maker. She is now considered the care-taker as her parents live in [place] and "I always check on them and make sure they are okay."   Family: 1 son, Gab (27, "things have always come easy to him academically," working as a  in [place], [place] for 2 years now. He has been accepted into [employer] school for the fall. PT reported he is "constantly trying to please his father;" who is also a , Mai struggling with anxiety and expressing herself and emotions, and there is the youngest, Chandni ("Dede"); who she described as a "people pleaser."  Nuclear/Marriage: Pt has been  to her  (Luis E, he will be 55 in July) for 27 years, (Luis E, 55 in July). She described him as "short-tempered" as she " "described there to be "a lot of animosity" in his family growing up. Pt stated, "His parents pinned one against the other (I.e., he and his siblings) and "now he is doing that with the kids." He is a  and has a high stress job, working for [employer]. PT reported in her marriage she "constantly feels like she is walking on eggshells," trying to make everything "peaceful" to avoid Luis E getting angry.   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  Hoahaoism/Spirituality: Confucianist   Hobbies and Interests: She enjoys being creative, working with students, programming, checking on people, yoga     PSYCHIATRIC FAMILY HISTORY:   Pt has 1 brother, Farooq with Bipolar  's father- undiagnosed issues, anger   Mother-in-law- (manipulation, possible NPD)     MENTAL HEALTH STATUS EXAM  General Appearance:  unremarkable, age appropriate   Speech: soft, high pitch, normal rate      Level of Cooperation: cooperative      Thought Processes: logical, obsessive   Mood: anxious, depressed      Thought Content: normal, no suicidality, no homicidality, delusions, or paranoia   Affect: anxious, tearful, sad, congruent to mood   Orientation: Oriented x3   Memory: recent >  intact, remote >  intact   Attention Span & Concentration: intact   Fund of General Knowledge: intact and appropriate to age and level of education   Abstract Reasoning: appropriate   Judgment & Insight: good     Language  intact       IMPRESSION:   My diagnostic impression is Anxiety disorders; anxiety, unspecified [F41.9].     PROVISIONAL DIAGNOSES:  1. Anxiety         STRENGTHS AND LIABILITIES: Strength: Patient accepts guidance/feedback, Strength: Patient is expressive/articulate., Strength: Patient is intelligent., Strength: Patient is physically healthy., Liability: Patient " lacks coping skills.    TREATMENT GOALS: Marital Discord: Learn healthy communication and assertiveness skills, learn strategies to increase self-esteem.     PLAN: In this session a psych evaluation was conducted to get history and process pt's life. CBT, Interpersonal Processing Therapy  and Relaxation Techniques  will be utilized in future individual therapy sessions to increase interaction, insight, support and behavior modification.     RETURN TO CLINIC: Follow up in about 2 weeks (around 6/18/2020).

## 2020-06-05 ENCOUNTER — TELEPHONE (OUTPATIENT)
Dept: BEHAVIORAL HEALTH | Facility: CLINIC | Age: 55
End: 2020-06-05

## 2020-06-05 NOTE — PROGRESS NOTES
CHW reached out to pt to schedule (her) next appointment with [facility]W, pt schedule appointment for (Thursday June 18,2020 at 9AM).

## 2020-06-05 NOTE — PROGRESS NOTES
I, Mai Jang MD, have reviewed the LCSW's history and exam, and I agree with the assessment and plan.  Patient is not interested in medication changes at this time.  [facility]      Time: 15 minutes

## 2020-06-09 ENCOUNTER — TELEPHONE (OUTPATIENT)
Dept: INTERNAL MEDICINE | Facility: CLINIC | Age: 55
End: 2020-06-09

## 2020-06-09 NOTE — TELEPHONE ENCOUNTER
----- Message from Kimberly Ellison MD sent at 6/8/2020  7:31 PM CDT -----  Darling,     The vit D is low, make sure you are taking 1000 units of vit D daily. If you are on this dose then raise it to 2000 units of vit D.        The GFR is slightly low and I wonder if its related to not hydrating before the lab. I would like you to do a nonfasting BMP drinking at least 3-4 glasses of any liquid before the lab    Please schedule Darling for a BMP.

## 2020-06-09 NOTE — TELEPHONE ENCOUNTER
Pt stated she did see the note and will start the 1,000 units of Vitamin D daily. Lab scheduled for June 16th.

## 2020-06-18 ENCOUNTER — OFFICE VISIT (OUTPATIENT)
Dept: BEHAVIORAL HEALTH | Facility: CLINIC | Age: 55
End: 2020-06-18
Payer: COMMERCIAL

## 2020-06-18 ENCOUNTER — LAB VISIT (OUTPATIENT)
Dept: LAB | Facility: HOSPITAL | Age: 55
End: 2020-06-18
Attending: INTERNAL MEDICINE
Payer: COMMERCIAL

## 2020-06-18 DIAGNOSIS — F41.9 ANXIETY: Primary | ICD-10-CM

## 2020-06-18 DIAGNOSIS — R94.4 DECREASED GFR: ICD-10-CM

## 2020-06-18 LAB
ANION GAP SERPL CALC-SCNC: 9 MMOL/L (ref 8–16)
BUN SERPL-MCNC: 17 MG/DL (ref 6–20)
CALCIUM SERPL-MCNC: 9.8 MG/DL (ref 8.7–10.5)
CHLORIDE SERPL-SCNC: 104 MMOL/L (ref 95–110)
CO2 SERPL-SCNC: 27 MMOL/L (ref 23–29)
CREAT SERPL-MCNC: 1 MG/DL (ref 0.5–1.4)
EST. GFR  (AFRICAN AMERICAN): >60 ML/MIN/1.73 M^2
EST. GFR  (NON AFRICAN AMERICAN): >60 ML/MIN/1.73 M^2
GLUCOSE SERPL-MCNC: 73 MG/DL (ref 70–110)
POTASSIUM SERPL-SCNC: 4.2 MMOL/L (ref 3.5–5.1)
SODIUM SERPL-SCNC: 140 MMOL/L (ref 136–145)

## 2020-06-18 PROCEDURE — 36415 COLL VENOUS BLD VENIPUNCTURE: CPT

## 2020-06-18 PROCEDURE — 80048 BASIC METABOLIC PNL TOTAL CA: CPT

## 2020-06-18 PROCEDURE — 90837 PR PSYCHOTHERAPY W/PATIENT, 60 MIN: ICD-10-PCS | Mod: S$GLB,,, | Performed by: SOCIAL WORKER

## 2020-06-18 PROCEDURE — 99484 PR CARE MGMT SVCS, BEHAVIORAL HEALTH, >= 20 MIN: ICD-10-PCS | Mod: S$GLB,,, | Performed by: SOCIAL WORKER

## 2020-06-18 PROCEDURE — 99484 CARE MGMT SVC BHVL HLTH COND: CPT | Mod: S$GLB,,, | Performed by: SOCIAL WORKER

## 2020-06-18 PROCEDURE — 99999 PR PBB SHADOW E&M-EST. PATIENT-LVL I: ICD-10-PCS | Mod: PBBFAC,,, | Performed by: SOCIAL WORKER

## 2020-06-18 PROCEDURE — 99999 PR PBB SHADOW E&M-EST. PATIENT-LVL I: CPT | Mod: PBBFAC,,, | Performed by: SOCIAL WORKER

## 2020-06-18 PROCEDURE — 90837 PSYTX W PT 60 MINUTES: CPT | Mod: S$GLB,,, | Performed by: SOCIAL WORKER

## 2020-06-18 NOTE — PROGRESS NOTES
"Addendum for encounter date: 6/18/2020  Total Monthly BHI Collaborative Time: 32 minutes spent separately from psychotherapy    Individual Psychotherapy (LCSW/PhD)  Darling Quach,  6/18/2020    Site:  [facility]         Therapeutic Intervention: Met with patient for individual psychotherapy.    Chief complaint/reason for encounter: anxiety and interpersonal     Interval history and content of current session: Therapist and pt began the session by discussion her goals for therapy. Pt reported wanting to "have a voice" in her marriage and "recognize and decrease self-doubt, "increase confidence," and disrupt this need to fulfill the role of . Therapist and pt processed the family dynamics and each individual's role. She described her  as someone who take no responsibility in helping with the children, and has high expectation of pt to "deal with it" (I.e., all problems that arise). PT gave the example of having a grand mal seizure "30 minutes" and her  freezing and then calling her mother and his mother, as apposed to calling 9-1-1. PT reported after coming out of the seizure, she directed her  Time on the steps to take to get pt help. We discussed reading material to better understand her  and his behavior. The following were placed in pt's AVS for review: 1) Stop Walking on Eggshells: Taking Your Life Back When Someone You Care about Has Borderline Personality Disorder  2) Why Is It Always About You?  The Seven Deadly Sins of Narcissism by Abbie Galindo    Therapist also educated pt on her own basic human rights and assertive communication strategies; which she was receptive to.  To increase understand of the skils, pt was provided wit hard-copy educatioal forms.     Treatment plan:  · Target symptoms: anxiety , marital discord  · Why chosen therapy is appropriate versus another modality: relevant to diagnosis, patient responds to this modality, evidence based " practice  · Outcome monitoring methods: self-report, observation, checklist/rating scale  · Therapeutic intervention type: insight oriented psychotherapy, behavior modifying psychotherapy, supportive psychotherapy, interactive psychotherapy    Risk parameters:  Patient reports no suicidal ideation  Patient reports no homicidal ideation  Patient reports no self-injurious behavior  Patient reports no violent behavior    Verbal deficits: None    Patient's response to intervention:  The patient's response to intervention is accepting, motivated.    Progress toward goals and other mental status changes:  The patient's progress toward goals is limited.    Diagnosis:     ICD-10-CM ICD-9-CM   1. Anxiety  F41.9 300.00       Plan: Pt plans to continue individual psychotherapy to continue to process anxiety and learn to coping strategies for interpersonal relationships.     Return to clinic: 2 weeks    Length of Service (minutes): 60

## 2020-06-19 ENCOUNTER — TELEPHONE (OUTPATIENT)
Dept: BEHAVIORAL HEALTH | Facility: CLINIC | Age: 55
End: 2020-06-19

## 2020-06-19 PROBLEM — F41.9 ANXIETY: Status: ACTIVE | Noted: 2020-06-19

## 2020-06-19 NOTE — PROGRESS NOTES
CHW reached out to pt to schedule (her) next appointment with [facility] RomeW, pt schedule appointment for (Monday July 14,2020 at 9AM).

## 2020-06-19 NOTE — PATIENT INSTRUCTIONS
1) Stop Walking on Eggshells: Taking Your Life Back When Someone You Care about Has Borderline Personality Disorder  2) Why Is It Always About You?  The Seven Deadly Sins of Narcissism by Abbie Galindo      Therapist in [place]:   [email or web address]

## 2020-07-01 ENCOUNTER — PATIENT MESSAGE (OUTPATIENT)
Dept: INTERNAL MEDICINE | Facility: CLINIC | Age: 55
End: 2020-07-01

## 2020-07-01 DIAGNOSIS — Z01.84 IMMUNITY STATUS TESTING: Primary | ICD-10-CM

## 2020-07-09 ENCOUNTER — APPOINTMENT (OUTPATIENT)
Dept: RADIOLOGY | Facility: OTHER | Age: 55
End: 2020-07-09
Attending: OBSTETRICS & GYNECOLOGY
Payer: COMMERCIAL

## 2020-07-09 ENCOUNTER — OFFICE VISIT (OUTPATIENT)
Dept: OBSTETRICS AND GYNECOLOGY | Facility: CLINIC | Age: 55
End: 2020-07-09
Payer: COMMERCIAL

## 2020-07-09 VITALS — HEIGHT: 62 IN | BODY MASS INDEX: 21.94 KG/M2 | WEIGHT: 119.25 LBS

## 2020-07-09 VITALS
BODY MASS INDEX: 22.03 KG/M2 | SYSTOLIC BLOOD PRESSURE: 116 MMHG | HEIGHT: 62 IN | WEIGHT: 119.69 LBS | DIASTOLIC BLOOD PRESSURE: 62 MMHG

## 2020-07-09 DIAGNOSIS — Z12.31 BREAST CANCER SCREENING BY MAMMOGRAM: ICD-10-CM

## 2020-07-09 DIAGNOSIS — N95.2 VAGINAL ATROPHY: ICD-10-CM

## 2020-07-09 DIAGNOSIS — Z01.411 ENCOUNTER FOR GYNECOLOGICAL EXAMINATION WITH ABNORMAL FINDING: Primary | ICD-10-CM

## 2020-07-09 PROCEDURE — 99999 PR PBB SHADOW E&M-EST. PATIENT-LVL III: ICD-10-PCS | Mod: PBBFAC,,, | Performed by: OBSTETRICS & GYNECOLOGY

## 2020-07-09 PROCEDURE — 77067 SCR MAMMO BI INCL CAD: CPT | Mod: 26,,, | Performed by: RADIOLOGY

## 2020-07-09 PROCEDURE — 77063 MAMMO DIGITAL SCREENING BILAT WITH TOMOSYNTHESIS_CAD: ICD-10-PCS | Mod: 26,,, | Performed by: RADIOLOGY

## 2020-07-09 PROCEDURE — 99396 PR PREVENTIVE VISIT,EST,40-64: ICD-10-PCS | Mod: S$GLB,,, | Performed by: OBSTETRICS & GYNECOLOGY

## 2020-07-09 PROCEDURE — 77063 BREAST TOMOSYNTHESIS BI: CPT | Mod: 26,,, | Performed by: RADIOLOGY

## 2020-07-09 PROCEDURE — 77067 SCR MAMMO BI INCL CAD: CPT | Mod: TC,PN

## 2020-07-09 PROCEDURE — 77067 MAMMO DIGITAL SCREENING BILAT WITH TOMOSYNTHESIS_CAD: ICD-10-PCS | Mod: 26,,, | Performed by: RADIOLOGY

## 2020-07-09 PROCEDURE — 99396 PREV VISIT EST AGE 40-64: CPT | Mod: S$GLB,,, | Performed by: OBSTETRICS & GYNECOLOGY

## 2020-07-09 PROCEDURE — 99999 PR PBB SHADOW E&M-EST. PATIENT-LVL III: CPT | Mod: PBBFAC,,, | Performed by: OBSTETRICS & GYNECOLOGY

## 2020-07-09 RX ORDER — CONJUGATED ESTROGENS 0.62 MG/G
CREAM VAGINAL
Qty: 45 G | Refills: 1 | Status: SHIPPED | OUTPATIENT
Start: 2020-07-09 | End: 2021-02-19

## 2020-07-09 RX ORDER — CLOBETASOL PROPIONATE 0.05 G/100ML
SHAMPOO TOPICAL
COMMUNITY
Start: 2020-06-11 | End: 2021-07-29

## 2020-07-09 RX ORDER — IVERMECTIN 10 MG/G
CREAM TOPICAL
COMMUNITY
Start: 2020-06-12 | End: 2021-02-19

## 2020-07-09 NOTE — PROGRESS NOTES
Well-woman exam    Darling Quach  is a 55 y.o. year old  who presents for annual exam.  She is S/P hysterectomy.  Patient reports vaginal dryness and painful sexual relations.  Otherwise, no gynecologic complaints today.    Screening mammogram done today    Past Medical History:   Diagnosis Date    Cataract     Glaucoma     Hemangioma     cavernous    History of acne     dr lopez prescribed accutane    Hyperlipidemia     Seizures     Venous angioma of brain     Vertigo        Past Surgical History:   Procedure Laterality Date    ANAL SPHINCTEROTOMY  2019    Procedure: SPHINCTEROTOMY, ANAL;  Surgeon: Nirmal Chan MD;  Location: [facility] OR [address] [place] [address];  Service: Colon and Rectal;;    CATARACT EXTRACTION W/  INTRAOCULAR LENS IMPLANT Right 2014        EXCISIONAL HEMORRHOIDECTOMY N/A 2019    Procedure: HEMORRHOIDECTOMY;  Surgeon: Nirmal Chan MD;  Location: [facility] OR [address] [place] [address];  Service: Colon and Rectal;  Laterality: N/A;    HYSTERECTOMY      DLH ov in situ        OB History        3    Para   3    Term   3            AB        Living   3       SAB        TAB        Ectopic        Multiple        Live Births                     ROS:  GENERAL: Feeling well overall.   SKIN: Denies rash or lesions.   HEAD: Denies head injury or headache.   NODES: Denies enlarged lymph nodes.   CHEST: Denies chest pain or shortness of breath.   CARDIOVASCULAR: Denies palpitations or left sided chest pain.   ABDOMEN: No abdominal pain, nausea, vomiting or rectal bleeding.   URINARY: No dysuria, hematuria, or burning on urination.  REPRODUCTIVE: See HPI.   BREASTS: Denies pain, lumps, or nipple discharge.   HEMATOLOGIC: No easy bruisability or excessive bleeding.   MUSCULOSKELETAL: Denies joint pain or swelling.   NEUROLOGIC: Denies syncope or weakness.   PSYCHIATRIC: Denies depression.    PE:   APPEARANCE: Well nourished, well developed, in no acute  distress.  NECK: Neck symmetric without masses or thyromegaly.  NODES: No inguinal lymph node enlargement.  ABDOMEN: Soft. No tenderness or masses. No hernias.  BREASTS: Symmetrical, no skin changes or visible lesions. No palpable masses, nipple discharge or adenopathy bilaterally.  PELVIC: Normal external female genitalia without lesions. Normal hair distribution. Adequate perineal body, normal urethral meatus. Vagina mildly atrophic without lesions or discharge. No significant cystocele or rectocele. Uterus and cervix surgically absent. Bimanual exam revealed no masses, tenderness or abnormality.  ANUS: Normal.  External hemorrhoids noted (mild).    Diagnosis:  1. Encounter for gynecological examination with abnormal finding    2. Vaginal atrophy          PLAN:    Orders Placed This Encounter    conjugated estrogens (PREMARIN) vaginal cream     Age specific counseling    Patient was counseled today on postmenopausal issues.     Vaginal atrophy treatment options reviewed today.  Will start with Premarin vaginal cream as prescribed.  IntraRosa information given to patient today.  Patient contact office if treatment changes desired.    Follow-up in 1 year.    El Mayes IV, MD

## 2020-07-10 ENCOUNTER — TELEPHONE (OUTPATIENT)
Dept: BEHAVIORAL HEALTH | Facility: CLINIC | Age: 55
End: 2020-07-10

## 2020-07-14 ENCOUNTER — LAB VISIT (OUTPATIENT)
Dept: LAB | Facility: HOSPITAL | Age: 55
End: 2020-07-14
Attending: INTERNAL MEDICINE
Payer: COMMERCIAL

## 2020-07-14 DIAGNOSIS — Z01.84 IMMUNITY STATUS TESTING: ICD-10-CM

## 2020-07-14 LAB — SARS-COV-2 IGG SERPLBLD QL IA.RAPID: NEGATIVE

## 2020-07-14 PROCEDURE — 36415 COLL VENOUS BLD VENIPUNCTURE: CPT

## 2020-07-14 PROCEDURE — 86769 SARS-COV-2 COVID-19 ANTIBODY: CPT

## 2020-07-20 ENCOUNTER — TELEPHONE (OUTPATIENT)
Dept: OBSTETRICS AND GYNECOLOGY | Facility: CLINIC | Age: 55
End: 2020-07-20

## 2020-07-20 RX ORDER — ESTRADIOL 0.1 MG/G
1 CREAM VAGINAL DAILY
Qty: 42.5 G | Refills: 1 | Status: SHIPPED | OUTPATIENT
Start: 2020-07-20 | End: 2021-02-19

## 2020-07-23 ENCOUNTER — TELEPHONE (OUTPATIENT)
Dept: BEHAVIORAL HEALTH | Facility: CLINIC | Age: 55
End: 2020-07-23

## 2020-07-23 NOTE — PROGRESS NOTES
Behavioral Health Community Health Worker  Follow-Up  Completed by:  Karley Rose    Date:  7/23/2020    Patient Enrollment in Behavioral Health Program:  · Mrs. Darling Quach was enrolled in the Behavioral Health Program on June 4,2020     Assessments     Promis 10:  PROMIS-10 6/2/2020   In general, would you say your health is 3   In general, would you say your quality of life is 5   In general, how would you rate your physical health? 3   In general, how would you rate your mental health, including your mood and your ability to think? 3   In general, how would you rate your satisfaction with your social activities and relationships? 3   In general, please rate how well you carry out your usual social activities and roles. (This includes activities at home, at work and in your community, and responsibilities as a parent, child, spouse, employee, friend, etc.) 3   To what extent are you able to carry out your everyday physical activities such as walking, climbing stairs, carrying groceries, or moving a chair?  5   In the past 7 days, how often have you been bothered by emotional problems such as feeling anxious, depressed or irritable? 3   In the past 7 days, how would you rate your fatigue on average? 2   In the past 7 days, on a scale of 0 to 10 (where 0 is no pain and 10 is the worst pain imaginable) how would you rate your pain on average? 0   Global Physical Health 10   Global Mental health Score 14       Depression PHQ:  PHQ9 7/23/2020   Total Score 3       Generalized Anxiety Disorder 7-Item Scale:  GAD7 7/23/2020   1. Feeling nervous, anxious, or on edge? 1   2. Not being able to stop or control worrying? 1   3. Worrying too much about different things? 1   4. Trouble relaxing? 1   5. Being so restless that it is hard to sit still? 0   6. Becoming easily annoyed or irritable? 0   7. Feeling afraid as if something awful might happen? 0   8. If you checked off any problems, how difficult have these  problems made it for you to do your work, take care of things at home, or get along with other people? 0   PHUONG-7 Score 4       Patients' Global Impression of Change (PGIC) Scale:  Since beginning treatment at this clinic, how would you describe the change (if any) in ACTIVITY LIMITATIONS, SYMPTOMS, EMOTIONS, and OVERALL QUALITY OF LIFE, related to your painful condition?  No Value exists for the : OHS#12853      In a similar way, please check the number below that matches your degree of change since beginning care at this clinic (Much better (0) - Much Worse (10)): No Value exists for the : OHS#88908        Much Better                                     No Change                                    Much Worse                        -----------------------------------------------------------------------------                        0       1       2       3       4       5       6       7      8       9      10                     Call Summary     CHW spoke with Darling Quach who stated that she is doing much better the reason for canceling was due to her  being ruled out for COVID-19. 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 Darling Quach completed her follow up assessment on July 23,2020. Darling Quach scored a (3) on the PHQ-9 and a (4) on the PHUONG-7

## 2020-07-25 ENCOUNTER — OFFICE VISIT (OUTPATIENT)
Dept: INTERNAL MEDICINE | Facility: CLINIC | Age: 55
End: 2020-07-25
Payer: COMMERCIAL

## 2020-07-25 VITALS
WEIGHT: 118.81 LBS | DIASTOLIC BLOOD PRESSURE: 74 MMHG | SYSTOLIC BLOOD PRESSURE: 110 MMHG | HEIGHT: 62 IN | BODY MASS INDEX: 21.86 KG/M2 | OXYGEN SATURATION: 98 % | HEART RATE: 84 BPM

## 2020-07-25 DIAGNOSIS — I78.1 SPIDER VEINS: Primary | ICD-10-CM

## 2020-07-25 PROCEDURE — 3008F PR BODY MASS INDEX (BMI) DOCUMENTED: ICD-10-PCS | Mod: CPTII,S$GLB,, | Performed by: INTERNAL MEDICINE

## 2020-07-25 PROCEDURE — 3008F BODY MASS INDEX DOCD: CPT | Mod: CPTII,S$GLB,, | Performed by: INTERNAL MEDICINE

## 2020-07-25 PROCEDURE — 99999 PR PBB SHADOW E&M-EST. PATIENT-LVL III: CPT | Mod: PBBFAC,,, | Performed by: INTERNAL MEDICINE

## 2020-07-25 PROCEDURE — 99999 PR PBB SHADOW E&M-EST. PATIENT-LVL III: ICD-10-PCS | Mod: PBBFAC,,, | Performed by: INTERNAL MEDICINE

## 2020-07-25 PROCEDURE — 99212 PR OFFICE/OUTPT VISIT, EST, LEVL II, 10-19 MIN: ICD-10-PCS | Mod: S$GLB,,, | Performed by: INTERNAL MEDICINE

## 2020-07-25 PROCEDURE — 99212 OFFICE O/P EST SF 10 MIN: CPT | Mod: S$GLB,,, | Performed by: INTERNAL MEDICINE

## 2020-07-25 NOTE — PROGRESS NOTES
Subjective:      Patient ID: Darling Quach is a 55 y.o. female.    Chief Complaint: Bleeding/Bruising    HPI:  HPI   Patient on the right leg what was in the picture is now spider veins    Several questions were discussed: answers given to the patient regarding she would have to ask her Neurologist.  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    Vitreous opacity - Right Eye    Venous angioma of brain    S/P hysterectomy    Hx of colonoscopy:10/10/2016    Anal fissure    Complex partial seizures evolving to generalized tonic-clonic seizures    Fissure in ano    Anxiety    Vaginal atrophy     Past Medical History:   Diagnosis Date    Cataract     Glaucoma     Hemangioma     cavernous    History of acne 2004    dr lopez prescribed accutane    Hyperlipidemia     Seizures     Venous angioma of brain     Vertigo      Past Surgical History:   Procedure Laterality Date    ANAL SPHINCTEROTOMY  5/27/2019    Procedure: SPHINCTEROTOMY, ANAL;  Surgeon: Nirmal Chan MD;  Location: [facility] OR [address] [place] [address];  Service: Colon and Rectal;;    CATARACT EXTRACTION W/  INTRAOCULAR LENS IMPLANT Right 12/17/2014        EXCISIONAL HEMORRHOIDECTOMY N/A 5/27/2019    Procedure: HEMORRHOIDECTOMY;  Surgeon: Nirmal Chan MD;  Location: [facility] OR [address] [place] [address];  Service: Colon and Rectal;  Laterality: N/A;    HYSTERECTOMY  2010    DLH ov in situ      Family History   Problem Relation Age of Onset    Fibroids Mother     Migraines Mother     Glaucoma Mother     Heart disease Father     Depression Brother     No Known Problems Sister     Diabetes Maternal Grandmother     Glaucoma Maternal Grandmother     Cancer Maternal Aunt         breast    Breast cancer Maternal Aunt 50    No Known Problems Maternal Uncle      "No Known Problems Paternal Aunt     No Known Problems Paternal Uncle     No Known Problems Maternal Grandfather     No Known Problems Paternal Grandmother     No Known Problems Paternal Grandfather     Macular degeneration Neg Hx     Blindness Neg Hx     Melanoma Neg Hx     Psoriasis Neg Hx     Lupus Neg Hx     Eczema Neg Hx     Acne Neg Hx     Cataracts Neg Hx     Amblyopia Neg Hx     Retinal detachment Neg Hx     Strabismus Neg Hx     Stroke Neg Hx     Thyroid disease Neg Hx     Hypertension Neg Hx     Ovarian cancer Neg Hx     Colon cancer Neg Hx      Review of Systems   No new symptoms  Objective:     Vitals:    07/25/20 0759   BP: 110/74   Pulse: 84   SpO2: 98%   Weight: 53.9 kg (118 lb 13.3 oz)   Height: 5' 2" (1.575 m)   PainSc: 0-No pain     Body mass index is 21.73 kg/m².  Physical Exam   Area in question Spider vein  Assessment:     1. Spider veins      Plan:   Darling was seen today for bleeding/bruising.    Diagnoses and all orders for this visit:    Spider veins    No treatment necessary    Problem List Items Addressed This Visit     None      Visit Diagnoses     Spider veins    -  Primary        No orders of the defined types were placed in this encounter.    No follow-ups on file.     Medication List          Accurate as of July 25, 2020 12:13 PM. If you have any questions, ask your nurse or doctor.            CONTINUE taking these medications    ADVIL ORAL     clobetasoL 0.05 % shampoo  Commonly known as: CLOBEX     estradioL 0.01 % (0.1 mg/gram) vaginal cream  Commonly known as: ESTRACE  Place 1 g vaginally once daily.     ketoconazole 2 % shampoo  Commonly known as: NIZORAL     levetiracetam  mg Tb24 tablet  Commonly known as: KEPPRA XR     LEXETTE 0.05 % Foam  Generic drug: halobetasol propionate     PREMARIN vaginal cream  Generic drug: conjugated estrogens  Use nightly at bedtime x 0.5 g intravaginally x 2 weeks.  Then use maintenance dosage:  0.5 g twice weekly   "   SOOLANTRA 1 % Crea  Generic drug: ivermectin

## 2020-08-27 NOTE — PROGRESS NOTES
HPI     DLS: 1/10/20    Pt here for HRT review;    Meds:    AT's PRN OU     1. MMG/OHT OU   2. PVD OU   3. Vitreous Opacity OD   4. PCIOL OD   5. Steroid Responder   6.  Hx Removal Left Eyelid Papilloma (Dr. Alvarado)    Last edited by Nivia Cox on 8/28/2020  3:18 PM. (History)              Assessment /Plan     For exam results, see Encounter Report.    Glaucoma due to combination of mechanisms - Both Eyes    Steroid responders borderline glaucoma, bilateral    Nuclear sclerosis, left    Posterior vitreous detachment, bilateral    PCO (posterior capsular opacification), right    Pseudophakia of right eye        PT IS TRANSFER ING FROM [facility] TO [place] - PHOTO file is now in  [place]  TRANSFER BACK TO [facility] - [place]     1. Mixed mechanism Glaucoma   H/O narrow angles - S/P PI's, + residual OHT   First HVF 1998   First photos 1999   Former [facility] , retired for a few years and went back to work as a    (mom with same problem with narrow angles and residual OHT)     Family history + mom - Narrow angles with residual OHT   Glaucoma meds - off gtts (use to use timolol)   H/O adverse rxn to glaucoma drops - latanoprost - eye irritation  LASERS PI's ou // yag cap od 4/25/2019  GLAUCOMA SURGERIES none   OTHER EYE SURGERIES - PC IOL OD 12/17/2014 - [facility]  CDR 0.4/0.4   Tbase 23-36 / 23-35 ou   Tmax 36/35   Ttarget 30/30  HVF 16 test 24-2 ss ou 1996 to 2020 ([facility]) - hint SAD od // full os            3 SWAP test 2010 to 2012 - Full ou            ( repeat HVF from 8/2013 to 9/2013 - new SAD od confirmed)            [place] VF's - 1 test 2016 to 2016 - SNS /INS od // full os   2019 VF full od/near full os   [place] +3 ou S/P PI's   /618   OCT 4 test 2005 -  2020 ([facility]) - RNFL - bord TI  od/ nl os   HRT - 5 test - 2014 to 2020 - MR -  nl od // bord SN os /// CDR 0.55 od // 0.55 os   Mix of [facility] and [facility]   Disc photos 1999, 2004, 2013 - slides // 5/13/2010 -  // 2019 - OIS     - Ttoday  26/26  - Test done today - HRT/ gonio    2. PVD ou - with dense vitreous veil OD Saw Arend 4/23/2012 4/2019 - pt is noticing the floaters again (had not noticed them for several years)     3. Vitreous opacity OD     4. Steroid responder - had an injection / ? Spironolactone / aldactone - not currently on any steroids    5. Refractive error   Was bilateral hyperope / presbyope   Is anisopmetropic post CE od    Glasses from [place] and doing well     6. H/O eyelid papilloma Left - S/P removal - [place]     7. Pseudophakia od   PC IOL od 12/17/2014 - SN60wf 20.5   Had a large myopic shift pre surgery from +1.00 to -3.00    8.  I see her father in law - he has glaucoma - and I did his cataracts    I see her mother - she has similar issues to this patient     Plan   MMG  / OHT ou  S/P PI's ou   HVF today near full OU    Intolerant to latanoprost - red irritated eye  Off all glaucoma gtts - (use to use timolol) 7/2019     [place] Rx - Boudreuax - 5/9/2019 - says yag cap helped a lot       F/U - 6 months  With HVF / DFE / OCT    Consider re-starting timolol prn - ?? Mild prog od

## 2020-08-28 ENCOUNTER — OFFICE VISIT (OUTPATIENT)
Dept: OPHTHALMOLOGY | Facility: CLINIC | Age: 55
End: 2020-08-28
Payer: COMMERCIAL

## 2020-08-28 DIAGNOSIS — H25.12 NUCLEAR SCLEROSIS, LEFT: ICD-10-CM

## 2020-08-28 DIAGNOSIS — H40.043 STEROID RESPONDERS BORDERLINE GLAUCOMA, BILATERAL: ICD-10-CM

## 2020-08-28 DIAGNOSIS — H40.89 GLAUCOMA DUE TO COMBINATION OF MECHANISMS: ICD-10-CM

## 2020-08-28 DIAGNOSIS — Z96.1 PSEUDOPHAKIA OF RIGHT EYE: ICD-10-CM

## 2020-08-28 DIAGNOSIS — H40.89 GLAUCOMA DUE TO COMBINATION OF MECHANISMS: Primary | ICD-10-CM

## 2020-08-28 DIAGNOSIS — H26.491 PCO (POSTERIOR CAPSULAR OPACIFICATION), RIGHT: ICD-10-CM

## 2020-08-28 DIAGNOSIS — H43.813 POSTERIOR VITREOUS DETACHMENT, BILATERAL: ICD-10-CM

## 2020-08-28 PROCEDURE — 99999 PR PBB SHADOW E&M-EST. PATIENT-LVL III: CPT | Mod: PBBFAC,,, | Performed by: OPHTHALMOLOGY

## 2020-08-28 PROCEDURE — 92133 HEIDELBERG RETINA TOMOGRAPHY (HRT) - OU - BOTH EYES: ICD-10-PCS | Mod: S$GLB,,, | Performed by: OPHTHALMOLOGY

## 2020-08-28 PROCEDURE — 99999 PR PBB SHADOW E&M-EST. PATIENT-LVL III: ICD-10-PCS | Mod: PBBFAC,,, | Performed by: OPHTHALMOLOGY

## 2020-08-28 PROCEDURE — 92012 PR EYE EXAM, EST PATIENT,INTERMED: ICD-10-PCS | Mod: S$GLB,,, | Performed by: OPHTHALMOLOGY

## 2020-08-28 PROCEDURE — 92020 PR SPECIAL EYE EVAL,GONIOSCOPY: ICD-10-PCS | Mod: S$GLB,,, | Performed by: OPHTHALMOLOGY

## 2020-08-28 PROCEDURE — 92012 INTRM OPH EXAM EST PATIENT: CPT | Mod: S$GLB,,, | Performed by: OPHTHALMOLOGY

## 2020-08-28 PROCEDURE — 92020 GONIOSCOPY: CPT | Mod: S$GLB,,, | Performed by: OPHTHALMOLOGY

## 2020-08-28 PROCEDURE — 92133 CPTRZD OPH DX IMG PST SGM ON: CPT | Mod: S$GLB,,, | Performed by: OPHTHALMOLOGY

## 2020-10-01 ENCOUNTER — PATIENT OUTREACH (OUTPATIENT)
Dept: ADMINISTRATIVE | Facility: OTHER | Age: 55
End: 2020-10-01

## 2020-10-02 ENCOUNTER — OFFICE VISIT (OUTPATIENT)
Dept: OPTOMETRY | Facility: CLINIC | Age: 55
End: 2020-10-02
Payer: COMMERCIAL

## 2020-10-02 DIAGNOSIS — H25.12 NUCLEAR SCLEROSIS, LEFT: Primary | ICD-10-CM

## 2020-10-02 DIAGNOSIS — H40.053 OCULAR HYPERTENSION, BILATERAL: ICD-10-CM

## 2020-10-02 DIAGNOSIS — H52.4 PRESBYOPIA: ICD-10-CM

## 2020-10-02 PROCEDURE — 92012 INTRM OPH EXAM EST PATIENT: CPT | Mod: S$GLB,,, | Performed by: OPTOMETRIST

## 2020-10-02 PROCEDURE — 92015 DETERMINE REFRACTIVE STATE: CPT | Mod: S$GLB,,, | Performed by: OPTOMETRIST

## 2020-10-02 PROCEDURE — 92015 PR REFRACTION: ICD-10-PCS | Mod: S$GLB,,, | Performed by: OPTOMETRIST

## 2020-10-02 PROCEDURE — 99999 PR PBB SHADOW E&M-EST. PATIENT-LVL III: ICD-10-PCS | Mod: PBBFAC,,, | Performed by: OPTOMETRIST

## 2020-10-02 PROCEDURE — 92012 PR EYE EXAM, EST PATIENT,INTERMED: ICD-10-PCS | Mod: S$GLB,,, | Performed by: OPTOMETRIST

## 2020-10-02 PROCEDURE — 99999 PR PBB SHADOW E&M-EST. PATIENT-LVL III: CPT | Mod: PBBFAC,,, | Performed by: OPTOMETRIST

## 2020-10-02 NOTE — PROGRESS NOTES
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 Pt states her depth perception is off ---when she is   pulling into a parking spot and she thinks she is all the way up she not   even half way there.   No f/f  No gtts         Last edited by Juan Diego Burnett, OD on 10/2/2020  2:46 PM. (History)        ROS     Positive for: Eyes (cat surgery/PI/glauc hx)    Negative for: Constitutional, Gastrointestinal, Neurological, Skin,   Genitourinary, Musculoskeletal, HENT, Endocrine, Cardiovascular,   Respiratory, Psychiatric, Allergic/Imm, Heme/Lymph    Last edited by Juan Diego Burnett, OD on 10/2/2020  2:46 PM. (History)        Assessment /Plan     For exam results, see Encounter Report.    Nuclear sclerosis, left    Ocular hypertension, bilateral    Presbyopia      1. Cat OS  2. Sp pciol OU  3. PI OU/hx glauc tx w Dr Camacho  4. Myopic shift OS--most likely due to cat--wrote new spex Rx    PLAN:    rtc as sched w Dr Camacho

## 2020-10-06 ENCOUNTER — PATIENT MESSAGE (OUTPATIENT)
Dept: OPHTHALMOLOGY | Facility: CLINIC | Age: 55
End: 2020-10-06

## 2020-12-18 ENCOUNTER — TELEPHONE (OUTPATIENT)
Dept: NEUROLOGY | Facility: CLINIC | Age: 55
End: 2020-12-18

## 2020-12-18 NOTE — TELEPHONE ENCOUNTER
----- Message from Velia Posada sent at 12/18/2020 11:09 AM CST -----  Regarding: questions about a EEG done in 2017  Contact: Darling Quach  questions about a EEG done in 2017 that was ordered by Dr. Sahu. She can be reached at [phone]

## 2021-02-18 ENCOUNTER — OFFICE VISIT (OUTPATIENT)
Dept: INTERNAL MEDICINE | Facility: CLINIC | Age: 56
End: 2021-02-18
Payer: COMMERCIAL

## 2021-02-18 ENCOUNTER — LAB VISIT (OUTPATIENT)
Dept: LAB | Facility: HOSPITAL | Age: 56
End: 2021-02-18
Attending: INTERNAL MEDICINE
Payer: COMMERCIAL

## 2021-02-18 VITALS
WEIGHT: 121.5 LBS | OXYGEN SATURATION: 99 % | DIASTOLIC BLOOD PRESSURE: 64 MMHG | BODY MASS INDEX: 22.36 KG/M2 | SYSTOLIC BLOOD PRESSURE: 102 MMHG | HEART RATE: 71 BPM | HEIGHT: 62 IN

## 2021-02-18 DIAGNOSIS — Z00.00 WELLNESS EXAMINATION: Primary | ICD-10-CM

## 2021-02-18 DIAGNOSIS — Z00.00 WELLNESS EXAMINATION: ICD-10-CM

## 2021-02-18 LAB
25(OH)D3+25(OH)D2 SERPL-MCNC: 39 NG/ML (ref 30–96)
ALBUMIN SERPL BCP-MCNC: 4.1 G/DL (ref 3.5–5.2)
ALP SERPL-CCNC: 69 U/L (ref 55–135)
ALT SERPL W/O P-5'-P-CCNC: 12 U/L (ref 10–44)
ANION GAP SERPL CALC-SCNC: 8 MMOL/L (ref 8–16)
AST SERPL-CCNC: 19 U/L (ref 10–40)
BASOPHILS # BLD AUTO: 0.08 K/UL (ref 0–0.2)
BASOPHILS NFR BLD: 1.3 % (ref 0–1.9)
BILIRUB SERPL-MCNC: 0.4 MG/DL (ref 0.1–1)
BUN SERPL-MCNC: 17 MG/DL (ref 6–20)
CALCIUM SERPL-MCNC: 9.5 MG/DL (ref 8.7–10.5)
CHLORIDE SERPL-SCNC: 103 MMOL/L (ref 95–110)
CHOLEST SERPL-MCNC: 283 MG/DL (ref 120–199)
CHOLEST/HDLC SERPL: 2.6 {RATIO} (ref 2–5)
CO2 SERPL-SCNC: 30 MMOL/L (ref 23–29)
CREAT SERPL-MCNC: 1 MG/DL (ref 0.5–1.4)
DIFFERENTIAL METHOD: NORMAL
EOSINOPHIL # BLD AUTO: 0.1 K/UL (ref 0–0.5)
EOSINOPHIL NFR BLD: 1.5 % (ref 0–8)
ERYTHROCYTE [DISTWIDTH] IN BLOOD BY AUTOMATED COUNT: 12.7 % (ref 11.5–14.5)
EST. GFR  (AFRICAN AMERICAN): >60 ML/MIN/1.73 M^2
EST. GFR  (NON AFRICAN AMERICAN): >60 ML/MIN/1.73 M^2
GLUCOSE SERPL-MCNC: 100 MG/DL (ref 70–110)
HCT VFR BLD AUTO: 41.2 % (ref 37–48.5)
HDLC SERPL-MCNC: 108 MG/DL (ref 40–75)
HDLC SERPL: 38.2 % (ref 20–50)
HGB BLD-MCNC: 13.6 G/DL (ref 12–16)
IMM GRANULOCYTES # BLD AUTO: 0.02 K/UL (ref 0–0.04)
IMM GRANULOCYTES NFR BLD AUTO: 0.3 % (ref 0–0.5)
LDLC SERPL CALC-MCNC: 159.4 MG/DL (ref 63–159)
LYMPHOCYTES # BLD AUTO: 1.5 K/UL (ref 1–4.8)
LYMPHOCYTES NFR BLD: 24.4 % (ref 18–48)
MCH RBC QN AUTO: 29.1 PG (ref 27–31)
MCHC RBC AUTO-ENTMCNC: 33 G/DL (ref 32–36)
MCV RBC AUTO: 88 FL (ref 82–98)
MONOCYTES # BLD AUTO: 0.4 K/UL (ref 0.3–1)
MONOCYTES NFR BLD: 6.1 % (ref 4–15)
NEUTROPHILS # BLD AUTO: 4 K/UL (ref 1.8–7.7)
NEUTROPHILS NFR BLD: 66.4 % (ref 38–73)
NONHDLC SERPL-MCNC: 175 MG/DL
NRBC BLD-RTO: 0 /100 WBC
PLATELET # BLD AUTO: 251 K/UL (ref 150–350)
PMV BLD AUTO: 10.5 FL (ref 9.2–12.9)
POTASSIUM SERPL-SCNC: 4.1 MMOL/L (ref 3.5–5.1)
PROT SERPL-MCNC: 7.6 G/DL (ref 6–8.4)
RBC # BLD AUTO: 4.67 M/UL (ref 4–5.4)
SODIUM SERPL-SCNC: 141 MMOL/L (ref 136–145)
TRIGL SERPL-MCNC: 78 MG/DL (ref 30–150)
VIT B12 SERPL-MCNC: 542 PG/ML (ref 210–950)
WBC # BLD AUTO: 6.02 K/UL (ref 3.9–12.7)

## 2021-02-18 PROCEDURE — 36415 COLL VENOUS BLD VENIPUNCTURE: CPT

## 2021-02-18 PROCEDURE — 3008F PR BODY MASS INDEX (BMI) DOCUMENTED: ICD-10-PCS | Mod: CPTII,S$GLB,, | Performed by: INTERNAL MEDICINE

## 2021-02-18 PROCEDURE — 3008F BODY MASS INDEX DOCD: CPT | Mod: CPTII,S$GLB,, | Performed by: INTERNAL MEDICINE

## 2021-02-18 PROCEDURE — 80053 COMPREHEN METABOLIC PANEL: CPT

## 2021-02-18 PROCEDURE — 99999 PR PBB SHADOW E&M-EST. PATIENT-LVL III: CPT | Mod: PBBFAC,,, | Performed by: INTERNAL MEDICINE

## 2021-02-18 PROCEDURE — 85025 COMPLETE CBC W/AUTO DIFF WBC: CPT

## 2021-02-18 PROCEDURE — 82306 VITAMIN D 25 HYDROXY: CPT

## 2021-02-18 PROCEDURE — 1126F AMNT PAIN NOTED NONE PRSNT: CPT | Mod: S$GLB,,, | Performed by: INTERNAL MEDICINE

## 2021-02-18 PROCEDURE — 99396 PR PREVENTIVE VISIT,EST,40-64: ICD-10-PCS | Mod: S$GLB,,, | Performed by: INTERNAL MEDICINE

## 2021-02-18 PROCEDURE — 99396 PREV VISIT EST AGE 40-64: CPT | Mod: S$GLB,,, | Performed by: INTERNAL MEDICINE

## 2021-02-18 PROCEDURE — 80061 LIPID PANEL: CPT

## 2021-02-18 PROCEDURE — 99999 PR PBB SHADOW E&M-EST. PATIENT-LVL III: ICD-10-PCS | Mod: PBBFAC,,, | Performed by: INTERNAL MEDICINE

## 2021-02-18 PROCEDURE — 82607 VITAMIN B-12: CPT

## 2021-02-18 PROCEDURE — 1126F PR PAIN SEVERITY QUANTIFIED, NO PAIN PRESENT: ICD-10-PCS | Mod: S$GLB,,, | Performed by: INTERNAL MEDICINE

## 2021-02-24 ENCOUNTER — PATIENT MESSAGE (OUTPATIENT)
Dept: INTERNAL MEDICINE | Facility: CLINIC | Age: 56
End: 2021-02-24

## 2021-04-23 NOTE — CONSULTS
"[facility]  Neurology  Consult Note    Patient Name: Darling Quach  MRN: [id number]  Admission Date: 7/14/2017  Hospital Length of Stay: 0 days  Code Status: Full Code   Attending Provider: Akbar Hughes MD   Consulting Provider: Freddie Spivey MD  Primary Care Physician: Kimberly Ellison MD  Principal Problem:Seizure    Consults   Subjective:     Chief Complaint:  ?Seizure     HPI:   Per ED: The patient is a 52 y.o. female with hx of: vertigo, HLD, and hemangioma followed by Dr. Matson that presents to the ED with a complaint of seizure which occurred 5 hours ago. Patient's spouse observed that while patient was laying in bed, she started experiencing tremors of her lower extremities and body while upper extremities were clenched and still. She was foaming at the mouth and unresponsive to any stimuli after  rolled her onto her side. Patient cannot remember what occurred this morning, and  reports that she was confused for 25-30 minutes. She reports feeling "kind of weak" for the last few days and mentions having a headache last night, which is not typical for her, for which she took 2 aspirin prior to going to bed. She denies any other pain, difficulty sleeping, recent routine changes, or new medications recently. Patient denies a history of seizures in the past.     Patient reports no prior history of seizures. Patient states that yesterday (07/13/17) afternoon she began to have a mild HA for which she took two aspirin. During this time she also states that she "felt a little dizzy". Patient denies any symptoms of syncope prior to this event. The patient states that she was asleep prior to "seizure" and that she has no recollection of anything leading up to the event. The first thing that she can remember is being on the floor with her  present. Per family the patient was profoundly confused for approximately 30 minutes following the event. The patient denies any loss " [address]  eMERGENCY dEPARTMENT eNCOUnter      Pt Name: Candelario Batres  MRN: [id number]  [place] 1994  Date of evaluation: 4/22/2021  Provider: Christopher Woodard, [address]       Chief Complaint   Patient presents with    Headache         HISTORY OF PRESENT ILLNESS   (Location/Symptom, Timing/Onset,Context/Setting, Quality, Duration, Modifying Factors, Severity)  Note limiting factors. Yael Samuels a 23 y.o. female who presents to the emergency department for evaluation of headache. Pt tells me that she has had \"burning\" quality pain along mid forehead and crown area of scalp with gradual worsening over past 3 days. She tells me quality of headache is different from previous headaches experienced. She gives history of problems with headaches generally experiencing  headache on monthly basis per family. She tells me that she has an appointment with Dr. Amelia Rodriguez in June for further evaluation. She tells me that she has had nausea without vomiting. She has had no fevers or head injury. She denies vision changes as well as lateralizing numbness/weakness. She tells me that she has had increased life stressors. She tells me that stress is a trigger for her headaches. HPI    Nursing Notes were reviewed. REVIEW OF SYSTEMS    (2-9 systems for level 4, 10 or more for level 5)     Review of Systems   Constitutional: Negative for fever. Gastrointestinal: Positive for nausea. Negative for vomiting. Genitourinary: Negative for dysuria. Neurological: Positive for headaches. Negative for weakness and numbness. A complete review of systems was performed and is negative except as noted above in the HPI.        PAST MEDICAL HISTORY     Past Medical History:   Diagnosis Date    Anxiety     Depression     History of bronchitis     History of cellulitis     History of ear infections          SURGICAL HISTORY       Past Surgical History:   Procedure Laterality Date    CHOLECYSTECTOMY      of bowel or bladder function during this event. Per family the patient experienced shaking of her lower extremities and clenching of the upper extremities as well as foaming at the mouth. The event lasted approximately 2 minutes. Patient denies any fever or illness preceding this event. CT head shows motion limited examination without evidence for definite acute intercranial hemorrhage. Ill-defined hyperdense lesion right superior medial temporal lobe compatible with known cavernoma correspond to abnormality seen on prior MRI. CXR shows no convincing evidence of intrathoracic disease identified. EKG shows normal sinus rhythm. Patient is currently awaiting MRI. Patient given Keppra 1000MG in ED.      Past Medical History:   Diagnosis Date    Cataract     Glaucoma     Hemangioma     cavernous    History of acne 2004    dr lopez prescribed accutane    Hyperlipidemia     Venous angioma of brain     Vertigo        Past Surgical History:   Procedure Laterality Date    CATARACT EXTRACTION W/  INTRAOCULAR LENS IMPLANT Right 12/17/14    Dr Camacho     HYSTERECTOMY         Review of patient's allergies indicates:   Allergen Reactions    Latanoprost Itching     Red itchy eyes       Current Neurological Medications: keppra 1000MG     No current facility-administered medications on file prior to encounter.      Current Outpatient Prescriptions on File Prior to Encounter   Medication Sig    DILTIAZEM HCL (DILTIAZEM 2% CREAM) Apply topically 3 (three) times daily. Apply topically to anal area.    estradiol (VIVELLE-DOT) 0.075 mg/24 hr Place 1 patch onto the skin twice a week.    timolol maleate 0.5% (TIMOPTIC-XE) 0.5 % SolG Place 1 drop into both eyes every morning.     Family History     Problem Relation (Age of Onset)    Breast cancer Maternal Aunt    Cancer Maternal Aunt    Depression Brother    Diabetes Maternal Grandmother    Fibroids Mother    Glaucoma Mother, Maternal Grandmother    Heart disease Father     Migraines Mother    No Known Problems Sister, Maternal Uncle, Paternal Aunt, Paternal Uncle, Maternal Grandfather, Paternal Grandmother, Paternal Grandfather        Social History Main Topics    Smoking status: Never Smoker    Smokeless tobacco: Never Used    Alcohol use No    Drug use: No    Sexual activity: Yes     Partners: Male     Birth control/ protection: Surgical      Comment: , [facility] 9/2010     Review of Systems   Constitutional: Negative for chills and fever.   Eyes: Negative for photophobia and visual disturbance.   Respiratory: Negative for cough, chest tightness and shortness of breath.    Cardiovascular: Negative for chest pain and palpitations.   Gastrointestinal: Negative for abdominal pain, constipation, diarrhea and nausea.   Musculoskeletal: Negative for neck pain and neck stiffness.   Neurological: Positive for seizures, weakness and headaches. Negative for dizziness, syncope, facial asymmetry, speech difficulty, light-headedness and numbness.     Objective:     Vital Signs (Most Recent):  Temp: 98.9 °F (37.2 °C) (07/14/17 1045)  Pulse: 69 (07/14/17 1340)  Resp: 17 (07/14/17 1340)  BP: 117/60 (07/14/17 1340)  SpO2: 100 % (07/14/17 1340) Vital Signs (24h Range):  Temp:  [98.9 °F (37.2 °C)] 98.9 °F (37.2 °C)  Pulse:  [69-87] 69  Resp:  [17-18] 17  SpO2:  [100 %] 100 %  BP: (117-134)/(60-71) 117/60     Weight: 51.7 kg (114 lb)  Body mass index is 20.85 kg/m².    Physical Exam   Constitutional: She is oriented to person, place, and time. She appears well-developed and well-nourished. No distress.   HENT:   Head: Normocephalic and atraumatic.   Eyes: EOM are normal. Pupils are equal, round, and reactive to light.   Cardiovascular: Normal rate and normal heart sounds.    Pulmonary/Chest: Effort normal and breath sounds normal.   Abdominal: Soft. There is no tenderness.   Neurological: She is alert and oriented to person, place, and time. She has normal strength and normal reflexes. She  comment: trying to quit   Substance and Sexual Activity    Alcohol use: No    Drug use: No    Sexual activity: None   Lifestyle    Physical activity     Days per week: None     Minutes per session: None    Stress: None   Relationships    Social connections     Talks on phone: None     Gets together: None     Attends Caodaism service: None     Active member of club or organization: None     Attends meetings of clubs or organizations: None     Relationship status: None    Intimate partner violence     Fear of current or ex partner: None     Emotionally abused: None     Physically abused: None     Forced sexual activity: None   Other Topics Concern    None   Social History Narrative    None       SCREENINGS    Kristin Coma Scale  Eye Opening: Spontaneous  Best Verbal Response: Oriented  Best Motor Response: Obeys commands  [place] Coma Scale Score: 15        PHYSICAL EXAM    (up to 7 for level 4, 8 or more for level 5)     ED Triage Vitals [04/22/21 1949]   BP Temp Temp src Pulse Resp SpO2 Height Weight   (!) 134/94 98 °F (36.7 °C) -- 120 20 95 % 5' 3.5\" (1.613 m) 265 lb (120.2 kg)       Physical Exam  Vitals signs reviewed. HENT:      Head: Normocephalic. Right Ear: External ear normal.      Left Ear: External ear normal.   Eyes:      Conjunctiva/sclera: Conjunctivae normal.      Pupils: Pupils are equal, round, and reactive to light. Neck:      Musculoskeletal: Normal range of motion. Cardiovascular:      Rate and Rhythm: Normal rate and regular rhythm. Heart sounds: Normal heart sounds. Pulmonary:      Effort: Pulmonary effort is normal.      Breath sounds: Normal breath sounds. Abdominal:      General: Bowel sounds are normal.      Palpations: Abdomen is soft. Musculoskeletal: Normal range of motion. Skin:     General: Skin is warm and dry. Neurological:      Mental Status: She is alert and oriented to person, place, and time.          DIAGNOSTIC RESULTS     EKG: All EKG's are displays normal reflexes. No cranial nerve deficit. She exhibits normal muscle tone.   Reflex Scores:       Bicep reflexes are 2+ on the right side and 2+ on the left side.       Brachioradialis reflexes are 2+ on the right side and 2+ on the left side.       Patellar reflexes are 2+ on the right side and 2+ on the left side.       Achilles reflexes are 2+ on the right side and 2+ on the left side.  Psychiatric: She has a normal mood and affect.       NEUROLOGICAL EXAMINATION:     MENTAL STATUS   Oriented to person, place, and time.   Level of consciousness: alert    CRANIAL NERVES   Cranial nerves II through XII intact.     CN III, IV, VI   Pupils are equal, round, and reactive to light.  Extraocular motions are normal.     MOTOR EXAM   Muscle bulk: normal  Overall muscle tone: normal    Strength   Strength 5/5 throughout.     REFLEXES     Reflexes   Right brachioradialis: 2+  Left brachioradialis: 2+  Right biceps: 2+  Left biceps: 2+  Right patellar: 2+  Left patellar: 2+  Right achilles: 2+  Left achilles: 2+    SENSORY EXAM   Light touch normal.       Significant Labs:   CBC:   Recent Labs  Lab 07/14/17  1115   WBC 11.69   HGB 12.1   HCT 34.4*        CMP:   Recent Labs  Lab 07/14/17  1115      *   K 3.5   CL 98   CO2 24   BUN 15   CREATININE 0.9   CALCIUM 9.0   MG 2.1   PROT 7.2   ALBUMIN 3.7   BILITOT 0.6   ALKPHOS 58   AST 25   ALT 15   ANIONGAP 9   EGFRNONAA >60.0     Urine Studies:   Recent Labs  Lab 07/14/17  1215   COLORU Straw   APPEARANCEUA Clear   PHUR 5.0   SPECGRAV 1.005   PROTEINUA Negative   GLUCUA Negative   KETONESU Negative   BILIRUBINUA Negative   OCCULTUA 1+*   NITRITE Negative   UROBILINOGEN Negative   LEUKOCYTESUR Negative   RBCUA 0   WBCUA 1   BACTERIA Rare   SQUAMEPITHEL 2     All pertinent lab results from the past 24 hours have been reviewed.    Significant Imaging: I have reviewed all pertinent imaging results/findings within the past 24 hours.    Assessment and  interpreted by the Emergency Department Physician who either signs or Co-signs this chart in the absence of acardiologist.        RADIOLOGY:   Non-plain film images such as CT, Ultrasound andMRI are read by the radiologist. Plain radiographic images are visualized and preliminarily interpreted by the emergency physician with the below findings:        Interpretation per the Radiologist below, if available at the time of this note:    CT HEAD WO CONTRAST   Final Result   Impression:   No acute intracranial findings. Signed by Dr Naty Castellanos on 4/22/2021 9:22 PM            ED BEDSIDE ULTRASOUND:   Performed by ED Physician - none    LABS:  Labs Reviewed - No data to display    All other labs were within normal range or not returned as of this dictation. RE-ASSESSMENT           EMERGENCY DEPARTMENT COURSE and DIFFERENTIALDIAGNOSIS/MDM:   Vitals:    Vitals:    04/22/21 1949 04/22/21 2200   BP: (!) 134/94    Pulse: 120 91   Resp: 20    Temp: 98 °F (36.7 °C)    SpO2: 95%    Weight: 265 lb (120.2 kg)    Height: 5' 3.5\" (1.613 m)        MDM      CONSULTS:  None    PROCEDURES:  Unless otherwise notedbelow, none     Procedures    FINAL IMPRESSION     1.  Acute nonintractable headache, unspecified headache type          DISPOSITION/PLAN   DISPOSITION        PATIENT REFERRED TO:  Min Cherry MD  [address] [facility] [phone]      as scheduled      DISCHARGE MEDICATIONS:       Discharge Medication List as of 4/22/2021 10:06 PM          (Pleasenote that portions of this note were completed with a voice recognition program.  Efforts were made to edit the dictations but occasionally words are mis-transcribed.)              Yane Mclaughlin, APRN  04/22/21 2233 Plan:     * Seizure    Assessment:  -?Seizure vs Syncope  -Patient with observed seizure like activity, tonic/clonic, post-ictal confusion, no bladder bowel incontinence  -Patient with diagnosed ~2cm cavernoma   -CT head shows motion limited examination without evidence for definite acute intercranial hemorrhage. Ill-defined hyperdense lesion right superior medial temporal lobe compatible with known cavernoma correspond to abnormality seen on prior MRI.   -CXR shows no convincing evidence of intrathoracic disease identified.   -EKG shows normal sinus rhythm   -Hyponatremic; Na 131, phosphate 2.6; other electrolytes WNL  -UA negative   -Vitals stable    Plan:  -Lorazepam 1MG q15min for seizure breakthrough   -Keppra 500MG BID   -Correct and monitor electrolytes as needed  -Follow MRI brain  -Follow MRA brain   -Seizure precautions                VTE Risk Mitigation         Ordered     Place LENIN hose  Until discontinued      07/14/17 1545     Medium Risk of VTE  Once      07/14/17 1406          Thank you for your consult. I will follow-up with patient. Please contact us if you have any additional questions.    Freddie Spivey MD  Neurology  [facility]

## 2021-05-25 ENCOUNTER — OFFICE VISIT (OUTPATIENT)
Dept: OPHTHALMOLOGY | Facility: CLINIC | Age: 56
End: 2021-05-25
Payer: COMMERCIAL

## 2021-05-25 DIAGNOSIS — Z96.1 PSEUDOPHAKIA OF RIGHT EYE: ICD-10-CM

## 2021-05-25 DIAGNOSIS — H25.12 NUCLEAR SCLEROSIS, LEFT: ICD-10-CM

## 2021-05-25 DIAGNOSIS — H43.813 POSTERIOR VITREOUS DETACHMENT, BILATERAL: ICD-10-CM

## 2021-05-25 DIAGNOSIS — H11.31 SUBCONJUNCTIVAL HEMORRHAGE OF RIGHT EYE: Primary | ICD-10-CM

## 2021-05-25 DIAGNOSIS — H40.89 GLAUCOMA DUE TO COMBINATION OF MECHANISMS: ICD-10-CM

## 2021-05-25 DIAGNOSIS — H40.043 STEROID RESPONDERS BORDERLINE GLAUCOMA, BILATERAL: ICD-10-CM

## 2021-05-25 DIAGNOSIS — H26.491 PCO (POSTERIOR CAPSULAR OPACIFICATION), RIGHT: ICD-10-CM

## 2021-05-25 PROCEDURE — 92012 PR EYE EXAM, EST PATIENT,INTERMED: ICD-10-PCS | Mod: S$GLB,,, | Performed by: OPHTHALMOLOGY

## 2021-05-25 PROCEDURE — 99999 PR PBB SHADOW E&M-EST. PATIENT-LVL II: ICD-10-PCS | Mod: PBBFAC,,, | Performed by: OPHTHALMOLOGY

## 2021-05-25 PROCEDURE — 1125F PR PAIN SEVERITY QUANTIFIED, PAIN PRESENT: ICD-10-PCS | Mod: S$GLB,,, | Performed by: OPHTHALMOLOGY

## 2021-05-25 PROCEDURE — 99999 PR PBB SHADOW E&M-EST. PATIENT-LVL II: CPT | Mod: PBBFAC,,, | Performed by: OPHTHALMOLOGY

## 2021-05-25 PROCEDURE — 1125F AMNT PAIN NOTED PAIN PRSNT: CPT | Mod: S$GLB,,, | Performed by: OPHTHALMOLOGY

## 2021-05-25 PROCEDURE — 92012 INTRM OPH EXAM EST PATIENT: CPT | Mod: S$GLB,,, | Performed by: OPHTHALMOLOGY

## 2021-05-25 RX ORDER — HALCINONIDE TOPICAL 1 MG/ML
SOLUTION TOPICAL
COMMUNITY
Start: 2021-02-26 | End: 2021-07-29

## 2021-05-25 RX ORDER — DOXYCYCLINE HYCLATE 20 MG
20 TABLET ORAL
COMMUNITY
Start: 2021-03-12 | End: 2021-11-11

## 2021-05-28 ENCOUNTER — TELEPHONE (OUTPATIENT)
Dept: OBSTETRICS AND GYNECOLOGY | Facility: CLINIC | Age: 56
End: 2021-05-28

## 2021-05-28 DIAGNOSIS — Z12.31 SCREENING MAMMOGRAM, ENCOUNTER FOR: Primary | ICD-10-CM

## 2021-06-07 ENCOUNTER — OFFICE VISIT (OUTPATIENT)
Dept: OPTOMETRY | Facility: CLINIC | Age: 56
End: 2021-06-07
Payer: COMMERCIAL

## 2021-06-07 DIAGNOSIS — H52.4 PRESBYOPIA: ICD-10-CM

## 2021-06-07 DIAGNOSIS — Z98.42 S/P CATARACT SURGERY, LEFT: ICD-10-CM

## 2021-06-07 DIAGNOSIS — H26.9 CORTICAL CATARACT OF LEFT EYE: ICD-10-CM

## 2021-06-07 DIAGNOSIS — Z96.1 PSEUDOPHAKIA OF RIGHT EYE: ICD-10-CM

## 2021-06-07 DIAGNOSIS — H25.12 NUCLEAR SCLEROSIS, LEFT: Primary | ICD-10-CM

## 2021-06-07 DIAGNOSIS — H52.203 ASTIGMATISM OF BOTH EYES, UNSPECIFIED TYPE: ICD-10-CM

## 2021-06-07 DIAGNOSIS — H40.89 GLAUCOMA DUE TO COMBINATION OF MECHANISMS: ICD-10-CM

## 2021-06-07 PROCEDURE — 99999 PR PBB SHADOW E&M-EST. PATIENT-LVL III: CPT | Mod: PBBFAC,,, | Performed by: OPTOMETRIST

## 2021-06-07 PROCEDURE — 92015 DETERMINE REFRACTIVE STATE: CPT | Mod: S$GLB,,, | Performed by: OPTOMETRIST

## 2021-06-07 PROCEDURE — 92012 INTRM OPH EXAM EST PATIENT: CPT | Mod: S$GLB,,, | Performed by: OPTOMETRIST

## 2021-06-07 PROCEDURE — 92012 PR EYE EXAM, EST PATIENT,INTERMED: ICD-10-PCS | Mod: S$GLB,,, | Performed by: OPTOMETRIST

## 2021-06-07 PROCEDURE — 92015 PR REFRACTION: ICD-10-PCS | Mod: S$GLB,,, | Performed by: OPTOMETRIST

## 2021-06-07 PROCEDURE — 1126F AMNT PAIN NOTED NONE PRSNT: CPT | Mod: S$GLB,,, | Performed by: OPTOMETRIST

## 2021-06-07 PROCEDURE — 1126F PR PAIN SEVERITY QUANTIFIED, NO PAIN PRESENT: ICD-10-PCS | Mod: S$GLB,,, | Performed by: OPTOMETRIST

## 2021-06-07 PROCEDURE — 99999 PR PBB SHADOW E&M-EST. PATIENT-LVL III: ICD-10-PCS | Mod: PBBFAC,,, | Performed by: OPTOMETRIST

## 2021-07-12 ENCOUNTER — APPOINTMENT (OUTPATIENT)
Dept: RADIOLOGY | Facility: OTHER | Age: 56
End: 2021-07-12
Attending: OBSTETRICS & GYNECOLOGY
Payer: COMMERCIAL

## 2021-07-12 VITALS — BODY MASS INDEX: 22.36 KG/M2 | WEIGHT: 121.5 LBS | HEIGHT: 62 IN

## 2021-07-12 DIAGNOSIS — Z12.31 SCREENING MAMMOGRAM, ENCOUNTER FOR: ICD-10-CM

## 2021-07-12 PROCEDURE — 77067 SCR MAMMO BI INCL CAD: CPT | Mod: TC,PN

## 2021-07-12 PROCEDURE — 77067 SCR MAMMO BI INCL CAD: CPT | Mod: 26,,, | Performed by: RADIOLOGY

## 2021-07-12 PROCEDURE — 77063 MAMMO DIGITAL SCREENING BILAT WITH TOMO: ICD-10-PCS | Mod: 26,,, | Performed by: RADIOLOGY

## 2021-07-12 PROCEDURE — 77067 MAMMO DIGITAL SCREENING BILAT WITH TOMO: ICD-10-PCS | Mod: 26,,, | Performed by: RADIOLOGY

## 2021-07-12 PROCEDURE — 77063 BREAST TOMOSYNTHESIS BI: CPT | Mod: 26,,, | Performed by: RADIOLOGY

## 2021-07-13 ENCOUNTER — PATIENT MESSAGE (OUTPATIENT)
Dept: OBSTETRICS AND GYNECOLOGY | Facility: CLINIC | Age: 56
End: 2021-07-13

## 2021-07-14 ENCOUNTER — PATIENT MESSAGE (OUTPATIENT)
Dept: INTERNAL MEDICINE | Facility: CLINIC | Age: 56
End: 2021-07-14

## 2021-07-16 ENCOUNTER — TELEPHONE (OUTPATIENT)
Dept: OBSTETRICS AND GYNECOLOGY | Facility: CLINIC | Age: 56
End: 2021-07-16

## 2021-07-19 ENCOUNTER — HOSPITAL ENCOUNTER (OUTPATIENT)
Dept: RADIOLOGY | Facility: HOSPITAL | Age: 56
Discharge: HOME | End: 2021-07-19
Attending: OBSTETRICS & GYNECOLOGY
Payer: COMMERCIAL

## 2021-07-19 DIAGNOSIS — R92.8 ABNORMAL MAMMOGRAM: ICD-10-CM

## 2021-07-19 PROCEDURE — 77065 DX MAMMO INCL CAD UNI: CPT | Mod: 26,LT,, | Performed by: RADIOLOGY

## 2021-07-19 PROCEDURE — 77061 MAMMO DIGITAL DIAGNOSTIC LEFT WITH TOMO: ICD-10-PCS | Mod: 26,LT,, | Performed by: RADIOLOGY

## 2021-07-19 PROCEDURE — 77065 MAMMO DIGITAL DIAGNOSTIC LEFT WITH TOMO: ICD-10-PCS | Mod: 26,LT,, | Performed by: RADIOLOGY

## 2021-07-19 PROCEDURE — 77061 BREAST TOMOSYNTHESIS UNI: CPT | Mod: 26,LT,, | Performed by: RADIOLOGY

## 2021-07-19 PROCEDURE — 77061 BREAST TOMOSYNTHESIS UNI: CPT | Mod: TC,LT

## 2021-07-27 ENCOUNTER — PATIENT OUTREACH (OUTPATIENT)
Dept: ADMINISTRATIVE | Facility: OTHER | Age: 56
End: 2021-07-27

## 2021-07-28 ENCOUNTER — TELEPHONE (OUTPATIENT)
Dept: OBSTETRICS AND GYNECOLOGY | Facility: CLINIC | Age: 56
End: 2021-07-28

## 2021-07-29 ENCOUNTER — OFFICE VISIT (OUTPATIENT)
Dept: OBSTETRICS AND GYNECOLOGY | Facility: CLINIC | Age: 56
End: 2021-07-29
Payer: COMMERCIAL

## 2021-07-29 VITALS
DIASTOLIC BLOOD PRESSURE: 68 MMHG | SYSTOLIC BLOOD PRESSURE: 120 MMHG | HEIGHT: 62 IN | BODY MASS INDEX: 21.62 KG/M2 | WEIGHT: 117.5 LBS

## 2021-07-29 DIAGNOSIS — N95.2 VAGINAL ATROPHY: ICD-10-CM

## 2021-07-29 DIAGNOSIS — Z01.411 ENCOUNTER FOR GYNECOLOGICAL EXAMINATION WITH ABNORMAL FINDING: Primary | ICD-10-CM

## 2021-07-29 DIAGNOSIS — Z12.31 SCREENING MAMMOGRAM, ENCOUNTER FOR: ICD-10-CM

## 2021-07-29 PROCEDURE — 1125F AMNT PAIN NOTED PAIN PRSNT: CPT | Mod: CPTII,S$GLB,, | Performed by: OBSTETRICS & GYNECOLOGY

## 2021-07-29 PROCEDURE — 1159F PR MEDICATION LIST DOCUMENTED IN MEDICAL RECORD: ICD-10-PCS | Mod: CPTII,S$GLB,, | Performed by: OBSTETRICS & GYNECOLOGY

## 2021-07-29 PROCEDURE — 99999 PR PBB SHADOW E&M-EST. PATIENT-LVL III: CPT | Mod: PBBFAC,,, | Performed by: OBSTETRICS & GYNECOLOGY

## 2021-07-29 PROCEDURE — 1160F PR REVIEW ALL MEDS BY PRESCRIBER/CLIN PHARMACIST DOCUMENTED: ICD-10-PCS | Mod: CPTII,S$GLB,, | Performed by: OBSTETRICS & GYNECOLOGY

## 2021-07-29 PROCEDURE — 3074F SYST BP LT 130 MM HG: CPT | Mod: CPTII,S$GLB,, | Performed by: OBSTETRICS & GYNECOLOGY

## 2021-07-29 PROCEDURE — 3078F PR MOST RECENT DIASTOLIC BLOOD PRESSURE < 80 MM HG: ICD-10-PCS | Mod: CPTII,S$GLB,, | Performed by: OBSTETRICS & GYNECOLOGY

## 2021-07-29 PROCEDURE — 1160F RVW MEDS BY RX/DR IN RCRD: CPT | Mod: CPTII,S$GLB,, | Performed by: OBSTETRICS & GYNECOLOGY

## 2021-07-29 PROCEDURE — 3008F BODY MASS INDEX DOCD: CPT | Mod: CPTII,S$GLB,, | Performed by: OBSTETRICS & GYNECOLOGY

## 2021-07-29 PROCEDURE — 3074F PR MOST RECENT SYSTOLIC BLOOD PRESSURE < 130 MM HG: ICD-10-PCS | Mod: CPTII,S$GLB,, | Performed by: OBSTETRICS & GYNECOLOGY

## 2021-07-29 PROCEDURE — 99999 PR PBB SHADOW E&M-EST. PATIENT-LVL III: ICD-10-PCS | Mod: PBBFAC,,, | Performed by: OBSTETRICS & GYNECOLOGY

## 2021-07-29 PROCEDURE — 99396 PR PREVENTIVE VISIT,EST,40-64: ICD-10-PCS | Mod: S$GLB,,, | Performed by: OBSTETRICS & GYNECOLOGY

## 2021-07-29 PROCEDURE — 3008F PR BODY MASS INDEX (BMI) DOCUMENTED: ICD-10-PCS | Mod: CPTII,S$GLB,, | Performed by: OBSTETRICS & GYNECOLOGY

## 2021-07-29 PROCEDURE — 1159F MED LIST DOCD IN RCRD: CPT | Mod: CPTII,S$GLB,, | Performed by: OBSTETRICS & GYNECOLOGY

## 2021-07-29 PROCEDURE — 99396 PREV VISIT EST AGE 40-64: CPT | Mod: S$GLB,,, | Performed by: OBSTETRICS & GYNECOLOGY

## 2021-07-29 PROCEDURE — 3078F DIAST BP <80 MM HG: CPT | Mod: CPTII,S$GLB,, | Performed by: OBSTETRICS & GYNECOLOGY

## 2021-07-29 PROCEDURE — 1125F PR PAIN SEVERITY QUANTIFIED, PAIN PRESENT: ICD-10-PCS | Mod: CPTII,S$GLB,, | Performed by: OBSTETRICS & GYNECOLOGY

## 2021-07-29 RX ORDER — TRIAMCINOLONE ACETONIDE OINTMENT USP, 0.05% 0.5 MG/G
OINTMENT TOPICAL
COMMUNITY
Start: 2021-06-18 | End: 2021-11-11

## 2021-07-29 RX ORDER — ADAPALENE AND BENZOYL PEROXIDE 3; 25 MG/G; MG/G
GEL TOPICAL
COMMUNITY
Start: 2021-06-18 | End: 2021-11-11

## 2021-10-22 ENCOUNTER — OFFICE VISIT (OUTPATIENT)
Dept: OTOLARYNGOLOGY | Facility: CLINIC | Age: 56
End: 2021-10-22
Payer: COMMERCIAL

## 2021-10-22 VITALS — WEIGHT: 116 LBS | BODY MASS INDEX: 21.22 KG/M2

## 2021-10-22 DIAGNOSIS — H61.21 CERUMEN DEBRIS ON TYMPANIC MEMBRANE OF RIGHT EAR: Primary | ICD-10-CM

## 2021-10-22 PROCEDURE — 69210 PR REMOVAL IMPACTED CERUMEN REQUIRING INSTRUMENTATION, UNILATERAL: ICD-10-PCS | Mod: S$GLB,,, | Performed by: OTOLARYNGOLOGY

## 2021-10-22 PROCEDURE — 69210 REMOVE IMPACTED EAR WAX UNI: CPT | Mod: S$GLB,,, | Performed by: OTOLARYNGOLOGY

## 2021-10-22 PROCEDURE — 99999 PR PBB SHADOW E&M-EST. PATIENT-LVL II: ICD-10-PCS | Mod: PBBFAC,,, | Performed by: OTOLARYNGOLOGY

## 2021-10-22 PROCEDURE — 3008F BODY MASS INDEX DOCD: CPT | Mod: CPTII,S$GLB,, | Performed by: OTOLARYNGOLOGY

## 2021-10-22 PROCEDURE — 99202 OFFICE O/P NEW SF 15 MIN: CPT | Mod: 25,S$GLB,, | Performed by: OTOLARYNGOLOGY

## 2021-10-22 PROCEDURE — 3008F PR BODY MASS INDEX (BMI) DOCUMENTED: ICD-10-PCS | Mod: CPTII,S$GLB,, | Performed by: OTOLARYNGOLOGY

## 2021-10-22 PROCEDURE — 99202 PR OFFICE/OUTPT VISIT, NEW, LEVL II, 15-29 MIN: ICD-10-PCS | Mod: 25,S$GLB,, | Performed by: OTOLARYNGOLOGY

## 2021-10-22 PROCEDURE — 99999 PR PBB SHADOW E&M-EST. PATIENT-LVL II: CPT | Mod: PBBFAC,,, | Performed by: OTOLARYNGOLOGY

## 2021-10-22 PROCEDURE — 1159F PR MEDICATION LIST DOCUMENTED IN MEDICAL RECORD: ICD-10-PCS | Mod: CPTII,S$GLB,, | Performed by: OTOLARYNGOLOGY

## 2021-10-22 PROCEDURE — 1159F MED LIST DOCD IN RCRD: CPT | Mod: CPTII,S$GLB,, | Performed by: OTOLARYNGOLOGY

## 2021-10-22 RX ORDER — DOXYCYCLINE 40 MG/1
40 CAPSULE ORAL DAILY
COMMUNITY
Start: 2021-10-08 | End: 2021-11-11

## 2021-10-22 RX ORDER — AMOXICILLIN AND CLAVULANATE POTASSIUM 875; 125 MG/1; MG/1
1 TABLET, FILM COATED ORAL 2 TIMES DAILY
COMMUNITY
Start: 2021-10-10 | End: 2021-11-11

## 2021-10-22 RX ORDER — CEFDINIR 300 MG/1
300 CAPSULE ORAL EVERY 12 HOURS
COMMUNITY
Start: 2021-10-15 | End: 2021-11-11

## 2021-10-29 ENCOUNTER — TELEPHONE (OUTPATIENT)
Dept: INTERNAL MEDICINE | Facility: CLINIC | Age: 56
End: 2021-10-29
Payer: COMMERCIAL

## 2021-11-11 ENCOUNTER — OFFICE VISIT (OUTPATIENT)
Dept: INTERNAL MEDICINE | Facility: CLINIC | Age: 56
End: 2021-11-11
Payer: COMMERCIAL

## 2021-11-11 VITALS
OXYGEN SATURATION: 99 % | SYSTOLIC BLOOD PRESSURE: 110 MMHG | DIASTOLIC BLOOD PRESSURE: 62 MMHG | HEIGHT: 62 IN | WEIGHT: 119.06 LBS | HEART RATE: 74 BPM | BODY MASS INDEX: 21.91 KG/M2

## 2021-11-11 DIAGNOSIS — E78.00 ELEVATED LDL CHOLESTEROL LEVEL: Primary | ICD-10-CM

## 2021-11-11 DIAGNOSIS — R92.30 DENSE BREAST: ICD-10-CM

## 2021-11-11 PROCEDURE — 3078F PR MOST RECENT DIASTOLIC BLOOD PRESSURE < 80 MM HG: ICD-10-PCS | Mod: CPTII,S$GLB,, | Performed by: INTERNAL MEDICINE

## 2021-11-11 PROCEDURE — 3074F SYST BP LT 130 MM HG: CPT | Mod: CPTII,S$GLB,, | Performed by: INTERNAL MEDICINE

## 2021-11-11 PROCEDURE — 99999 PR PBB SHADOW E&M-EST. PATIENT-LVL III: CPT | Mod: PBBFAC,,, | Performed by: INTERNAL MEDICINE

## 2021-11-11 PROCEDURE — 3078F DIAST BP <80 MM HG: CPT | Mod: CPTII,S$GLB,, | Performed by: INTERNAL MEDICINE

## 2021-11-11 PROCEDURE — 99999 PR PBB SHADOW E&M-EST. PATIENT-LVL III: ICD-10-PCS | Mod: PBBFAC,,, | Performed by: INTERNAL MEDICINE

## 2021-11-11 PROCEDURE — 3008F BODY MASS INDEX DOCD: CPT | Mod: CPTII,S$GLB,, | Performed by: INTERNAL MEDICINE

## 2021-11-11 PROCEDURE — 99214 PR OFFICE/OUTPT VISIT, EST, LEVL IV, 30-39 MIN: ICD-10-PCS | Mod: S$GLB,,, | Performed by: INTERNAL MEDICINE

## 2021-11-11 PROCEDURE — 3074F PR MOST RECENT SYSTOLIC BLOOD PRESSURE < 130 MM HG: ICD-10-PCS | Mod: CPTII,S$GLB,, | Performed by: INTERNAL MEDICINE

## 2021-11-11 PROCEDURE — 1159F MED LIST DOCD IN RCRD: CPT | Mod: CPTII,S$GLB,, | Performed by: INTERNAL MEDICINE

## 2021-11-11 PROCEDURE — 1159F PR MEDICATION LIST DOCUMENTED IN MEDICAL RECORD: ICD-10-PCS | Mod: CPTII,S$GLB,, | Performed by: INTERNAL MEDICINE

## 2021-11-11 PROCEDURE — 3008F PR BODY MASS INDEX (BMI) DOCUMENTED: ICD-10-PCS | Mod: CPTII,S$GLB,, | Performed by: INTERNAL MEDICINE

## 2021-11-11 PROCEDURE — 99214 OFFICE O/P EST MOD 30 MIN: CPT | Mod: S$GLB,,, | Performed by: INTERNAL MEDICINE

## 2021-11-18 ENCOUNTER — CLINICAL SUPPORT (OUTPATIENT)
Dept: OPHTHALMOLOGY | Facility: CLINIC | Age: 56
End: 2021-11-18
Payer: COMMERCIAL

## 2021-11-18 ENCOUNTER — OFFICE VISIT (OUTPATIENT)
Dept: OPHTHALMOLOGY | Facility: CLINIC | Age: 56
End: 2021-11-18
Payer: COMMERCIAL

## 2021-11-18 DIAGNOSIS — H25.12 NUCLEAR SCLEROSIS, LEFT: ICD-10-CM

## 2021-11-18 DIAGNOSIS — H40.043 STEROID RESPONDERS BORDERLINE GLAUCOMA, BILATERAL: ICD-10-CM

## 2021-11-18 DIAGNOSIS — H40.53X1 BILATERAL SECONDARY GLAUCOMA DUE TO COMBINATION MECHANISMS, MILD STAGE: Primary | ICD-10-CM

## 2021-11-18 DIAGNOSIS — H43.813 POSTERIOR VITREOUS DETACHMENT, BILATERAL: ICD-10-CM

## 2021-11-18 DIAGNOSIS — H26.491 PCO (POSTERIOR CAPSULAR OPACIFICATION), RIGHT: ICD-10-CM

## 2021-11-18 DIAGNOSIS — Z96.1 PSEUDOPHAKIA OF RIGHT EYE: ICD-10-CM

## 2021-11-18 PROCEDURE — 92133 POSTERIOR SEGMENT OCT OPTIC NERVE(OCULAR COHERENCE TOMOGRAPHY) - OU - BOTH EYES: ICD-10-PCS | Mod: S$GLB,,, | Performed by: OPHTHALMOLOGY

## 2021-11-18 PROCEDURE — 92083 EXTENDED VISUAL FIELD XM: CPT | Mod: S$GLB,,, | Performed by: OPHTHALMOLOGY

## 2021-11-18 PROCEDURE — 1160F RVW MEDS BY RX/DR IN RCRD: CPT | Mod: CPTII,S$GLB,, | Performed by: OPHTHALMOLOGY

## 2021-11-18 PROCEDURE — 92014 COMPRE OPH EXAM EST PT 1/>: CPT | Mod: S$GLB,,, | Performed by: OPHTHALMOLOGY

## 2021-11-18 PROCEDURE — 99999 PR PBB SHADOW E&M-EST. PATIENT-LVL II: ICD-10-PCS | Mod: PBBFAC,,, | Performed by: OPHTHALMOLOGY

## 2021-11-18 PROCEDURE — 92083 HUMPHREY VISUAL FIELD - OU - BOTH EYES: ICD-10-PCS | Mod: S$GLB,,, | Performed by: OPHTHALMOLOGY

## 2021-11-18 PROCEDURE — 99999 PR PBB SHADOW E&M-EST. PATIENT-LVL II: CPT | Mod: PBBFAC,,, | Performed by: OPHTHALMOLOGY

## 2021-11-18 PROCEDURE — 92014 PR EYE EXAM, EST PATIENT,COMPREHESV: ICD-10-PCS | Mod: S$GLB,,, | Performed by: OPHTHALMOLOGY

## 2021-11-18 PROCEDURE — 92133 CPTRZD OPH DX IMG PST SGM ON: CPT | Mod: S$GLB,,, | Performed by: OPHTHALMOLOGY

## 2021-11-18 PROCEDURE — 1160F PR REVIEW ALL MEDS BY PRESCRIBER/CLIN PHARMACIST DOCUMENTED: ICD-10-PCS | Mod: CPTII,S$GLB,, | Performed by: OPHTHALMOLOGY

## 2021-11-18 PROCEDURE — 1159F MED LIST DOCD IN RCRD: CPT | Mod: CPTII,S$GLB,, | Performed by: OPHTHALMOLOGY

## 2021-11-18 PROCEDURE — 1159F PR MEDICATION LIST DOCUMENTED IN MEDICAL RECORD: ICD-10-PCS | Mod: CPTII,S$GLB,, | Performed by: OPHTHALMOLOGY

## 2021-11-18 RX ORDER — DOXYCYCLINE HYCLATE 20 MG
20 TABLET ORAL DAILY
COMMUNITY
Start: 2021-03-12 | End: 2022-03-03

## 2021-11-18 RX ORDER — MUPIROCIN 20 MG/G
OINTMENT TOPICAL
COMMUNITY
Start: 2021-10-01 | End: 2022-02-11

## 2021-11-18 RX ORDER — KETOCONAZOLE 20 MG/ML
1 SHAMPOO, SUSPENSION TOPICAL
COMMUNITY
Start: 2021-10-01 | End: 2022-03-03

## 2021-11-24 ENCOUNTER — TELEPHONE (OUTPATIENT)
Dept: OPHTHALMOLOGY | Facility: CLINIC | Age: 56
End: 2021-11-24
Payer: COMMERCIAL

## 2021-12-03 ENCOUNTER — TELEPHONE (OUTPATIENT)
Dept: OPHTHALMOLOGY | Facility: CLINIC | Age: 56
End: 2021-12-03
Payer: COMMERCIAL

## 2021-12-03 ENCOUNTER — HOSPITAL ENCOUNTER (OUTPATIENT)
Dept: RADIOLOGY | Facility: HOSPITAL | Age: 56
Discharge: HOME | End: 2021-12-03
Attending: PHYSICIAN ASSISTANT
Payer: COMMERCIAL

## 2021-12-03 ENCOUNTER — OFFICE VISIT (OUTPATIENT)
Dept: ORTHOPEDICS | Facility: CLINIC | Age: 56
End: 2021-12-03
Payer: COMMERCIAL

## 2021-12-03 DIAGNOSIS — H40.53X1 BILATERAL SECONDARY GLAUCOMA DUE TO COMBINATION MECHANISMS, MILD STAGE: Primary | ICD-10-CM

## 2021-12-03 DIAGNOSIS — S93.521A SPRAIN OF METATARSOPHALANGEAL JOINT OF RIGHT GREAT TOE, INITIAL ENCOUNTER: ICD-10-CM

## 2021-12-03 DIAGNOSIS — M79.674 TOE PAIN, RIGHT: ICD-10-CM

## 2021-12-03 DIAGNOSIS — M79.674 TOE PAIN, RIGHT: Primary | ICD-10-CM

## 2021-12-03 PROCEDURE — 73660 XR TOE 2 OR MORE VIEWS RIGHT: ICD-10-PCS | Mod: 26,RT,, | Performed by: RADIOLOGY

## 2021-12-03 PROCEDURE — 99999 PR PBB SHADOW E&M-EST. PATIENT-LVL II: ICD-10-PCS | Mod: PBBFAC,,, | Performed by: PHYSICIAN ASSISTANT

## 2021-12-03 PROCEDURE — 99999 PR PBB SHADOW E&M-EST. PATIENT-LVL II: CPT | Mod: PBBFAC,,, | Performed by: PHYSICIAN ASSISTANT

## 2021-12-03 PROCEDURE — 73660 X-RAY EXAM OF TOE(S): CPT | Mod: 26,RT,, | Performed by: RADIOLOGY

## 2021-12-03 PROCEDURE — 99203 OFFICE O/P NEW LOW 30 MIN: CPT | Mod: S$GLB,,, | Performed by: PHYSICIAN ASSISTANT

## 2021-12-03 PROCEDURE — 73660 X-RAY EXAM OF TOE(S): CPT | Mod: TC,RT

## 2021-12-03 PROCEDURE — 99203 PR OFFICE/OUTPT VISIT, NEW, LEVL III, 30-44 MIN: ICD-10-PCS | Mod: S$GLB,,, | Performed by: PHYSICIAN ASSISTANT

## 2021-12-07 ENCOUNTER — OFFICE VISIT (OUTPATIENT)
Dept: ORTHOPEDICS | Facility: CLINIC | Age: 56
End: 2021-12-07
Payer: COMMERCIAL

## 2021-12-07 VITALS
BODY MASS INDEX: 22.58 KG/M2 | SYSTOLIC BLOOD PRESSURE: 108 MMHG | DIASTOLIC BLOOD PRESSURE: 75 MMHG | WEIGHT: 122.69 LBS | HEART RATE: 76 BPM | HEIGHT: 62 IN

## 2021-12-07 DIAGNOSIS — S92.301A CLOSED AVULSION FRACTURE OF METATARSAL BONE OF RIGHT FOOT, INITIAL ENCOUNTER: Primary | ICD-10-CM

## 2021-12-07 PROCEDURE — 99213 PR OFFICE/OUTPT VISIT, EST, LEVL III, 20-29 MIN: ICD-10-PCS | Mod: S$GLB,,, | Performed by: ORTHOPAEDIC SURGERY

## 2021-12-07 PROCEDURE — 99999 PR PBB SHADOW E&M-EST. PATIENT-LVL III: ICD-10-PCS | Mod: PBBFAC,,, | Performed by: ORTHOPAEDIC SURGERY

## 2021-12-07 PROCEDURE — 99999 PR PBB SHADOW E&M-EST. PATIENT-LVL III: CPT | Mod: PBBFAC,,, | Performed by: ORTHOPAEDIC SURGERY

## 2021-12-07 PROCEDURE — 99213 OFFICE O/P EST LOW 20 MIN: CPT | Mod: S$GLB,,, | Performed by: ORTHOPAEDIC SURGERY

## 2021-12-09 ENCOUNTER — PATIENT MESSAGE (OUTPATIENT)
Dept: INTERNAL MEDICINE | Facility: CLINIC | Age: 56
End: 2021-12-09
Payer: COMMERCIAL

## 2021-12-09 RX ORDER — OSELTAMIVIR PHOSPHATE 75 MG/1
75 CAPSULE ORAL DAILY
Qty: 10 CAPSULE | Refills: 0 | Status: SHIPPED | OUTPATIENT
Start: 2021-12-09 | End: 2021-12-19

## 2021-12-15 ENCOUNTER — NURSE TRIAGE (OUTPATIENT)
Dept: ADMINISTRATIVE | Facility: CLINIC | Age: 56
End: 2021-12-15
Payer: COMMERCIAL

## 2021-12-20 ENCOUNTER — PATIENT MESSAGE (OUTPATIENT)
Dept: INTERNAL MEDICINE | Facility: CLINIC | Age: 56
End: 2021-12-20
Payer: COMMERCIAL

## 2021-12-21 ENCOUNTER — TELEPHONE (OUTPATIENT)
Dept: INTERNAL MEDICINE | Facility: CLINIC | Age: 56
End: 2021-12-21
Payer: COMMERCIAL

## 2021-12-21 DIAGNOSIS — U07.1 COVID-19: Primary | ICD-10-CM

## 2021-12-22 ENCOUNTER — INFUSION (OUTPATIENT)
Dept: INFECTIOUS DISEASES | Facility: HOSPITAL | Age: 56
End: 2021-12-22
Attending: INTERNAL MEDICINE
Payer: COMMERCIAL

## 2021-12-22 VITALS
WEIGHT: 120 LBS | TEMPERATURE: 98 F | HEART RATE: 70 BPM | RESPIRATION RATE: 18 BRPM | SYSTOLIC BLOOD PRESSURE: 104 MMHG | OXYGEN SATURATION: 96 % | BODY MASS INDEX: 22.08 KG/M2 | DIASTOLIC BLOOD PRESSURE: 74 MMHG | HEIGHT: 62 IN

## 2021-12-22 DIAGNOSIS — U07.1 COVID-19: Primary | ICD-10-CM

## 2021-12-22 PROCEDURE — M0243 CASIRIVI AND IMDEVI INFUSION: HCPCS | Performed by: INTERNAL MEDICINE

## 2021-12-22 PROCEDURE — 25000003 PHARM REV CODE 250: Performed by: INTERNAL MEDICINE

## 2021-12-22 PROCEDURE — 63600175 PHARM REV CODE 636 W HCPCS: Performed by: INTERNAL MEDICINE

## 2021-12-22 RX ORDER — DIPHENHYDRAMINE HYDROCHLORIDE 50 MG/ML
25 INJECTION INTRAMUSCULAR; INTRAVENOUS ONCE AS NEEDED
Status: DISCONTINUED | OUTPATIENT
Start: 2021-12-22 | End: 2022-02-11

## 2021-12-22 RX ORDER — SODIUM CHLORIDE 0.9 % (FLUSH) 0.9 %
10 SYRINGE (ML) INJECTION
Status: DISCONTINUED | OUTPATIENT
Start: 2021-12-22 | End: 2022-02-11

## 2021-12-22 RX ORDER — EPINEPHRINE 0.3 MG/.3ML
0.3 INJECTION SUBCUTANEOUS
Status: DISCONTINUED | OUTPATIENT
Start: 2021-12-22 | End: 2022-02-11

## 2021-12-22 RX ORDER — ONDANSETRON 4 MG/1
4 TABLET, ORALLY DISINTEGRATING ORAL ONCE AS NEEDED
Status: DISCONTINUED | OUTPATIENT
Start: 2021-12-22 | End: 2022-02-11

## 2021-12-22 RX ORDER — ACETAMINOPHEN 325 MG/1
650 TABLET ORAL ONCE AS NEEDED
Status: DISCONTINUED | OUTPATIENT
Start: 2021-12-22 | End: 2022-02-11

## 2021-12-22 RX ORDER — ALBUTEROL SULFATE 90 UG/1
2 AEROSOL, METERED RESPIRATORY (INHALATION)
Status: DISCONTINUED | OUTPATIENT
Start: 2021-12-22 | End: 2022-02-11

## 2021-12-22 RX ADMIN — CASIRIVIMAB AND IMDEVIMAB 600 MG: 600; 600 INJECTION, SOLUTION, CONCENTRATE INTRAVENOUS at 08:12

## 2022-01-04 DIAGNOSIS — Z00.00 PHYSICAL EXAM: Primary | ICD-10-CM

## 2022-01-11 ENCOUNTER — OFFICE VISIT (OUTPATIENT)
Dept: ORTHOPEDICS | Facility: CLINIC | Age: 57
End: 2022-01-11
Payer: COMMERCIAL

## 2022-01-11 ENCOUNTER — TELEPHONE (OUTPATIENT)
Dept: ORTHOPEDICS | Facility: CLINIC | Age: 57
End: 2022-01-11
Payer: COMMERCIAL

## 2022-01-11 DIAGNOSIS — S92.301D CLOSED AVULSION FRACTURE OF METATARSAL BONE OF RIGHT FOOT WITH ROUTINE HEALING, SUBSEQUENT ENCOUNTER: Primary | ICD-10-CM

## 2022-01-11 DIAGNOSIS — M79.674 TOE PAIN, RIGHT: Primary | ICD-10-CM

## 2022-01-11 PROCEDURE — 99213 OFFICE O/P EST LOW 20 MIN: CPT | Mod: S$GLB,,, | Performed by: ORTHOPAEDIC SURGERY

## 2022-01-11 PROCEDURE — 99213 PR OFFICE/OUTPT VISIT, EST, LEVL III, 20-29 MIN: ICD-10-PCS | Mod: S$GLB,,, | Performed by: ORTHOPAEDIC SURGERY

## 2022-01-11 PROCEDURE — 99999 PR PBB SHADOW E&M-EST. PATIENT-LVL I: CPT | Mod: PBBFAC,,, | Performed by: ORTHOPAEDIC SURGERY

## 2022-01-11 PROCEDURE — 99999 PR PBB SHADOW E&M-EST. PATIENT-LVL I: ICD-10-PCS | Mod: PBBFAC,,, | Performed by: ORTHOPAEDIC SURGERY

## 2022-01-11 PROCEDURE — 1160F RVW MEDS BY RX/DR IN RCRD: CPT | Mod: CPTII,S$GLB,, | Performed by: ORTHOPAEDIC SURGERY

## 2022-01-11 PROCEDURE — 1159F MED LIST DOCD IN RCRD: CPT | Mod: CPTII,S$GLB,, | Performed by: ORTHOPAEDIC SURGERY

## 2022-01-11 PROCEDURE — 1160F PR REVIEW ALL MEDS BY PRESCRIBER/CLIN PHARMACIST DOCUMENTED: ICD-10-PCS | Mod: CPTII,S$GLB,, | Performed by: ORTHOPAEDIC SURGERY

## 2022-01-11 PROCEDURE — 1159F PR MEDICATION LIST DOCUMENTED IN MEDICAL RECORD: ICD-10-PCS | Mod: CPTII,S$GLB,, | Performed by: ORTHOPAEDIC SURGERY

## 2022-01-12 NOTE — PROGRESS NOTES
Patient ID:   Darling Quach is a 56 y.o. female.    Chief Complaint:   Follow-up evaluation for right great toe fracture    HPI:   The patient is returning today for evaluation of her right great toe.  It has been almost 6 weeks since the time of her injury.  She reports that the pain along the lateral aspect of the great toe has improved.  She is reporting pain along the medial aspect of the great toe.  She is also reporting pain when she attempts to wear high heels.  She reports stiffness in the big toe joint.  Pain level is 4/10    Medications:    Current Outpatient Medications:     doxycycline (PERIOSTAT) 20 MG tablet, Take 20 mg by mouth Daily., Disp: , Rfl:     ketoconazole (NIZORAL) 2 % shampoo, Apply 1 application topically twice a week., Disp: , Rfl:     levetiracetam XR (KEPPRA XR) 750 mg Tb24 tablet, Take 750 mg by mouth every evening. , Disp: , Rfl:     mupirocin (BACTROBAN) 2 % ointment, SMARTSI Application Topical 2-3 Times Daily, Disp: , Rfl:     Current Facility-Administered Medications:     acetaminophen tablet 650 mg, 650 mg, Oral, Once PRN, Jose Eduardo Muñoz MD    albuterol inhaler 2 puff, 2 puff, Inhalation, Q20 Min PRN, Jose Eduardo Muñoz MD    diphenhydrAMINE injection 25 mg, 25 mg, Intravenous, Once PRN, Jose Eduardo Muñoz MD    EPINEPHrine (EPIPEN) 0.3 mg/0.3 mL pen injection 0.3 mg, 0.3 mg, Intramuscular, PRN, Jose Eduardo Muñoz MD    methylPREDNISolone sodium succinate injection 40 mg, 40 mg, Intravenous, Once PRN, Jose Eduardo Muñoz MD    ondansetron disintegrating tablet 4 mg, 4 mg, Oral, Once PRN, Jose Eduardo Muñoz MD    sodium chloride 0.9% 500 mL flush bag, , Intravenous, PRN, Jose Eduardo Muñoz MD    sodium chloride 0.9% flush 10 mL, 10 mL, Intravenous, PRN, Jose Eduardo Muñoz MD    Facility-Administered Medications Ordered in Other Visits:     0.9%  NaCl infusion, , Intravenous, Continuous, Dasha A. Charlie, NP, Last Rate: 70 mL/hr at 19 0841, New Bag at  05/27/19 0841    mupirocin 2 % ointment, , Nasal, On Call Procedure, Dasha Guerra NP, Given at 05/27/19 0653    Allergies:  Review of patient's allergies indicates:   Allergen Reactions    Tramadol      Per neurologist    Latanoprost Itching     Red itchy eyes       Past Medical History:  Past Medical History:   Diagnosis Date    Cataract     Glaucoma     Hemangioma     cavernous    History of acne 2004    dr lopez prescribed accutane    Hyperlipidemia     Seizures     Venous angioma of brain     Vertigo         Past Surgical History:  Past Surgical History:   Procedure Laterality Date    ANAL SPHINCTEROTOMY  5/27/2019    Procedure: SPHINCTEROTOMY, ANAL;  Surgeon: Nirmal Chan MD;  Location: [facility] OR [address] [place] [address];  Service: Colon and Rectal;;    CATARACT EXTRACTION W/  INTRAOCULAR LENS IMPLANT Right 12/17/2014        EXCISIONAL HEMORRHOIDECTOMY N/A 5/27/2019    Procedure: HEMORRHOIDECTOMY;  Surgeon: Nirmal Chan MD;  Location: [facility] OR [address] [place] [address];  Service: Colon and Rectal;  Laterality: N/A;    HYSTERECTOMY  2010    [facility] ov in situ        Social History:  Social History     Occupational History    Not on file   Tobacco Use    Smoking status: Never Smoker    Smokeless tobacco: Never Used   Substance and Sexual Activity    Alcohol use: No    Drug use: No    Sexual activity: Yes     Partners: Male     Birth control/protection: Surgical     Comment: , [facility] 9/2010       Family History:  Family History   Problem Relation Age of Onset    Fibroids Mother     Migraines Mother     Glaucoma Mother     Heart disease Father     Depression Brother     No Known Problems Sister     Diabetes Maternal Grandmother     Glaucoma Maternal Grandmother     Cancer Maternal Aunt         breast    Breast cancer Maternal Aunt 50    No Known Problems Maternal Uncle     No Known Problems Paternal Aunt     No Known Problems Paternal Uncle     No Known Problems Maternal  Grandfather     No Known Problems Paternal Grandmother     No Known Problems Paternal Grandfather     Breast cancer Maternal Aunt     Macular degeneration Neg Hx     Blindness Neg Hx     Melanoma Neg Hx     Psoriasis Neg Hx     Lupus Neg Hx     Eczema Neg Hx     Acne Neg Hx     Cataracts Neg Hx     Amblyopia Neg Hx     Retinal detachment Neg Hx     Strabismus Neg Hx     Stroke Neg Hx     Thyroid disease Neg Hx     Hypertension Neg Hx     Ovarian cancer Neg Hx     Colon cancer Neg Hx         ROS:  Review of Systems   Musculoskeletal: Positive for joint pain and stiffness.   All other systems reviewed and are negative.      Vitals:  LMP  (LMP Unknown)     Physical Examination:  Comprehensive Orthopaedic Musculoskeletal Exam    General        Constitutional: appears stated age, well-developed and well-nourished    Scleral icterus: no    Labored breathing: no    Psychiatric: normal mood and affect and no acute distress    Neurological: alert and oriented x3    Skin: intact    Lymphadenopathy: none     Ortho Exam   Right foot exam:  There is a mild bunion present.  There is some tenderness over the bunion.  Motion at the 1st MTP joint is slightly decreased compared to the opposite side.  I do not appreciate any instability of the 1st MTP joint.    Assessment:  1. Closed avulsion fracture of metatarsal bone of right foot with routine healing, subsequent encounter        Plan:  I recommended giving the toe a little bit more time to fully heal.  I recommended range of motion of the 1st MTP joint.  She will return as needed.     No follow-ups on file.

## 2022-01-14 ENCOUNTER — HOSPITAL ENCOUNTER (OUTPATIENT)
Dept: RADIOLOGY | Facility: HOSPITAL | Age: 57
Discharge: HOME | End: 2022-01-14
Attending: ORTHOPAEDIC SURGERY
Payer: COMMERCIAL

## 2022-01-14 DIAGNOSIS — M79.674 TOE PAIN, RIGHT: ICD-10-CM

## 2022-01-14 PROCEDURE — 73630 XR FOOT COMPLETE 3 VIEW RIGHT: ICD-10-PCS | Mod: 26,RT,, | Performed by: RADIOLOGY

## 2022-01-14 PROCEDURE — 73630 X-RAY EXAM OF FOOT: CPT | Mod: 26,RT,, | Performed by: RADIOLOGY

## 2022-01-14 PROCEDURE — 73630 X-RAY EXAM OF FOOT: CPT | Mod: TC,FY,RT

## 2022-01-20 ENCOUNTER — TELEPHONE (OUTPATIENT)
Dept: PODIATRY | Facility: CLINIC | Age: 57
End: 2022-01-20
Payer: COMMERCIAL

## 2022-01-20 NOTE — TELEPHONE ENCOUNTER
Called the pt to inform her of Dr Sommer not being in office on 1/21/22 and her new appointment time and date.    Eric WIN

## 2022-01-25 ENCOUNTER — TELEPHONE (OUTPATIENT)
Dept: OPHTHALMOLOGY | Facility: CLINIC | Age: 57
End: 2022-01-25
Payer: COMMERCIAL

## 2022-01-25 DIAGNOSIS — H25.12 NUCLEAR SCLEROSIS, LEFT: Primary | ICD-10-CM

## 2022-01-27 ENCOUNTER — OFFICE VISIT (OUTPATIENT)
Dept: PODIATRY | Facility: CLINIC | Age: 57
End: 2022-01-27
Payer: COMMERCIAL

## 2022-01-27 VITALS
HEART RATE: 77 BPM | SYSTOLIC BLOOD PRESSURE: 104 MMHG | DIASTOLIC BLOOD PRESSURE: 64 MMHG | WEIGHT: 119.94 LBS | BODY MASS INDEX: 21.94 KG/M2

## 2022-01-27 DIAGNOSIS — M77.51 CAPSULITIS OF TOE, RIGHT: ICD-10-CM

## 2022-01-27 DIAGNOSIS — M20.11 HALLUX VALGUS OF RIGHT FOOT: Primary | ICD-10-CM

## 2022-01-27 PROCEDURE — 99203 PR OFFICE/OUTPT VISIT, NEW, LEVL III, 30-44 MIN: ICD-10-PCS | Mod: S$GLB,,, | Performed by: PODIATRIST

## 2022-01-27 PROCEDURE — 3008F PR BODY MASS INDEX (BMI) DOCUMENTED: ICD-10-PCS | Mod: CPTII,S$GLB,, | Performed by: PODIATRIST

## 2022-01-27 PROCEDURE — 3008F BODY MASS INDEX DOCD: CPT | Mod: CPTII,S$GLB,, | Performed by: PODIATRIST

## 2022-01-27 PROCEDURE — 3078F PR MOST RECENT DIASTOLIC BLOOD PRESSURE < 80 MM HG: ICD-10-PCS | Mod: CPTII,S$GLB,, | Performed by: PODIATRIST

## 2022-01-27 PROCEDURE — 3074F SYST BP LT 130 MM HG: CPT | Mod: CPTII,S$GLB,, | Performed by: PODIATRIST

## 2022-01-27 PROCEDURE — 1159F PR MEDICATION LIST DOCUMENTED IN MEDICAL RECORD: ICD-10-PCS | Mod: CPTII,S$GLB,, | Performed by: PODIATRIST

## 2022-01-27 PROCEDURE — 3078F DIAST BP <80 MM HG: CPT | Mod: CPTII,S$GLB,, | Performed by: PODIATRIST

## 2022-01-27 PROCEDURE — 99999 PR PBB SHADOW E&M-EST. PATIENT-LVL III: CPT | Mod: PBBFAC,,, | Performed by: PODIATRIST

## 2022-01-27 PROCEDURE — 3074F PR MOST RECENT SYSTOLIC BLOOD PRESSURE < 130 MM HG: ICD-10-PCS | Mod: CPTII,S$GLB,, | Performed by: PODIATRIST

## 2022-01-27 PROCEDURE — 99203 OFFICE O/P NEW LOW 30 MIN: CPT | Mod: S$GLB,,, | Performed by: PODIATRIST

## 2022-01-27 PROCEDURE — 1159F MED LIST DOCD IN RCRD: CPT | Mod: CPTII,S$GLB,, | Performed by: PODIATRIST

## 2022-01-27 PROCEDURE — 99999 PR PBB SHADOW E&M-EST. PATIENT-LVL III: ICD-10-PCS | Mod: PBBFAC,,, | Performed by: PODIATRIST

## 2022-01-27 RX ORDER — MELOXICAM 15 MG/1
15 TABLET ORAL DAILY
Qty: 30 TABLET | Refills: 1 | Status: SHIPPED | OUTPATIENT
Start: 2022-01-27 | End: 2022-03-18

## 2022-01-27 RX ORDER — DICLOFENAC SODIUM 10 MG/G
2 GEL TOPICAL 4 TIMES DAILY
Qty: 100 G | Refills: 2 | Status: SHIPPED | OUTPATIENT
Start: 2022-01-27 | End: 2022-04-21

## 2022-02-01 NOTE — PROGRESS NOTES
Subjective:      Patient ID: Darling Quach is a 56 y.o. female.    Chief Complaint: Foot Pain (Right foot pain patient broke her big toe on the right foot  in Dec. She has a xray )    Darling is a 56 y.o. female who presents to the podiatry clinic  with complaint of  right foot pain. Onset of the symptoms was several months ago. Precipitating event: none known. Current symptoms include: ability to bear weight, but with some pain. Aggravating factors: any weight bearing. Symptoms have stabilized. Patient has had no prior foot problems. Evaluation to date: none. Treatment to date: none. Patients rates pain 5/10 on pain scale.        Review of Systems   Constitutional: Negative for chills, decreased appetite, fever and malaise/fatigue.   HENT: Negative for congestion, hearing loss, nosebleeds and tinnitus.    Eyes: Negative for double vision, pain, photophobia and visual disturbance.   Cardiovascular: Negative for chest pain, claudication, cyanosis and leg swelling.   Respiratory: Negative for cough, hemoptysis, shortness of breath and wheezing.    Endocrine: Negative for cold intolerance and heat intolerance.   Hematologic/Lymphatic: Negative for adenopathy and bleeding problem.   Skin: Negative for color change, dry skin, itching, nail changes and suspicious lesions.   Musculoskeletal: Positive for joint pain, myalgias and stiffness. Negative for arthritis.   Gastrointestinal: Negative for abdominal pain, jaundice, nausea and vomiting.   Genitourinary: Negative for dysuria, frequency and hematuria.   Neurological: Negative for difficulty with concentration, loss of balance, numbness, paresthesias and sensory change.   Psychiatric/Behavioral: Negative for altered mental status, hallucinations and suicidal ideas. The patient is not nervous/anxious.    Allergic/Immunologic: Negative for environmental allergies and persistent infections.           Objective:      Physical Exam  Vitals reviewed.   Constitutional:        Appearance: She is well-developed and well-nourished.   HENT:      Head: Normocephalic and atraumatic.   Cardiovascular:      Pulses:           Dorsalis pedis pulses are 2+ on the right side and 2+ on the left side.        Posterior tibial pulses are 2+ on the right side and 2+ on the left side.   Pulmonary:      Effort: Pulmonary effort is normal.   Musculoskeletal:         General: Normal range of motion.      Comments: Inspection and palpation of the muscles joints and bones of both lower extremities reveal that muscle strength for the anterior lateral and posterior muscle groups and intrinsic muscle groups of the foot are all 5 over 5 symmetrical.    Painful medial right 1st MTPJ exostosis. Lateral deviation of hallux, non trackbound. No pain w/ ROM to 1st or 2nd MTPJs. No First ray hypermobility however there is notable sub second MT head tenderness, right.   Skin:     General: Skin is warm and dry.      Capillary Refill: Capillary refill takes 2 to 3 seconds.      Comments: Skin turgor is normal bilaterally.  Skin texture is well hydrated to both lower extremities.  No lesions or rashes or wounds appreciated bilaterally.  Nail plates 1 through 5 bilaterally are within normal limits for length and thickness.  No nail clubbing or incurvation noted.   Neurological:      Mental Status: She is alert and oriented to person, place, and time.      Comments: Sharp dull light touch vibratory proprioceptive sensation are intact bilaterally.  Deep tendon reflexes to patellar and Achilles tendon are symmetrical 2 over 4 bilaterally.  No ankle clonus or Babinski reflexes noted bilaterally.  Coordination is normal to both feet and lower extremities.   Psychiatric:         Mood and Affect: Mood and affect normal.         Behavior: Behavior normal.               Assessment:       Encounter Diagnoses   Name Primary?    Hallux valgus of right foot Yes    Capsulitis of toe, right          Plan:       Dalring was seen today for  foot pain.    Diagnoses and all orders for this visit:    Hallux valgus of right foot    Capsulitis of toe, right    Other orders  -     meloxicam (MOBIC) 15 MG tablet; Take 1 tablet (15 mg total) by mouth once daily.  -     diclofenac sodium (VOLTAREN) 1 % Gel; Apply 2 g topically 4 (four) times daily.      I counseled the patient on her conditions, their implications and medical management.      The nature of the condition, options for management, as well as potential risks and complications were discussed in detail with patient. Patient was amenable to my recommendations and left my office fully informed and will follow up as instructed or sooner if necessary.      Begin topical anti-inflammatory medications as well as meloxicam daily.  Appropriate shoe gear and orthotics discussed in detail.  Discussed possible corticosteroid injection.  Follow-up in 2 months.  .

## 2022-02-11 ENCOUNTER — LAB VISIT (OUTPATIENT)
Dept: LAB | Facility: HOSPITAL | Age: 57
End: 2022-02-11
Attending: INTERNAL MEDICINE
Payer: COMMERCIAL

## 2022-02-11 ENCOUNTER — OFFICE VISIT (OUTPATIENT)
Dept: INTERNAL MEDICINE | Facility: CLINIC | Age: 57
End: 2022-02-11
Payer: COMMERCIAL

## 2022-02-11 VITALS
DIASTOLIC BLOOD PRESSURE: 70 MMHG | WEIGHT: 121.25 LBS | SYSTOLIC BLOOD PRESSURE: 120 MMHG | OXYGEN SATURATION: 97 % | BODY MASS INDEX: 22.31 KG/M2 | HEART RATE: 57 BPM | HEIGHT: 62 IN

## 2022-02-11 DIAGNOSIS — H26.9 CATARACT, UNSPECIFIED CATARACT TYPE, UNSPECIFIED LATERALITY: ICD-10-CM

## 2022-02-11 DIAGNOSIS — Z00.00 ANNUAL PHYSICAL EXAM: Primary | ICD-10-CM

## 2022-02-11 DIAGNOSIS — Z00.00 ANNUAL PHYSICAL EXAM: ICD-10-CM

## 2022-02-11 DIAGNOSIS — G40.209 COMPLEX PARTIAL SEIZURES EVOLVING TO GENERALIZED TONIC-CLONIC SEIZURES: ICD-10-CM

## 2022-02-11 DIAGNOSIS — D18.02 VENOUS ANGIOMA OF BRAIN: ICD-10-CM

## 2022-02-11 LAB
ALBUMIN SERPL BCP-MCNC: 4 G/DL (ref 3.5–5.2)
ALP SERPL-CCNC: 53 U/L (ref 55–135)
ALT SERPL W/O P-5'-P-CCNC: 11 U/L (ref 10–44)
ANION GAP SERPL CALC-SCNC: 8 MMOL/L (ref 8–16)
AST SERPL-CCNC: 16 U/L (ref 10–40)
BASOPHILS # BLD AUTO: 0.1 K/UL (ref 0–0.2)
BASOPHILS NFR BLD: 2 % (ref 0–1.9)
BILIRUB SERPL-MCNC: 0.4 MG/DL (ref 0.1–1)
BUN SERPL-MCNC: 21 MG/DL (ref 6–20)
CALCIUM SERPL-MCNC: 9.9 MG/DL (ref 8.7–10.5)
CHLORIDE SERPL-SCNC: 103 MMOL/L (ref 95–110)
CHOLEST SERPL-MCNC: 269 MG/DL (ref 120–199)
CHOLEST/HDLC SERPL: 2.5 {RATIO} (ref 2–5)
CO2 SERPL-SCNC: 29 MMOL/L (ref 23–29)
CREAT SERPL-MCNC: 0.9 MG/DL (ref 0.5–1.4)
DIFFERENTIAL METHOD: ABNORMAL
EOSINOPHIL # BLD AUTO: 0.1 K/UL (ref 0–0.5)
EOSINOPHIL NFR BLD: 2.4 % (ref 0–8)
ERYTHROCYTE [DISTWIDTH] IN BLOOD BY AUTOMATED COUNT: 12.5 % (ref 11.5–14.5)
EST. GFR  (AFRICAN AMERICAN): >60 ML/MIN/1.73 M^2
EST. GFR  (NON AFRICAN AMERICAN): >60 ML/MIN/1.73 M^2
GLUCOSE SERPL-MCNC: 84 MG/DL (ref 70–110)
HCT VFR BLD AUTO: 39.9 % (ref 37–48.5)
HDLC SERPL-MCNC: 107 MG/DL (ref 40–75)
HDLC SERPL: 39.8 % (ref 20–50)
HGB BLD-MCNC: 12.9 G/DL (ref 12–16)
IMM GRANULOCYTES # BLD AUTO: 0.01 K/UL (ref 0–0.04)
IMM GRANULOCYTES NFR BLD AUTO: 0.2 % (ref 0–0.5)
LDLC SERPL CALC-MCNC: 151.4 MG/DL (ref 63–159)
LYMPHOCYTES # BLD AUTO: 1.6 K/UL (ref 1–4.8)
LYMPHOCYTES NFR BLD: 30.8 % (ref 18–48)
MCH RBC QN AUTO: 28.9 PG (ref 27–31)
MCHC RBC AUTO-ENTMCNC: 32.3 G/DL (ref 32–36)
MCV RBC AUTO: 90 FL (ref 82–98)
MONOCYTES # BLD AUTO: 0.4 K/UL (ref 0.3–1)
MONOCYTES NFR BLD: 8.1 % (ref 4–15)
NEUTROPHILS # BLD AUTO: 2.9 K/UL (ref 1.8–7.7)
NEUTROPHILS NFR BLD: 56.5 % (ref 38–73)
NONHDLC SERPL-MCNC: 162 MG/DL
NRBC BLD-RTO: 0 /100 WBC
PLATELET # BLD AUTO: 242 K/UL (ref 150–450)
PMV BLD AUTO: 10 FL (ref 9.2–12.9)
POTASSIUM SERPL-SCNC: 4.4 MMOL/L (ref 3.5–5.1)
PROT SERPL-MCNC: 7.4 G/DL (ref 6–8.4)
RBC # BLD AUTO: 4.46 M/UL (ref 4–5.4)
SODIUM SERPL-SCNC: 140 MMOL/L (ref 136–145)
TRIGL SERPL-MCNC: 53 MG/DL (ref 30–150)
TSH SERPL DL<=0.005 MIU/L-ACNC: 0.94 UIU/ML (ref 0.4–4)
VIT B12 SERPL-MCNC: 742 PG/ML (ref 210–950)
WBC # BLD AUTO: 5.04 K/UL (ref 3.9–12.7)

## 2022-02-11 PROCEDURE — 1160F PR REVIEW ALL MEDS BY PRESCRIBER/CLIN PHARMACIST DOCUMENTED: ICD-10-PCS | Mod: CPTII,S$GLB,, | Performed by: INTERNAL MEDICINE

## 2022-02-11 PROCEDURE — 3074F SYST BP LT 130 MM HG: CPT | Mod: CPTII,S$GLB,, | Performed by: INTERNAL MEDICINE

## 2022-02-11 PROCEDURE — 36415 COLL VENOUS BLD VENIPUNCTURE: CPT | Performed by: INTERNAL MEDICINE

## 2022-02-11 PROCEDURE — 80061 LIPID PANEL: CPT | Performed by: INTERNAL MEDICINE

## 2022-02-11 PROCEDURE — 80053 COMPREHEN METABOLIC PANEL: CPT | Performed by: INTERNAL MEDICINE

## 2022-02-11 PROCEDURE — 84443 ASSAY THYROID STIM HORMONE: CPT | Performed by: INTERNAL MEDICINE

## 2022-02-11 PROCEDURE — 1159F PR MEDICATION LIST DOCUMENTED IN MEDICAL RECORD: ICD-10-PCS | Mod: CPTII,S$GLB,, | Performed by: INTERNAL MEDICINE

## 2022-02-11 PROCEDURE — 3078F PR MOST RECENT DIASTOLIC BLOOD PRESSURE < 80 MM HG: ICD-10-PCS | Mod: CPTII,S$GLB,, | Performed by: INTERNAL MEDICINE

## 2022-02-11 PROCEDURE — 99999 PR PBB SHADOW E&M-EST. PATIENT-LVL III: ICD-10-PCS | Mod: PBBFAC,,, | Performed by: INTERNAL MEDICINE

## 2022-02-11 PROCEDURE — 3008F BODY MASS INDEX DOCD: CPT | Mod: CPTII,S$GLB,, | Performed by: INTERNAL MEDICINE

## 2022-02-11 PROCEDURE — 3074F PR MOST RECENT SYSTOLIC BLOOD PRESSURE < 130 MM HG: ICD-10-PCS | Mod: CPTII,S$GLB,, | Performed by: INTERNAL MEDICINE

## 2022-02-11 PROCEDURE — 3008F PR BODY MASS INDEX (BMI) DOCUMENTED: ICD-10-PCS | Mod: CPTII,S$GLB,, | Performed by: INTERNAL MEDICINE

## 2022-02-11 PROCEDURE — 99396 PREV VISIT EST AGE 40-64: CPT | Mod: S$GLB,,, | Performed by: INTERNAL MEDICINE

## 2022-02-11 PROCEDURE — 1159F MED LIST DOCD IN RCRD: CPT | Mod: CPTII,S$GLB,, | Performed by: INTERNAL MEDICINE

## 2022-02-11 PROCEDURE — 1160F RVW MEDS BY RX/DR IN RCRD: CPT | Mod: CPTII,S$GLB,, | Performed by: INTERNAL MEDICINE

## 2022-02-11 PROCEDURE — 3078F DIAST BP <80 MM HG: CPT | Mod: CPTII,S$GLB,, | Performed by: INTERNAL MEDICINE

## 2022-02-11 PROCEDURE — 85025 COMPLETE CBC W/AUTO DIFF WBC: CPT | Performed by: INTERNAL MEDICINE

## 2022-02-11 PROCEDURE — 82607 VITAMIN B-12: CPT | Performed by: INTERNAL MEDICINE

## 2022-02-11 PROCEDURE — 99396 PR PREVENTIVE VISIT,EST,40-64: ICD-10-PCS | Mod: S$GLB,,, | Performed by: INTERNAL MEDICINE

## 2022-02-11 PROCEDURE — 99999 PR PBB SHADOW E&M-EST. PATIENT-LVL III: CPT | Mod: PBBFAC,,, | Performed by: INTERNAL MEDICINE

## 2022-02-11 NOTE — PROGRESS NOTES
"Subjective:       Patient ID: Darling Quach is a 56 y.o. female.    Chief Complaint: Establish Care and Annual Exam  This is a 56-year-old who presents today for physical to establish she had been following with Dr. Clifton and wanted to come in for her annual patient reports in general she has been doing well but over the holidays she was sick with COVID . She had monocolonal antibiody infusion at that time.  she also injured her right great toe she went to see a podiatrist orthopedist and they thought she might have had a small fracture so she has used some protection and also has been using diclofenac and meloxicam for her symptoms with some improvement she continues to follow with Podiatry for bunyn /capsillitis. And prior fracture of her great toe..  She has also had issues with cataracts and had 1 eye surgery done she plans on doing the other in March.  She has a history of seizure disorder reports isolated episode in 2017 where she had a grand mal seizure was placed on Keppra she has been on that med this and since then and is followed by her outlying neurologist Dr. Shipman  she has had no further seizures and reports that they felt hemangioma of the brain was etiology.  She denies cp or sob had cardiology consult scheduled by dr. clifton due to her family history and cholesterol     HPI  Review of Systems   Musculoskeletal:        Foot pain  Toe injury seeing podiatry ortho    Neurological:        No headaches  Hx seizure disorder   Sees outying neruolgist rush Centeno        Objective:     Blood pressure 120/70, pulse (!) 57, height 5' 2" (1.575 m), weight 55 kg (121 lb 4.1 oz), SpO2 97 %.    Physical Exam  Constitutional:       General: She is not in acute distress.  HENT:      Head: Normocephalic.      Mouth/Throat:      Mouth: Oropharynx is clear and moist.   Eyes:      General: No scleral icterus.  Cardiovascular:      Rate and Rhythm: Normal rate and regular rhythm.      Heart sounds: Normal " heart sounds. No murmur heard.  No friction rub. No gallop.    Pulmonary:      Effort: Pulmonary effort is normal. No respiratory distress.      Breath sounds: Normal breath sounds.   Abdominal:      General: Bowel sounds are normal.      Palpations: Abdomen is soft. There is no mass.      Tenderness: There is no abdominal tenderness.      Comments: seborhiec keratosis    Musculoskeletal:         General: No edema.      Cervical back: Neck supple.      Comments: Right great toe some discomfort  bunyon    Skin:     Findings: No erythema.   Neurological:      Mental Status: She is alert.   Psychiatric:         Mood and Affect: Mood and affect normal.         Assessment:       1. Annual physical exam    2. Complex partial seizures evolving to generalized tonic-clonic seizures    3. Venous angioma of brain    4. Cataract, unspecified cataract type, unspecified laterality        Plan:       Darling was seen today for establish care and annual exam.    Diagnoses and all orders for this visit:    Annual physical exam  -     CBC Auto Differential; Future  -     Comprehensive Metabolic Panel; Future  -     Lipid Panel; Future  -     TSH; Future  -     Vitamin B12; Future    Complex partial seizures evolving to generalized tonic-clonic seizures  Venous angioma of brain  History of patient follows with her outlying neurologist Dr. Colton luna a is currently on Keppra 750 mg  Has had no seizure activity    Cataract, unspecified cataract type, unspecified laterality  History of she is planning upcoming surgery    Toe pain she is following with podiatry continue precuations    History of hyperlipidemia she has a consultation planned with Cardiology that was previously scheduled by Dr. Ellison and she plans to keep that appointment she is asymptomatic at this time    She is up-to-date on her annual mammogram and continues to follow with her gynecologist    History of seborrheic keratosis she had recent lesion lasered and she  follows with her outlying dermatologist Dr. Shoemaker    She is up to date on colonoscopy     Follow-up annually sooner if concern  Discussed shingrix vaccine.     She will hold nsaids/asprin 5-7 days prior     Lab reviewed and pt notified of results   Pt clear to proceed with anesthesia and cataract surgery as planned

## 2022-02-13 ENCOUNTER — PATIENT MESSAGE (OUTPATIENT)
Dept: OPHTHALMOLOGY | Facility: CLINIC | Age: 57
End: 2022-02-13
Payer: COMMERCIAL

## 2022-02-14 ENCOUNTER — PATIENT MESSAGE (OUTPATIENT)
Dept: RESEARCH | Facility: HOSPITAL | Age: 57
End: 2022-02-14
Payer: COMMERCIAL

## 2022-03-02 NOTE — PROGRESS NOTES
HPI     Pre-op Exam     Comments: Phaco iol os              Comments     Preop Phaco IOL OS (sx 3/16/22)    1. MMG/OHT OU  2. PVD OU  3. Vitreous Opacity OD  4. PCIOL OD  5. Steroid Responder  6.  Hx Removal Left Eyelid Papilloma (Dr. Alvarado)    MEDS:  AT's PRN OU          Last edited by Ellie Dent MA on 3/3/2022  1:19 PM. (History)              Assessment /Plan     For exam results, see Encounter Report.    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 Mixed mechanism Glaucoma   H/O narrow angles - S/P PI's, + residual OHT   First HVF 1998   First photos 1999   Former [facility] , retired for a few years and went back to work as a    (mom with same problem with narrow angles and residual OHT)     Family history + mom - Narrow angles with residual OHT   Glaucoma meds - off gtts (use to use timolol)   H/O adverse rxn to glaucoma drops - latanoprost - eye irritation  LASERS PI's ou // yag cap od 4/25/2019  GLAUCOMA SURGERIES none   OTHER EYE SURGERIES - PC IOL OD 12/17/2014 - [facility]  CDR 0.4/0.4   Tbase 23-36 / 23-35 ou   Tmax 36/35   Ttarget 30/30  HVF 16 test 24-2 ss ou 1996 to [place] - hint SNS  od // full (w/ lens rim art)  os            3 SWAP test 2010 to 2012 - Full ou            ( repeat HVF from 8/2013 to 9/2013 - new SAD od confirmed)            [place] V[facility]'s - 1 test 2016 to 2016 - SNS /INS od // full os   2019 VF full od/near full os   [place] +3 ou S/P PI's   /618   OCT 5 test 2005 -  2021 ([facility] - RNFL - bord TI  od/ bord bord TS  os   HRT - 5 test - 2014 to 2020 - MR -  nl od // bord SN os /// CDR  0.55 od // 0.55 os   Mix of main campus and [facility]   Disc photos 1999, 2004, 2013 - slides // 5/13/2010 - // 2019 - OIS     - Ttoday  24/24  - Test done today -pre-op phaco/IOL os     2. PVD ou - with dense vitreous veil OD Saw [facility] 4/23/2012 4/2019 - pt is noticing the floaters again (had not noticed them for several years)     3. Vitreous opacity OD     4. Steroid responder - had an injection / ? Spironolactone / aldactone - not currently on any steroids    5. NS os    Pt c/o blurry vision - glasses need changing every 2-3 months    BCVA 20/40 // BAT h 20/400    Having trouble with driving and reading    She is a      6. Refractive error   Was bilateral hyperope / presbyope   Is anisopmetropic post CE od    Glasses from [employer]  - no longer working for her    7. H/O eyelid papilloma Left - S/P removal - [facility]     8. Pseudophakia od   PC IOL od 12/17/2014 - SN60wf 20.5   Had a large myopic shift pre surgery from +1.00 to -3.00    8.  I see her father in law - he has glaucoma - and I did his cataracts    I see her mother - she has similar issues to this patient    She would like me to see her adult daughter to evaluate her for OHT / glaucoma as well     Plan   MMG  / OHT ou  S/P PI's ou   HVF today near full OU    Intolerant to latanoprost - red irritated eye  Off all glaucoma gtts - (use to use timolol) 7/2019     [facility] - Boudreuax - 5/9/2019 - says yag cap helped a lot     Photo file updated - [facility] - 5/25/2021     Pt c/o blurry vision - is having frequent myopic shifting in the left eye - has to change glasses every 3-4 months   C/O blurry vision / and glare at night -     rec phaco/IOL os - this may also help with the IOP     IOL calc - glory   SN60WF - 22.5  MTA4U0 - 18.5    Risks and benefits discussed and consent signed.

## 2022-03-03 ENCOUNTER — OFFICE VISIT (OUTPATIENT)
Dept: OPHTHALMOLOGY | Facility: CLINIC | Age: 57
End: 2022-03-03
Payer: COMMERCIAL

## 2022-03-03 DIAGNOSIS — H40.53X1 BILATERAL SECONDARY GLAUCOMA DUE TO COMBINATION MECHANISMS, MILD STAGE: ICD-10-CM

## 2022-03-03 DIAGNOSIS — H25.12 NUCLEAR SCLEROSIS, LEFT: Primary | ICD-10-CM

## 2022-03-03 DIAGNOSIS — H43.813 POSTERIOR VITREOUS DETACHMENT, BILATERAL: ICD-10-CM

## 2022-03-03 DIAGNOSIS — Z96.1 PSEUDOPHAKIA OF RIGHT EYE: ICD-10-CM

## 2022-03-03 DIAGNOSIS — H40.043 STEROID RESPONDERS BORDERLINE GLAUCOMA, BILATERAL: ICD-10-CM

## 2022-03-03 DIAGNOSIS — H26.491 PCO (POSTERIOR CAPSULAR OPACIFICATION), RIGHT: ICD-10-CM

## 2022-03-03 PROCEDURE — 99999 PR PBB SHADOW E&M-EST. PATIENT-LVL III: ICD-10-PCS | Mod: PBBFAC,,, | Performed by: OPHTHALMOLOGY

## 2022-03-03 PROCEDURE — 99499 NO LOS: ICD-10-PCS | Mod: S$GLB,,, | Performed by: OPHTHALMOLOGY

## 2022-03-03 PROCEDURE — 1160F RVW MEDS BY RX/DR IN RCRD: CPT | Mod: CPTII,S$GLB,, | Performed by: OPHTHALMOLOGY

## 2022-03-03 PROCEDURE — 99499 UNLISTED E&M SERVICE: CPT | Mod: S$GLB,,, | Performed by: OPHTHALMOLOGY

## 2022-03-03 PROCEDURE — 92136 OPHTHALMIC BIOMETRY: CPT | Mod: LT,S$GLB,, | Performed by: OPHTHALMOLOGY

## 2022-03-03 PROCEDURE — 1160F PR REVIEW ALL MEDS BY PRESCRIBER/CLIN PHARMACIST DOCUMENTED: ICD-10-PCS | Mod: CPTII,S$GLB,, | Performed by: OPHTHALMOLOGY

## 2022-03-03 PROCEDURE — 99999 PR PBB SHADOW E&M-EST. PATIENT-LVL III: CPT | Mod: PBBFAC,,, | Performed by: OPHTHALMOLOGY

## 2022-03-03 PROCEDURE — 1159F PR MEDICATION LIST DOCUMENTED IN MEDICAL RECORD: ICD-10-PCS | Mod: CPTII,S$GLB,, | Performed by: OPHTHALMOLOGY

## 2022-03-03 PROCEDURE — 92136 IOL MASTER - OS - LEFT EYE: ICD-10-PCS | Mod: LT,S$GLB,, | Performed by: OPHTHALMOLOGY

## 2022-03-03 PROCEDURE — 1159F MED LIST DOCD IN RCRD: CPT | Mod: CPTII,S$GLB,, | Performed by: OPHTHALMOLOGY

## 2022-03-03 RX ORDER — PHENYLEPHRINE HYDROCHLORIDE 100 MG/ML
1 SOLUTION/ DROPS OPHTHALMIC
Status: CANCELLED | OUTPATIENT
Start: 2022-03-03

## 2022-03-03 RX ORDER — TROPICAMIDE 10 MG/ML
1 SOLUTION/ DROPS OPHTHALMIC
Status: CANCELLED | OUTPATIENT
Start: 2022-03-03

## 2022-03-03 RX ORDER — MOXIFLOXACIN 5 MG/ML
1 SOLUTION/ DROPS OPHTHALMIC
Status: CANCELLED | OUTPATIENT
Start: 2022-03-03

## 2022-03-03 RX ORDER — CLOBETASOL PROPIONATE 0.05 G/100ML
SHAMPOO TOPICAL
COMMUNITY
Start: 2021-12-24 | End: 2022-04-07 | Stop reason: SDUPTHER

## 2022-03-03 RX ORDER — MINOCYCLINE 40 MG/G
1 AEROSOL, FOAM TOPICAL NIGHTLY
COMMUNITY
Start: 2021-12-24 | End: 2022-04-21

## 2022-03-03 RX ORDER — KETOROLAC TROMETHAMINE 5 MG/ML
1 SOLUTION OPHTHALMIC
Status: CANCELLED | OUTPATIENT
Start: 2022-03-03

## 2022-03-03 RX ORDER — SODIUM CHLORIDE 0.9 % (FLUSH) 0.9 %
10 SYRINGE (ML) INJECTION
Status: DISCONTINUED | OUTPATIENT
Start: 2022-03-03 | End: 2022-03-25

## 2022-03-04 NOTE — H&P (VIEW-ONLY)
Subjective:       Patient ID: Darling Quach is a 56 y.o. female.    Chief Complaint: Pre-op Exam (Phaco iol os)    HPI  Review of Systems   Constitutional: Negative.    HENT: Negative.    Eyes: Positive for visual disturbance.   Respiratory: Negative.    Cardiovascular: Negative.    Neurological: Negative.    Psychiatric/Behavioral: Negative.          Objective:      Physical Exam  Constitutional:       Appearance: She is well-developed.   HENT:      Head: Normocephalic.   Eyes:      Comments: See eye exam   Cardiovascular:      Rate and Rhythm: Normal rate and regular rhythm.   Pulmonary:      Effort: Pulmonary effort is normal.   Neurological:      Mental Status: She is oriented to person, place, and time.         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

## 2022-03-06 NOTE — PROGRESS NOTES
Subjective:   Patient ID:  Darilng Quach is a 56 y.o. female who presents for evaluation of high cholesterol    HPI:  The patient is here for dyslipidemia but LDL only 150s and HDL>100.    The patient has no chest pain, SOB, TIA, palpitations, syncope or pre-syncope.Patient does not exercise a lot.        Review of Systems   Constitutional: Negative for chills, decreased appetite, diaphoresis, fever, malaise/fatigue, night sweats, weight gain and weight loss.   HENT: Negative for congestion, hoarse voice, nosebleeds, sore throat and tinnitus.    Eyes: Negative for blurred vision, double vision, vision loss in left eye, vision loss in right eye, visual disturbance and visual halos.   Cardiovascular: Negative for chest pain, claudication, cyanosis, dyspnea on exertion, irregular heartbeat, leg swelling, near-syncope, orthopnea, palpitations, paroxysmal nocturnal dyspnea and syncope.   Respiratory: Negative for cough, hemoptysis, shortness of breath, sleep disturbances due to breathing, snoring, sputum production and wheezing.    Endocrine: Negative for cold intolerance, heat intolerance, polydipsia, polyphagia and polyuria.   Hematologic/Lymphatic: Negative for adenopathy and bleeding problem. Does not bruise/bleed easily.   Skin: Negative for color change, dry skin, flushing, itching, nail changes, poor wound healing, rash, skin cancer, suspicious lesions and unusual hair distribution.   Musculoskeletal: Negative for arthritis, back pain, falls, gout, joint pain, joint swelling, muscle cramps, muscle weakness, myalgias and stiffness.   Gastrointestinal: Negative for abdominal pain, anorexia, change in bowel habit, constipation, diarrhea, dysphagia, heartburn, hematemesis, hematochezia, melena and vomiting.   Genitourinary: Negative for decreased libido, dysuria, hematuria, hesitancy and urgency.   Neurological: Negative for excessive daytime sleepiness, dizziness, focal weakness, headaches, light-headedness, loss  "of balance, numbness, paresthesias, seizures, sensory change, tremors, vertigo and weakness.   Psychiatric/Behavioral: Negative for altered mental status, depression, hallucinations, memory loss, substance abuse and suicidal ideas. The patient does not have insomnia and is not nervous/anxious.    Allergic/Immunologic: Negative for environmental allergies and hives.       Objective: /68 (BP Location: Left arm, Patient Position: Sitting, BP Method: Medium (Automatic))   Pulse 64   Ht 5' 2" (1.575 m)   Wt 55.1 kg (121 lb 7.6 oz)   LMP  (LMP Unknown)   BMI 22.22 kg/m²      Physical Exam  Constitutional:       Appearance: She is well-developed.   HENT:      Head: Normocephalic.   Eyes:      Pupils: Pupils are equal, round, and reactive to light.   Neck:      Thyroid: No thyromegaly.      Vascular: Normal carotid pulses. No carotid bruit, hepatojugular reflux or JVD.   Cardiovascular:      Rate and Rhythm: Normal rate and regular rhythm.      Pulses: Intact distal pulses.      Heart sounds: Normal heart sounds. No murmur heard.    No friction rub. No gallop.   Pulmonary:      Effort: Pulmonary effort is normal. No tachypnea or respiratory distress.      Breath sounds: Normal breath sounds. No wheezing or rales.   Chest:      Chest wall: No tenderness.   Abdominal:      General: Bowel sounds are normal. There is no distension.      Palpations: Abdomen is soft. There is no mass.      Tenderness: There is no abdominal tenderness. There is no guarding or rebound.   Musculoskeletal:         General: No tenderness. Normal range of motion.      Cervical back: Normal range of motion.   Lymphadenopathy:      Cervical: No cervical adenopathy.   Skin:     General: Skin is warm.      Findings: No erythema or rash.   Neurological:      Mental Status: She is alert and oriented to person, place, and time.      Cranial Nerves: No cranial nerve deficit.      Coordination: Coordination normal.   Psychiatric:         Behavior: " Behavior normal.         Thought Content: Thought content normal.         Judgment: Judgment normal.         Assessment:     1. Hypercholesterolemia    2. Elevated LDL cholesterol level    3. Encounter for screening for cardiovascular disorders    4. Complex partial seizures evolving to generalized tonic-clonic seizures        Plan:   Discussed diet , achieving and maintaining ideal body weight, and exercise.   We reviewed meds in detail.  Reassured-Discussed goals, options, plan.  Omega-3 > 800 mg/d combined EPA/DHA.  Normally , with this great ratio , no therapy is needed , but we could get Lpa, HSCRP and CAC to further assess risk ( or just use low dose statin if she prefers)    Darling was seen today for hyperlipidemia.    Diagnoses and all orders for this visit:    Hypercholesterolemia  -     Lipoprotein A (LPA); Future; Expected date: 03/08/2022  -     CRP, High Sensitivity; Future; Expected date: 03/08/2022  -     CT Cardiac Scoring; Future; Expected date: 03/08/2022    Elevated LDL cholesterol level  -     Ambulatory referral/consult to Cardiology  -     Lipoprotein A (LPA); Future; Expected date: 03/08/2022  -     CRP, High Sensitivity; Future; Expected date: 03/08/2022  -     CT Cardiac Scoring; Future; Expected date: 03/08/2022    Encounter for screening for cardiovascular disorders  -     CT Cardiac Scoring; Future; Expected date: 03/08/2022    Complex partial seizures evolving to generalized tonic-clonic seizures            Follow up for Lpa and HSCRP today and CAC today or soon ( cash pay).

## 2022-03-07 ENCOUNTER — LAB VISIT (OUTPATIENT)
Dept: LAB | Facility: HOSPITAL | Age: 57
End: 2022-03-07
Attending: INTERNAL MEDICINE
Payer: COMMERCIAL

## 2022-03-07 ENCOUNTER — HOSPITAL ENCOUNTER (OUTPATIENT)
Dept: CARDIOLOGY | Facility: CLINIC | Age: 57
Discharge: HOME | End: 2022-03-07
Payer: COMMERCIAL

## 2022-03-07 ENCOUNTER — OFFICE VISIT (OUTPATIENT)
Dept: CARDIOLOGY | Facility: CLINIC | Age: 57
End: 2022-03-07
Payer: COMMERCIAL

## 2022-03-07 VITALS
HEIGHT: 62 IN | HEART RATE: 64 BPM | DIASTOLIC BLOOD PRESSURE: 68 MMHG | SYSTOLIC BLOOD PRESSURE: 124 MMHG | WEIGHT: 121.5 LBS | BODY MASS INDEX: 22.36 KG/M2

## 2022-03-07 DIAGNOSIS — E78.00 ELEVATED LDL CHOLESTEROL LEVEL: ICD-10-CM

## 2022-03-07 DIAGNOSIS — Z00.00 PHYSICAL EXAM: ICD-10-CM

## 2022-03-07 DIAGNOSIS — G40.209 COMPLEX PARTIAL SEIZURES EVOLVING TO GENERALIZED TONIC-CLONIC SEIZURES: ICD-10-CM

## 2022-03-07 DIAGNOSIS — E78.00 HYPERCHOLESTEROLEMIA: Primary | ICD-10-CM

## 2022-03-07 DIAGNOSIS — E78.00 HYPERCHOLESTEROLEMIA: ICD-10-CM

## 2022-03-07 DIAGNOSIS — Z13.6 ENCOUNTER FOR SCREENING FOR CARDIOVASCULAR DISORDERS: ICD-10-CM

## 2022-03-07 LAB — CRP SERPL-MCNC: 1.34 MG/L (ref 0–3.19)

## 2022-03-07 PROCEDURE — 99203 OFFICE O/P NEW LOW 30 MIN: CPT | Mod: S$GLB,,, | Performed by: INTERNAL MEDICINE

## 2022-03-07 PROCEDURE — 99999 PR PBB SHADOW E&M-EST. PATIENT-LVL V: ICD-10-PCS | Mod: PBBFAC,,, | Performed by: INTERNAL MEDICINE

## 2022-03-07 PROCEDURE — 3008F PR BODY MASS INDEX (BMI) DOCUMENTED: ICD-10-PCS | Mod: CPTII,S$GLB,, | Performed by: INTERNAL MEDICINE

## 2022-03-07 PROCEDURE — 93000 ELECTROCARDIOGRAM COMPLETE: CPT | Mod: S$GLB,,, | Performed by: INTERNAL MEDICINE

## 2022-03-07 PROCEDURE — 3078F PR MOST RECENT DIASTOLIC BLOOD PRESSURE < 80 MM HG: ICD-10-PCS | Mod: CPTII,S$GLB,, | Performed by: INTERNAL MEDICINE

## 2022-03-07 PROCEDURE — 3078F DIAST BP <80 MM HG: CPT | Mod: CPTII,S$GLB,, | Performed by: INTERNAL MEDICINE

## 2022-03-07 PROCEDURE — 3074F PR MOST RECENT SYSTOLIC BLOOD PRESSURE < 130 MM HG: ICD-10-PCS | Mod: CPTII,S$GLB,, | Performed by: INTERNAL MEDICINE

## 2022-03-07 PROCEDURE — 93000 EKG 12-LEAD: ICD-10-PCS | Mod: S$GLB,,, | Performed by: INTERNAL MEDICINE

## 2022-03-07 PROCEDURE — 99203 PR OFFICE/OUTPT VISIT, NEW, LEVL III, 30-44 MIN: ICD-10-PCS | Mod: S$GLB,,, | Performed by: INTERNAL MEDICINE

## 2022-03-07 PROCEDURE — 3074F SYST BP LT 130 MM HG: CPT | Mod: CPTII,S$GLB,, | Performed by: INTERNAL MEDICINE

## 2022-03-07 PROCEDURE — 99999 PR PBB SHADOW E&M-EST. PATIENT-LVL V: CPT | Mod: PBBFAC,,, | Performed by: INTERNAL MEDICINE

## 2022-03-07 PROCEDURE — 1159F PR MEDICATION LIST DOCUMENTED IN MEDICAL RECORD: ICD-10-PCS | Mod: CPTII,S$GLB,, | Performed by: INTERNAL MEDICINE

## 2022-03-07 PROCEDURE — 3008F BODY MASS INDEX DOCD: CPT | Mod: CPTII,S$GLB,, | Performed by: INTERNAL MEDICINE

## 2022-03-07 PROCEDURE — 36415 COLL VENOUS BLD VENIPUNCTURE: CPT | Performed by: INTERNAL MEDICINE

## 2022-03-07 PROCEDURE — 83695 ASSAY OF LIPOPROTEIN(A): CPT | Performed by: INTERNAL MEDICINE

## 2022-03-07 PROCEDURE — 86141 C-REACTIVE PROTEIN HS: CPT | Performed by: INTERNAL MEDICINE

## 2022-03-07 PROCEDURE — 1159F MED LIST DOCD IN RCRD: CPT | Mod: CPTII,S$GLB,, | Performed by: INTERNAL MEDICINE

## 2022-03-07 NOTE — PATIENT INSTRUCTIONS
Discussed diet , achieving and maintaining ideal body weight, and exercise.   We reviewed meds in detail.  Reassured-Discussed goals, options, plan.  Omega-3 > 800 mg/d combined EPA/DHA.  Normally , with this great ratio , no therapy is needed , but we could get Lpa, HSCRP and CAC to further assess risk ( or just use low dose statin if she prefers)

## 2022-03-11 LAB — LPA SERPL-MCNC: 115 MG/DL (ref 0–30)

## 2022-03-14 ENCOUNTER — LAB VISIT (OUTPATIENT)
Dept: PRIMARY CARE CLINIC | Facility: CLINIC | Age: 57
End: 2022-03-14
Payer: COMMERCIAL

## 2022-03-14 ENCOUNTER — PATIENT MESSAGE (OUTPATIENT)
Dept: CARDIOLOGY | Facility: CLINIC | Age: 57
End: 2022-03-14
Payer: COMMERCIAL

## 2022-03-14 DIAGNOSIS — H25.12 NUCLEAR SCLEROSIS, LEFT: ICD-10-CM

## 2022-03-14 PROCEDURE — U0005 INFEC AGEN DETEC AMPLI PROBE: HCPCS | Performed by: OPHTHALMOLOGY

## 2022-03-14 PROCEDURE — U0003 INFECTIOUS AGENT DETECTION BY NUCLEIC ACID (DNA OR RNA); SEVERE ACUTE RESPIRATORY SYNDROME CORONAVIRUS 2 (SARS-COV-2) (CORONAVIRUS DISEASE [COVID-19]), AMPLIFIED PROBE TECHNIQUE, MAKING USE OF HIGH THROUGHPUT TECHNOLOGIES AS DESCRIBED BY CMS-2020-01-R: HCPCS | Performed by: OPHTHALMOLOGY

## 2022-03-14 NOTE — PROGRESS NOTES
Results of recent labs given to patient by phone and through [facility]. She wants to hold off on the Atorvastatin until she checks with her neurologist to see if he is ok with that - She will call back and let us know..

## 2022-03-15 ENCOUNTER — TELEPHONE (OUTPATIENT)
Dept: OPHTHALMOLOGY | Facility: CLINIC | Age: 57
End: 2022-03-15
Payer: COMMERCIAL

## 2022-03-15 LAB
SARS-COV-2 RNA RESP QL NAA+PROBE: NOT DETECTED
SARS-COV-2- CYCLE NUMBER: NORMAL

## 2022-03-15 NOTE — PRE-PROCEDURE INSTRUCTIONS
PREOP INSTRUCTIONS:No food,milk or milk products for 8 hours before surgery.Clear liquids like water,gatorade,apple juice are allowed up until 2 hours before surgery.Instructed to follow the surgeon's instructions if they differ from these.Shower instructions as well as directions to the [facility] were given.Encouraged to wear loose fitting,comfortable clothing.Medication instructions for pm prior to and am of procedure reviewed.Instructed to avoid taking vitamins,supplements,aspirin and ibuprofen the morning of surgery.    Patient denies any side effects or issues with anesthesia or sedation. Patient does report suffering a Grand Mal seizure in the past which was not related to anesthesia but which she wanted known

## 2022-03-16 ENCOUNTER — ANESTHESIA (OUTPATIENT)
Dept: SURGERY | Facility: HOSPITAL | Age: 57
End: 2022-03-16
Payer: COMMERCIAL

## 2022-03-16 ENCOUNTER — HOSPITAL ENCOUNTER (OUTPATIENT)
Facility: HOSPITAL | Age: 57
Discharge: HOME | End: 2022-03-16
Attending: OPHTHALMOLOGY | Admitting: OPHTHALMOLOGY
Payer: COMMERCIAL

## 2022-03-16 ENCOUNTER — ANESTHESIA EVENT (OUTPATIENT)
Dept: SURGERY | Facility: HOSPITAL | Age: 57
End: 2022-03-16
Payer: COMMERCIAL

## 2022-03-16 VITALS
TEMPERATURE: 98 F | WEIGHT: 120 LBS | BODY MASS INDEX: 22.08 KG/M2 | HEIGHT: 62 IN | SYSTOLIC BLOOD PRESSURE: 114 MMHG | DIASTOLIC BLOOD PRESSURE: 72 MMHG | RESPIRATION RATE: 19 BRPM | HEART RATE: 60 BPM | OXYGEN SATURATION: 100 %

## 2022-03-16 DIAGNOSIS — H25.12 NUCLEAR SCLEROSIS, LEFT: Primary | ICD-10-CM

## 2022-03-16 PROCEDURE — 25000003 PHARM REV CODE 250

## 2022-03-16 PROCEDURE — D9220A PRA ANESTHESIA: ICD-10-PCS | Mod: ,,, | Performed by: ANESTHESIOLOGY

## 2022-03-16 PROCEDURE — 66982 PR REMOVAL, CATARACT, W/INSRT INTRAOC LENS, W/O ENDO CYCLO, CMPLX: ICD-10-PCS | Mod: LT,,, | Performed by: OPHTHALMOLOGY

## 2022-03-16 PROCEDURE — D9220A PRA ANESTHESIA: Mod: ,,, | Performed by: NURSE ANESTHETIST, CERTIFIED REGISTERED

## 2022-03-16 PROCEDURE — 99499 NO LOS: ICD-10-PCS | Mod: ,,, | Performed by: OPHTHALMOLOGY

## 2022-03-16 PROCEDURE — V2632 POST CHMBR INTRAOCULAR LENS: HCPCS | Performed by: OPHTHALMOLOGY

## 2022-03-16 PROCEDURE — 63600175 PHARM REV CODE 636 W HCPCS: Performed by: NURSE ANESTHETIST, CERTIFIED REGISTERED

## 2022-03-16 PROCEDURE — 71000044 HC DOSC ROUTINE RECOVERY FIRST HOUR: Performed by: OPHTHALMOLOGY

## 2022-03-16 PROCEDURE — 63600175 PHARM REV CODE 636 W HCPCS: Performed by: OPHTHALMOLOGY

## 2022-03-16 PROCEDURE — 25000003 PHARM REV CODE 250: Performed by: OPHTHALMOLOGY

## 2022-03-16 PROCEDURE — 37000009 HC ANESTHESIA EA ADD 15 MINS: Performed by: OPHTHALMOLOGY

## 2022-03-16 PROCEDURE — 99499 UNLISTED E&M SERVICE: CPT | Mod: ,,, | Performed by: OPHTHALMOLOGY

## 2022-03-16 PROCEDURE — 36000706: Performed by: OPHTHALMOLOGY

## 2022-03-16 PROCEDURE — 25000003 PHARM REV CODE 250: Performed by: NURSE ANESTHETIST, CERTIFIED REGISTERED

## 2022-03-16 PROCEDURE — 37000008 HC ANESTHESIA 1ST 15 MINUTES: Performed by: OPHTHALMOLOGY

## 2022-03-16 PROCEDURE — 66982 XCAPSL CTRC RMVL CPLX WO ECP: CPT | Mod: LT,,, | Performed by: OPHTHALMOLOGY

## 2022-03-16 PROCEDURE — D9220A PRA ANESTHESIA: ICD-10-PCS | Mod: ,,, | Performed by: NURSE ANESTHETIST, CERTIFIED REGISTERED

## 2022-03-16 PROCEDURE — 71000015 HC POSTOP RECOV 1ST HR: Performed by: OPHTHALMOLOGY

## 2022-03-16 PROCEDURE — D9220A PRA ANESTHESIA: Mod: ,,, | Performed by: ANESTHESIOLOGY

## 2022-03-16 PROCEDURE — 36000707: Performed by: OPHTHALMOLOGY

## 2022-03-16 DEVICE — LENS 22.5 ACRYSOF: Type: IMPLANTABLE DEVICE | Site: EYE | Status: FUNCTIONAL

## 2022-03-16 RX ORDER — LIDOCAINE HYDROCHLORIDE 40 MG/ML
INJECTION, SOLUTION RETROBULBAR
Status: DISCONTINUED | OUTPATIENT
Start: 2022-03-16 | End: 2022-03-16 | Stop reason: HOSPADM

## 2022-03-16 RX ORDER — ACETAMINOPHEN 325 MG/1
650 TABLET ORAL EVERY 6 HOURS PRN
Status: DISCONTINUED | OUTPATIENT
Start: 2022-03-16 | End: 2022-03-16 | Stop reason: HOSPADM

## 2022-03-16 RX ORDER — MOXIFLOXACIN 5 MG/ML
1 SOLUTION/ DROPS OPHTHALMIC
Status: DISCONTINUED | OUTPATIENT
Start: 2022-03-16 | End: 2022-03-16 | Stop reason: HOSPADM

## 2022-03-16 RX ORDER — ACETAZOLAMIDE 500 MG/1
500 CAPSULE, EXTENDED RELEASE ORAL ONCE
Status: COMPLETED | OUTPATIENT
Start: 2022-03-16 | End: 2022-03-16

## 2022-03-16 RX ORDER — LIDOCAINE HYDROCHLORIDE 10 MG/ML
INJECTION, SOLUTION EPIDURAL; INFILTRATION; INTRACAUDAL; PERINEURAL
Status: DISCONTINUED
Start: 2022-03-16 | End: 2022-03-16 | Stop reason: HOSPADM

## 2022-03-16 RX ORDER — MOXIFLOXACIN 5 MG/ML
SOLUTION/ DROPS OPHTHALMIC
Status: DISCONTINUED | OUTPATIENT
Start: 2022-03-16 | End: 2022-03-16 | Stop reason: HOSPADM

## 2022-03-16 RX ORDER — KETOROLAC TROMETHAMINE 5 MG/ML
1 SOLUTION OPHTHALMIC
Status: DISCONTINUED | OUTPATIENT
Start: 2022-03-16 | End: 2022-03-16 | Stop reason: HOSPADM

## 2022-03-16 RX ORDER — LIDOCAINE HYDROCHLORIDE 10 MG/ML
INJECTION, SOLUTION EPIDURAL; INFILTRATION; INTRACAUDAL; PERINEURAL
Status: DISCONTINUED | OUTPATIENT
Start: 2022-03-16 | End: 2022-03-16 | Stop reason: HOSPADM

## 2022-03-16 RX ORDER — HYDROMORPHONE HYDROCHLORIDE 1 MG/ML
0.2 INJECTION, SOLUTION INTRAMUSCULAR; INTRAVENOUS; SUBCUTANEOUS EVERY 5 MIN PRN
Status: DISCONTINUED | OUTPATIENT
Start: 2022-03-16 | End: 2022-03-16 | Stop reason: HOSPADM

## 2022-03-16 RX ORDER — LIDOCAINE HYDROCHLORIDE 40 MG/ML
INJECTION, SOLUTION RETROBULBAR
Status: DISCONTINUED
Start: 2022-03-16 | End: 2022-03-16 | Stop reason: HOSPADM

## 2022-03-16 RX ORDER — MIDAZOLAM HYDROCHLORIDE 1 MG/ML
INJECTION, SOLUTION INTRAMUSCULAR; INTRAVENOUS
Status: DISCONTINUED | OUTPATIENT
Start: 2022-03-16 | End: 2022-03-16

## 2022-03-16 RX ORDER — MOXIFLOXACIN 5 MG/ML
SOLUTION/ DROPS OPHTHALMIC
Status: DISCONTINUED
Start: 2022-03-16 | End: 2022-03-16 | Stop reason: HOSPADM

## 2022-03-16 RX ORDER — LIDOCAINE HYDROCHLORIDE 20 MG/ML
JELLY TOPICAL
Status: DISCONTINUED
Start: 2022-03-16 | End: 2022-03-16 | Stop reason: HOSPADM

## 2022-03-16 RX ORDER — TROPICAMIDE 10 MG/ML
1 SOLUTION/ DROPS OPHTHALMIC
Status: DISCONTINUED | OUTPATIENT
Start: 2022-03-16 | End: 2022-03-16 | Stop reason: HOSPADM

## 2022-03-16 RX ORDER — PREDNISOLONE ACETATE 10 MG/ML
SUSPENSION/ DROPS OPHTHALMIC
Status: DISCONTINUED | OUTPATIENT
Start: 2022-03-16 | End: 2022-03-16 | Stop reason: HOSPADM

## 2022-03-16 RX ORDER — SODIUM CHLORIDE 0.9 % (FLUSH) 0.9 %
3 SYRINGE (ML) INJECTION
Status: DISCONTINUED | OUTPATIENT
Start: 2022-03-16 | End: 2022-03-16 | Stop reason: HOSPADM

## 2022-03-16 RX ORDER — PREDNISOLONE ACETATE 10 MG/ML
SUSPENSION/ DROPS OPHTHALMIC
Status: DISCONTINUED
Start: 2022-03-16 | End: 2022-03-16 | Stop reason: HOSPADM

## 2022-03-16 RX ORDER — PHENYLEPHRINE HYDROCHLORIDE 100 MG/ML
1 SOLUTION/ DROPS OPHTHALMIC
Status: DISCONTINUED | OUTPATIENT
Start: 2022-03-16 | End: 2022-03-16 | Stop reason: HOSPADM

## 2022-03-16 RX ADMIN — PHENYLEPHRINE HYDROCHLORIDE 1 DROP: 100 SOLUTION/ DROPS OPHTHALMIC at 07:03

## 2022-03-16 RX ADMIN — MOXIFLOXACIN 1 DROP: 5 SOLUTION/ DROPS OPHTHALMIC at 07:03

## 2022-03-16 RX ADMIN — MIDAZOLAM HYDROCHLORIDE 2 MG: 1 INJECTION, SOLUTION INTRAMUSCULAR; INTRAVENOUS at 08:03

## 2022-03-16 RX ADMIN — TROPICAMIDE 1 DROP: 10 SOLUTION/ DROPS OPHTHALMIC at 07:03

## 2022-03-16 RX ADMIN — KETOROLAC TROMETHAMINE 1 DROP: 5 SOLUTION/ DROPS OPHTHALMIC at 07:03

## 2022-03-16 RX ADMIN — ACETAZOLAMIDE 500 MG: 500 CAPSULE ORAL at 09:03

## 2022-03-16 RX ADMIN — SODIUM CHLORIDE: 0.9 INJECTION, SOLUTION INTRAVENOUS at 08:03

## 2022-03-16 NOTE — DISCHARGE SUMMARY
[facility] - [facility] (1st Fl)  Discharge Note  Short Stay    Procedure(s) (LRB):  PHACOEMULSIFICATION, CATARACT (Left)  INSERTION, IOL PROSTHESIS (Left)    OUTCOME: Patient tolerated treatment/procedure well without complication and is now ready for discharge.    DISPOSITION: Home or Self Care    FINAL DIAGNOSIS:  Nuclear sclerosis, left    FOLLOWUP: In clinic    DISCHARGE INSTRUCTIONS:  No discharge procedures on file.     TIME SPENT ON DISCHARGE: 10 minutes

## 2022-03-16 NOTE — TRANSFER OF CARE
"Anesthesia Transfer of Care Note    Patient: Darling Quach    Procedure(s) Performed: Procedure(s) (LRB):  PHACOEMULSIFICATION, CATARACT (Left)  INSERTION, IOL PROSTHESIS (Left)    Patient location: PACU    Anesthesia Type: MAC    Transport from OR: Transported from OR on room air with adequate spontaneous ventilation    Post pain: adequate analgesia    Post assessment: no apparent anesthetic complications and tolerated procedure well    Post vital signs: stable    Level of consciousness: awake, alert and oriented    Nausea/Vomiting: no nausea/vomiting    Complications: none    Transfer of care protocol was followed      Last vitals:   Visit Vitals  /75 (BP Location: Left arm, Patient Position: Lying)   Pulse 71   Temp 36.8 °C (98.2 °F) (Temporal)   Resp 16   Ht 5' 2" (1.575 m)   Wt 54.4 kg (120 lb)   LMP  (LMP Unknown)   SpO2 100%   Breastfeeding No   BMI 21.95 kg/m²     "

## 2022-03-16 NOTE — ANESTHESIA POSTPROCEDURE EVALUATION
Anesthesia Post Evaluation    Patient: Darling Quach    Procedure(s) Performed: Procedure(s) (LRB):  PHACOEMULSIFICATION, CATARACT (Left)  INSERTION, IOL PROSTHESIS (Left)    Final Anesthesia Type: MAC      Patient location during evaluation: PACU  Patient participation: Yes- Able to Participate  Level of consciousness: awake and alert  Post-procedure vital signs: reviewed and stable  Pain control: Pain has been treated.  Airway patency: patent    PONV status: PONV absent or treated.  Anesthetic complications: no      Cardiovascular status: hemodynamically stable  Respiratory status: spontaneous ventilation  Hydration status: euvolemic  Follow-up not needed.          Vitals Value Taken Time   /72 03/16/22 0917   Temp 36.7 °C (98.1 °F) 03/16/22 0849   Pulse 64 03/16/22 0917   Resp 19 03/16/22 0849   SpO2 100 % 03/16/22 0917   Vitals shown include unvalidated device data.      No case tracking events are documented in the log.      Pain/Chintan Score: Chintan Score: 10 (3/16/2022  8:49 AM)

## 2022-03-16 NOTE — ANESTHESIA PREPROCEDURE EVALUATION
03/16/2022  Darling Quach is a 56 y.o., female.      Pre-op Assessment    I have reviewed the Patient Summary Reports.          Review of Systems  Anesthesia Hx:  No problems with previous Anesthesia    Social:  Non-Smoker    Hematology/Oncology:  Hematology Normal   Oncology Normal     EENT/Dental:EENT/Dental Normal   Cardiovascular:  Cardiovascular Normal     Pulmonary:  Pulmonary Normal    Renal/:  Renal/ Normal     Hepatic/GI:  Hepatic/GI Normal    Musculoskeletal:  Musculoskeletal Normal    Neurological:   Seizures    Endocrine:  Endocrine Normal    Dermatological:  Skin Normal    Psych:  Psychiatric Normal           Physical Exam  General: Alert and Oriented    Airway:  Mallampati: II / II  Mouth Opening: Normal  TM Distance: Normal  Tongue: Normal  Neck ROM: Normal ROM    Dental:  Intact    Chest/Lungs:  Clear to auscultation, Normal Respiratory Rate    Heart:  Rate: Normal  Rhythm: Regular Rhythm  Sounds: Normal        Anesthesia Plan  Type of Anesthesia, risks & benefits discussed:    Anesthesia Type: Gen ETT, MAC  Intra-op Monitoring Plan: Standard ASA Monitors  Post Op Pain Control Plan: multimodal analgesia  Airway Plan: Direct  Informed Consent: Informed consent signed with the Patient and all parties understand the risks and agree with anesthesia plan.  All questions answered.   ASA Score: 2    Ready For Surgery From Anesthesia Perspective.     .

## 2022-03-16 NOTE — PLAN OF CARE
Discharge instructions given and explained to patient and family with verbalization of understanding all instructions. Prescription given and explained next time and doses of each medication Patients v/s stable, denies n/v and tolerating po, rates pain level tolerable, IV removed, and family at bedside for patient discharge home.

## 2022-03-16 NOTE — OP NOTE
DATE OF PROCEDURE: 3/16/2022    PREOPERATIVE DIAGNOSIS: mixed cortical and nuclear Cataract// Nuclear sclerosis, left OS.     POSTOPERATIVE DIAGNOSIS: mixed cortical and nuclear Cataract// Nuclear sclerosis, leftOS, status post procedure.     PROCEDURE PERFORMED: Cataract extraction with phacoemulsification, posterior   chamber intraocular lens placement. - with the use of trypan blue    SURGEON: Josey Camacho M.D.     ASSISTANT: satish jo md      COMPLICATIONS: None.     BLOOD LOSS: Less than 5 mL.     ANESTHESIA: Local MAC    IMPLANT DATA:   1. Bull model SN60WF, power 22.5 diopters, serial #[id number]    PROCEDURE IN DETAIL: After informed consent including risks, benefits,   alternatives, the patient was brought to the operating room table, placed in   supine position. Monitored anesthesia care was used throughout the entire   procedure. Once adequate anesthesia was confirmed, the patient was then prepped   and draped in usual sterile fashion for intraocular surgery. Wire speculum was   used to hold the eyelids apart and the procedure was initiated by making   a partial thickness corneal wound with a nathalia blade temporally. It was noted that there was a very poor red reflex so it was elected to use trypan blue to stain the anterior capsule.    A supersharp blade was then used to make a second entry into the eye via a paracentesis incision.  Intracameral lidocaine followed by trypan blue, followed by Viscoat were placed in the anterior chamber.   A 2.4 mm blade was then used to make to complete the clear corneal   incision temporally. A cystotome was used to make a tear in   the anterior capsular flap, which was continued around with Utrata forceps for   continuous curvilinear capsulorrhexis. BSS on a Woods cannula was then used for   hydrodissection and hydrodelineation of lens. The lens was noted to spin   freely in the bag. Phacoemulsification handpiece was then used to remove the   lens in a  divide-and-conquer manner. Irrigation aspiration handpiece removed   the remaining cortical material. Provisc was placed in the eye followed by the   lens as mentioned above without any complications. Once the lens was in proper   position, irrigation aspiration handpiece was used to remove the remaining Provisc. The   wounds were then hydrated with BSS and noted to be watertight. The eye had a   good physiological pressure and the lid speculum was removed under the   microscope without any shallowing. The eye was then covered with a collagen   shield soaked in Pred Forte and Vigamox and turned over to Anesthesia in stable   condition after placement of patch and shield.

## 2022-03-17 ENCOUNTER — OFFICE VISIT (OUTPATIENT)
Dept: OPHTHALMOLOGY | Facility: CLINIC | Age: 57
End: 2022-03-17
Payer: COMMERCIAL

## 2022-03-17 ENCOUNTER — PATIENT OUTREACH (OUTPATIENT)
Dept: ADMINISTRATIVE | Facility: OTHER | Age: 57
End: 2022-03-17
Payer: COMMERCIAL

## 2022-03-17 DIAGNOSIS — Z98.49 POSTOPERATIVE CARE FOR CATARACT: Primary | ICD-10-CM

## 2022-03-17 DIAGNOSIS — Z48.810 POSTOPERATIVE CARE FOR CATARACT: Primary | ICD-10-CM

## 2022-03-17 DIAGNOSIS — H26.491 PCO (POSTERIOR CAPSULAR OPACIFICATION), RIGHT: ICD-10-CM

## 2022-03-17 DIAGNOSIS — H40.53X1 BILATERAL SECONDARY GLAUCOMA DUE TO COMBINATION MECHANISMS, MILD STAGE: ICD-10-CM

## 2022-03-17 DIAGNOSIS — Z96.1 PSEUDOPHAKIA, BOTH EYES: ICD-10-CM

## 2022-03-17 DIAGNOSIS — H40.043 STEROID RESPONDERS BORDERLINE GLAUCOMA, BILATERAL: ICD-10-CM

## 2022-03-17 DIAGNOSIS — H43.813 POSTERIOR VITREOUS DETACHMENT, BILATERAL: ICD-10-CM

## 2022-03-17 PROCEDURE — 99999 PR PBB SHADOW E&M-EST. PATIENT-LVL III: CPT | Mod: PBBFAC,,, | Performed by: OPHTHALMOLOGY

## 2022-03-17 PROCEDURE — 99024 POSTOP FOLLOW-UP VISIT: CPT | Mod: S$GLB,,, | Performed by: OPHTHALMOLOGY

## 2022-03-17 PROCEDURE — 1160F RVW MEDS BY RX/DR IN RCRD: CPT | Mod: CPTII,S$GLB,, | Performed by: OPHTHALMOLOGY

## 2022-03-17 PROCEDURE — 99024 PR POST-OP FOLLOW-UP VISIT: ICD-10-PCS | Mod: S$GLB,,, | Performed by: OPHTHALMOLOGY

## 2022-03-17 PROCEDURE — 1159F MED LIST DOCD IN RCRD: CPT | Mod: CPTII,S$GLB,, | Performed by: OPHTHALMOLOGY

## 2022-03-17 PROCEDURE — 99999 PR PBB SHADOW E&M-EST. PATIENT-LVL III: ICD-10-PCS | Mod: PBBFAC,,, | Performed by: OPHTHALMOLOGY

## 2022-03-17 PROCEDURE — 1159F PR MEDICATION LIST DOCUMENTED IN MEDICAL RECORD: ICD-10-PCS | Mod: CPTII,S$GLB,, | Performed by: OPHTHALMOLOGY

## 2022-03-17 PROCEDURE — 1160F PR REVIEW ALL MEDS BY PRESCRIBER/CLIN PHARMACIST DOCUMENTED: ICD-10-PCS | Mod: CPTII,S$GLB,, | Performed by: OPHTHALMOLOGY

## 2022-03-17 NOTE — PROGRESS NOTES
LINKS immunization registry updated  Care Everywhere updated  Health Maintenance updated  Chart reviewed for overdue [facility] Encounters ([facility]) health maintenance testing (CRS, Breast Ca, Diabetic Eye Exam)   Orders entered:N/A

## 2022-03-17 NOTE — PROGRESS NOTES
HPI     Cataract extraction with phacoemulsification, posterior   chamber intraocular lens placement. - with the use of trypan blue left eye    03/17/2022  1 day post op      1. MMG/OHT OU   2. PVD OU   3. Vitreous Opacity OD   4. PCIOL OD   5. Steroid Responder   6.  Hx Removal Left Eyelid Papilloma (Dr. Alvarado)     Last edited by Khadra Hay MA on 3/17/2022  9:08 AM. (History)            Assessment /Plan     For exam results, see Encounter Report.    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 Mixed mechanism Glaucoma   H/O narrow angles - S/P PI's, + residual OHT   First HVF 1998   First photos 1999   Former [facility] , retired for a few years and went back to work as a    (mom with same problem with narrow angles and residual OHT)     Family history + mom - Narrow angles with residual OHT   Glaucoma meds - off gtts (use to use timolol)   H/O adverse rxn to glaucoma drops - latanoprost - eye irritation  LASERS PI's ou // yag cap od 4/25/2019  GLAUCOMA SURGERIES none   OTHER EYE SURGERIES - PC IOL OD 12/17/2014 - [facility]  CDR 0.4/0.4   Tbase 23-36 / 23-35 ou   Tmax 36/35   Ttarget 30/30  HVF 16 test 24-2 ss ou 1996 to 2021 ([facility]) - hint SNS  od // full (w/ lens rim art)  os            3 SWAP test 2010 to 2012 - Full ou            ( repeat HVF from 8/2013 to 9/2013 - new SAD od confirmed)            [place] VF's - 1 test 2016 to 2016 - SNS /INS od // full os   2019 VF full od/near full os   [place] +3 ou S/P PI's   /618   OCT 5 test 2005 -  2021 ([facility]) - RNFL - bord TI  od/ bord bord TS  os   HRT - 5  test - 2014 to 2020 - MR -  nl od // bord SN os /// CDR 0.55 od // 0.55 os   Mix of main campus and [facility]   Disc photos 1999, 2004, 2013 - slides // 5/13/2010 - // 2019 - OIS     - Ttoday  24/24  - Test done today -pre-op phaco/IOL os     2. PVD ou - with dense vitreous veil OD Saw Arend 4/23/2012 4/2019 - pt is noticing the floaters again (had not noticed them for several years)     3. Vitreous opacity OD     4. Steroid responder - had an injection / ? Spironolactone / aldactone - not currently on any steroids    5. NS os    Pt c/o blurry vision - glasses need changing every 2-3 months    BCVA 20/40 // BAT h 20/400    Having trouble with driving and reading    She is a      6. Refractive error   Was bilateral hyperope / presbyope   Is anisopmetropic post CE od    Glasses from [employer]  - no longer working for her    7. H/O eyelid papilloma Left - S/P removal - [facility]     8. Pseudophakia od   PC IOL od 12/17/2014 - SN60wf 20.5   Had a large myopic shift pre surgery from +1.00 to -3.00    8.  I see her father in law - he has glaucoma - and I did his cataracts    I see her mother - she has similar issues to this patient    She would like me to see her adult daughter to evaluate her for OHT / glaucoma as well     Plan   MMG  / OHT ou  S/P PI's ou   HVF today near full OU    Intolerant to latanoprost - red irritated eye  Off all glaucoma gtts - (use to use timolol) 7/2019     [facility] - Boudreuax - 5/9/2019 - says yag cap helped a lot     Photo file updated - [facility] - 5/25/2021     Pt c/o blurry vision - is having frequent myopic shifting in the left eye - has to change glasses every 3-4 months   C/O blurry vision / and glare at night -  Plan phaco/IOL os     IOL calc - holder   SN60WF - 22.5  MTA4U0 - 18.5    Phaco / IOL OS Date: 3/17/2022 - sn60wf 22.5   POD # 1 - phaco/IOL   Doing well  Begin Pred Acetate QID   Begin vigamox QID  Shield at night  Glasses day  No lifting, no bending, no eye  rubbing  F/U 1 week, AR/MR ang

## 2022-03-20 ENCOUNTER — PATIENT MESSAGE (OUTPATIENT)
Dept: INTERNAL MEDICINE | Facility: CLINIC | Age: 57
End: 2022-03-20
Payer: COMMERCIAL

## 2022-03-21 ENCOUNTER — PATIENT MESSAGE (OUTPATIENT)
Dept: INTERNAL MEDICINE | Facility: CLINIC | Age: 57
End: 2022-03-21
Payer: COMMERCIAL

## 2022-03-21 DIAGNOSIS — D18.02 VENOUS ANGIOMA OF BRAIN: ICD-10-CM

## 2022-03-21 DIAGNOSIS — G40.209 COMPLEX PARTIAL SEIZURES EVOLVING TO GENERALIZED TONIC-CLONIC SEIZURES: Primary | ICD-10-CM

## 2022-03-21 NOTE — TELEPHONE ENCOUNTER
Called and spoke with pt neurology referral placed  And discussed cardiology recommendations  Agreeable as  As discussed

## 2022-03-23 NOTE — PROGRESS NOTES
HPI     DLS: 3/17/2022    Pt here for 1 week post phaco w/IOL OS- 3/17/2022  PT states she started noticing star burst of floaters in her OS that   started on Monday which this is something new. Pt states the OS feels   heavy and throbbing. Pt denies any flashes of light or diplopia.     Meds;  PA QID OS  Vigamox QID OS    1. MMG/OHT OU   2. PVD OU   3. Vitreous Opacity OD   4. PCIOL OD   5. Steroid Responder   6.  Hx Removal Left Eyelid Papilloma (Dr. Alvarado)     Last edited by Nivia Cox on 3/25/2022  8:11 AM. (History)            Assessment /Plan     For exam results, see Encounter Report.    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 Mixed mechanism Glaucoma   H/O narrow angles - S/P PI's, + residual OHT   First HVF 1998   First photos 1999   Former [facility] , retired for a few years and went back to work as a    (mom with same problem with narrow angles and residual OHT)     Family history + mom - Narrow angles with residual OHT   Glaucoma meds - off gtts (use to use timolol)   H/O adverse rxn to glaucoma drops - latanoprost - eye irritation  LASERS PI's ou // yag cap od 4/25/2019  GLAUCOMA SURGERIES none   OTHER EYE SURGERIES - PC IOL OD 12/17/2014 - [facility]  CDR 0.4/0.4   Tbase 23-36 / 23-35 ou   Tmax 36/35   Ttarget 30/30  HVF 16 test 24-2 ss ou 1996 to 2021 ([facility]) - hint SNS  od // full (w/ lens rim art)  os            3 SWAP test 2010 to 2012 - Full ou            ( repeat HVF from 8/2013 to 9/2013 - new SAD od confirmed)            [place] VF's - 1 test 2016 to 2016 - SNS /INS od // full os   2019 VF full  od/near full os   Gonio +3 ou S/P PI's   /618   OCT 5 test 2005 -  2021 ([facility]) - RNFL - bord TI  od/ bord bord TS  os   HRT - 5 test - 2014 to 2020 - MR -  nl od // bord SN os /// CDR 0.55 od // 0.55 os   Mix of [facility] and [facility]   Disc photos 1999, 2004, 2013 - slides // 5/13/2010 - // 2019 - OIS     - Ttoday  22/20  - Test done today -post -op phaco/IOL os - 3/17/2022    2. PVD ou - with dense vitreous veil OD Saw Arend 4/23/2012 4/2019 - pt is noticing the floaters again (had not noticed them for several years)     3. Vitreous opacity OD     4. Steroid responder - had an injection / ? Spironolactone / aldactone - not currently on any steroids    5. NS os    Pt c/o blurry vision - glasses need changing every 2-3 months    BCVA 20/40 // BAT h 20/400    Having trouble with driving and reading    She is a      6. Refractive error   Was bilateral hyperope / presbyope   Is anisopmetropic post CE od    Glasses from [employer]  - no longer working for her    7. H/O eyelid papilloma Left - S/P removal - [facility]     8. Pseudophakia od   PC IOL od 12/17/2014 - SN60wf 20.5   Had a large myopic shift pre surgery from +1.00 to -3.00    8.  I see her father in law - he has glaucoma - and I did his cataracts    I see her mother - she has similar issues to this patient    She would like me to see her adult daughter to evaluate her for OHT / glaucoma as well     Plan   MMG  / OHT ou  S/P PI's ou   HVF today near full OU    Intolerant to latanoprost - red irritated eye  Off all glaucoma gtts - (use to use timolol) 7/2019     [facility] - Boudreuax - 5/9/2019 - says yag cap helped a lot     Photo file updated - [facility] - 5/25/2021     Pt c/o blurry vision - is having frequent myopic shifting in the left eye - has to change glasses every 3-4 months   C/O blurry vision / and glare at night -  Plan phaco/IOL os     IOL calc - holder   SN60WF - 22.5  MTA4U0 - 18.5    Phaco / IOL OS Date: 3/17/2022 - sn60wf  22.5   POw # 1 - phaco/IOL   Doing well - WITH A NEW PVD OS - NO HOLES OR TEARS - warned of signs of RD   Begin Pred Acetate QID - taper q 2 weeks   Begin vigamox QID- stop   Shield at night - `1 more week   Glasses day  No lifting, no bending, no eye rubbing  F/U 1 month , AR/MR ou    Consult retina - new acute PVD os post phaco on 3/17/2022

## 2022-03-25 ENCOUNTER — TELEPHONE (OUTPATIENT)
Dept: NEUROLOGY | Facility: CLINIC | Age: 57
End: 2022-03-25
Payer: COMMERCIAL

## 2022-03-25 ENCOUNTER — HOSPITAL ENCOUNTER (OUTPATIENT)
Dept: RADIOLOGY | Facility: HOSPITAL | Age: 57
Discharge: HOME | End: 2022-03-25
Attending: INTERNAL MEDICINE
Payer: COMMERCIAL

## 2022-03-25 ENCOUNTER — OFFICE VISIT (OUTPATIENT)
Dept: OPHTHALMOLOGY | Facility: CLINIC | Age: 57
End: 2022-03-25
Payer: COMMERCIAL

## 2022-03-25 DIAGNOSIS — E78.00 HYPERCHOLESTEROLEMIA: ICD-10-CM

## 2022-03-25 DIAGNOSIS — H26.491 PCO (POSTERIOR CAPSULAR OPACIFICATION), RIGHT: ICD-10-CM

## 2022-03-25 DIAGNOSIS — H43.813 POSTERIOR VITREOUS DETACHMENT, BILATERAL: ICD-10-CM

## 2022-03-25 DIAGNOSIS — H43.812 PVD (POSTERIOR VITREOUS DETACHMENT), LEFT: ICD-10-CM

## 2022-03-25 DIAGNOSIS — Z96.1 PSEUDOPHAKIA, BOTH EYES: ICD-10-CM

## 2022-03-25 DIAGNOSIS — E78.00 ELEVATED LDL CHOLESTEROL LEVEL: ICD-10-CM

## 2022-03-25 DIAGNOSIS — Z13.6 ENCOUNTER FOR SCREENING FOR CARDIOVASCULAR DISORDERS: ICD-10-CM

## 2022-03-25 DIAGNOSIS — Z48.810 POSTOPERATIVE CARE FOR CATARACT: Primary | ICD-10-CM

## 2022-03-25 DIAGNOSIS — Z98.49 POSTOPERATIVE CARE FOR CATARACT: Primary | ICD-10-CM

## 2022-03-25 DIAGNOSIS — H40.043 STEROID RESPONDERS BORDERLINE GLAUCOMA, BILATERAL: ICD-10-CM

## 2022-03-25 DIAGNOSIS — H40.53X1 BILATERAL SECONDARY GLAUCOMA DUE TO COMBINATION MECHANISMS, MILD STAGE: ICD-10-CM

## 2022-03-25 PROBLEM — R93.1 AGATSTON CAC SCORE, <100: Status: ACTIVE | Noted: 2022-03-25

## 2022-03-25 PROCEDURE — 99999 PR PBB SHADOW E&M-EST. PATIENT-LVL III: ICD-10-PCS | Mod: PBBFAC,,, | Performed by: OPHTHALMOLOGY

## 2022-03-25 PROCEDURE — 99999 PR PBB SHADOW E&M-EST. PATIENT-LVL III: CPT | Mod: PBBFAC,,, | Performed by: OPHTHALMOLOGY

## 2022-03-25 PROCEDURE — 75571 CT HRT W/O DYE W/CA TEST: CPT | Mod: TC

## 2022-03-25 PROCEDURE — 1159F MED LIST DOCD IN RCRD: CPT | Mod: CPTII,S$GLB,, | Performed by: OPHTHALMOLOGY

## 2022-03-25 PROCEDURE — 1159F PR MEDICATION LIST DOCUMENTED IN MEDICAL RECORD: ICD-10-PCS | Mod: CPTII,S$GLB,, | Performed by: OPHTHALMOLOGY

## 2022-03-25 PROCEDURE — 99024 PR POST-OP FOLLOW-UP VISIT: ICD-10-PCS | Mod: S$GLB,,, | Performed by: OPHTHALMOLOGY

## 2022-03-25 PROCEDURE — 99024 POSTOP FOLLOW-UP VISIT: CPT | Mod: S$GLB,,, | Performed by: OPHTHALMOLOGY

## 2022-03-25 PROCEDURE — 75571 CT HRT W/O DYE W/CA TEST: CPT | Mod: 26,,, | Performed by: RADIOLOGY

## 2022-03-25 PROCEDURE — 75571 CT CALCIUM SCORING CARDIAC: ICD-10-PCS | Mod: 26,,, | Performed by: RADIOLOGY

## 2022-03-25 PROCEDURE — 1160F RVW MEDS BY RX/DR IN RCRD: CPT | Mod: CPTII,S$GLB,, | Performed by: OPHTHALMOLOGY

## 2022-03-25 PROCEDURE — 1160F PR REVIEW ALL MEDS BY PRESCRIBER/CLIN PHARMACIST DOCUMENTED: ICD-10-PCS | Mod: CPTII,S$GLB,, | Performed by: OPHTHALMOLOGY

## 2022-03-25 RX ORDER — PREDNISOLONE ACETATE 10 MG/ML
SUSPENSION/ DROPS OPHTHALMIC
Qty: 5 ML | Refills: 1 | Status: SHIPPED | OUTPATIENT
Start: 2022-03-25 | End: 2022-07-26

## 2022-03-25 NOTE — TELEPHONE ENCOUNTER
----- Message from Bee Yang sent at 3/25/2022  8:07 AM CDT -----  Regarding: REFERRAL  Contact: self  Pt stated she called earlier this week regarding the referral that is in the system. Pt stated she need to be seen before 04/15/2022 Pt ask for a call to schedule      Contact info  [phone] (home)

## 2022-03-27 PROBLEM — E78.2 MIXED DYSLIPIDEMIA: Status: ACTIVE | Noted: 2022-03-27

## 2022-03-27 NOTE — PROGRESS NOTES
Subjective:   Patient ID:  Darling Quach is a 56 y.o. female who presents for follow-up of CAD risk    HPI: The patient is here for CAD risk factors-CAC only 16 and average  But Lpa 115 .   The patient has no chest pain, SOB, TIA, palpitations, syncope or pre-syncope.Patient does  exercise a lot.      Review of Systems   Constitutional: Negative for chills, decreased appetite, diaphoresis, fever, malaise/fatigue, night sweats, weight gain and weight loss.   HENT: Negative for congestion, hoarse voice, nosebleeds, sore throat and tinnitus.    Eyes: Negative for blurred vision, double vision, vision loss in left eye, vision loss in right eye, visual disturbance and visual halos.   Cardiovascular: Negative for chest pain, claudication, cyanosis, dyspnea on exertion, irregular heartbeat, leg swelling, near-syncope, orthopnea, palpitations, paroxysmal nocturnal dyspnea and syncope.   Respiratory: Negative for cough, hemoptysis, shortness of breath, sleep disturbances due to breathing, snoring, sputum production and wheezing.    Endocrine: Negative for cold intolerance, heat intolerance, polydipsia, polyphagia and polyuria.   Hematologic/Lymphatic: Negative for adenopathy and bleeding problem. Does not bruise/bleed easily.   Skin: Negative for color change, dry skin, flushing, itching, nail changes, poor wound healing, rash, skin cancer, suspicious lesions and unusual hair distribution.   Musculoskeletal: Negative for arthritis, back pain, falls, gout, joint pain, joint swelling, muscle cramps, muscle weakness, myalgias and stiffness.   Gastrointestinal: Negative for abdominal pain, anorexia, change in bowel habit, constipation, diarrhea, dysphagia, heartburn, hematemesis, hematochezia, melena and vomiting.   Genitourinary: Negative for decreased libido, dysuria, hematuria, hesitancy and urgency.   Neurological: Negative for excessive daytime sleepiness, dizziness, focal weakness, headaches, light-headedness, loss of  balance, numbness, paresthesias, seizures, sensory change, tremors, vertigo and weakness.   Psychiatric/Behavioral: Negative for altered mental status, depression, hallucinations, memory loss, substance abuse and suicidal ideas. The patient does not have insomnia and is not nervous/anxious.    Allergic/Immunologic: Negative for environmental allergies and hives.       Objective: LMP  (LMP Unknown)      Physical Exam  Virtual  Assessment:     1. Agatston CAC score, <100    2. Hypercholesterolemia    3. Mixed dyslipidemia        Plan:   Discussed diet , achieving and maintaining ideal body weight, and exercise.   We reviewed meds in detail.  Reassured-Discussed goals, options, plan.  Omega-3 > 1500 mg/d combined EPA/DHA with high Lpa.  Vit C 1000 mg at least once preferably twice; Baby ASA/d  Goal LDL < 70 with some CAC and very high Lpa.  Atorvastatin 40 mg just half to start      Diagnoses and all orders for this visit:    Agatston CAC score, <100  -     Lipid Panel; Standing  -     Comprehensive Metabolic Panel; Standing  -     TSH; Future; Expected date: 05/28/2023  -     atorvastatin (LIPITOR) 40 MG tablet; Take 1 tablet (40 mg total) by mouth once daily.  -     aspirin (ECOTRIN) 81 MG EC tablet; Take 1 tablet (81 mg total) by mouth once daily.    Hypercholesterolemia  -     Lipid Panel; Standing  -     Comprehensive Metabolic Panel; Standing  -     TSH; Future; Expected date: 05/28/2023  -     atorvastatin (LIPITOR) 40 MG tablet; Take 1 tablet (40 mg total) by mouth once daily.  -     aspirin (ECOTRIN) 81 MG EC tablet; Take 1 tablet (81 mg total) by mouth once daily.    Mixed dyslipidemia  -     Lipid Panel; Standing  -     Comprehensive Metabolic Panel; Standing  -     TSH; Future; Expected date: 05/28/2023  -     atorvastatin (LIPITOR) 40 MG tablet; Take 1 tablet (40 mg total) by mouth once daily.  -     aspirin (ECOTRIN) 81 MG EC tablet; Take 1 tablet (81 mg total) by mouth once daily.            Follow up in  about 15 months (around 6/28/2023) for with labs ; comp and lipids in 6 months.

## 2022-03-28 ENCOUNTER — OFFICE VISIT (OUTPATIENT)
Dept: CARDIOLOGY | Facility: CLINIC | Age: 57
End: 2022-03-28
Payer: COMMERCIAL

## 2022-03-28 DIAGNOSIS — R93.1 AGATSTON CAC SCORE, <100: Primary | ICD-10-CM

## 2022-03-28 DIAGNOSIS — E78.2 MIXED DYSLIPIDEMIA: ICD-10-CM

## 2022-03-28 DIAGNOSIS — E78.00 HYPERCHOLESTEROLEMIA: ICD-10-CM

## 2022-03-28 PROCEDURE — 99213 OFFICE O/P EST LOW 20 MIN: CPT | Mod: 95,,, | Performed by: INTERNAL MEDICINE

## 2022-03-28 PROCEDURE — 99213 PR OFFICE/OUTPT VISIT, EST, LEVL III, 20-29 MIN: ICD-10-PCS | Mod: 95,,, | Performed by: INTERNAL MEDICINE

## 2022-03-28 RX ORDER — ASPIRIN 81 MG/1
81 TABLET ORAL DAILY
Qty: 30 TABLET | Refills: 0
Start: 2022-03-28 | End: 2023-04-11 | Stop reason: SDUPTHER

## 2022-03-28 RX ORDER — ATORVASTATIN CALCIUM 40 MG/1
40 TABLET, FILM COATED ORAL DAILY
Qty: 90 TABLET | Refills: 3 | Status: SHIPPED | OUTPATIENT
Start: 2022-03-28 | End: 2022-10-10 | Stop reason: DRUGHIGH

## 2022-03-28 NOTE — PATIENT INSTRUCTIONS
Discussed diet , achieving and maintaining ideal body weight, and exercise.   We reviewed meds in detail.  Reassured-Discussed goals, options, plan.  Omega-3 > 1500 mg/d combined EPA/DHA with high Lpa.  Vit C 1000 mg at least once preferably twice; Baby ASA/d  Goal LDL < 70 with some CAC and very high Lpa.  Atorvastatin 40 mg just half to start

## 2022-04-07 ENCOUNTER — OFFICE VISIT (OUTPATIENT)
Dept: PODIATRY | Facility: CLINIC | Age: 57
End: 2022-04-07
Payer: COMMERCIAL

## 2022-04-07 VITALS
WEIGHT: 119.94 LBS | DIASTOLIC BLOOD PRESSURE: 78 MMHG | HEIGHT: 62 IN | HEART RATE: 74 BPM | BODY MASS INDEX: 22.07 KG/M2 | SYSTOLIC BLOOD PRESSURE: 102 MMHG

## 2022-04-07 DIAGNOSIS — M77.51 CAPSULITIS OF TOE, RIGHT: ICD-10-CM

## 2022-04-07 DIAGNOSIS — M20.11 HALLUX VALGUS OF RIGHT FOOT: Primary | ICD-10-CM

## 2022-04-07 PROCEDURE — 1159F PR MEDICATION LIST DOCUMENTED IN MEDICAL RECORD: ICD-10-PCS | Mod: CPTII,S$GLB,, | Performed by: PODIATRIST

## 2022-04-07 PROCEDURE — 3074F SYST BP LT 130 MM HG: CPT | Mod: CPTII,S$GLB,, | Performed by: PODIATRIST

## 2022-04-07 PROCEDURE — 3078F DIAST BP <80 MM HG: CPT | Mod: CPTII,S$GLB,, | Performed by: PODIATRIST

## 2022-04-07 PROCEDURE — 3008F BODY MASS INDEX DOCD: CPT | Mod: CPTII,S$GLB,, | Performed by: PODIATRIST

## 2022-04-07 PROCEDURE — 3078F PR MOST RECENT DIASTOLIC BLOOD PRESSURE < 80 MM HG: ICD-10-PCS | Mod: CPTII,S$GLB,, | Performed by: PODIATRIST

## 2022-04-07 PROCEDURE — 99214 PR OFFICE/OUTPT VISIT, EST, LEVL IV, 30-39 MIN: ICD-10-PCS | Mod: S$GLB,,, | Performed by: PODIATRIST

## 2022-04-07 PROCEDURE — 3074F PR MOST RECENT SYSTOLIC BLOOD PRESSURE < 130 MM HG: ICD-10-PCS | Mod: CPTII,S$GLB,, | Performed by: PODIATRIST

## 2022-04-07 PROCEDURE — 99999 PR PBB SHADOW E&M-EST. PATIENT-LVL III: CPT | Mod: PBBFAC,,, | Performed by: PODIATRIST

## 2022-04-07 PROCEDURE — 3008F PR BODY MASS INDEX (BMI) DOCUMENTED: ICD-10-PCS | Mod: CPTII,S$GLB,, | Performed by: PODIATRIST

## 2022-04-07 PROCEDURE — 99999 PR PBB SHADOW E&M-EST. PATIENT-LVL III: ICD-10-PCS | Mod: PBBFAC,,, | Performed by: PODIATRIST

## 2022-04-07 PROCEDURE — 99214 OFFICE O/P EST MOD 30 MIN: CPT | Mod: S$GLB,,, | Performed by: PODIATRIST

## 2022-04-07 PROCEDURE — 1159F MED LIST DOCD IN RCRD: CPT | Mod: CPTII,S$GLB,, | Performed by: PODIATRIST

## 2022-04-07 RX ORDER — CLOBETASOL PROPIONATE 0.05 G/100ML
SHAMPOO TOPICAL DAILY PRN
Qty: 118 ML | Refills: 2 | Status: SHIPPED | OUTPATIENT
Start: 2022-04-07 | End: 2022-04-16

## 2022-04-13 ENCOUNTER — TELEPHONE (OUTPATIENT)
Dept: NEUROLOGY | Facility: CLINIC | Age: 57
End: 2022-04-13
Payer: COMMERCIAL

## 2022-04-16 RX ORDER — CLOBETASOL PROPIONATE 0.5 MG/G
CREAM TOPICAL 2 TIMES DAILY
Qty: 60 G | Refills: 1 | Status: SHIPPED | OUTPATIENT
Start: 2022-04-16 | End: 2022-04-21

## 2022-04-17 NOTE — PROGRESS NOTES
Subjective:      Patient ID: Darling Quach is a 57 y.o. female.    Chief Complaint: Foot Pain (Right big toe pain. Redness. Broken toe 12/21)    Darling is a 57 y.o. female who presents to the podiatry clinic  with complaint of  right foot pain. Onset of the symptoms was several months ago. Precipitating event: none known. Current symptoms include: ability to bear weight, but with some pain. Aggravating factors: any weight bearing.  She is here to review x-ray.  In addition she is also having some irritation/itching to the right foot.        Review of Systems   Constitutional: Negative for chills, decreased appetite, fever and malaise/fatigue.   HENT: Negative for congestion, hearing loss, nosebleeds and tinnitus.    Eyes: Negative for double vision, pain, photophobia and visual disturbance.   Cardiovascular: Negative for chest pain, claudication, cyanosis and leg swelling.   Respiratory: Negative for cough, hemoptysis, shortness of breath and wheezing.    Endocrine: Negative for cold intolerance and heat intolerance.   Hematologic/Lymphatic: Negative for adenopathy and bleeding problem.   Skin: Negative for color change, dry skin, itching, nail changes and suspicious lesions.   Musculoskeletal: Positive for joint pain, myalgias and stiffness. Negative for arthritis.   Gastrointestinal: Negative for abdominal pain, jaundice, nausea and vomiting.   Genitourinary: Negative for dysuria, frequency and hematuria.   Neurological: Negative for difficulty with concentration, loss of balance, numbness, paresthesias and sensory change.   Psychiatric/Behavioral: Negative for altered mental status, hallucinations and suicidal ideas. The patient is not nervous/anxious.    Allergic/Immunologic: Negative for environmental allergies and persistent infections.           Objective:      Physical Exam  Vitals reviewed.   Constitutional:       Appearance: She is well-developed.   HENT:      Head: Normocephalic and atraumatic.    Cardiovascular:      Pulses:           Dorsalis pedis pulses are 2+ on the right side and 2+ on the left side.        Posterior tibial pulses are 2+ on the right side and 2+ on the left side.   Pulmonary:      Effort: Pulmonary effort is normal.   Musculoskeletal:         General: Normal range of motion.      Comments: Inspection and palpation of the muscles joints and bones of both lower extremities reveal that muscle strength for the anterior lateral and posterior muscle groups and intrinsic muscle groups of the foot are all 5 over 5 symmetrical.    Painful medial right 1st MTPJ exostosis. Lateral deviation of hallux, non trackbound. No pain w/ ROM to 1st or 2nd MTPJs. No First ray hypermobility however there is notable sub second MT head tenderness, right.   Skin:     General: Skin is warm and dry.      Capillary Refill: Capillary refill takes 2 to 3 seconds.      Comments: Skin turgor is normal bilaterally.  Skin texture is well hydrated to both lower extremities.  No lesions or rashes or wounds appreciated bilaterally.  Nail plates 1 through 5 bilaterally are within normal limits for length and thickness.  No nail clubbing or incurvation noted.   Neurological:      Mental Status: She is alert and oriented to person, place, and time.      Comments: Sharp dull light touch vibratory proprioceptive sensation are intact bilaterally.  Deep tendon reflexes to patellar and Achilles tendon are symmetrical 2 over 4 bilaterally.  No ankle clonus or Babinski reflexes noted bilaterally.  Coordination is normal to both feet and lower extremities.   Psychiatric:         Behavior: Behavior normal.               Assessment:       Encounter Diagnoses   Name Primary?    Hallux valgus of right foot Yes    Capsulitis of toe, right          Plan:       Darling was seen today for foot pain.    Diagnoses and all orders for this visit:    Hallux valgus of right foot    Capsulitis of toe, right    Other orders  -     Discontinue:  clobetasoL (CLOBEX) 0.05 % shampoo; Apply topically daily as needed.  -     clobetasoL (TEMOVATE) 0.05 % cream; Apply topically 2 (two) times daily.      I counseled the patient on her conditions, their implications and medical management.      The nature of the condition, options for management, as well as potential risks and complications were discussed in detail with patient. Patient was amenable to my recommendations and left my office fully informed and will follow up as instructed or sooner if necessary.      Independent visualization of imaging was performed.  Results were reviewed in detail with patient.    Begin topical medication as instructed.  Appropriate shoe gear and orthotics discussed.  Conservative surgical options discussed in detail.  Follow-up in 4 weeks.  .

## 2022-04-19 ENCOUNTER — OFFICE VISIT (OUTPATIENT)
Dept: OPHTHALMOLOGY | Facility: CLINIC | Age: 57
End: 2022-04-19
Payer: COMMERCIAL

## 2022-04-19 DIAGNOSIS — Z96.1 PSEUDOPHAKIA OF BOTH EYES: ICD-10-CM

## 2022-04-19 DIAGNOSIS — H43.813 POSTERIOR VITREOUS DETACHMENT OF BOTH EYES: Primary | ICD-10-CM

## 2022-04-19 DIAGNOSIS — H43.813 VITREOUS DEGENERATION OF BOTH EYES: ICD-10-CM

## 2022-04-19 DIAGNOSIS — H40.53X1 BILATERAL SECONDARY GLAUCOMA DUE TO COMBINATION MECHANISMS, MILD STAGE: ICD-10-CM

## 2022-04-19 PROCEDURE — 92134 OCT, RETINA - OU - BOTH EYES: ICD-10-PCS | Mod: S$GLB,,, | Performed by: OPHTHALMOLOGY

## 2022-04-19 PROCEDURE — 92134 CPTRZ OPH DX IMG PST SGM RTA: CPT | Mod: S$GLB,,, | Performed by: OPHTHALMOLOGY

## 2022-04-19 PROCEDURE — 99999 PR PBB SHADOW E&M-EST. PATIENT-LVL III: CPT | Mod: PBBFAC,,, | Performed by: OPHTHALMOLOGY

## 2022-04-19 PROCEDURE — 99214 OFFICE O/P EST MOD 30 MIN: CPT | Mod: 24,S$GLB,, | Performed by: OPHTHALMOLOGY

## 2022-04-19 PROCEDURE — 92201 PR OPHTHALMOSCOPY, EXT, W/RET DRAW/SCLERAL DEPR, I&R, UNI/BI: ICD-10-PCS | Mod: S$GLB,,, | Performed by: OPHTHALMOLOGY

## 2022-04-19 PROCEDURE — 99214 PR OFFICE/OUTPT VISIT, EST, LEVL IV, 30-39 MIN: ICD-10-PCS | Mod: 24,S$GLB,, | Performed by: OPHTHALMOLOGY

## 2022-04-19 PROCEDURE — 92201 OPSCPY EXTND RTA DRAW UNI/BI: CPT | Mod: S$GLB,,, | Performed by: OPHTHALMOLOGY

## 2022-04-19 PROCEDURE — 99999 PR PBB SHADOW E&M-EST. PATIENT-LVL III: ICD-10-PCS | Mod: PBBFAC,,, | Performed by: OPHTHALMOLOGY

## 2022-04-19 NOTE — PROGRESS NOTES
HPI     PVD f/u per Dr. Camacho   DLS- 03/25/2022 Dr. Camacho     OS started to have floaters a bit more than normal since cataract sx has   had them for several years but not as much as now. Floaters in OS only.   Denies pain or flashing lights.   Vision is very good after cataract sx.   (+)Photophobia since the sx   (-)Glare    Meds;  PA QD OS      1. MMG/OHT OU   2. PVD OU   3. Vitreous Opacity OD   4. PCIOL OD   5. Steroid Responder   6.  Hx Removal Left Eyelid Papilloma (Dr. Alvarado)     Last edited by Constance Morales on 4/19/2022  2:05 PM. (History)         A/P    ICD-10-CM ICD-9-CM   1. Posterior vitreous detachment of both eyes  H43.813 379.21   2. Vitreous degeneration of both eyes  H43.813 379.21   3. Pseudophakia of both eyes  Z96.1 V43.1   4. 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 Posterior vitreous detachment of both eyes  2. Vitreous degeneration of both eyes  Eval for new floaters/flash OS, recently had CEIOL  Symptoms not as bad as prior, had occ flash past few days    Today PVD OU, no RT/RD, mild peripheral vit opacities, flat and attached 360 with   Plan: Observation  Pathology of PVD, Retinal Tear, Retinal Detachment reviewed in great detail  RD precautions discussed in detail, patient expressed understanding  RTC immediately PRN (especially ANY change flashes, floaters, vision, visual field)     3. Pseudophakia of both eyes  Good lens position OU  Plan: Observation     4. 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 Camacho  IOP 21/18  Plan: Continue Present Management     RTC 3-4 weeks DFE OS    I saw and examined the patient and reviewed in detail the findings documented. The final examination findings, image interpretations, and plan as documented in the record represent my personal judgment and conclusions.    Jesus Triana MD  Vitreoretinal Surgery   [facility]

## 2022-04-20 ENCOUNTER — PATIENT MESSAGE (OUTPATIENT)
Dept: OPHTHALMOLOGY | Facility: CLINIC | Age: 57
End: 2022-04-20
Payer: COMMERCIAL

## 2022-04-21 ENCOUNTER — LAB VISIT (OUTPATIENT)
Dept: LAB | Facility: HOSPITAL | Age: 57
End: 2022-04-21
Attending: PSYCHIATRY & NEUROLOGY
Payer: COMMERCIAL

## 2022-04-21 ENCOUNTER — OFFICE VISIT (OUTPATIENT)
Dept: NEUROLOGY | Facility: CLINIC | Age: 57
End: 2022-04-21
Payer: COMMERCIAL

## 2022-04-21 ENCOUNTER — PATIENT OUTREACH (OUTPATIENT)
Dept: ADMINISTRATIVE | Facility: OTHER | Age: 57
End: 2022-04-21
Payer: COMMERCIAL

## 2022-04-21 VITALS
DIASTOLIC BLOOD PRESSURE: 74 MMHG | BODY MASS INDEX: 21.67 KG/M2 | HEART RATE: 78 BPM | HEIGHT: 62 IN | WEIGHT: 117.75 LBS | SYSTOLIC BLOOD PRESSURE: 107 MMHG

## 2022-04-21 DIAGNOSIS — R56.9 SEIZURE: Primary | ICD-10-CM

## 2022-04-21 DIAGNOSIS — Q28.3 CAVERNOUS MALFORMATION: ICD-10-CM

## 2022-04-21 DIAGNOSIS — G40.209 COMPLEX PARTIAL SEIZURES EVOLVING TO GENERALIZED TONIC-CLONIC SEIZURES: ICD-10-CM

## 2022-04-21 PROCEDURE — 99999 PR PBB SHADOW E&M-EST. PATIENT-LVL III: ICD-10-PCS | Mod: PBBFAC,,, | Performed by: PSYCHIATRY & NEUROLOGY

## 2022-04-21 PROCEDURE — 99205 PR OFFICE/OUTPT VISIT, NEW, LEVL V, 60-74 MIN: ICD-10-PCS | Mod: S$GLB,,, | Performed by: PSYCHIATRY & NEUROLOGY

## 2022-04-21 PROCEDURE — 1159F MED LIST DOCD IN RCRD: CPT | Mod: CPTII,S$GLB,, | Performed by: PSYCHIATRY & NEUROLOGY

## 2022-04-21 PROCEDURE — 3078F PR MOST RECENT DIASTOLIC BLOOD PRESSURE < 80 MM HG: ICD-10-PCS | Mod: CPTII,S$GLB,, | Performed by: PSYCHIATRY & NEUROLOGY

## 2022-04-21 PROCEDURE — 36415 COLL VENOUS BLD VENIPUNCTURE: CPT | Performed by: PSYCHIATRY & NEUROLOGY

## 2022-04-21 PROCEDURE — 99205 OFFICE O/P NEW HI 60 MIN: CPT | Mod: S$GLB,,, | Performed by: PSYCHIATRY & NEUROLOGY

## 2022-04-21 PROCEDURE — 1160F RVW MEDS BY RX/DR IN RCRD: CPT | Mod: CPTII,S$GLB,, | Performed by: PSYCHIATRY & NEUROLOGY

## 2022-04-21 PROCEDURE — 99999 PR PBB SHADOW E&M-EST. PATIENT-LVL III: CPT | Mod: PBBFAC,,, | Performed by: PSYCHIATRY & NEUROLOGY

## 2022-04-21 PROCEDURE — 3078F DIAST BP <80 MM HG: CPT | Mod: CPTII,S$GLB,, | Performed by: PSYCHIATRY & NEUROLOGY

## 2022-04-21 PROCEDURE — 3008F BODY MASS INDEX DOCD: CPT | Mod: CPTII,S$GLB,, | Performed by: PSYCHIATRY & NEUROLOGY

## 2022-04-21 PROCEDURE — 80177 DRUG SCRN QUAN LEVETIRACETAM: CPT | Performed by: PSYCHIATRY & NEUROLOGY

## 2022-04-21 PROCEDURE — 1160F PR REVIEW ALL MEDS BY PRESCRIBER/CLIN PHARMACIST DOCUMENTED: ICD-10-PCS | Mod: CPTII,S$GLB,, | Performed by: PSYCHIATRY & NEUROLOGY

## 2022-04-21 PROCEDURE — 1159F PR MEDICATION LIST DOCUMENTED IN MEDICAL RECORD: ICD-10-PCS | Mod: CPTII,S$GLB,, | Performed by: PSYCHIATRY & NEUROLOGY

## 2022-04-21 PROCEDURE — 3074F PR MOST RECENT SYSTOLIC BLOOD PRESSURE < 130 MM HG: ICD-10-PCS | Mod: CPTII,S$GLB,, | Performed by: PSYCHIATRY & NEUROLOGY

## 2022-04-21 PROCEDURE — 3074F SYST BP LT 130 MM HG: CPT | Mod: CPTII,S$GLB,, | Performed by: PSYCHIATRY & NEUROLOGY

## 2022-04-21 PROCEDURE — 3008F PR BODY MASS INDEX (BMI) DOCUMENTED: ICD-10-PCS | Mod: CPTII,S$GLB,, | Performed by: PSYCHIATRY & NEUROLOGY

## 2022-04-21 RX ORDER — LEVETIRACETAM 750 MG/1
750 TABLET, EXTENDED RELEASE ORAL NIGHTLY
Qty: 90 TABLET | Refills: 3 | Status: SHIPPED | OUTPATIENT
Start: 2022-04-21 | End: 2022-11-18 | Stop reason: SDUPTHER

## 2022-04-21 RX ORDER — IBUPROFEN 100 MG/5ML
1000 SUSPENSION, ORAL (FINAL DOSE FORM) ORAL DAILY
COMMUNITY

## 2022-04-21 RX ORDER — OMEGA-3 FATTY ACIDS 1000 MG
1500 CAPSULE ORAL
COMMUNITY

## 2022-04-21 RX ORDER — LORATADINE 10 MG/1
10 TABLET ORAL DAILY
COMMUNITY
End: 2022-11-18

## 2022-04-21 NOTE — PROGRESS NOTES
Name: Darling Quach  MRN:[id number]   CSN: [id number]  Date of service: 2022  Age:57 y.o.   Gender:female   Referring Physician/Service: Aaareferral Self  No address on file   The patient is here today with:  self     Neurology Clinic: Initial Visit    CHIEF COMPLAINT:  Single nocturnal convulsion    HPI 2022:     Age of first seizure: 2017 53yo   Seizure Risk Factors:  Cavernous malformation in the right superior medial temporal lobe. No family history of seizures. No head strike with LOC. Term  with no prolonged hospitalization   Time of Last Seizure:  2017  # of lifetime Seizures: 1  Frequency of Seizures: just 1 event on one day   Seizure Triggers: estrogen hormone patch, lights were flashing the night before   Injuries/Hospitalization for seizures? No injury, ED with admission ([facility])   Driving? Yes   Pregnancy? 3 children (26, 24, 19 -> all healthy)   Contraception? no  Folic Acid? No   Bone Health: no      Auras:  Weird taste with nausea, light sensitivity     Seizure Events:   1. Nocturnal tonic clonic convulsions, foaming at the mouth, unresponsive, 1-2 minutes, no tongue bite, no incontinence, postictal confusion for 30 minutes    Current AED/SEs:  1. keppra 750 XR nightly SE none    Previous AED/SEs or reason for DC.   1. None     R-EEG, 17: INTERPRETATION:  Abnormal EEG due to bitemporal slowing and sharp waves, worse in the left temporal region than the right temporal region with sharp activity, maximal at the T1 electrode, most likely representing interictal epileptiform discharges.    MRI:  Right temporal lobe cavernous malformation     Other Allergies: reviewed     AED compliance, adherence: no missed doses     ROS 2022: tired at 4pm now which is new since starting Lipitor.  Short term memory loss. No getting lost.  No missing appointments or important events.  No forgetting close relatives names. Otherwise, denies headache, loss of vision, blurred vision, diplopia,  "dysarthria, dysphagia, lightheadedness, vertigo, tinnitus or hearing difficulty. Denies difficulties producing or comprehending speech.  Denies focal weakness, numbness, paresthesias. Denies difficulty with gait. Denies cough, shortness of breath.  Denies chest pain or tightness, palpitations.  Denies nausea, vomiting, diarrhea, constipation or abdominal pain.  No bowel or bladder incontinence or retention.  No falls.       EXAM:   - Vitals: /74   Pulse 78   Ht 5' 2" (1.575 m)   Wt 53.4 kg (117 lb 11.6 oz)   LMP  (LMP Unknown)   BMI 21.53 kg/m²    - General: Awake, cooperative, NAD.  - HEENT: NC/AT  - Neck: Supple  - Pulmonary: no increased WOB  - Cardiac: well perfused   - Abdomen: soft, nontender, nondistended  - Extremities: no edema  - Skin: no rashes or lesions noted.     NEURO EXAM:   - Mental Status: Awake, alert, oriented x 3. Able to relate history without difficulty. Attentive to examiner. Language is fluent with intact repetition and comprehension. Normal prosody. There were no paraphasic errors. Able to name both high and low frequency objects. Speech was not dysarthric. Able to follow both midline and appendicular commands. There was no evidence of apraxia or neglect.    - Cranial Nerves:  VFF. EOMI. No facial droop. Hearing intact to finger-rub bilaterally. 5/5 strength in trapezii and SCM bilaterally. Tongue protrudes in midline and to either side with no evidence of atrophy or weakness.    - Motor: Normal bulk and tone throughout. No pronator drift bilaterally. No adventitious movements such as tremor or asterixis noted.     Delt Bic Tri WrE WrF  FFl FE IO IP Quad Ham TA Gastroc    R   5     5    5    5    5        5   5    5   5    5        5     5      5            L   5      5    5   5    5        5    5   5    5    5       5     5      5              - Sensory: No deficits to light touch. No extinction to DSS.  - Coordination: No dysmetria on FNF   - Gait: Good initiation. " Narrow-based, normal stride and arm swing. Romberg negative.    PLAN:  57-year-old woman with a single nocturnal convulsion possibly provoked by estrogen replacement found to have a cavernous malformation which has been stable on surveillance scans.  She prefers to continue on levetiracetam 750 mg extended release nightly.  Level today.  Follow up in about 1 year (around 4/21/2023).     Patient Instructions   You came to Epilepsy Clinic because of a single convulsive event. You have not had another event while on keppra 750mg nightly. Do not miss any doses of your medication. If you do miss a dose, take it as soon as you remember even if that means doubling up on the dose. Please get a lab test to check out the blood level of your medication. Please get regular sleep. Go to sleep at the same time and wake up at the same time every day.     Per [place] law, no episodes of loss of consciousness for 6 months before you may drive.  Please avoid dangerous situations. If you have a single breakthrough seizure, please patient portal me or call the office and we will decide the next steps. If you have multiple seizures in a row and do not return back to baseline, 911 needs to be called.     Return to clinic in 12 months or sooner with issues.  Please patient portal with any questions or concerns.    Lily Bishop MD PhD  Neurology-Epilepsy  [facility].        Problem List Items Addressed This Visit     Complex partial seizures evolving to generalized tonic-clonic seizures - Primary    Relevant Medications    levetiracetam XR (KEPPRA XR) 750 mg Tb24 tablet    Cavernous malformation          More than 50% of the 56 minutes spent with the patient (as well as family/caregiver(s) was spent on face-to-face counseling. Total time spent on encounter: 76 minutes    Disclaimer: This note has been generated using voice-recognition software. There may be typographical errors that were missed during proof-reading.      LABS:  Recent Labs   Lab 06/04/20  0832 06/18/20  0850 02/18/21  1245 02/11/22  1045   WBC 4.58  --  6.02 5.04   Hemoglobin 12.9  --  13.6 12.9   Hematocrit 40.2  --  41.2 39.9   Platelets 240  --  251 242   Sodium 142   < > 141 140   Potassium 4.3   < > 4.1 4.4   BUN 21 H   < > 17 21 H   Creatinine 1.1   < > 1.0 0.9   eGFR if African American >60.0   < > >60.0 >60.0   eGFR if non  57.1 A   < > >60.0 >60.0   TSH  --   --   --  0.941   Vitamin B-12 339  --  542 742    < > = values in this interval not displayed.              IMAGING:  Recent imaging is personally reviewed with the patient.    Results for orders placed during the hospital encounter of 07/14/17    MRI Brain W WO Contrast    Impression  Heterogeneous signal intra-axial lesion right mesial temporal lobe remains compatible with cavernoma overall slightly increased in size from prior.    No evidence for edema about this to suggest acute hemorrhage.    No evidence for acute infarction.    Ventricles stable without hydrocephalus. Clinical correlation and continued followup advised.      Electronically signed by: NAUN SAN DO  Date:     07/14/17  Time:    17:05    Results for orders placed during the hospital encounter of 07/14/17    CT Head Without Contrast    Impression  Motion limited examination without evidence for definite acute intercranial hemorrhage.    Ill-defined hyperdense lesion right superior medial temporal lobe compatible with known cavernoma correspond to abnormality seen on prior MRI.    Further evaluation as warranted clinically.          Electronically signed by: NAUN SAN DO  Date:     07/14/17  Time:    12:32    Results for orders placed during the hospital encounter of 07/14/17    MRA Brain without contrast    Impression  Unremarkable MRA of the head specifically without evidence for focal stenosis or intracranial aneurysm.    No evidence for abnormal flow related enhancement associated with the known right  temporal cavernoma to suggest arterial vascular malformation As detailed above        Electronically signed by: NAUN SAN DO  Date:     07/14/17  Time:    17:00    PAST MEDICAL HISTORY:   Active Ambulatory Problems     Diagnosis Date Noted    Cavernoma     Hypercholesterolemia     Nuclear sclerosis, left 12/18/2012    Glaucoma due to combination of mechanisms - Both Eyes 12/18/2012    Posterior vitreous detachment - Both Eyes 12/18/2012    Steroid responders borderline glaucoma - Both Eyes 12/18/2012    Vitreous opacity - Right Eye 12/18/2012    Venous angioma of brain 12/15/2014    S/P hysterectomy 03/16/2017    Hx of colonoscopy:10/10/2016 04/06/2017    Anal fissure 04/06/2017    Complex partial seizures evolving to generalized tonic-clonic seizures 07/18/2017    Fissure in ano 05/27/2019    Anxiety 06/19/2020    Vaginal atrophy 07/09/2020    Agatston CAC score, <100 03/25/2022    Mixed dyslipidemia 03/27/2022    Vitreous degeneration of both eyes 04/19/2022    Pseudophakia of both eyes 04/19/2022    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, mild stage 04/19/2022    Cavernous malformation 04/21/2022     Resolved Ambulatory Problems     Diagnosis Date Noted    Glaucoma 12/15/2014    Cataract 12/15/2014    Post-operative state 12/23/2014    Anal fistula 04/06/2017    Seizure 07/14/2017    Hyponatremia 07/14/2017    Hypophosphatemia 07/14/2017     Past Medical History:   Diagnosis Date    Hemangioma     History of acne 2004    Hyperlipidemia     Seizures     Vertigo         PAST SURGICAL HISTORY:   Past Surgical History:   Procedure Laterality Date    ANAL SPHINCTEROTOMY  5/27/2019    Procedure: SPHINCTEROTOMY, ANAL;  Surgeon: Nirmal Chan MD;  Location: [facility] OR [address] [place] [address];  Service: Colon and Rectal;;    CATARACT EXTRACTION W/  INTRAOCULAR LENS IMPLANT Right 12/17/2014        EXCISIONAL HEMORRHOIDECTOMY N/A 5/27/2019    Procedure:  HEMORRHOIDECTOMY;  Surgeon: Nirmal Chan MD;  Location: [facility] OR 2ND FLR;  Service: Colon and Rectal;  Laterality: N/A;    HYSTERECTOMY  2010    DLH ov in situ     INTRAOCULAR PROSTHESES INSERTION Left 3/16/2022    Procedure: INSERTION, IOL PROSTHESIS;  Surgeon: Josey Camacho MD;  Location: [facility] OR 1ST FLR;  Service: Ophthalmology;  Laterality: Left;    PHACOEMULSIFICATION OF CATARACT Left 3/16/2022    Procedure: PHACOEMULSIFICATION, CATARACT;  Surgeon: Josey Camacho MD;  [facility];  Service: Ophthalmology;  Laterality: Left;        ALLERGIES: Tramadol and Latanoprost   CURRENT MEDICATIONS:   Current Outpatient Medications   Medication Sig Dispense Refill    ascorbic acid, vitamin C, (VITAMIN C) 1000 MG tablet Take 1,000 mg by mouth once daily.      Lactobacillus acidophilus 10 billion cell Cap Take by mouth.      loratadine (CLARITIN) 10 mg tablet Take 10 mg by mouth once daily.      multivitamin capsule Take 1 capsule by mouth once daily.      omega-3 fatty acids 1,000 mg Cap Take 1,500 mg by mouth.      aspirin (ECOTRIN) 81 MG EC tablet Take 1 tablet (81 mg total) by mouth once daily. 30 tablet 0    atorvastatin (LIPITOR) 40 MG tablet Take 1 tablet (40 mg total) by mouth once daily. 90 tablet 3    levetiracetam XR (KEPPRA XR) 750 mg Tb24 tablet Take 1 tablet (750 mg total) by mouth every evening. 90 tablet 3    prednisoLONE acetate (PRED FORTE) 1 % DrpS Left eye - 3 x day for 2 weeks, then 2 x day for 2 weeks, then 1 x day for 2 weeks, then stop 5 mL 1     No current facility-administered medications for this visit.        FAMILY HISTORY:   Family History   Problem Relation Age of Onset    Fibroids Mother     Migraines Mother     Glaucoma Mother     Heart disease Father     Depression Brother     No Known Problems Sister     Diabetes Maternal Grandmother     Glaucoma Maternal Grandmother     Cancer Maternal Aunt         breast    Breast cancer Maternal  Aunt 50    No Known Problems Maternal Uncle     No Known Problems Paternal Aunt     No Known Problems Paternal Uncle     No Known Problems Maternal Grandfather     No Known Problems Paternal Grandmother     No Known Problems Paternal Grandfather     Breast cancer Maternal Aunt     Macular degeneration Neg Hx     Blindness Neg Hx     Melanoma Neg Hx     Psoriasis Neg Hx     Lupus Neg Hx     Eczema Neg Hx     Acne Neg Hx     Cataracts Neg Hx     Amblyopia Neg Hx     Retinal detachment Neg Hx     Strabismus Neg Hx     Stroke Neg Hx     Thyroid disease Neg Hx     Hypertension Neg Hx     Ovarian cancer Neg Hx     Colon cancer Neg Hx          SOCIAL HISTORY:   Social History     Socioeconomic History    Marital status:    Tobacco Use    Smoking status: Never Smoker    Smokeless tobacco: Never Used   Substance and Sexual Activity    Alcohol use: No    Drug use: No    Sexual activity: Yes     Partners: Male     Birth control/protection: Surgical     Comment: , [facility] 9/2010   Other Topics Concern    Are you pregnant or think you may be? No    Breast-feeding No     Social Determinants of Health     Financial Resource Strain: Low Risk     Difficulty of Paying Living Expenses: Not hard at all   Food Insecurity: No Food Insecurity    Worried About Running Out of Food in the Last Year: Never true    Ran Out of Food in the Last Year: Never true   Transportation Needs: No Transportation Needs    Lack of Transportation (Medical): No    Lack of Transportation (Non-Medical): No   Physical Activity: Insufficiently Active    Days of Exercise per Week: 4 days    Minutes of Exercise per Session: 30 min   Stress: No Stress Concern Present    Feeling of Stress : Not at all   Social Connections: Unknown    Frequency of Communication with Friends and Family: More than three times a week    Frequency of Social Gatherings with Friends and Family: More than three times a week    Active Member  of Clubs or Organizations: Yes    Attends Club or Organization Meetings: More than 4 times per year    Marital Status:    Housing Stability: Unknown    Unable to Pay for Housing in the Last Year: No    Unstable Housing in the Last Year: No         Questions and concerns raised by the patient and family/care-giver(s) were addressed and they indicated understanding of everything discussed and agreed to plans as above.    Lily Bishop MD PhD  Neurology-Epilepsy  [facility].

## 2022-04-21 NOTE — PROGRESS NOTES
Health Maintenance Due   Topic Date Due    Hepatitis C Screening  Never done    HIV Screening  Never done    Shingles Vaccine (1 of 2) Never done     Updates were requested from care everywhere.  Chart was reviewed for overdue Proactive [facility] Encounters ([facility]) topics (CRS, Breast Cancer Screening, Eye exam)  Health Maintenance has been updated.  LINKS immunization registry triggered.  Immunizations were reconciled.

## 2022-04-21 NOTE — PATIENT INSTRUCTIONS
You came to Epilepsy Clinic because of a single convulsive event. You have not had another event while on keppra 750mg nightly. Do not miss any doses of your medication. If you do miss a dose, take it as soon as you remember even if that means doubling up on the dose. Please get a lab test to check out the blood level of your medication. Please get regular sleep. Go to sleep at the same time and wake up at the same time every day.     Per [place] law, no episodes of loss of consciousness for 6 months before you may drive.  Please avoid dangerous situations. If you have a single breakthrough seizure, please patient portal me or call the office and we will decide the next steps. If you have multiple seizures in a row and do not return back to baseline, 911 needs to be called.     Return to clinic in 12 months or sooner with issues.  Please patient portal with any questions or concerns.    Lily Bishop MD PhD  Neurology-Epilepsy  [facility].

## 2022-04-22 ENCOUNTER — PATIENT MESSAGE (OUTPATIENT)
Dept: CARDIOLOGY | Facility: CLINIC | Age: 57
End: 2022-04-22
Payer: COMMERCIAL

## 2022-04-25 LAB — LEVETIRACETAM SERPL-MCNC: 7.7 UG/ML (ref 3–60)

## 2022-04-29 ENCOUNTER — OFFICE VISIT (OUTPATIENT)
Dept: OPHTHALMOLOGY | Facility: CLINIC | Age: 57
End: 2022-04-29
Payer: COMMERCIAL

## 2022-04-29 DIAGNOSIS — H43.813 POSTERIOR VITREOUS DETACHMENT OF BOTH EYES: ICD-10-CM

## 2022-04-29 DIAGNOSIS — Z96.1 PSEUDOPHAKIA OF BOTH EYES: ICD-10-CM

## 2022-04-29 DIAGNOSIS — H40.53X1 BILATERAL SECONDARY GLAUCOMA DUE TO COMBINATION MECHANISMS, MILD STAGE: ICD-10-CM

## 2022-04-29 DIAGNOSIS — Z98.49 POSTOPERATIVE CARE FOR CATARACT: Primary | ICD-10-CM

## 2022-04-29 DIAGNOSIS — H43.813 VITREOUS DEGENERATION OF BOTH EYES: ICD-10-CM

## 2022-04-29 DIAGNOSIS — Z48.810 POSTOPERATIVE CARE FOR CATARACT: Primary | ICD-10-CM

## 2022-04-29 PROCEDURE — 1159F MED LIST DOCD IN RCRD: CPT | Mod: CPTII,S$GLB,, | Performed by: OPHTHALMOLOGY

## 2022-04-29 PROCEDURE — 1159F PR MEDICATION LIST DOCUMENTED IN MEDICAL RECORD: ICD-10-PCS | Mod: CPTII,S$GLB,, | Performed by: OPHTHALMOLOGY

## 2022-04-29 PROCEDURE — 1160F PR REVIEW ALL MEDS BY PRESCRIBER/CLIN PHARMACIST DOCUMENTED: ICD-10-PCS | Mod: CPTII,S$GLB,, | Performed by: OPHTHALMOLOGY

## 2022-04-29 PROCEDURE — 99999 PR PBB SHADOW E&M-EST. PATIENT-LVL III: ICD-10-PCS | Mod: PBBFAC,,, | Performed by: OPHTHALMOLOGY

## 2022-04-29 PROCEDURE — 99024 PR POST-OP FOLLOW-UP VISIT: ICD-10-PCS | Mod: S$GLB,,, | Performed by: OPHTHALMOLOGY

## 2022-04-29 PROCEDURE — 1160F RVW MEDS BY RX/DR IN RCRD: CPT | Mod: CPTII,S$GLB,, | Performed by: OPHTHALMOLOGY

## 2022-04-29 PROCEDURE — 99999 PR PBB SHADOW E&M-EST. PATIENT-LVL III: CPT | Mod: PBBFAC,,, | Performed by: OPHTHALMOLOGY

## 2022-04-29 PROCEDURE — 99024 POSTOP FOLLOW-UP VISIT: CPT | Mod: S$GLB,,, | Performed by: OPHTHALMOLOGY

## 2022-04-29 NOTE — PROGRESS NOTES
HPI     S/p Phaco IOL OS 3/16/22    1. MMG/OHT OU  2. PVD OU  3. Vitreous Opacity OD  4. PCIOL OU  5. Steroid Responder  6.  Hx Removal Left Eyelid Papilloma (Dr. Alvarado)    MEDS:  PA QDAY OS  AT's PRN OU    Last edited by Ellie Dent MA on 4/29/2022  1:23 PM. (History)            Assessment /Plan     For exam results, see Encounter Report.    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 Mixed mechanism Glaucoma   H/O narrow angles - S/P PI's, + residual OHT   First HVF 1998   First photos 1999   Former [facility] , retired for a few years and went back to work as a    (mom with same problem with narrow angles and residual OHT)     Family history + mom - Narrow angles with residual OHT   Glaucoma meds - off gtts (use to use timolol)   H/O adverse rxn to glaucoma drops - latanoprost - eye irritation  LASERS PI's ou // yag cap od 4/25/2019  GLAUCOMA SURGERIES none   OTHER EYE SURGERIES - PC IOL OD 12/17/2014 - [facility]  CDR 0.4/0.4   Tbase 23-36 / 23-35 ou   Tmax 36/35   Ttarget 30/30  HVF 16 test 24-2 ss ou 1996 to 2021 ([facility]) - hint SNS  od // full (w/ lens rim art)  os            3 SWAP test 2010 to 2012 - Full ou            ( repeat HVF from 8/2013 to 9/2013 - new SAD od confirmed)            [place] VF's - 1 test 2016 to 2016 - SNS /INS od // full os   2019 VF full od/near full os   [place] +3 ou S/P PI's   /618   OCT 5 test 2005 -  2021 ([facility]) - RNFL - bord TI  od/ bord bord TS  os   HRT - 5 test - 2014 to 2020 - MR -  nl od // bord SN os /// CDR 0.55 od // 0.55 os   Mix of main campus and [place]   Disc photos 1999, 2004, 2013 - slides // 5/13/2010 - // 2019 - OIS      - Ttoday  24/20  - Test done today -post -op phaco/IOL os - 3/17/2022    2. PVD ou - with dense vitreous veil OD Saw [facility] 4/23/2012 4/2019 - pt is noticing the floaters again (had not noticed them for several years)     3. Vitreous opacity OD     4. Steroid responder - had an injection / ? Spironolactone / aldactone - not currently on any steroids    5. NS os    Pt c/o blurry vision - glasses need changing every 2-3 months    BCVA 20/40 // BAT h 20/400    Having trouble with driving and reading    She is a      6. Refractive error   Was bilateral hyperope / presbyope   Is anisopmetropic post CE od    Glasses from [employer]  - no longer working for her    7. H/O eyelid papilloma Left - S/P removal - [place]     8. Pseudophakia od   PC IOL od 12/17/2014 - SN60wf 20.5   Had a large myopic shift pre surgery from +1.00 to -3.00    8.  I see her father in law - he has glaucoma - and I did his cataracts    I see her mother - she has similar issues to this patient    She would like me to see her adult daughter to evaluate her for OHT / glaucoma as well     Plan   MMG  / OHT ou  S/P PI's ou   HVF today near full OU    Intolerant to latanoprost - red irritated eye  Off all glaucoma gtts - (use to use timolol) 7/2019     Nadeem Rx - [facility] - 5/9/2019 - says yag cap helped a lot     Photo file updated - [facility] - 5/25/2021     IOL calc - glory   SN60WF - 22.5  MTA4U0 - 18.5    Phaco / IOL OS Date: 3/17/2022 - sn60wf 22.5   POW # 5  - phaco/IOL   Doing well - WITH A NEW PVD OS - NO HOLES OR TEARS - warned of signs of RD    Pred Acetate  Stop     Consult retina - new acute PVD os post phaco on 3/17/2022 / no holes or tears - he will recheck again     F/U 6 months - [place] if open

## 2022-05-12 ENCOUNTER — TELEPHONE (OUTPATIENT)
Dept: OPHTHALMOLOGY | Facility: CLINIC | Age: 57
End: 2022-05-12
Payer: COMMERCIAL

## 2022-07-13 ENCOUNTER — HOSPITAL ENCOUNTER (OUTPATIENT)
Dept: RADIOLOGY | Facility: OTHER | Age: 57
Discharge: HOME | End: 2022-07-13
Attending: OBSTETRICS & GYNECOLOGY
Payer: COMMERCIAL

## 2022-07-13 ENCOUNTER — OFFICE VISIT (OUTPATIENT)
Dept: OPTOMETRY | Facility: CLINIC | Age: 57
End: 2022-07-13
Payer: COMMERCIAL

## 2022-07-13 ENCOUNTER — PATIENT MESSAGE (OUTPATIENT)
Dept: OPHTHALMOLOGY | Facility: CLINIC | Age: 57
End: 2022-07-13
Payer: COMMERCIAL

## 2022-07-13 DIAGNOSIS — Z96.1 PSEUDOPHAKIA OF BOTH EYES: ICD-10-CM

## 2022-07-13 DIAGNOSIS — Z98.49 STATUS POST CATARACT EXTRACTION, UNSPECIFIED LATERALITY: ICD-10-CM

## 2022-07-13 DIAGNOSIS — H40.89 GLAUCOMA DUE TO COMBINATION OF MECHANISMS: ICD-10-CM

## 2022-07-13 DIAGNOSIS — Z12.31 SCREENING MAMMOGRAM, ENCOUNTER FOR: ICD-10-CM

## 2022-07-13 DIAGNOSIS — H40.051 OCULAR HYPERTENSION OF RIGHT EYE: Primary | ICD-10-CM

## 2022-07-13 DIAGNOSIS — H52.203 ASTIGMATISM OF BOTH EYES, UNSPECIFIED TYPE: ICD-10-CM

## 2022-07-13 PROCEDURE — 77067 SCR MAMMO BI INCL CAD: CPT | Mod: 26,,, | Performed by: RADIOLOGY

## 2022-07-13 PROCEDURE — 77067 MAMMO DIGITAL SCREENING BILAT WITH TOMO: ICD-10-PCS | Mod: 26,,, | Performed by: RADIOLOGY

## 2022-07-13 PROCEDURE — 99999 PR PBB SHADOW E&M-EST. PATIENT-LVL III: CPT | Mod: PBBFAC,,, | Performed by: OPTOMETRIST

## 2022-07-13 PROCEDURE — 99999 PR PBB SHADOW E&M-EST. PATIENT-LVL III: ICD-10-PCS | Mod: PBBFAC,,, | Performed by: OPTOMETRIST

## 2022-07-13 PROCEDURE — 77063 BREAST TOMOSYNTHESIS BI: CPT | Mod: TC

## 2022-07-13 PROCEDURE — 92015 DETERMINE REFRACTIVE STATE: CPT | Mod: S$GLB,,, | Performed by: OPTOMETRIST

## 2022-07-13 PROCEDURE — 77063 MAMMO DIGITAL SCREENING BILAT WITH TOMO: ICD-10-PCS | Mod: 26,,, | Performed by: RADIOLOGY

## 2022-07-13 PROCEDURE — 77063 BREAST TOMOSYNTHESIS BI: CPT | Mod: 26,,, | Performed by: RADIOLOGY

## 2022-07-13 PROCEDURE — 92015 PR REFRACTION: ICD-10-PCS | Mod: S$GLB,,, | Performed by: OPTOMETRIST

## 2022-07-13 PROCEDURE — 92012 PR EYE EXAM, EST PATIENT,INTERMED: ICD-10-PCS | Mod: S$GLB,,, | Performed by: OPTOMETRIST

## 2022-07-13 PROCEDURE — 92012 INTRM OPH EXAM EST PATIENT: CPT | Mod: S$GLB,,, | Performed by: OPTOMETRIST

## 2022-07-13 RX ORDER — MINOCYCLINE 40 MG/G
1 AEROSOL, FOAM TOPICAL NIGHTLY
COMMUNITY
Start: 2022-06-06 | End: 2022-11-18

## 2022-07-13 NOTE — PROGRESS NOTES
HPI     eye examination       Additional comments: Eye examination and refraction - in for refraction   following cataract surgery in OD in March, 2022.              Comments     Patient's age: 57 y.o. WF  Occupation:     Approximate date of last eye examination:  06/07/2021 Dr.Boudreaux Haque 04/29/2022  Wears glasses? Yes      If yes, wears  Full-time or part-time?  Full time   Present glasses are: Bifocal, SV Distance, SV Reading?  Progressive lens   Approximate age of present glasses:  1 month   Got new glasses following last exam, or subsequently?:  Yes    Any problem with VA with glasses? no  Wears CLs?:  no  Headaches?  No   Eye pain/discomfort?  No                                                                                      Flashes?  No   Floaters?  No   Diplopia/Double vision?  No   Patient's Ocular History:         Any eye surgeries? Cataract bilaterally - most recently in OD          Any eye injury?  No          Any treatment for eye disease?  glaucoma  OU  Family history of eye disease?   M. Grandmother, mother: glaucoma/P   grandmother cataract   Significant patient medical history:         1. Diabetes?  No        If yes, IDDM or NIDDM? n/a   2. HBP?  No               3. Other (describe):  No    ! OTC eyedrops currently using:  No    ! Prescription eye meds currently using:  No    ! Any history of allergy/adverse reaction to any eye meds used   previously? Adverse reaction to Timolol 0.5% GFS     ! Any history of allergy/adverse reaction to eyedrops used during prior   eye exam(s)?  See above    ! Any history of allergy/adverse reaction to Novacaine or similar meds?   No    ! Any history of allergy/adverse reaction to Epinephrine or similar meds?   No     ! Patient okay with use of anesthetic eyedrops to check eye pressure?    Yes         ! Patient okay with use of eyedrops to dilate pupils today?  Yes    !  Allergies/Medications/Medical History/Family History reviewed today?   "  Yes       PD =   61/57  Desired reading distance =  17"                                                                     Last edited by Jairon June, OD on 7/13/2022  9:30 AM. (History)            Assessment /Plan     For exam results, see Encounter Report.    1. Ocular hypertension of right eye     2. Glaucoma due to combination of mechanisms - Both Eyes     3. Status post cataract extraction, unspecified laterality     4. Pseudophakia of both eyes     5. Astigmatism of both eyes, unspecified type                       Prior diagnosis of bilateral glaucoma secondary to mixed mechanisms.  Has been followed by Dr. Camacho.  S/P laser peripheral iridotomy in each eye.  Not using drops for IOP control/reduction in either eye.  S/P cataract surgery in both eyes, most recently in the right eye.    Residual refractive error in each eye, with very satisfactory best-corrected VA in each eye, although slgihtly better in the left eye than in the right eye.    IOP of 2& mm Hg in the right eye and 18mm Hg in the left eye today.    New spectacle lens Rx issued for use as desired.  Recommend full-time wear.    Send copy of notes to Dr. Camacho for her review, in view of elevated intraocular pressure in the right eye today.  Advised patient to call Dr. Camacho within the next two days to discuss with Dr. Camacho whether she might need to be placed back on drops to lower the IOP in the right eye.     Recheck refraction in one year - or prior, if so recommended by Dr. Camacho.     Follow up with Dr. Camacho as she recommends.               "

## 2022-07-13 NOTE — PATIENT INSTRUCTIONS
Prior diagnosis of bilateral glaucoma secondary to mixed mechanisms.  Has been followed by Dr. Camacho.  S/P laser peripheral iridotomy in each eye.  Not using drops for IOP control/reduction in either eye.  S/P cataract surgery in both eyes, most recently in the right eye.    Residual refractive error in each eye, with very satisfactory best-corrected VA in each eye, although slgihtly better in the left eye than in the right eye.    IOP of 2& mm Hg in the right eye and 18mm Hg in the left eye today.    New spectacle lens Rx issued for use as desired.  Recommend full-time wear.    Send copy of notes to Dr. Camacho for her review, in view of elevated intraocular pressure in the right eye today.  Advised patient to call Dr. Camacho within the next two days to discuss with Dr. Camacho whether she might need to be placed back on drops to lower the IOP in the right eye.     Recheck refraction in one year - or prior, if so recommended by Dr. Camacho.     Follow up with Dr. Camacho as she recommends.

## 2022-07-14 ENCOUNTER — OFFICE VISIT (OUTPATIENT)
Dept: OBSTETRICS AND GYNECOLOGY | Facility: CLINIC | Age: 57
End: 2022-07-14
Payer: COMMERCIAL

## 2022-07-14 VITALS
BODY MASS INDEX: 22.19 KG/M2 | DIASTOLIC BLOOD PRESSURE: 70 MMHG | HEIGHT: 62 IN | SYSTOLIC BLOOD PRESSURE: 98 MMHG | WEIGHT: 120.56 LBS

## 2022-07-14 DIAGNOSIS — N95.2 POSTMENOPAUSAL ATROPHIC VAGINITIS: ICD-10-CM

## 2022-07-14 DIAGNOSIS — Z78.0 POST-MENOPAUSAL: ICD-10-CM

## 2022-07-14 DIAGNOSIS — Z01.419 WELL WOMAN EXAM WITH ROUTINE GYNECOLOGICAL EXAM: Primary | ICD-10-CM

## 2022-07-14 PROCEDURE — 3074F SYST BP LT 130 MM HG: CPT | Mod: CPTII,S$GLB,, | Performed by: OBSTETRICS & GYNECOLOGY

## 2022-07-14 PROCEDURE — 3074F PR MOST RECENT SYSTOLIC BLOOD PRESSURE < 130 MM HG: ICD-10-PCS | Mod: CPTII,S$GLB,, | Performed by: OBSTETRICS & GYNECOLOGY

## 2022-07-14 PROCEDURE — 3008F PR BODY MASS INDEX (BMI) DOCUMENTED: ICD-10-PCS | Mod: CPTII,S$GLB,, | Performed by: OBSTETRICS & GYNECOLOGY

## 2022-07-14 PROCEDURE — 1160F RVW MEDS BY RX/DR IN RCRD: CPT | Mod: CPTII,S$GLB,, | Performed by: OBSTETRICS & GYNECOLOGY

## 2022-07-14 PROCEDURE — 1159F PR MEDICATION LIST DOCUMENTED IN MEDICAL RECORD: ICD-10-PCS | Mod: CPTII,S$GLB,, | Performed by: OBSTETRICS & GYNECOLOGY

## 2022-07-14 PROCEDURE — 99396 PREV VISIT EST AGE 40-64: CPT | Mod: S$GLB,,, | Performed by: OBSTETRICS & GYNECOLOGY

## 2022-07-14 PROCEDURE — 99999 PR PBB SHADOW E&M-EST. PATIENT-LVL III: ICD-10-PCS | Mod: PBBFAC,,, | Performed by: OBSTETRICS & GYNECOLOGY

## 2022-07-14 PROCEDURE — 99396 PR PREVENTIVE VISIT,EST,40-64: ICD-10-PCS | Mod: S$GLB,,, | Performed by: OBSTETRICS & GYNECOLOGY

## 2022-07-14 PROCEDURE — 1160F PR REVIEW ALL MEDS BY PRESCRIBER/CLIN PHARMACIST DOCUMENTED: ICD-10-PCS | Mod: CPTII,S$GLB,, | Performed by: OBSTETRICS & GYNECOLOGY

## 2022-07-14 PROCEDURE — 1159F MED LIST DOCD IN RCRD: CPT | Mod: CPTII,S$GLB,, | Performed by: OBSTETRICS & GYNECOLOGY

## 2022-07-14 PROCEDURE — 3078F PR MOST RECENT DIASTOLIC BLOOD PRESSURE < 80 MM HG: ICD-10-PCS | Mod: CPTII,S$GLB,, | Performed by: OBSTETRICS & GYNECOLOGY

## 2022-07-14 PROCEDURE — 3008F BODY MASS INDEX DOCD: CPT | Mod: CPTII,S$GLB,, | Performed by: OBSTETRICS & GYNECOLOGY

## 2022-07-14 PROCEDURE — 3078F DIAST BP <80 MM HG: CPT | Mod: CPTII,S$GLB,, | Performed by: OBSTETRICS & GYNECOLOGY

## 2022-07-14 PROCEDURE — 99999 PR PBB SHADOW E&M-EST. PATIENT-LVL III: CPT | Mod: PBBFAC,,, | Performed by: OBSTETRICS & GYNECOLOGY

## 2022-07-14 NOTE — PROGRESS NOTES
Subjective:     Patient ID: Darling Quach is a 57 y.o. female.     Chief Complaint: Well Woman     History of Present Illness: This patient is a 57 y.o.  female, who presents to the GYN clinic for her gyn well woman yearly exam.  She denies gyn complaints.      No LMP recorded (lmp unknown). Patient has had a hysterectomy.    Review of Systems    GENERAL: No fever, chills, fatigability or weightchange  SKIN: No rashes, itching or changes in color or texture of skin.  HEAD: No headaches or recent head trauma.  EYES: Visual acuity fine. No photophobia,r diplopia.  EARS: Denies earache or vertigo  NOSE: No loss of smell, no epistaxis or postnasal drip.  MOUTH & THROAT: No hoarseness or change in voice.   NODES: Denies swollen glands.  CHEST: Denies COLMENARES, cyanosis, wheezing, cough and sputum production.  CARDIOVASCULAR: Denies chest pain, PND, orthopnea or reduced exercise tolerance.  ABDOMEN: Appetite fine. No weight loss. bloating, Denies diarrhea, abdominal pain, hematemesis or blood in stool.  URINARY: No flank pain, dysuria or hematuria.  PERIPHERAL VASCULAR: No claudication or cyanosis.Varicosities  MUSCULOSKELETAL: No joint stiffness or swelling. Denies back pain.muscle aches  NEUROLOGIC: No history of seizures, paralysis, alteration of gait or coordination.       Objective:       Physical Exam     APPEARANCE: Well nourished, well developed, in no acute distress.    GENITOURINARY:  Vulva: No lesions. Normal female genital architecture.  Urethral Meatus: Normal size and location, no lesions, no prolapse.  Urethra: No masses, tenderness, prolapse or scarring.  Vagina: thin, atrophic and with decreased rugae, no discharge, no significant cystocele or rectocele.  Cervix: Absent  Uterus: Absent  Adnexa: No masses, tenderness or CDS nodularity.  Anus Perineum: No lesions, no relaxation, no external hemorrhoids.  Breasts: Symmetrical, no skin changes or visible lesions. No palpable masses, nipple discharge or  adenopathy bilaterally.  Abdomen: No masses, tenderness, hernia or ascites, no hepatasplenomegaly  Neck: Supple. Symmetric without masses. No thyromegaly.  Skin: No rashes, lesions, ulcers, acne, hirsutism.  Respiratory: Breath sounds clear bilateraly. Good air movement. No rales. No wheezes.  Cardiovascular: Normal rate. Regular rhythm.  Peripheral/lower extremities: No edema, erythema or tenderness.  Lymphatic: No axillary, neck or groin nodes palp.  Mental Status: Alert, oriented x 3, normal affect and mood.    @PROCEDURE:@           Assessment:      1. Well woman exam with routine gynecological exam    2. Post-menopausal    3. Postmenopausal atrophic vaginitis               Plan:  1.  No change in gyn regimen.  2.  Return to clinic p.r.n. symptoms or for well-woman exam.              No orders of the defined types were placed in this encounter.

## 2022-07-26 ENCOUNTER — OFFICE VISIT (OUTPATIENT)
Dept: OPHTHALMOLOGY | Facility: CLINIC | Age: 57
End: 2022-07-26
Payer: COMMERCIAL

## 2022-07-26 DIAGNOSIS — H40.043 STEROID RESPONDERS BORDERLINE GLAUCOMA, BILATERAL: ICD-10-CM

## 2022-07-26 DIAGNOSIS — Z96.1 PSEUDOPHAKIA OF BOTH EYES: ICD-10-CM

## 2022-07-26 DIAGNOSIS — H43.813 VITREOUS DEGENERATION OF BOTH EYES: ICD-10-CM

## 2022-07-26 DIAGNOSIS — H40.53X1 BILATERAL SECONDARY GLAUCOMA DUE TO COMBINATION MECHANISMS, MILD STAGE: Primary | ICD-10-CM

## 2022-07-26 DIAGNOSIS — H43.813 POSTERIOR VITREOUS DETACHMENT OF BOTH EYES: ICD-10-CM

## 2022-07-26 PROCEDURE — 92012 PR EYE EXAM, EST PATIENT,INTERMED: ICD-10-PCS | Mod: S$GLB,,, | Performed by: OPHTHALMOLOGY

## 2022-07-26 PROCEDURE — 92020 GONIOSCOPY: CPT | Mod: S$GLB,,, | Performed by: OPHTHALMOLOGY

## 2022-07-26 PROCEDURE — 1159F PR MEDICATION LIST DOCUMENTED IN MEDICAL RECORD: ICD-10-PCS | Mod: CPTII,S$GLB,, | Performed by: OPHTHALMOLOGY

## 2022-07-26 PROCEDURE — 99999 PR PBB SHADOW E&M-EST. PATIENT-LVL II: CPT | Mod: PBBFAC,,, | Performed by: OPHTHALMOLOGY

## 2022-07-26 PROCEDURE — 92012 INTRM OPH EXAM EST PATIENT: CPT | Mod: S$GLB,,, | Performed by: OPHTHALMOLOGY

## 2022-07-26 PROCEDURE — 99999 PR PBB SHADOW E&M-EST. PATIENT-LVL II: ICD-10-PCS | Mod: PBBFAC,,, | Performed by: OPHTHALMOLOGY

## 2022-07-26 PROCEDURE — 1160F RVW MEDS BY RX/DR IN RCRD: CPT | Mod: CPTII,S$GLB,, | Performed by: OPHTHALMOLOGY

## 2022-07-26 PROCEDURE — 1159F MED LIST DOCD IN RCRD: CPT | Mod: CPTII,S$GLB,, | Performed by: OPHTHALMOLOGY

## 2022-07-26 PROCEDURE — 1160F PR REVIEW ALL MEDS BY PRESCRIBER/CLIN PHARMACIST DOCUMENTED: ICD-10-PCS | Mod: CPTII,S$GLB,, | Performed by: OPHTHALMOLOGY

## 2022-07-26 PROCEDURE — 92020 PR SPECIAL EYE EVAL,GONIOSCOPY: ICD-10-PCS | Mod: S$GLB,,, | Performed by: OPHTHALMOLOGY

## 2022-07-26 NOTE — PROGRESS NOTES
HPI     DLS: With Dr. June 7/13/2022  DLS: With Dr. Camacho 4/29/2022    Pt here for IOP Check per Dr. June; (high IOP in OD)    Meds;  No GTTS    1. MMG/OHT OU   2. PVD OU   3. Vitreous Opacity OD   4. PCIOL OU   5. Steroid Responder   6.  Hx Removal Left Eyelid Papilloma (Dr. Alvarado)     Last edited by Nivia Cox on 7/26/2022  9:49 AM. (History)                Assessment /Plan     For exam results, see Encounter Report.    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 Mixed mechanism Glaucoma   H/O narrow angles - S/P PI's, + residual OHT   First HVF 1998   First photos 1999   Former [employer] , retired for a few years and went back to work as a    (mom with same problem with narrow angles and residual OHT)     Family history + mom - Narrow angles with residual OHT   Glaucoma meds - off gtts (use to use timolol)   H/O adverse rxn to glaucoma drops - latanoprost - eye irritation  LASERS PI's ou // yag cap od 4/25/2019  GLAUCOMA SURGERIES none   OTHER EYE SURGERIES - PC IOL OD 12/17/2014 - [facility]  CDR 0.4/0.4   Tbase 23-36 / 23-35 ou   Tmax 36/35   Ttarget 30/30  HVF 16 test 24-2 ss ou 1996 to 2021 ([facility]) - hint SNS  od // full (w/ lens rim art)  os            3 SWAP test 2010 to 2012 - Full ou            ( repeat HVF from 8/2013 to 9/2013 - new SAD od confirmed)            [place] VF's - 1 test 2016 to 2016 - SNS /INS od // full os   2019 VF full od/near full os   Gonio +3 ou S/P PI's   /618   OCT 5 test 2005 -  2021 ([facility]) - RNFL - bord TI  od/ bord bord TS  os   HRT - 5 test - 2014 to 2020 - MR -  nl od // bord SN os /// CDR  0.55 od // 0.55 os   Mix of main [facility] and [facility]   Disc photos 1999, 2004, 2013 - slides // 5/13/2010 - // 2019 - OIS     - Ttoday  24/20   - Test done today -post -op phaco/IOL os - 3/17/2022  // gonio     2. PVD ou - with dense vitreous veil OD Saw Arend 4/23/2012 4/2019 - pt is noticing the floaters again (had not noticed them for several years)     3. Vitreous opacity OD     4. Steroid responder - had an injection / ? Spironolactone / aldactone - not currently on any steroids    5. NS os    Pt c/o blurry vision - glasses need changing every 2-3 months    BCVA 20/40 // BAT h 20/400    Having trouble with driving and reading    She is a      6. Refractive error   Was bilateral hyperope / presbyope   Is anisopmetropic post CE od    Glasses from [employer]  - no longer working for her    7. H/O eyelid papilloma Left - S/P removal - Christiano     8. Pseudophakia od   PC IOL od 12/17/2014 - SN60wf 20.5   Had a large myopic shift pre surgery from +1.00 to -3.00    8.  I see her father in law - he has glaucoma - and I did his cataracts    I see her mother - she has similar issues to this patient    She would like me to see her adult daughter to evaluate her for OHT / glaucoma as well     Plan   MMG  / OHT ou  S/P PI's ou   HVF today near full OU    Intolerant to latanoprost - red irritated eye  Off all glaucoma gtts - (use to use timolol) 7/2019     [facility] Rx - Boudreuax - 5/9/2019 - says yag cap helped a lot     Photo file updated - [facility] - 5/25/2021     IOL calc - glory   SN60WF - 22.5  MTA4U0 - 18.5    Phaco / IOL OS Date: 3/17/2022 - sn60wf 22.5   POM 4  - phaco/IOL        F/U 3 months - met with HVF / DFE / OCT - if the IOP is too high and if there is VF progression - consider re-starting timolol - has used in pst - stopped when had siezures - pt has tolerated elevated IOP with no or minimal damage over many years // also the hemangioma of the brain has increase in size - so may also affect VF's

## 2022-09-30 ENCOUNTER — LAB VISIT (OUTPATIENT)
Dept: LAB | Facility: HOSPITAL | Age: 57
End: 2022-09-30
Attending: INTERNAL MEDICINE
Payer: COMMERCIAL

## 2022-09-30 ENCOUNTER — OFFICE VISIT (OUTPATIENT)
Dept: PODIATRY | Facility: CLINIC | Age: 57
End: 2022-09-30
Payer: COMMERCIAL

## 2022-09-30 VITALS
BODY MASS INDEX: 22.08 KG/M2 | SYSTOLIC BLOOD PRESSURE: 119 MMHG | WEIGHT: 120 LBS | HEIGHT: 62 IN | DIASTOLIC BLOOD PRESSURE: 74 MMHG | HEART RATE: 70 BPM

## 2022-09-30 DIAGNOSIS — E78.00 HYPERCHOLESTEROLEMIA: ICD-10-CM

## 2022-09-30 DIAGNOSIS — M79.671 FOOT PAIN, RIGHT: ICD-10-CM

## 2022-09-30 DIAGNOSIS — E78.2 MIXED DYSLIPIDEMIA: ICD-10-CM

## 2022-09-30 DIAGNOSIS — R29.898: ICD-10-CM

## 2022-09-30 DIAGNOSIS — M19.079 ARTHRITIS OF FOOT: Primary | ICD-10-CM

## 2022-09-30 DIAGNOSIS — R93.1 AGATSTON CAC SCORE, <100: ICD-10-CM

## 2022-09-30 LAB
ALBUMIN SERPL BCP-MCNC: 3.9 G/DL (ref 3.5–5.2)
ALP SERPL-CCNC: 62 U/L (ref 55–135)
ALT SERPL W/O P-5'-P-CCNC: 16 U/L (ref 10–44)
ANION GAP SERPL CALC-SCNC: 8 MMOL/L (ref 8–16)
AST SERPL-CCNC: 20 U/L (ref 10–40)
BILIRUB SERPL-MCNC: 0.6 MG/DL (ref 0.1–1)
BUN SERPL-MCNC: 21 MG/DL (ref 6–20)
CALCIUM SERPL-MCNC: 10 MG/DL (ref 8.7–10.5)
CHLORIDE SERPL-SCNC: 104 MMOL/L (ref 95–110)
CHOLEST SERPL-MCNC: 211 MG/DL (ref 120–199)
CHOLEST/HDLC SERPL: 2.1 {RATIO} (ref 2–5)
CO2 SERPL-SCNC: 29 MMOL/L (ref 23–29)
CREAT SERPL-MCNC: 1 MG/DL (ref 0.5–1.4)
EST. GFR  (NO RACE VARIABLE): >60 ML/MIN/1.73 M^2
GLUCOSE SERPL-MCNC: 95 MG/DL (ref 70–110)
HDLC SERPL-MCNC: 100 MG/DL (ref 40–75)
HDLC SERPL: 47.4 % (ref 20–50)
LDLC SERPL CALC-MCNC: 101.8 MG/DL (ref 63–159)
NONHDLC SERPL-MCNC: 111 MG/DL
POTASSIUM SERPL-SCNC: 4.4 MMOL/L (ref 3.5–5.1)
PROT SERPL-MCNC: 7.1 G/DL (ref 6–8.4)
SODIUM SERPL-SCNC: 141 MMOL/L (ref 136–145)
TRIGL SERPL-MCNC: 46 MG/DL (ref 30–150)

## 2022-09-30 PROCEDURE — 3074F SYST BP LT 130 MM HG: CPT | Mod: CPTII,S$GLB,, | Performed by: PODIATRIST

## 2022-09-30 PROCEDURE — 99214 PR OFFICE/OUTPT VISIT, EST, LEVL IV, 30-39 MIN: ICD-10-PCS | Mod: S$GLB,,, | Performed by: PODIATRIST

## 2022-09-30 PROCEDURE — 3008F PR BODY MASS INDEX (BMI) DOCUMENTED: ICD-10-PCS | Mod: CPTII,S$GLB,, | Performed by: PODIATRIST

## 2022-09-30 PROCEDURE — 1159F MED LIST DOCD IN RCRD: CPT | Mod: CPTII,S$GLB,, | Performed by: PODIATRIST

## 2022-09-30 PROCEDURE — 3078F DIAST BP <80 MM HG: CPT | Mod: CPTII,S$GLB,, | Performed by: PODIATRIST

## 2022-09-30 PROCEDURE — 80053 COMPREHEN METABOLIC PANEL: CPT | Performed by: INTERNAL MEDICINE

## 2022-09-30 PROCEDURE — 3078F PR MOST RECENT DIASTOLIC BLOOD PRESSURE < 80 MM HG: ICD-10-PCS | Mod: CPTII,S$GLB,, | Performed by: PODIATRIST

## 2022-09-30 PROCEDURE — 36415 COLL VENOUS BLD VENIPUNCTURE: CPT | Mod: PO | Performed by: INTERNAL MEDICINE

## 2022-09-30 PROCEDURE — 80061 LIPID PANEL: CPT | Performed by: INTERNAL MEDICINE

## 2022-09-30 PROCEDURE — 3008F BODY MASS INDEX DOCD: CPT | Mod: CPTII,S$GLB,, | Performed by: PODIATRIST

## 2022-09-30 PROCEDURE — 99999 PR PBB SHADOW E&M-EST. PATIENT-LVL IV: ICD-10-PCS | Mod: PBBFAC,,, | Performed by: PODIATRIST

## 2022-09-30 PROCEDURE — 99999 PR PBB SHADOW E&M-EST. PATIENT-LVL IV: CPT | Mod: PBBFAC,,, | Performed by: PODIATRIST

## 2022-09-30 PROCEDURE — 3074F PR MOST RECENT SYSTOLIC BLOOD PRESSURE < 130 MM HG: ICD-10-PCS | Mod: CPTII,S$GLB,, | Performed by: PODIATRIST

## 2022-09-30 PROCEDURE — 99214 OFFICE O/P EST MOD 30 MIN: CPT | Mod: S$GLB,,, | Performed by: PODIATRIST

## 2022-09-30 PROCEDURE — 1159F PR MEDICATION LIST DOCUMENTED IN MEDICAL RECORD: ICD-10-PCS | Mod: CPTII,S$GLB,, | Performed by: PODIATRIST

## 2022-09-30 RX ORDER — DOXYCYCLINE HYCLATE 120 MG/1
1 TABLET, DELAYED RELEASE ORAL DAILY
COMMUNITY
Start: 2022-06-30 | End: 2022-11-18

## 2022-09-30 RX ORDER — DOXYCYCLINE HYCLATE 100 MG
100 TABLET ORAL 2 TIMES DAILY
COMMUNITY
Start: 2022-06-01 | End: 2022-11-18

## 2022-09-30 RX ORDER — PREDNISONE 10 MG/1
10 TABLET ORAL 2 TIMES DAILY
COMMUNITY
Start: 2022-08-26 | End: 2022-11-18

## 2022-09-30 RX ORDER — CLINDAMYCIN PHOSPHATE 11.9 MG/ML
SOLUTION TOPICAL 2 TIMES DAILY
COMMUNITY
Start: 2022-09-22 | End: 2022-11-18

## 2022-09-30 RX ORDER — COVID-19 ANTIGEN TEST
KIT MISCELLANEOUS
COMMUNITY
Start: 2022-09-12 | End: 2022-11-18

## 2022-09-30 RX ORDER — CEFDINIR 300 MG/1
300 CAPSULE ORAL 2 TIMES DAILY
COMMUNITY
Start: 2022-08-26 | End: 2022-11-18

## 2022-09-30 RX ORDER — AZITHROMYCIN 250 MG/1
250 TABLET, FILM COATED ORAL
COMMUNITY
Start: 2022-08-22 | End: 2022-11-18

## 2022-09-30 RX ORDER — LIDOCAINE HYDROCHLORIDE 20 MG/ML
JELLY TOPICAL
Qty: 30 ML | Refills: 2 | Status: SHIPPED | OUTPATIENT
Start: 2022-09-30 | End: 2022-11-18 | Stop reason: SDUPTHER

## 2022-09-30 NOTE — PROGRESS NOTES
Subjective:      Patient ID: Dalring Quach is a 57 y.o. female.    Chief Complaint: Foot Problem (Pain/soreness-navicular bone)    Burning, aching and sometimes sharp pain right big toe joint.  Gradual onset, worsening over past several months following toe fractre (unknown significance), aggravated by increased weight bearing, shoe gear, pressure.  No previous medical treatment.  OTC pain med not helping.   Xrays 04/2022 reviewed, decreased 1st mtpj joint space, mild hallux valgus, long first ray.    Review of Systems   Constitutional: Negative for chills, diaphoresis, fever, malaise/fatigue and night sweats.   Cardiovascular:  Negative for claudication, cyanosis, leg swelling and syncope.   Skin:  Negative for color change, dry skin, nail changes, rash, suspicious lesions and unusual hair distribution.   Musculoskeletal:  Negative for falls, joint pain, joint swelling, muscle cramps, muscle weakness and stiffness.   Gastrointestinal:  Negative for constipation, diarrhea, nausea and vomiting.   Neurological:  Negative for brief paralysis, disturbances in coordination, focal weakness, numbness, paresthesias, sensory change and tremors.         Objective:      Physical Exam  Constitutional:       General: She is not in acute distress.     Appearance: She is well-developed. She is not diaphoretic.   Cardiovascular:      Pulses:           Popliteal pulses are 2+ on the right side and 2+ on the left side.        Dorsalis pedis pulses are 2+ on the right side and 2+ on the left side.        Posterior tibial pulses are 2+ on the right side and 2+ on the left side.      Comments: Capillary refill 3 seconds all toes/distal feet, all toes/both feet warm to touch.      Negative lymphadenopathy bilateral popliteal fossa and tarsal tunnel.      Negavie lower extremity edema bilateral.    Musculoskeletal:      Right ankle: No swelling, deformity, ecchymosis or lacerations. Normal range of motion. Normal pulse.      Right  Achilles Tendon: Normal. No defects. Doss's test negative.      Comments: Mild pain to end range of motion dorsiflesion right 1st mtpj, small prominence of bone medial 1st mtpj right.  Modest hallux valgus right 1st mtpj reducible with dorsal hypermobility 1st ray.    Ankle dorsiflexion decreased at <10 degrees bilateral with moderate increase with knee flexion bilateral.    Otherwise, Normal angle, base, station of gait. All ten toes without clubbing, cyanosis, or signs of ischemia.  No pain to palpation bilateral lower extremities.  Range of motion, stability, muscle strength, and muscle tone normal bilateral feet and legs.        Lymphadenopathy:      Lower Body: No right inguinal adenopathy. No left inguinal adenopathy.      Comments: Negative lymphadenopathy bilateral popliteal fossa and tarsal tunnel.    Negative lymphangitic streaking bilateral feet/ankles/legs.   Skin:     General: Skin is warm and dry.      Capillary Refill: Capillary refill takes 2 to 3 seconds.      Coloration: Skin is not pale.      Findings: No abrasion, bruising, burn, ecchymosis, erythema, laceration, lesion or rash.      Nails: There is no clubbing.      Comments:   Skin is normal age and health appropriate color, turgor, texture, and temperature bilateral lower extremities without ulceration, hyperpigmentation, discoloration, masses nodules or cords palpated.  No ecchymosis, erythema, edema, or cardinal signs of infection bilateral lower extremities.     Neurological:      Mental Status: She is alert and oriented to person, place, and time.      Sensory: No sensory deficit.      Motor: No tremor, atrophy or abnormal muscle tone.      Gait: Gait normal.      Deep Tendon Reflexes:      Reflex Scores:       Patellar reflexes are 2+ on the right side and 2+ on the left side.       Achilles reflexes are 2+ on the right side and 2+ on the left side.     Comments: Negative tinel sign to percussion sural, superficial peroneal, deep  peroneal, saphenous, and posterior tibial nerves right and left ankles and feet.     Psychiatric:         Behavior: Behavior is cooperative.           Assessment:       Encounter Diagnoses   Name Primary?    Arthritis of foot Yes    Foot pain, right     Long first ray of foot          Plan:       Darlnig was seen today for foot problem.    Diagnoses and all orders for this visit:    Arthritis of foot    Foot pain, right    Long first ray of foot    Other orders  -     LIDOcaine HCL 2% (XYLOCAINE) 2 % jelly; Apply topically as needed. Apply topically once nightly to affected part of foot/feet.      I counseled the patient on her conditions, their implications and medical management.        Patient will stretch the tendo achilles complex three times daily as demonstrated in the office.  Literature was dispensed illustrating proper stretching technique.    Patient will obtain over the counter arch supports and wear them in shoes whenever possible.  Athletic shoes intended for walking or running are usually best.    The patient was advised that NSAID-type medications have two very important potential side effects: gastrointestinal irritation including hemorrhage and renal injuries. She was asked to take the medication with food and to stop if she experiences any GI upset. I asked her to call for vomiting, abdominal pain or black/bloody stools. The patient expresses understanding of these issues and questions were answered.      Discussed conservative treatment with shoes of adequate dimensions, material, and style to alleviate symptoms and delay or prevent surgical intervention.    Recommend soft shoe material, avoid seams, have shoes spot stretched, padding as appropriate.      Lidocaine gel    PT, custom orthotics, injection pending improvement.    Briefly discussed role of surgery if needed.    Follow up in about 6 weeks (around 11/11/2022).

## 2022-10-05 ENCOUNTER — PATIENT MESSAGE (OUTPATIENT)
Dept: CARDIOLOGY | Facility: CLINIC | Age: 57
End: 2022-10-05
Payer: COMMERCIAL

## 2022-10-05 ENCOUNTER — PATIENT MESSAGE (OUTPATIENT)
Dept: OPHTHALMOLOGY | Facility: CLINIC | Age: 57
End: 2022-10-05
Payer: COMMERCIAL

## 2022-10-10 ENCOUNTER — PATIENT MESSAGE (OUTPATIENT)
Dept: INTERNAL MEDICINE | Facility: CLINIC | Age: 57
End: 2022-10-10
Payer: COMMERCIAL

## 2022-10-10 DIAGNOSIS — E78.2 MIXED DYSLIPIDEMIA: ICD-10-CM

## 2022-10-10 DIAGNOSIS — E78.00 HYPERCHOLESTEROLEMIA: ICD-10-CM

## 2022-10-10 DIAGNOSIS — R93.1 AGATSTON CAC SCORE, <100: ICD-10-CM

## 2022-10-10 DIAGNOSIS — E78.5 HYPERLIPIDEMIA, UNSPECIFIED HYPERLIPIDEMIA TYPE: Primary | ICD-10-CM

## 2022-10-10 RX ORDER — ATORVASTATIN CALCIUM 80 MG/1
80 TABLET, FILM COATED ORAL DAILY
Qty: 90 TABLET | Refills: 3 | Status: SHIPPED | OUTPATIENT
Start: 2022-10-10 | End: 2023-11-07 | Stop reason: SDUPTHER

## 2022-10-10 NOTE — TELEPHONE ENCOUNTER
Pt confirmed that she would rather double up on the atorvastatin than start a new drug (spoke with neuro, worried about a new drug could increase chance of seizures).   She also c/o recent multiple bruising over arms , legs for no apparent reason. She takes ASA 81 per day. She wonders if due to vit C/ omega 3/ or atorvastatin. Mediterranean Diet info emailed to pt as requested.

## 2022-10-11 ENCOUNTER — TELEPHONE (OUTPATIENT)
Dept: PODIATRY | Facility: CLINIC | Age: 57
End: 2022-10-11
Payer: COMMERCIAL

## 2022-10-11 NOTE — TELEPHONE ENCOUNTER
Staff spoke with Replaced by [facility] informed her that Lidocaine 2% jelly could be changed to Lidocaine 5% ointment. If insurance does not cover it Lidocaine could be purchase over the counter.    Replaced by [facility] verbalized understanding.          ----- Message from Julissa Garces sent at 10/11/2022  2:44 PM CDT -----  Contact: Pharmacy  Type: Needs Medical Advice    Who Called:[facility]  Best Call Back Number:[phone]  Additional Information Requesting a call back regarding Pharmacy was calling on behalf of pt LIDOcaine HCL 2% (XYLOCAINE) 2 % jelly pharmacy stated the jelly is not available right now wanted to see if the Dr would like to switch to something else please call when available Thank you  Please Advise-Thank you

## 2022-10-28 ENCOUNTER — PATIENT MESSAGE (OUTPATIENT)
Dept: PODIATRY | Facility: CLINIC | Age: 57
End: 2022-10-28
Payer: COMMERCIAL

## 2022-11-04 ENCOUNTER — OFFICE VISIT (OUTPATIENT)
Dept: OPHTHALMOLOGY | Facility: CLINIC | Age: 57
End: 2022-11-04
Payer: COMMERCIAL

## 2022-11-04 ENCOUNTER — CLINICAL SUPPORT (OUTPATIENT)
Dept: OPHTHALMOLOGY | Facility: CLINIC | Age: 57
End: 2022-11-04
Payer: COMMERCIAL

## 2022-11-04 DIAGNOSIS — Z96.1 PSEUDOPHAKIA OF BOTH EYES: ICD-10-CM

## 2022-11-04 DIAGNOSIS — H40.53X1 BILATERAL SECONDARY GLAUCOMA DUE TO COMBINATION MECHANISMS, MILD STAGE: Primary | ICD-10-CM

## 2022-11-04 DIAGNOSIS — H40.043 STEROID RESPONDERS BORDERLINE GLAUCOMA, BILATERAL: ICD-10-CM

## 2022-11-04 DIAGNOSIS — H40.53X1 BILATERAL SECONDARY GLAUCOMA DUE TO COMBINATION MECHANISMS, MILD STAGE: ICD-10-CM

## 2022-11-04 DIAGNOSIS — H43.813 VITREOUS DEGENERATION OF BOTH EYES: ICD-10-CM

## 2022-11-04 DIAGNOSIS — H43.813 POSTERIOR VITREOUS DETACHMENT OF BOTH EYES: ICD-10-CM

## 2022-11-04 PROCEDURE — 1159F MED LIST DOCD IN RCRD: CPT | Mod: CPTII,S$GLB,, | Performed by: OPHTHALMOLOGY

## 2022-11-04 PROCEDURE — 1159F PR MEDICATION LIST DOCUMENTED IN MEDICAL RECORD: ICD-10-PCS | Mod: CPTII,S$GLB,, | Performed by: OPHTHALMOLOGY

## 2022-11-04 PROCEDURE — 1160F RVW MEDS BY RX/DR IN RCRD: CPT | Mod: CPTII,S$GLB,, | Performed by: OPHTHALMOLOGY

## 2022-11-04 PROCEDURE — 1160F PR REVIEW ALL MEDS BY PRESCRIBER/CLIN PHARMACIST DOCUMENTED: ICD-10-PCS | Mod: CPTII,S$GLB,, | Performed by: OPHTHALMOLOGY

## 2022-11-04 PROCEDURE — 99999 PR PBB SHADOW E&M-EST. PATIENT-LVL III: CPT | Mod: PBBFAC,,, | Performed by: OPHTHALMOLOGY

## 2022-11-04 PROCEDURE — 92014 COMPRE OPH EXAM EST PT 1/>: CPT | Mod: S$GLB,,, | Performed by: OPHTHALMOLOGY

## 2022-11-04 PROCEDURE — 99999 PR PBB SHADOW E&M-EST. PATIENT-LVL III: ICD-10-PCS | Mod: PBBFAC,,, | Performed by: OPHTHALMOLOGY

## 2022-11-04 PROCEDURE — 92014 PR EYE EXAM, EST PATIENT,COMPREHESV: ICD-10-PCS | Mod: S$GLB,,, | Performed by: OPHTHALMOLOGY

## 2022-11-04 NOTE — PROGRESS NOTES
HPI    DLS: 7/26/2022    Pt here for HVF review; (Pt states she doesn't want to be dilated today.)    Meds;  No GTTS    1. MMG/OHT OU   2. PVD OU   3. Vitreous Opacity OD   4. PCIOL OU   5. Steroid Responder   6.  Hx Remova l Left Eyelid Papilloma (Dr. Alvarado)     Last edited by Nivia Cox on 11/4/2022  3:34 PM.            Assessment /Plan     For exam results, see Encounter Report.    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 Mixed mechanism Glaucoma   H/O narrow angles - S/P PI's, + residual OHT   First HVF 1998   First photos 1999   Former [facility] , retired for a few years and went back to work as a    (mom with same problem with narrow angles and residual OHT)     Family history + mom - Narrow angles with residual OHT   Glaucoma meds - off gtts (use to use timolol)   H/O adverse rxn to glaucoma drops - latanoprost - eye irritation  LASERS PI's ou // yag cap od 4/25/2019  GLAUCOMA SURGERIES none   OTHER EYE SURGERIES - PC IOL OD [facility]/17/2014 - [facility]  CDR 0.4/0.4   Tbase 23-36 / 23-35 ou   Tmax 36/35   Ttarget 30/30  HVF 16 test 24-2 ss ou 1996 to 2021 ([facility]) - hint SNS  od // full (w/ lens rim art)  os            3 SWAP test 2010 to 2012 - Full ou            ( repeat HVF from 8/2013 to 9/2013 - new SAD od confirmed)            [place] VF's - 1 test 2016 to 2016 - SNS /INS od // full os   2019 VF full od/near full os   Gonio +3 ou S/P PI's   /618   OCT 5 test 2005 -  2021 ([facility]) - RNFL - bord TI  od/ bord bord TS  os   HRT - 5 test - 2014 to 2020 - MR -  nl od // bord SN os /// CDR 0.55 od // 0.55 os   [facility]   Disc  photos 1999, 2004, 2013 - slides // 5/13/2010 - // 2019 - OIS     - Ttoday  17/18 (usually higher)   - Test done today -    2. PVD ou - with dense vitreous veil OD Saw Arend 4/23/2012 4/2019 - pt is noticing the floaters again (had not noticed them for several years)     3. Vitreous opacity OD     4. Steroid responder - had an injection / ? Spironolactone / aldactone - not currently on any steroids    5. NS os    Pt c/o blurry vision - glasses need changing every 2-3 months    BCVA 20/40 // BAT h 20/400    Having trouble with driving and reading    She is a      6. Refractive error   Was bilateral hyperope / presbyope   Is anisopmetropic post CE od    Glasses from [employer]  - no longer working for her    7. H/O eyelid papilloma Left - S/P removal - Christiano     8. Pseudophakia od   PC IOL od 12/17/2014 - SN60wf 20.5   Had a large myopic shift pre surgery from +1.00 to -3.00    8.  I see her father in law - he has glaucoma - and I did his cataracts    I see her mother - she has similar issues to this patient    She would like me to see her adult daughter to evaluate her for OHT / glaucoma as well     Plan   MMG  / OHT ou  S/P PI's ou   HVF today near full OU    Intolerant to latanoprost - red irritated eye  Off all glaucoma gtts - (use to use timolol) 7/2019     [facility] Rx - Boudreuax - 5/9/2019 - says yag cap helped a lot     Photo file updated - [employer] - 5/25/2021     IOL calc - glory   SN60WF - 22.5  MTA4U0 - 18.5    Phaco / IOL OS Date: 3/17/2022 - sn60wf 22.5   POM 4  - phaco/IOL        F/U 6 months sooner prn // IOP // gonio

## 2022-11-04 NOTE — PROGRESS NOTES
Visual field test done.  Patient stated no latex allergies used coverlet        Glasses Prescription     Sphere Cylinder Axis Dist VA Add   Right +0.75 +0.50 178 20/25-1+5 +2.25   Left -0.25 +1.00 002 20/20 +2.25   Comments: ST bifocal, OR single vision distance lenses and/or single vision reading lenses.       If progressives, +2.50 D add OU

## 2022-11-18 ENCOUNTER — OFFICE VISIT (OUTPATIENT)
Dept: NEUROLOGY | Facility: CLINIC | Age: 57
End: 2022-11-18
Payer: COMMERCIAL

## 2022-11-18 VITALS
SYSTOLIC BLOOD PRESSURE: 121 MMHG | HEART RATE: 72 BPM | HEIGHT: 62 IN | BODY MASS INDEX: 22.52 KG/M2 | WEIGHT: 122.38 LBS | DIASTOLIC BLOOD PRESSURE: 75 MMHG

## 2022-11-18 DIAGNOSIS — Q28.3 CAVERNOUS MALFORMATION: ICD-10-CM

## 2022-11-18 DIAGNOSIS — D18.00 CAVERNOMA: ICD-10-CM

## 2022-11-18 DIAGNOSIS — H43.399 VITREOUS OPACITY: ICD-10-CM

## 2022-11-18 DIAGNOSIS — E78.00 HYPERCHOLESTEROLEMIA: ICD-10-CM

## 2022-11-18 DIAGNOSIS — G40.209 LOCALIZATION-RELATED (FOCAL) (PARTIAL) SYMPTOMATIC EPILEPSY AND EPILEPTIC SYNDROMES WITH COMPLEX PARTIAL SEIZURES, NOT INTRACTABLE, WITHOUT STATUS EPILEPTICUS: Primary | ICD-10-CM

## 2022-11-18 DIAGNOSIS — H40.89 GLAUCOMA DUE TO COMBINATION OF MECHANISMS: ICD-10-CM

## 2022-11-18 PROCEDURE — 99215 OFFICE O/P EST HI 40 MIN: CPT | Mod: S$GLB,,, | Performed by: PSYCHIATRY & NEUROLOGY

## 2022-11-18 PROCEDURE — 3008F PR BODY MASS INDEX (BMI) DOCUMENTED: ICD-10-PCS | Mod: CPTII,S$GLB,, | Performed by: PSYCHIATRY & NEUROLOGY

## 2022-11-18 PROCEDURE — 3078F DIAST BP <80 MM HG: CPT | Mod: CPTII,S$GLB,, | Performed by: PSYCHIATRY & NEUROLOGY

## 2022-11-18 PROCEDURE — 1160F RVW MEDS BY RX/DR IN RCRD: CPT | Mod: CPTII,S$GLB,, | Performed by: PSYCHIATRY & NEUROLOGY

## 2022-11-18 PROCEDURE — 99215 PR OFFICE/OUTPT VISIT, EST, LEVL V, 40-54 MIN: ICD-10-PCS | Mod: S$GLB,,, | Performed by: PSYCHIATRY & NEUROLOGY

## 2022-11-18 PROCEDURE — 99999 PR PBB SHADOW E&M-EST. PATIENT-LVL III: ICD-10-PCS | Mod: PBBFAC,,, | Performed by: PSYCHIATRY & NEUROLOGY

## 2022-11-18 PROCEDURE — 3008F BODY MASS INDEX DOCD: CPT | Mod: CPTII,S$GLB,, | Performed by: PSYCHIATRY & NEUROLOGY

## 2022-11-18 PROCEDURE — 1159F MED LIST DOCD IN RCRD: CPT | Mod: CPTII,S$GLB,, | Performed by: PSYCHIATRY & NEUROLOGY

## 2022-11-18 PROCEDURE — 3078F PR MOST RECENT DIASTOLIC BLOOD PRESSURE < 80 MM HG: ICD-10-PCS | Mod: CPTII,S$GLB,, | Performed by: PSYCHIATRY & NEUROLOGY

## 2022-11-18 PROCEDURE — 1160F PR REVIEW ALL MEDS BY PRESCRIBER/CLIN PHARMACIST DOCUMENTED: ICD-10-PCS | Mod: CPTII,S$GLB,, | Performed by: PSYCHIATRY & NEUROLOGY

## 2022-11-18 PROCEDURE — 3074F SYST BP LT 130 MM HG: CPT | Mod: CPTII,S$GLB,, | Performed by: PSYCHIATRY & NEUROLOGY

## 2022-11-18 PROCEDURE — 99999 PR PBB SHADOW E&M-EST. PATIENT-LVL III: CPT | Mod: PBBFAC,,, | Performed by: PSYCHIATRY & NEUROLOGY

## 2022-11-18 PROCEDURE — 3074F PR MOST RECENT SYSTOLIC BLOOD PRESSURE < 130 MM HG: ICD-10-PCS | Mod: CPTII,S$GLB,, | Performed by: PSYCHIATRY & NEUROLOGY

## 2022-11-18 PROCEDURE — 1159F PR MEDICATION LIST DOCUMENTED IN MEDICAL RECORD: ICD-10-PCS | Mod: CPTII,S$GLB,, | Performed by: PSYCHIATRY & NEUROLOGY

## 2022-11-18 RX ORDER — LIDOCAINE 50 MG/G
OINTMENT TOPICAL NIGHTLY PRN
COMMUNITY
Start: 2022-10-12 | End: 2023-04-11

## 2022-11-18 RX ORDER — LEVETIRACETAM 750 MG/1
750 TABLET, EXTENDED RELEASE ORAL NIGHTLY
Qty: 90 TABLET | Refills: 3 | Status: SHIPPED | OUTPATIENT
Start: 2022-11-18

## 2022-11-18 NOTE — PROGRESS NOTES
Name: Darling Quach  MRN:[id number]   CSN: [id number]  Date of service: 2022  Age:57 y.o.   Gender:female   Referring Physician/Service: Aaareferral Self  No address on file   The patient is here today with:  daughter, Joanie     Neurology Clinic: Initial Visit    CHIEF COMPLAINT:  Single nocturnal convulsion    Interval Events/ROS 2022:    Current AED/SEs:   Levetiracetam 750 ER nightly SE none   Breakthrough seizures/events: no events   Driving: yes   Folic acid: no   Sleep: 11pm-545am   Mood: good     Otherwise, no fever, no cold symptoms, no headache, no changes in vision, no new weakness, no chest pain, no shortness of breath, no nausea, no vomiting, no diarrhea, no constipation, no tingling/numbness, no problems walking.    HPI 2022:     Age of first seizure: 2017 51yo   Seizure Risk Factors:  Cavernous malformation in the right superior medial temporal lobe. No family history of seizures. No head strike with LOC. Term  with no prolonged hospitalization   Time of Last Seizure:  2017  # of lifetime Seizures: 1  Frequency of Seizures: just 1 event on one day   Seizure Triggers: estrogen hormone patch, lights were flashing the night before   Injuries/Hospitalization for seizures? No injury, ED with admission ([facility])   Driving? Yes   Pregnancy? 3 children (26, 24, 19 -> all healthy)   Contraception? no  Folic Acid? No   Bone Health: no      Auras:  Weird taste with nausea, light sensitivity     Seizure Events:   1. Nocturnal tonic clonic convulsions, foaming at the mouth, unresponsive, 1-2 minutes, no tongue bite, no incontinence, postictal confusion for 30 minutes    Current AED/SEs:  1. keppra 750 XR nightly SE none    Previous AED/SEs or reason for DC.   1. None     R-EEG, 17: INTERPRETATION:  Abnormal EEG due to bitemporal slowing and sharp waves, worse in the left temporal region than the right temporal region with sharp activity, maximal at the T1 electrode, most likely  "representing interictal epileptiform discharges.    MRI:  Right temporal lobe cavernous malformation     Other Allergies: reviewed     AED compliance, adherence: no missed doses     ROS 4/21/2022: tired at 4pm now which is new since starting Lipitor.  Short term memory loss. No getting lost.  No missing appointments or important events.  No forgetting close relatives names. Otherwise, denies headache, loss of vision, blurred vision, diplopia, dysarthria, dysphagia, lightheadedness, vertigo, tinnitus or hearing difficulty. Denies difficulties producing or comprehending speech.  Denies focal weakness, numbness, paresthesias. Denies difficulty with gait. Denies cough, shortness of breath.  Denies chest pain or tightness, palpitations.  Denies nausea, vomiting, diarrhea, constipation or abdominal pain.  No bowel or bladder incontinence or retention.  No falls.       EXAM:   - Vitals: /75   Pulse 72   Ht 5' 2" (1.575 m)   Wt 55.5 kg (122 lb 5.7 oz)   LMP  (LMP Unknown)   BMI 22.38 kg/m²    - General: Awake, cooperative, NAD.  - HEENT: NC/AT  - Neck: Supple  - Pulmonary: no increased WOB  - Cardiac: well perfused   - Abdomen: soft, nontender, nondistended  - Extremities: no edema  - Skin: no rashes or lesions noted.     NEURO EXAM:   - Mental Status: Awake, alert, oriented x 3. Able to relate history without difficulty. Attentive to examiner. Language is fluent with intact repetition and comprehension. Normal prosody. There were no paraphasic errors. Able to name both high and low frequency objects. Speech was not dysarthric. Able to follow both midline and appendicular commands. There was no evidence of apraxia or neglect.    - Cranial Nerves:  VFF. EOMI. No facial droop. Hearing intact to finger-rub bilaterally. 5/5 strength in trapezii and SCM bilaterally. Tongue protrudes in midline and to either side with no evidence of atrophy or weakness.    - Motor: Normal bulk and tone throughout. No pronator drift " bilaterally. No adventitious movements such as tremor or asterixis noted.     Delt Bic Tri WrE WrF  FFl FE IO IP Quad Ham TA Gastroc    R   5     5    5    5    5        5   5    5   5    5        5     5      5            L   5      5    5   5    5        5    5   5    5    5       5     5      5              - Sensory: No deficits to light touch. No extinction to DSS.  - Coordination: No dysmetria on FNF   - Gait: Good initiation. Narrow-based, normal stride and arm swing. Romberg negative.    PLAN:  57-year-old woman with a single nocturnal convulsion possibly provoked by estrogen replacement found to have a cavernous malformation with minimal enlargement on surveillance scans.  She prefers to continue on levetiracetam 750 mg extended release nightly.  Asked her to bring us a copy of the most recent MRI images performed 7/2022.  Follow up in about 1 year (around 11/18/2023).     Patient Instructions   You came to Epilepsy Clinic because of a single convulsive event with a cavernous malformation in the right temporal lobe. You have not had another event while on keppra 750mg nightly. Do not miss any doses of your medication. If you do miss a dose, take it as soon as you remember even if that means doubling up on the dose. Please get regular sleep. Go to sleep at the same time and wake up at the same time every day. You recently had an MRI with some light changes in the dimensions of the malformation. I suspect the changes are related to technical issues more than any indication of significant growth or recent bleeding. However, I would like to review the images. Please get a copy of the scan burned to disk and either send it or bring it to the clinic. It is fine for you to take atorvastatin 80mg with the keppra.      Per [place] law, no episodes of loss of consciousness for 6 months before you may drive.  Please avoid dangerous situations. If you have a single breakthrough seizure, please patient portal me or  call the office and we will decide the next steps. If you have multiple seizures in a row and do not return back to baseline, 911 needs to be called.      Return to clinic in 12 months or sooner with issues.  Please patient portal with any questions or concerns.     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 Total time spent on encounter: 43 minutes    Disclaimer: This note has been generated using voice-recognition software. There may be typographical errors that were missed during proof-reading.     LABS:  Recent Labs   Lab 06/04/20  0832 06/18/20  0850 02/18/21  1245 02/11/22  1045 09/30/22  0825   WBC 4.58  --  6.02 5.04  --    Hemoglobin 12.9  --  13.6 12.9  --    Hematocrit 40.2  --  41.2 39.9  --    Platelets 240  --  251 242  --    Sodium 142   < > 141 140 141   Potassium 4.3   < > 4.1 4.4 4.4   BUN 21 H   < > 17 21 H 21 H   Creatinine 1.1   < > 1.0 0.9 1.0   eGFR if African American >60.0   < > >60.0 >60.0  --    eGFR if non  57.1 A   < > >60.0 >60.0  --    TSH  --   --   --  0.941  --    Vitamin B-12 339  --  542 742  --     < > = values in this interval not displayed.       Recent Labs   Lab 04/21/22  1657   Levetiracetam Lvl 7.7        IMAGING:  Recent imaging is  personally reviewed with the patient.    Results for orders placed during the hospital encounter of 07/14/17    MRI Brain W WO Contrast    Impression  Heterogeneous signal intra-axial lesion right mesial temporal lobe remains compatible with cavernoma overall slightly increased in size from prior.    No evidence for edema about this to suggest acute hemorrhage.    No evidence for acute infarction.    Ventricles stable without hydrocephalus. Clinical correlation and continued followup advised.      Electronically signed by: NAUN SAN DO  Date:     07/14/17  Time:    17:05    Results for orders placed during the hospital encounter of 07/14/17    CT Head Without Contrast    Impression  Motion limited examination without evidence for definite acute intercranial hemorrhage.    Ill-defined hyperdense lesion right superior medial temporal lobe compatible with known cavernoma correspond to abnormality seen on prior MRI.    Further evaluation as warranted clinically.          Electronically signed by: NAUN SAN DO  Date:     07/14/17  Time:    12:32    Results for orders placed during the hospital encounter of 07/14/17    MRA Brain without contrast    Impression  Unremarkable MRA of the head specifically without evidence for focal stenosis or intracranial aneurysm.    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 12/18/2012    Posterior vitreous detachment - Both Eyes 12/18/2012    Steroid responders borderline glaucoma - Both Eyes 12/18/2012    Vitreous opacity - Right Eye 12/18/2012    Venous angioma of brain  12/15/2014    S/P hysterectomy 03/16/2017    Hx of colonoscopy:10/10/2016 04/06/2017    Anal fissure 04/06/2017    Seizure 07/14/2017    Localization-related (focal) (partial) symptomatic epilepsy and epileptic syndromes with complex partial seizures, not intractable, without status epilepticus 07/18/2017    Fissure in ano 05/27/2019    Anxiety 06/19/2020    Vaginal atrophy 07/09/2020    Agatston CAC score, <100 03/25/2022    Mixed dyslipidemia 03/27/2022    Vitreous degeneration of both eyes 04/19/2022    Pseudophakia of both eyes 04/19/2022    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, mild stage 04/19/2022    Cavernous malformation 04/21/2022     Resolved Ambulatory Problems     Diagnosis Date Noted    Glaucoma 12/15/2014    Cataract 12/15/2014    Post-operative state 12/23/2014    Anal fistula 04/06/2017    Hyponatremia 07/14/2017    Hypophosphatemia 07/14/2017     Past Medical History:   Diagnosis Date    Hemangioma     History of acne 2004    Hyperlipidemia     Seizures     Vertigo         PAST SURGICAL HISTORY:   Past Surgical History:   Procedure Laterality Date    ANAL SPHINCTEROTOMY  5/27/2019    Procedure: SPHINCTEROTOMY, ANAL;  Surgeon: Nirmal Chan MD;  Location: [facility] OR 2ND FLR;  Service: Colon and Rectal;;    CATARACT EXTRACTION W/  INTRAOCULAR LENS IMPLANT Right 12/17/2014        CATARACT EXTRACTION W/  INTRAOCULAR LENS IMPLANT Left 03/17/2022        EXCISIONAL HEMORRHOIDECTOMY N/A 5/27/2019    Procedure: HEMORRHOIDECTOMY;  Surgeon: Nirmal Chan MD;  Location: [facility] OR 2ND FLR;  Service: Colon and Rectal;  Laterality: N/A;    HYSTERECTOMY  2010    DLH ov in situ     INTRAOCULAR PROSTHESES INSERTION Left 3/16/2022    Procedure: INSERTION, IOL PROSTHESIS;  Surgeon: Josey Camacho MD;  Location: [facility] OR 1ST FLR;  Service: Ophthalmology;  Laterality: Left;    PHACOEMULSIFICATION OF CATARACT Left 3/16/2022    Procedure: PHACOEMULSIFICATION,  CATARACT;  Surgeon: Josey Camacho MD;  Location: [facility] OR [address] [place] [address];  Service: Ophthalmology;  Laterality: Left;        ALLERGIES: Tramadol and Latanoprost   CURRENT MEDICATIONS:   Current Outpatient Medications   Medication Sig Dispense Refill    ascorbic acid, vitamin C, (VITAMIN C) 1000 MG tablet Take 1,000 mg by mouth once daily.      aspirin (ECOTRIN) 81 MG EC tablet Take 1 tablet (81 mg total) by mouth once daily. 30 tablet 0    atorvastatin (LIPITOR) 80 MG tablet Take 1 tablet (80 mg total) by mouth once daily. 90 tablet 3    Lactobacillus acidophilus 10 billion cell Cap Take by mouth.      multivitamin capsule Take 1 capsule by mouth once daily.      omega-3 fatty acids 1,000 mg Cap Take 1,500 mg by mouth.      levetiracetam XR (KEPPRA XR) 750 mg Tb24 tablet Take 1 tablet (750 mg total) by mouth every evening. 90 tablet 3    LIDOcaine (XYLOCAINE) 5 % Oint ointment Apply topically nightly as needed.       No current facility-administered medications for this visit.        FAMILY HISTORY:   Family History   Problem Relation Age of Onset    Fibroids Mother     Migraines Mother     Glaucoma Mother     Heart disease Father     Depression Brother     No Known Problems Sister     Diabetes Maternal Grandmother     Glaucoma Maternal Grandmother     Cancer Maternal Aunt         breast    Breast cancer Maternal Aunt 50    No Known Problems Maternal Uncle     No Known Problems Paternal Aunt     No Known Problems Paternal Uncle     No Known Problems Maternal Grandfather     No Known Problems Paternal Grandmother     No Known Problems Paternal Grandfather     Breast cancer Maternal Aunt     Macular degeneration Neg Hx     Blindness Neg Hx     Melanoma Neg Hx     Psoriasis Neg Hx     Lupus Neg Hx     Eczema Neg Hx     Acne Neg Hx     Cataracts Neg Hx     Amblyopia Neg Hx     Retinal detachment Neg Hx     Strabismus Neg Hx     Stroke Neg Hx     Thyroid disease Neg Hx     Hypertension Neg Hx     Ovarian cancer Neg Hx      Colon cancer Neg Hx          SOCIAL HISTORY:   Social History     Socioeconomic History    Marital status:    Tobacco Use    Smoking status: Never    Smokeless tobacco: Never   Substance and Sexual Activity    Alcohol use: No    Drug use: No    Sexual activity: Yes     Partners: Male     Birth control/protection: Surgical     Comment: , TIN 9/2010   Other Topics Concern    Are you pregnant or think you may be? No    Breast-feeding No     Social Determinants of Health     Financial Resource Strain: Low Risk     Difficulty of Paying Living Expenses: Not hard at all   Food Insecurity: No Food Insecurity    Worried About Running Out of Food in the Last Year: Never true    Ran Out of Food in the Last Year: Never true   Transportation Needs: No Transportation Needs    Lack of Transportation (Medical): No    Lack of Transportation (Non-Medical): No   Physical Activity: Insufficiently Active    Days of Exercise per Week: 4 days    Minutes of Exercise per Session: 30 min   Stress: No Stress Concern Present    Feeling of Stress : Not at all   Social Connections: Unknown    Frequency of Communication with Friends and Family: More than three times a week    Frequency of Social Gatherings with Friends and Family: More than three times a week    Active Member of Clubs or Organizations: Yes    Attends Club or Organization Meetings: More than 4 times per year    Marital Status:    Housing Stability: Unknown    Unable to Pay for Housing in the Last Year: No    Unstable Housing in the Last Year: No         Questions and concerns raised by the patient and family/care-giver(s) were addressed and they indicated understanding of everything discussed and agreed to plans as above.    Lily Bishop MD PhD  Neurology-Epilepsy  [facility].

## 2022-11-18 NOTE — PATIENT INSTRUCTIONS
You came to Epilepsy Clinic because of a single convulsive event with a cavernous malformation in the right temporal lobe. You have not had another event while on keppra 750mg nightly. Do not miss any doses of your medication. If you do miss a dose, take it as soon as you remember even if that means doubling up on the dose. Please get regular sleep. Go to sleep at the same time and wake up at the same time every day. You recently had an MRI with some light changes in the dimensions of the malformation. I suspect the changes are related to technical issues more than any indication of significant growth or recent bleeding. However, I would like to review the images. Please get a copy of the scan burned to disk and either send it or bring it to the clinic. It is fine for you to take atorvastatin 80mg with the keppra.      Per [place] law, no episodes of loss of consciousness for 6 months before you may drive.  Please avoid dangerous situations. If you have a single breakthrough seizure, please patient portal me or call the office and we will decide the next steps. If you have multiple seizures in a row and do not return back to baseline, 911 needs to be called.      Return to clinic in 12 months or sooner with issues.  Please patient portal with any questions or concerns.     Lily Bishop MD PhD  Neurology-Epilepsy  [facility]

## 2022-12-02 ENCOUNTER — OFFICE VISIT (OUTPATIENT)
Dept: PODIATRY | Facility: CLINIC | Age: 57
End: 2022-12-02
Payer: COMMERCIAL

## 2022-12-02 ENCOUNTER — HOSPITAL ENCOUNTER (OUTPATIENT)
Dept: RADIOLOGY | Facility: HOSPITAL | Age: 57
Discharge: HOME | End: 2022-12-02
Attending: PODIATRIST
Payer: COMMERCIAL

## 2022-12-02 VITALS
HEART RATE: 75 BPM | HEIGHT: 62 IN | DIASTOLIC BLOOD PRESSURE: 68 MMHG | BODY MASS INDEX: 22.52 KG/M2 | WEIGHT: 122.38 LBS | SYSTOLIC BLOOD PRESSURE: 93 MMHG

## 2022-12-02 DIAGNOSIS — M79.674 CHRONIC TOE PAIN, RIGHT FOOT: ICD-10-CM

## 2022-12-02 DIAGNOSIS — M20.21 HALLUX RIGIDUS OF RIGHT FOOT: ICD-10-CM

## 2022-12-02 DIAGNOSIS — Z87.81 H/O FRACTURE OF FOOT: ICD-10-CM

## 2022-12-02 DIAGNOSIS — M79.674 CHRONIC TOE PAIN, RIGHT FOOT: Primary | ICD-10-CM

## 2022-12-02 DIAGNOSIS — G89.29 CHRONIC TOE PAIN, RIGHT FOOT: ICD-10-CM

## 2022-12-02 DIAGNOSIS — G89.29 CHRONIC TOE PAIN, RIGHT FOOT: Primary | ICD-10-CM

## 2022-12-02 PROCEDURE — 99999 PR PBB SHADOW E&M-EST. PATIENT-LVL IV: CPT | Mod: PBBFAC,,, | Performed by: PODIATRIST

## 2022-12-02 PROCEDURE — 99214 OFFICE O/P EST MOD 30 MIN: CPT | Mod: S$GLB,,, | Performed by: PODIATRIST

## 2022-12-02 PROCEDURE — 99214 PR OFFICE/OUTPT VISIT, EST, LEVL IV, 30-39 MIN: ICD-10-PCS | Mod: S$GLB,,, | Performed by: PODIATRIST

## 2022-12-02 PROCEDURE — 1159F MED LIST DOCD IN RCRD: CPT | Mod: CPTII,S$GLB,, | Performed by: PODIATRIST

## 2022-12-02 PROCEDURE — 99999 PR PBB SHADOW E&M-EST. PATIENT-LVL IV: ICD-10-PCS | Mod: PBBFAC,,, | Performed by: PODIATRIST

## 2022-12-02 PROCEDURE — 3078F DIAST BP <80 MM HG: CPT | Mod: CPTII,S$GLB,, | Performed by: PODIATRIST

## 2022-12-02 PROCEDURE — 1159F PR MEDICATION LIST DOCUMENTED IN MEDICAL RECORD: ICD-10-PCS | Mod: CPTII,S$GLB,, | Performed by: PODIATRIST

## 2022-12-02 PROCEDURE — 73630 X-RAY EXAM OF FOOT: CPT | Mod: 26,RT,, | Performed by: RADIOLOGY

## 2022-12-02 PROCEDURE — 73630 X-RAY EXAM OF FOOT: CPT | Mod: TC,PN,RT

## 2022-12-02 PROCEDURE — 73630 XR FOOT COMPLETE 3 VIEW RIGHT: ICD-10-PCS | Mod: 26,RT,, | Performed by: RADIOLOGY

## 2022-12-02 PROCEDURE — 3008F BODY MASS INDEX DOCD: CPT | Mod: CPTII,S$GLB,, | Performed by: PODIATRIST

## 2022-12-02 PROCEDURE — 3074F PR MOST RECENT SYSTOLIC BLOOD PRESSURE < 130 MM HG: ICD-10-PCS | Mod: CPTII,S$GLB,, | Performed by: PODIATRIST

## 2022-12-02 PROCEDURE — 3008F PR BODY MASS INDEX (BMI) DOCUMENTED: ICD-10-PCS | Mod: CPTII,S$GLB,, | Performed by: PODIATRIST

## 2022-12-02 PROCEDURE — 3074F SYST BP LT 130 MM HG: CPT | Mod: CPTII,S$GLB,, | Performed by: PODIATRIST

## 2022-12-02 PROCEDURE — 3078F PR MOST RECENT DIASTOLIC BLOOD PRESSURE < 80 MM HG: ICD-10-PCS | Mod: CPTII,S$GLB,, | Performed by: PODIATRIST

## 2022-12-02 NOTE — PROGRESS NOTES
Subjective:      Patient ID: Darling Quach is a 57 y.o. female.    Chief Complaint:   Bunions (Right foot )    Darling is a 57 y.o. female who presents to the podiatry clinic  with complaint of  right foot pain.     Patient relates when she broke her toe about a year ago she buddy taped it.  She is not had any injections however it has been discussed.  She relates she thinks the redness may be secondary to using all the topical lidocaine  Patient relates she is unable to wear any shoe comfortably she is even been wearing her [place] tennis shoes with pain daily.  She is a  at a [employer] and she relates she is having difficulty with her daily walking task  Denies any numbness or pain to the toe at baseline however at night she will wake up and it feels that way    Patient relates she came for another opinion.  She is not necessarily interested in surgery however she is not sure if she can continue with this pain    Patient relates she is been 100s of dollars on different types of shoes and nothing significantly helps    Relates broke big toe one year ago saw ortho:12/3/22  56 year old female with sprain first MTP joint right foot  Plan:  - Comfortable supportive shoes  - No high impact activity  - Rest, ice, tylenol/NSAIDS as needed  - Follow up 4 - 6 weeks if symptoms persist    Ortho 12/7/22:  Imaging:  I have independently reviewed the following imaging studies performed at [facility]:  Right toe XR series 12/3/21 demonstrates a probable avulsion fracture off the lateral aspect of the 1st metatarsal head. The joint is congruent. Mild hallux valgus deformity.  Assessment:  1. Closed avulsion fracture of metatarsal bone of right foot, initial encounter      Plan:  Recommended a Darco shoe and buddy taping. Follow-up in 5 weeks with a new x-ray of the right foot.     1/11/22  Assessment:  1. Closed avulsion fracture of metatarsal bone of right foot with routine healing, subsequent encounter        Plan:  I recommended giving the toe a little bit more time to fully heal.  I recommended range of motion of the 1st MTP joint.  She will return as needed.  No follow-ups on file.         Dr. Sommer :      Arthritis of foot Yes    Foot pain, right      Long first ray of foot           -     LIDOcaine HCL 2% (XYLOCAINE) 2 % jelly; Apply topically as needed. Apply topically once nightly to affected part of foot/feet.    Patient will stretch the tendo achilles complex three times daily as demonstrated in the office.  Literature was dispensed illustrating proper stretching technique.  Patient will obtain over the counter arch supports and wear them in shoes whenever possible.  Athletic shoes intended for walking or running are usually best.  The patient was advised that NSAID-type medications have two very important potential side effects: gastrointestinal irritation including hemorrhage and renal injuries. She was asked to take the medication with food and to stop if she experiences any GI upset. I asked her to call for vomiting, abdominal pain or black/bloody stools. The patient expresses understanding of these issues and questions were answered.    Discussed conservative treatment with shoes of adequate dimensions, material, and style to alleviate symptoms an delay or prevent surgical intervention.  Recommend soft shoe material, avoid seams, have shoes spot stretched, padding as appropriate.    Lidocaine gel  PT, custom orthotics, injection pending improvement.  Briefly discussed role of surgery if needed.         also seen Dr. Blue1/22/22:     Hallux valgus of right foot   Capsulitis of toe, right  Other orders  -     meloxicam (MOBIC) 15 MG tablet; Take 1 tablet (15 mg total) by mouth once daily.  -     diclofenac sodium (VOLTAREN) 1 % Gel; Apply 2 g topically 4 (four) times daily.            Past Medical History:   Diagnosis Date    Glaucoma     Hemangioma     cavernous    History of acne 2004    dr lopez  prescribed accutane    Hyperlipidemia     Seizures     Venous angioma of brain     Vertigo      Past Surgical History:   Procedure Laterality Date    ANAL SPHINCTEROTOMY  5/27/2019    Procedure: SPHINCTEROTOMY, ANAL;  Surgeon: Nirmal Chan MD;  Location: [facility] OR [address] [place] [address];  Service: Colon and Rectal;;    CATARACT EXTRACTION W/  INTRAOCULAR LENS IMPLANT Right 12/17/2014        CATARACT EXTRACTION W/  INTRAOCULAR LENS IMPLANT Left 03/17/2022        EXCISIONAL HEMORRHOIDECTOMY N/A 5/27/2019    Procedure: HEMORRHOIDECTOMY;  Surgeon: Nirmal Chan MD;  [facility];  Service: Colon and Rectal;  Laterality: N/A;    HYSTERECTOMY  2010    DLH ov in situ     INTRAOCULAR PROSTHESES INSERTION Left 3/16/2022    Procedure: INSERTION, IOL PROSTHESIS;  Surgeon: Josey Camacho MD;  Location: [facility] OR [address] [place] [address];  Service: Ophthalmology;  Laterality: Left;    PHACOEMULSIFICATION OF CATARACT Left 3/16/2022    Procedure: PHACOEMULSIFICATION, CATARACT;  Surgeon: Josey Camacho MD;  Location: [facility] OR [address] [place] [address];  Service: Ophthalmology;  Laterality: Left;     Current Outpatient Medications on File Prior to Visit   Medication Sig Dispense Refill    ascorbic acid, vitamin C, (VITAMIN C) 1000 MG tablet Take 1,000 mg by mouth once daily.      atorvastatin (LIPITOR) 80 MG tablet Take 1 tablet (80 mg total) by mouth once daily. 90 tablet 3    Lactobacillus acidophilus 10 billion cell Cap Take by mouth.      levetiracetam XR (KEPPRA XR) 750 mg Tb24 tablet Take 1 tablet (750 mg total) by mouth every evening. 90 tablet 3    LIDOcaine (XYLOCAINE) 5 % Oint ointment Apply topically nightly as needed.      multivitamin capsule Take 1 capsule by mouth once daily.      omega-3 fatty acids 1,000 mg Cap Take 1,500 mg by mouth.      aspirin (ECOTRIN) 81 MG EC tablet Take 1 tablet (81 mg total) by mouth once daily. 30 tablet 0     No current facility-administered medications on file prior to visit.  "    Review of patient's allergies indicates:   Allergen Reactions    Tramadol      Per neurologist    Latanoprost Itching     Red itchy eyes       Review of Systems   Constitutional: Negative for chills, decreased appetite, fever, malaise/fatigue, night sweats, weight gain and weight loss.   Cardiovascular:  Negative for chest pain, claudication, dyspnea on exertion, leg swelling, palpitations and syncope.   Respiratory:  Negative for cough and shortness of breath.    Endocrine: Negative for cold intolerance and heat intolerance.   Hematologic/Lymphatic: Negative for bleeding problem. Does not bruise/bleed easily.   Skin:  Negative for color change, dry skin, flushing, itching, nail changes, poor wound healing, rash, skin cancer, suspicious lesions and unusual hair distribution.   Musculoskeletal:  Negative for arthritis, back pain, falls, gout, joint pain, joint swelling, muscle cramps, muscle weakness, myalgias, neck pain and stiffness.        Foot pain   Gastrointestinal:  Negative for diarrhea, nausea and vomiting.   Neurological:  Negative for dizziness, focal weakness, light-headedness, numbness, paresthesias, tremors, vertigo and weakness.   Psychiatric/Behavioral:  Negative for altered mental status and depression. The patient does not have insomnia.    Allergic/Immunologic: Negative.          Objective:       Vitals:    12/02/22 1250   BP: 93/68   Pulse: 75   Weight: 55.5 kg (122 lb 5.7 oz)   Height: 5' 2" (1.575 m)   PainSc:   5   PainLoc: Foot   55.5 kg (122 lb 5.7 oz)     Physical Exam  Vitals reviewed.   Constitutional:       General: She is not in acute distress.     Appearance: She is well-developed. She is not ill-appearing, toxic-appearing or diaphoretic.      Comments: Proper supportive shoegear Fly      Cardiovascular:      Pulses:           Dorsalis pedis pulses are 2+ on the right side and 2+ on the left side.        Posterior tibial pulses are 2+ on the right side and 2+ on the left side. "   Musculoskeletal:      Right lower leg: No edema.      Left lower leg: No edema.      Right ankle: Normal.      Right Achilles Tendon: Normal. No tenderness.      Left ankle: Normal.      Left Achilles Tendon: Normal. No tenderness.      Right foot: Decreased range of motion. Deformity, bunion, tenderness and bony tenderness present.      Left foot: Decreased range of motion. No bunion.      Comments: Positive pain on palpation to lateral aspect of 1st metatarsophalangeal joint pain with range of motion as well as distraction less pain to sesamoids plantar no pain other areas   Feet:      Right foot:      Protective Sensation: 10 sites tested.  10 sites sensed.      Skin integrity: Erythema present. No ulcer, blister, skin breakdown, warmth, callus or dry skin.      Toenail Condition: Right toenails are normal.      Left foot:      Protective Sensation: 10 sites tested.  10 sites sensed.      Skin integrity: No ulcer, blister, skin breakdown, erythema, warmth, callus or dry skin.      Toenail Condition: Left toenails are normal.      Comments: [place] intact to all 10 digits same no decreased sensation noted    Erythema noted to right dorsal 1st metatarsophalangeal joint also toward the medial aspect.  There is no fluctuance or warmth noted  Skin:     General: Skin is warm.      Capillary Refill: Capillary refill takes 2 to 3 seconds.      Coloration: Skin is not pale.      Findings: No erythema or rash.   Neurological:      Mental Status: She is alert and oriented to person, place, and time.      Sensory: No sensory deficit.      Gait: Gait is intact.   Psychiatric:         Attention and Perception: Attention normal.         Mood and Affect: Mood normal.         Speech: Speech normal.         Behavior: Behavior normal.         Thought Content: Thought content normal.         Cognition and Memory: Cognition normal.         Judgment: Judgment normal.             Assessment:       Encounter Diagnoses   Name  Primary?    Chronic toe pain, right foot Yes    Hallux rigidus of right foot     H/O fracture of foot          Plan:       Darling was seen today for bunions.    Diagnoses and all orders for this visit:    Chronic toe pain, right foot  -     X-Ray Foot Complete Right; Future  -     MRI Foot (Forefoot) Right Without Contrast; Future  -     MRI Foot (Forefoot) Right Without Contrast  -     Ambulatory referral/consult to Orthopedics; Future    Hallux rigidus of right foot  -     X-Ray Foot Complete Right; Future  -     MRI Foot (Forefoot) Right Without Contrast; Future  -     MRI Foot (Forefoot) Right Without Contrast  -     Ambulatory referral/consult to Orthopedics; Future    H/O fracture of foot  -     MRI Foot (Forefoot) Right Without Contrast; Future  -     MRI Foot (Forefoot) Right Without Contrast  -     Ambulatory referral/consult to Orthopedics; Future    I counseled the patient on her conditions, their implications and medical management.    Significant time spent with chart review both orthopedic and podiatry notes; prior imaging    - Dispensed, fitted and gait trained with orthopedic boot right foot; educated pt no driving in boot.  Recommend immobilization and cam boot for 2 weeks to see if pain resolves    At this time I recommend avoiding any of the topical lidocaine as this could be causing the skin irritation and erythema.    - rx new xray for comparison to previous imaging    - rx mri. Will try at [facility] as patient has another MRI scheduled    - refer to Dr. Hollins for surgical options.  Patient is hesitant to pursue any surgical interventions however discussed with patient this may be needed to achieve better quality of life patient has already failed shoe gear    Patient also consider if she decides not to pursue any surgical intervention physical therapy with dry needling or acupuncture    Low suspicion for CRPS    - prn    Patient is to message update of symptoms        I spent a total of 55  minutes on the day of the visit.This includes face to face time and non-face to face time preparing to see the patient (eg, review of tests), obtaining and/or reviewing separately obtained history, documenting clinical information in the electronic or other health record, independently interpreting results and communicating results to the patient/family/caregiver, or care coordinator.      Follow up if symptoms worsen or fail to improve.

## 2022-12-09 ENCOUNTER — PATIENT MESSAGE (OUTPATIENT)
Dept: PODIATRY | Facility: CLINIC | Age: 57
End: 2022-12-09
Payer: COMMERCIAL

## 2022-12-12 NOTE — PROGRESS NOTES
Subjective:      Patient ID: Darling Quach is a 52 y.o. female.    Chief Complaint: Follow-up    HPI:  HPI   Dr. Becca Brito: [facility]  APPT 12/22/2017  Keppra 750mg for about 2 months, no seizures (this was reduced from 1500 which she did not tolerate) She has also gained 4-5 pounds. Patient will return to exercise. Hot flashes continue. Her neurologist feels that she is sleep deprived.She plans another MRI in the spring.     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    Vitreous opacity - Right Eye    Venous angioma of brain    S/P hysterectomy    Hx of colonoscopy:10/10/2016    Anal fissure    Hyponatremia    Hypophosphatemia    Complex partial seizures evolving to generalized tonic-clonic seizures     Past Medical History:   Diagnosis Date    Cataract     Glaucoma     Hemangioma     cavernous    History of acne 2004    dr lopez prescribed accutane    Hyperlipidemia     Venous angioma of brain     Vertigo      Past Surgical History:   Procedure Laterality Date    CATARACT EXTRACTION W/  INTRAOCULAR LENS IMPLANT Right 12/17/14    Dr Camacho     HYSTERECTOMY       Family History   Problem Relation Age of Onset    Fibroids Mother     Migraines Mother     Glaucoma Mother     Heart disease Father     Depression Brother     No Known Problems Sister     Diabetes Maternal Grandmother     Glaucoma Maternal Grandmother     Cancer Maternal Aunt      breast    Breast cancer Maternal Aunt     No Known Problems Maternal Uncle     No Known Problems Paternal Aunt     No Known Problems Paternal Uncle     No Known Problems Maternal Grandfather     No Known Problems Paternal Grandmother     No Known Problems Paternal Grandfather     Macular  "degeneration Neg Hx     Blindness Neg Hx     Melanoma Neg Hx     Psoriasis Neg Hx     Lupus Neg Hx     Eczema Neg Hx     Acne Neg Hx     Cataracts Neg Hx     Amblyopia Neg Hx     Retinal detachment Neg Hx     Strabismus Neg Hx     Stroke Neg Hx     Thyroid disease Neg Hx     Hypertension Neg Hx      Review of Systems   Constitutional: Negative for chills, fever and unexpected weight change.   HENT: Negative for ear pain, nosebleeds, sore throat, trouble swallowing and voice change.    Eyes: Negative for photophobia and visual disturbance.   Respiratory: Negative for cough, shortness of breath and wheezing.    Cardiovascular: Negative for chest pain, palpitations and leg swelling.   Gastrointestinal: Negative for abdominal distention, abdominal pain and blood in stool.   Genitourinary: Negative for difficulty urinating, dysuria, flank pain and hematuria.   Musculoskeletal: Negative for back pain, gait problem and joint swelling.   Skin: Negative for color change and rash.   Neurological: Negative for seizures and headaches.   Hematological: Negative for adenopathy. Does not bruise/bleed easily.   Psychiatric/Behavioral: Negative for dysphoric mood and suicidal ideas.     Objective:     Vitals:    01/02/18 1324   BP: 122/78   Pulse: 83   SpO2: 98%   Weight: 55.2 kg (121 lb 11.1 oz)   Height: 5' 2" (1.575 m)   PainSc: 0-No pain     Body mass index is 22.26 kg/m².  Physical Exam   Constitutional: She is oriented to person, place, and time. She appears well-developed and well-nourished. No distress.   HENT:   Right Ear: Tympanic membrane and ear canal normal.   Left Ear: Tympanic membrane and ear canal normal.   Nose: No sinus tenderness or nasal deformity.   Mouth/Throat: No oropharyngeal exudate or posterior oropharyngeal erythema.   Eyes: Conjunctivae, EOM and lids are normal. Pupils are equal, round, and reactive to light.   Neck: Carotid bruit is not present. No thyromegaly present.   Cardiovascular: " Normal rate, regular rhythm and intact distal pulses.    Pulmonary/Chest: Effort normal and breath sounds normal. No respiratory distress.   Abdominal: Soft. Bowel sounds are normal. There is no tenderness.   Musculoskeletal: She exhibits no edema or tenderness.   Lymphadenopathy:        Head (right side): No submandibular adenopathy present.        Head (left side): No submandibular adenopathy present.     She has no cervical adenopathy.     She has no axillary adenopathy.        Right: No inguinal adenopathy present.        Left: No inguinal adenopathy present.   Neurological: She is alert and oriented to person, place, and time. She has normal strength.   Skin: Skin is intact. No lesion noted.   Psychiatric: She has a normal mood and affect. Her speech is normal and behavior is normal.     Assessment:     1. Seizure disorder      Plan:   Darling was seen today for follow-up.    Diagnoses and all orders for this visit:    Seizure disorder  -     CBC auto differential; Future  -     Comprehensive metabolic panel; Future  -     Lipid panel; Future  -     TSH; Future  -     Vitamin D; Future  -     Vitamin B12; Future      Return in about 3 months (around 4/6/2018) for Annual exam: cbc, cmp, lipid , tsh vit D , B12.     Medication List          Accurate as of 1/2/18  2:01 PM. If you have any questions, ask your nurse or doctor.               CONTINUE taking these medications    DILTIAZEM 2% CREAM  Apply topically 3 (three) times daily. Apply topically to anal area.     levetiracetam  mg Tb24 24 hr tablet  Commonly known as:  KEPPRA XR  Take 2 tablets (1,000 mg total) by mouth once daily.     timolol maleate 0.5% 0.5 % Solg  Commonly known as:  TIMOPTIC-XE  Place 1 drop into both eyes every morning.             No

## 2022-12-13 ENCOUNTER — HOSPITAL ENCOUNTER (OUTPATIENT)
Dept: RADIOLOGY | Facility: HOSPITAL | Age: 57
Discharge: HOME | End: 2022-12-13
Attending: PODIATRIST
Payer: COMMERCIAL

## 2022-12-13 PROCEDURE — 73718 MRI LOWER EXTREMITY W/O DYE: CPT | Mod: 26,RT,, | Performed by: RADIOLOGY

## 2022-12-13 PROCEDURE — 73718 MRI LOWER EXTREMITY W/O DYE: CPT | Mod: TC,RT

## 2022-12-13 PROCEDURE — 73718 MRI FOOT (FOREFOOT) RIGHT WITHOUT CONTRAST: ICD-10-PCS | Mod: 26,RT,, | Performed by: RADIOLOGY

## 2022-12-15 ENCOUNTER — PATIENT MESSAGE (OUTPATIENT)
Dept: ORTHOPEDICS | Facility: CLINIC | Age: 57
End: 2022-12-15
Payer: COMMERCIAL

## 2022-12-21 ENCOUNTER — OFFICE VISIT (OUTPATIENT)
Dept: ORTHOPEDICS | Facility: CLINIC | Age: 57
End: 2022-12-21
Payer: COMMERCIAL

## 2022-12-21 VITALS — HEIGHT: 62 IN | BODY MASS INDEX: 22.33 KG/M2 | WEIGHT: 121.31 LBS

## 2022-12-21 DIAGNOSIS — M20.11 HALLUX VALGUS, RIGHT: ICD-10-CM

## 2022-12-21 DIAGNOSIS — M19.071 ARTHRITIS OF FIRST METATARSOPHALANGEAL (MTP) JOINT OF RIGHT FOOT: Primary | ICD-10-CM

## 2022-12-21 PROCEDURE — 3008F PR BODY MASS INDEX (BMI) DOCUMENTED: ICD-10-PCS | Mod: CPTII,S$GLB,, | Performed by: PHYSICIAN ASSISTANT

## 2022-12-21 PROCEDURE — 99999 PR PBB SHADOW E&M-EST. PATIENT-LVL III: CPT | Mod: PBBFAC,,, | Performed by: PHYSICIAN ASSISTANT

## 2022-12-21 PROCEDURE — 1159F PR MEDICATION LIST DOCUMENTED IN MEDICAL RECORD: ICD-10-PCS | Mod: CPTII,S$GLB,, | Performed by: PHYSICIAN ASSISTANT

## 2022-12-21 PROCEDURE — 1160F RVW MEDS BY RX/DR IN RCRD: CPT | Mod: CPTII,S$GLB,, | Performed by: PHYSICIAN ASSISTANT

## 2022-12-21 PROCEDURE — 1160F PR REVIEW ALL MEDS BY PRESCRIBER/CLIN PHARMACIST DOCUMENTED: ICD-10-PCS | Mod: CPTII,S$GLB,, | Performed by: PHYSICIAN ASSISTANT

## 2022-12-21 PROCEDURE — 3008F BODY MASS INDEX DOCD: CPT | Mod: CPTII,S$GLB,, | Performed by: PHYSICIAN ASSISTANT

## 2022-12-21 PROCEDURE — 99214 PR OFFICE/OUTPT VISIT, EST, LEVL IV, 30-39 MIN: ICD-10-PCS | Mod: S$GLB,,, | Performed by: PHYSICIAN ASSISTANT

## 2022-12-21 PROCEDURE — 99999 PR PBB SHADOW E&M-EST. PATIENT-LVL III: ICD-10-PCS | Mod: PBBFAC,,, | Performed by: PHYSICIAN ASSISTANT

## 2022-12-21 PROCEDURE — 1159F MED LIST DOCD IN RCRD: CPT | Mod: CPTII,S$GLB,, | Performed by: PHYSICIAN ASSISTANT

## 2022-12-21 PROCEDURE — 99214 OFFICE O/P EST MOD 30 MIN: CPT | Mod: S$GLB,,, | Performed by: PHYSICIAN ASSISTANT

## 2022-12-21 RX ORDER — METHYLPREDNISOLONE 4 MG/1
TABLET ORAL
Qty: 21 EACH | Refills: 0 | Status: SHIPPED | OUTPATIENT
Start: 2022-12-21 | End: 2023-01-11

## 2022-12-22 NOTE — PROGRESS NOTES
SUBJECTIVE:     Chief Complaint & History of Present Illness:    Darling Quach is a 57 y.o. year old female here for constant right foot pain which has been present for one year.  She feels the pain started after her injury one year ago.  The pain is located in the first MTP joint.  The pain is described as achy, worse with prolonged standing and walking.  She has most recently seen podiatry, had x-rays and MRI.  She takes OTC NSAIDS occasionally.  She is only able to wear wide tennis shoes.  She has noticed swelling. The patient does not use an assistive device.    Review of patient's allergies indicates:   Allergen Reactions    Tramadol      Per neurologist    Latanoprost Itching     Red itchy eyes         Current Outpatient Medications   Medication Sig Dispense Refill    ascorbic acid, vitamin C, (VITAMIN C) 1000 MG tablet Take 1,000 mg by mouth once daily.      atorvastatin (LIPITOR) 80 MG tablet Take 1 tablet (80 mg total) by mouth once daily. 90 tablet 3    Lactobacillus acidophilus 10 billion cell Cap Take by mouth.      levetiracetam XR (KEPPRA XR) 750 mg Tb24 tablet Take 1 tablet (750 mg total) by mouth every evening. 90 tablet 3    LIDOcaine (XYLOCAINE) 5 % Oint ointment Apply topically nightly as needed.      multivitamin capsule Take 1 capsule by mouth once daily.      omega-3 fatty acids 1,000 mg Cap Take 1,500 mg by mouth.      aspirin (ECOTRIN) 81 MG EC tablet Take 1 tablet (81 mg total) by mouth once daily. 30 tablet 0    methylPREDNISolone (MEDROL DOSEPACK) 4 mg tablet use as directed 21 each 0     No current facility-administered medications for this visit.       Past Medical History:   Diagnosis Date    Glaucoma     Hemangioma     cavernous    History of acne 2004    dr lopez prescribed accutane    Hyperlipidemia     Seizures     Venous angioma of brain     Vertigo        Past Surgical History:   Procedure Laterality Date    ANAL SPHINCTEROTOMY  5/27/2019    Procedure: SPHINCTEROTOMY,  "ANAL;  Surgeon: Nirmal Chan MD;  Location: [facility] OR 2ND FLR;  Service: Colon and Rectal;;    CATARACT EXTRACTION W/  INTRAOCULAR LENS IMPLANT Right 12/17/2014        CATARACT EXTRACTION W/  INTRAOCULAR LENS IMPLANT Left 03/17/2022        EXCISIONAL HEMORRHOIDECTOMY N/A 5/27/2019    Procedure: HEMORRHOIDECTOMY;  Surgeon: Nirmal Chan MD;  Location: [facility] OR 2ND FLR;  Service: Colon and Rectal;  Laterality: N/A;    HYSTERECTOMY  2010    DLH ov in situ     INTRAOCULAR PROSTHESES INSERTION Left 3/16/2022    Procedure: INSERTION, IOL PROSTHESIS;  Surgeon: Josey Camacho MD;  Location: [facility] OR 1ST FLR;  Service: Ophthalmology;  Laterality: Left;    PHACOEMULSIFICATION OF CATARACT Left 3/16/2022    Procedure: PHACOEMULSIFICATION, CATARACT;  Surgeon: Josey Camacho MD;  Location: [facility] OR 1ST FLR;  Service: Ophthalmology;  Laterality: Left;         Review of Systems:  ROS:  Constitutional: no fever or chills  Eyes: no visual changes  ENT: no nasal congestion or sore throat  Respiratory: no cough or shortness of breath  Musculoskeletal: no arthralgias or myalgias  Behavioral/Psych: no auditory or visual hallucinations    OBJECTIVE:     PHYSICAL EXAM:  Height: 5' 2" (157.5 cm) Weight: 55 kg (121 lb 4.8 oz), General Appearance: Well nourished, well developed, in no acute distress.  CV: 2+ UE and LE distal pulses bilaterally  RESP: Respirations equal and unlabored  GI: Abdomen soft and non-tender  Neurological: Mood & affect are normal.  right  Foot/Ankle  Skin intact  Standing exam shows neutral alignment  No warmth or erythema  TTP diffusely around first MTP joint  Mild swelling  Mild hallux valgus  Sensation intact  2+ DP    IMAGING:  X-rays of the right foot personally reviewed by me, demonstrate osteoarthritis of first MTP joint.  No fracture or dislocation. Mild hallux valgus    MRI foot:  There is 1st MTP joint osteoarthritis with articular cartilage loss, reactive " subchondral edema, sclerosis, and marginal osteophyte production.  There is a reactive effusion present.  Sesamoid-metatarsal articulations also demonstrate some reactive subchondral edema.  Plantar plate appears intact.  Flexor and extensor tendons have a benign appearance.  The remainder of the visualized bones and joints have a benign appearance.     Soft tissues are unremarkable.    ASSESSMENT/PLAN:     57 year old female with right foot first MTP osteoarthritis    Plan:  - X-ray and MRI reviewed  - Discussed shoe wear, pads for shoes  - NSAIDS  - Activity modification  - Can consider injections  - Medrol dose pack today  - She has follow up scheduled with Dr. Hollins in 2 months

## 2023-02-07 ENCOUNTER — PATIENT MESSAGE (OUTPATIENT)
Dept: INTERNAL MEDICINE | Facility: CLINIC | Age: 58
End: 2023-02-07
Payer: COMMERCIAL

## 2023-02-07 NOTE — TELEPHONE ENCOUNTER
Called patient. Friday is one day prior to when she able to schedule an annual. Pt scheduled for annual in March.

## 2023-02-16 ENCOUNTER — OFFICE VISIT (OUTPATIENT)
Dept: ORTHOPEDICS | Facility: CLINIC | Age: 58
End: 2023-02-16
Payer: COMMERCIAL

## 2023-02-16 VITALS — HEIGHT: 62 IN | BODY MASS INDEX: 22.19 KG/M2 | WEIGHT: 120.56 LBS

## 2023-02-16 DIAGNOSIS — M79.674 CHRONIC TOE PAIN, RIGHT FOOT: ICD-10-CM

## 2023-02-16 DIAGNOSIS — G89.29 CHRONIC TOE PAIN, RIGHT FOOT: ICD-10-CM

## 2023-02-16 DIAGNOSIS — M20.21 HALLUX RIGIDUS OF RIGHT FOOT: ICD-10-CM

## 2023-02-16 DIAGNOSIS — Z87.81 H/O FRACTURE OF FOOT: ICD-10-CM

## 2023-02-16 PROCEDURE — 20610 PR DRAIN/INJECT LARGE JOINT/BURSA: ICD-10-PCS | Mod: RT,S$GLB,, | Performed by: ORTHOPAEDIC SURGERY

## 2023-02-16 PROCEDURE — 3008F BODY MASS INDEX DOCD: CPT | Mod: CPTII,S$GLB,, | Performed by: ORTHOPAEDIC SURGERY

## 2023-02-16 PROCEDURE — 99999 PR PBB SHADOW E&M-EST. PATIENT-LVL III: ICD-10-PCS | Mod: PBBFAC,,, | Performed by: ORTHOPAEDIC SURGERY

## 2023-02-16 PROCEDURE — 20610 DRAIN/INJ JOINT/BURSA W/O US: CPT | Mod: RT,S$GLB,, | Performed by: ORTHOPAEDIC SURGERY

## 2023-02-16 PROCEDURE — 3008F PR BODY MASS INDEX (BMI) DOCUMENTED: ICD-10-PCS | Mod: CPTII,S$GLB,, | Performed by: ORTHOPAEDIC SURGERY

## 2023-02-16 PROCEDURE — 77002 PR FLUOROSCOPIC GUIDANCE NEEDLE PLACEMENT: ICD-10-PCS | Mod: 26,S$GLB,, | Performed by: ORTHOPAEDIC SURGERY

## 2023-02-16 PROCEDURE — 1159F MED LIST DOCD IN RCRD: CPT | Mod: CPTII,S$GLB,, | Performed by: ORTHOPAEDIC SURGERY

## 2023-02-16 PROCEDURE — 99214 PR OFFICE/OUTPT VISIT, EST, LEVL IV, 30-39 MIN: ICD-10-PCS | Mod: 25,S$GLB,, | Performed by: ORTHOPAEDIC SURGERY

## 2023-02-16 PROCEDURE — 99999 PR PBB SHADOW E&M-EST. PATIENT-LVL III: CPT | Mod: PBBFAC,,, | Performed by: ORTHOPAEDIC SURGERY

## 2023-02-16 PROCEDURE — 1159F PR MEDICATION LIST DOCUMENTED IN MEDICAL RECORD: ICD-10-PCS | Mod: CPTII,S$GLB,, | Performed by: ORTHOPAEDIC SURGERY

## 2023-02-16 PROCEDURE — 77002 NEEDLE LOCALIZATION BY XRAY: CPT | Mod: 26,S$GLB,, | Performed by: ORTHOPAEDIC SURGERY

## 2023-02-16 PROCEDURE — 99214 OFFICE O/P EST MOD 30 MIN: CPT | Mod: 25,S$GLB,, | Performed by: ORTHOPAEDIC SURGERY

## 2023-02-16 RX ORDER — TRIAMCINOLONE ACETONIDE 40 MG/ML
40 INJECTION, SUSPENSION INTRA-ARTICULAR; INTRAMUSCULAR
Status: DISCONTINUED | OUTPATIENT
Start: 2023-02-16 | End: 2023-02-16 | Stop reason: HOSPADM

## 2023-02-16 RX ADMIN — TRIAMCINOLONE ACETONIDE 40 MG: 40 INJECTION, SUSPENSION INTRA-ARTICULAR; INTRAMUSCULAR at 08:02

## 2023-02-16 NOTE — PROGRESS NOTES
Subjective:   Chief complaint: right foot pain  Referring provider: Dr. Felecia Daniel     HPI:   Darling Quach is a 57 y.o. female who presents today for evaluation of right foot pain.  Rates pain as 6/10.  Pain has been ongoing since December 2021.  Inciting event: injury; great toe fx.  Treatments tried: boot, ointments, meloxicam, aleve, and bunion cushion.    Initial injury occurred when she stubbed her toe badly.  Was seen in urgent care and provided a boot.  Pain has persisted despite this.  The only shoe that she can tolerate is wide toe box sneakers.  She is frustrated with this and ongoing pain that limits activities.    Pain is localized to medial > lateral MTP joint dorsally.  She notes acutely her bunion is worsened.    Works as .    Does the patient use tobacco products? No  If so, what and how often? N/A    ROS:  Musculoskeletal: per HPI  Neurological: Positive for burning, tingling and numbness  Heme: Negative for blood thinners; Negative for history of blood clot  Endocrine: Negative for diabetes    Objective:   Exam:  There were no vitals filed for this visit.  General: No acute distress, well-appearing  Neurologic: Alert and oriented x3  Psychiatric: Appropriate mood and affect, cooperative  Cardiovascular: Regular pulse  Respiratory: Breathing on room air  Skin: No rashes or ulcers  Vascular: Palpable DP/PT  Musculoskeletal: Standing examination demonstrates neutral ankle alignment with hallux valgus.  There is minimal swelling.  There is small area of ecchymosis just proximal to the MTP joint.  Medial longitudinal arch is neutral.  Hallux alignment is valgus.  Lesser toe alignment is neutral.    Focused exam of the right lower extremity demonstrates non-irritable ankle range of motion.  Hindfoot is flexible.  Ankle dorsiflexion is within normal limits.      Focused exam of the right forefoot demonstrates no abnormal callusing.  Hallux MPJ motion is maintained and only slightly  irritable at extremes.  Maximal dorsiflexion maintained and with pain.  There is medial and dorso-medial TTP over MPJ and lesser degree lateral MTP.  There is no TTP over sesamoid.  MPJ stress testing is stable and non-irritable.  Grind is negative..    Fires EHL/FHL with no pain.  SILT SP/DP/PT and able to localize.     Imaging:  Prior radiographs were independently interpreted by me.    Standing 3v foot as well as initial injury films show a small avulsion fragment off the lateral proximal phalanx base with slight associated lateral joint space narrowing.    MRI forefoot independently reviewed and interpreted and demonstrates lateral MTP metatarsal head cartilage defect with associated underlying marrow edema and ?cyst.     Additional records/labs reviewed:  Dr. Eagle notes reviewed- extensive nonsurgical treatment options explored and referral for surgical consultation placed.         Assessment:     1. Chronic toe pain, right foot    2. Hallux rigidus of right foot    3. H/O fracture of foot         Patient is seen for a new acute injury  with uncertain prognosis.    Data:  2 results independently interpreted    Treatment plan: Corticosteroid injection management discussed and provided (see procedure notes) and Counseling regarding non-operative treatment and possibility of surgery in future if persistent morbidity from symptoms     I reviewed imaging, clinical history, and diagnosis as above with the patient. I attempted to use layman's terms to educate the patient as well as utilize foot models and/or pictures.   I personally went through imaging with the patient.      She has traumatic bunion interesting in the letter of lateral joint injury.  I suspect the proximal phalanx is collapsing into that lesion.  The rest of joint appears reasonably maintained on MRI and symptoms more consistent with painful bunion than lateral joint injury or OA.  For that reason discussed treatment options based on that.       Discussed therapeutic and diagnostic benefit of CSI.      If that works, can be destination therapy.  If doesn't work, would consider chevron, cheilectomy, and MTP joint debridement.       Plan:       See AVS  --phone f/up in 2 weeks     No orders of the defined types were placed in this encounter.      Past Medical History:   Diagnosis Date    Glaucoma     Hemangioma     cavernous    History of acne 2004    dr lopez prescribed accutane    Hyperlipidemia     Seizures     Venous angioma of brain     Vertigo        Past Surgical History:   Procedure Laterality Date    ANAL SPHINCTEROTOMY  5/27/2019    Procedure: SPHINCTEROTOMY, ANAL;  Surgeon: Nirmal Chan MD;  Location: [facility] OR 2ND FLR;  Service: Colon and Rectal;;    CATARACT EXTRACTION W/  INTRAOCULAR LENS IMPLANT Right 12/17/2014        CATARACT EXTRACTION W/  INTRAOCULAR LENS IMPLANT Left 03/17/2022        EXCISIONAL HEMORRHOIDECTOMY N/A 5/27/2019    Procedure: HEMORRHOIDECTOMY;  Surgeon: Nirmal Chan MD;  Location: [facility] OR 2ND FLR;  Service: Colon and Rectal;  Laterality: N/A;    HYSTERECTOMY  2010    [facility] ov in situ     INTRAOCULAR PROSTHESES INSERTION Left 3/16/2022    Procedure: INSERTION, IOL PROSTHESIS;  Surgeon: Josey Camacho MD;  Location: [facility] OR 1ST FLR;  Service: Ophthalmology;  Laterality: Left;    PHACOEMULSIFICATION OF CATARACT Left 3/16/2022    Procedure: PHACOEMULSIFICATION, CATARACT;  Surgeon: Josey Camacho MD;  Location: [facility] OR 1ST FLR;  Service: Ophthalmology;  Laterality: Left;       Family History   Problem Relation Age of Onset    Fibroids Mother     Migraines Mother     Glaucoma Mother     Heart disease Father     Depression Brother     No Known Problems Sister     Diabetes Maternal Grandmother     Glaucoma Maternal Grandmother     Cancer Maternal Aunt         breast    Breast cancer Maternal Aunt 50    No Known Problems Maternal Uncle     No Known Problems Paternal Aunt     No  Known Problems Paternal Uncle     No Known Problems Maternal Grandfather     No Known Problems Paternal Grandmother     No Known Problems Paternal Grandfather     Breast cancer Maternal Aunt     Macular degeneration Neg Hx     Blindness Neg Hx     Melanoma Neg Hx     Psoriasis Neg Hx     Lupus Neg Hx     Eczema Neg Hx     Acne Neg Hx     Cataracts Neg Hx     Amblyopia Neg Hx     Retinal detachment Neg Hx     Strabismus Neg Hx     Stroke Neg Hx     Thyroid disease Neg Hx     Hypertension Neg Hx     Ovarian cancer Neg Hx     Colon cancer Neg Hx        Social History     Socioeconomic History    Marital status:    Tobacco Use    Smoking status: Never    Smokeless tobacco: Never   Substance and Sexual Activity    Alcohol use: No    Drug use: No    Sexual activity: Yes     Partners: Male     Birth control/protection: Surgical     Comment: , [facility] 9/2010   Other Topics Concern    Are you pregnant or think you may be? No    Breast-feeding No     Social Determinants of Health     Financial Resource Strain: Low Risk     Difficulty of Paying Living Expenses: Not hard at all   Food Insecurity: No Food Insecurity    Worried About Running Out of Food in the Last Year: Never true    Ran Out of Food in the Last Year: Never true   Transportation Needs: No Transportation Needs    Lack of Transportation (Medical): No    Lack of Transportation (Non-Medical): No   Physical Activity: Insufficiently Active    Days of Exercise per Week: 4 days    Minutes of Exercise per Session: 30 min   Stress: No Stress Concern Present    Feeling of Stress : Not at all   Social Connections: Unknown    Frequency of Communication with Friends and Family: More than three times a week    Frequency of Social Gatherings with Friends and Family: More than three times a week    Active Member of Clubs or Organizations: Yes    Attends Club or Organization Meetings: More than 4 times per year    Marital Status:    Housing Stability: Unknown     Unable to Pay for Housing in the Last Year: No    Unstable Housing in the Last Year: No

## 2023-02-16 NOTE — PATIENT INSTRUCTIONS
"Today, you saw Dr. Hollins and were seen for painful big toe arthritis causing a bunion deformity of your 1st toe    We decided the next step(s) in in treatment is/are  #1 Live with this - conservative treatment including shoe wear modifications, toe spacer.  #2 Try an injection (what you decided to do today). This is not a permament fix but sometimes does help make the pain less when/if it returns  #3 Surgical intervention (Described below)    Thank you for allowing me to participate in your care.  We will see you back via phone or mychart to discuss results of your injection.    #3 We have discussed that surgery is an option to treat your Bunion deformity and big toe arthritis.  The surgery is called Right Bunion correction, exostectomy (shaving of the bone on the outside of your foot), capsulorrhaphy (tightening of capsule on outside of foot), possible calcaneal bone graft (kindling for your "pothole" in your big toe).  The surgery would be Outpatient (home same day).      I strongly advise you read more about this surgery - a great resource for learning about foot and ankle problems and their treatment options is www.footcaremd.org.      We went over the following regarding surgical treatment:    Activity after surgery  You will be Heel-only weight bearing for 6 weeks on the right lower extremity  You be in postop shoe for for 6 weeks  Surgery is on your driving foot.  You will not be able to drive until off narcotic pain medications and until fully weight bearing on operative extremity.  Ultimately the decision about when to drive is a personal choice based on your assessment of your own ability/safety for operating a motor vehicle.  Anticipated time out of work:  - at least 2 weeks  Next steps  At this point, you have decided choose option #2   "

## 2023-02-17 NOTE — PROCEDURES
Small Joint Aspiration/Injection    Date/Time: 2/16/2023 8:30 AM  Performed by: Aletha Hollins MD  Authorized by: Aletha Hollins MD     Consent Done?:  Yes (Verbal)  Indications:  Arthritis and pain  Site marked: the procedure site was marked    Timeout: prior to procedure the correct patient, procedure, and site was verified    Local anesthesia used?: Yes    Local anesthetic:  Lidocaine 1% without epinephrine  Anesthetic total (ml):  1    Location:  Foot  Foot joint: Right MTP joint.  Ultrasonic guidance for needle placement?: No    Needle size:  22 G  Medications:  40 mg triamcinolone acetonide 40 mg/mL    Additional Comments: I personally utilized fluoroscopic machine and obtained fluoroscopic images for localization of the needle into the right first MTP joint.  Images were saved in the media tab.

## 2023-02-22 ENCOUNTER — PATIENT MESSAGE (OUTPATIENT)
Dept: CARDIOLOGY | Facility: CLINIC | Age: 58
End: 2023-02-22
Payer: COMMERCIAL

## 2023-03-02 ENCOUNTER — TELEPHONE (OUTPATIENT)
Dept: ORTHOPEDICS | Facility: CLINIC | Age: 58
End: 2023-03-02
Payer: COMMERCIAL

## 2023-03-02 NOTE — TELEPHONE ENCOUNTER
LVM for pt to give me a call back----- Message from Chris Cummings sent at 2/16/2023  9:28 AM CST -----  Regarding: Injection follow up  Please check in with patient to see injection efficacy. Right MTP csi      What percent better:   Continued relief:     Thanks,    Chris

## 2023-03-02 NOTE — TELEPHONE ENCOUNTER
Spoke to pt and The spot dr escobar injected was doing better for 24 hours but the bunion is throbbing at night and right side of foot 5/10. It helped the joint overall, those are the only concerns. Informed pt I will update dr escobar and reach out to her.----- Message from Alden Osborn sent at 3/2/2023  2:30 PM CST -----  Regarding: Advice  Contact: @[phone]  Patient calling regarding to returning a missed call... Please call and advice @[phone]

## 2023-03-03 ENCOUNTER — TELEPHONE (OUTPATIENT)
Dept: ORTHOPEDICS | Facility: CLINIC | Age: 58
End: 2023-03-03
Payer: COMMERCIAL

## 2023-03-03 NOTE — TELEPHONE ENCOUNTER
Spoke to pt and relayed the the message below, scheduled an appt in june----- Message from Aletha Hollins MD sent at 3/3/2023  9:24 AM CST -----  Regarding: RE: Advice  Contact: @[phone]  Not much else-  1.) silicone bunion pads  2.) anti-inflammatory medications  3.) surgery    ----- Message -----  From: Gisselle Coyne MA  Sent: 3/2/2023   2:38 PM CST  To: Aletha Hollins MD  Subject: FW: Advice                                       The spot you injected was doing better for 24 hours but the bunion is throbbing at night and right side of foot 5/10. It helped the joint overall, those are the only concerns. Please advise what else she can do.  ----- Message -----  From: Alden Osborn  Sent: 3/2/2023   2:33 PM CST  To: Gisselle Coyne MA, Gurvinder Pompa Staff  Subject: Advice                                           Patient calling regarding to returning a missed call... Please call and advice @[phone]

## 2023-03-08 DIAGNOSIS — E78.2 MIXED DYSLIPIDEMIA: ICD-10-CM

## 2023-03-08 DIAGNOSIS — E78.00 HYPERCHOLESTEROLEMIA: ICD-10-CM

## 2023-03-08 DIAGNOSIS — R93.1 AGATSTON CAC SCORE, <100: ICD-10-CM

## 2023-03-08 RX ORDER — ATORVASTATIN CALCIUM 40 MG/1
TABLET, FILM COATED ORAL
Qty: 90 TABLET | Refills: 3 | OUTPATIENT
Start: 2023-03-08

## 2023-03-17 ENCOUNTER — PATIENT MESSAGE (OUTPATIENT)
Dept: ORTHOPEDICS | Facility: CLINIC | Age: 58
End: 2023-03-17
Payer: COMMERCIAL

## 2023-03-23 ENCOUNTER — PATIENT MESSAGE (OUTPATIENT)
Dept: ORTHOPEDICS | Facility: CLINIC | Age: 58
End: 2023-03-23
Payer: COMMERCIAL

## 2023-03-29 ENCOUNTER — PATIENT MESSAGE (OUTPATIENT)
Dept: ORTHOPEDICS | Facility: CLINIC | Age: 58
End: 2023-03-29
Payer: COMMERCIAL

## 2023-03-29 ENCOUNTER — TELEPHONE (OUTPATIENT)
Dept: ORTHOPEDICS | Facility: CLINIC | Age: 58
End: 2023-03-29
Payer: COMMERCIAL

## 2023-03-29 NOTE — TELEPHONE ENCOUNTER
Verbalized the Following:    SCHEDULED Right  chevron, cheilectomy, and MTP joint  surgery at [facility], [address] [place] [address] A DOS: 5/29/23

## 2023-03-31 ENCOUNTER — PATIENT MESSAGE (OUTPATIENT)
Dept: ORTHOPEDICS | Facility: CLINIC | Age: 58
End: 2023-03-31
Payer: COMMERCIAL

## 2023-04-09 NOTE — PROGRESS NOTES
ANNUAL VISIT NOTE     PRESENTING HISTORY     Reason for Visit:  Annual visit.    No chief complaint on file.    History of Present Illness & ROS: Ms. Darling Quach is a 57 y.o. female.  Annual   New to me.   Very pleasant lady.   Pre  at [facility]   Reports that she is preparing to have 'foot surgery with Dr. Hollins in May'.   Not fasting for labs today and will return to have them drawn.   Followed by Ashley (Ortho, foot specialist), Dr Bishop (Neuro), Dr. Edmondson (Cardiology ), Dr Baez (Ophth) and Dr. Mayes (GYN).   No chest pain, SOB, dizziness, headaches or other discomforts voiced today.     Review of Systems:  Eyes: denies visual changes at this time denies floaters   ENT: no nasal congestion or sore throat  Respiratory: no cough or shorness of breath  Cardiovascular: no chest pain or palpitations  Gastrointestinal: no nausea or vomiting, no abdominal pain or change in bowel habits  Genitourinary: no hematuria or dysuria; denies frequency  Hematologic/Lymphatic: no easy bruising or lymphadenopathy  Musculoskeletal: no arthralgias or myalgias  Neurological: no seizures or tremors  Endocrine: no heat or cold intolerance    PAST HISTORY:     Past Medical History:   Diagnosis Date    Glaucoma     Hemangioma     cavernous    History of acne 2004    dr lopez prescribed accutane    Hyperlipidemia     Seizures     Venous angioma of brain     Vertigo        Past Surgical History:   Procedure Laterality Date    ANAL SPHINCTEROTOMY  5/27/2019    Procedure: SPHINCTEROTOMY, ANAL;  Surgeon: Nirmal Chan MD;  Location: [address];  Service: Colon and Rectal;;    CATARACT EXTRACTION W/  INTRAOCULAR LENS IMPLANT Right 12/17/2014        CATARACT EXTRACTION W/  INTRAOCULAR LENS IMPLANT Left 03/17/2022        EXCISIONAL HEMORRHOIDECTOMY N/A 5/27/2019    Procedure: HEMORRHOIDECTOMY;  Surgeon: Nirmal Chan MD;  Location: [facility] OR [address] [place] [address];  Service: Colon and  Rectal;  Laterality: N/A;    HYSTERECTOMY  2010    [facility] ov in situ     INTRAOCULAR PROSTHESES INSERTION Left 3/16/2022    Procedure: INSERTION, IOL PROSTHESIS;  Surgeon: Josey Camacho MD;  Location: [facility] OR [address] [place] [address];  Service: Ophthalmology;  Laterality: Left;    PHACOEMULSIFICATION OF CATARACT Left 3/16/2022    Procedure: PHACOEMULSIFICATION, CATARACT;  Surgeon: Josey Camacho MD;  Location: [facility] OR [address] [place] [address];  Service: Ophthalmology;  Laterality: Left;       Family History   Problem Relation Age of Onset    Fibroids Mother     Migraines Mother     Glaucoma Mother     Heart disease Father     Depression Brother     No Known Problems Sister     Diabetes Maternal Grandmother     Glaucoma Maternal Grandmother     Cancer Maternal Aunt         breast    Breast cancer Maternal Aunt 50    No Known Problems Maternal Uncle     No Known Problems Paternal Aunt     No Known Problems Paternal Uncle     No Known Problems Maternal Grandfather     No Known Problems Paternal Grandmother     No Known Problems Paternal Grandfather     Breast cancer Maternal Aunt     Macular degeneration Neg Hx     Blindness Neg Hx     Melanoma Neg Hx     Psoriasis Neg Hx     Lupus Neg Hx     Eczema Neg Hx     Acne Neg Hx     Cataracts Neg Hx     Amblyopia Neg Hx     Retinal detachment Neg Hx     Strabismus Neg Hx     Stroke Neg Hx     Thyroid disease Neg Hx     Hypertension Neg Hx     Ovarian cancer Neg Hx     Colon cancer Neg Hx        Social History     Socioeconomic History    Marital status:    Tobacco Use    Smoking status: Never    Smokeless tobacco: Never   Substance and Sexual Activity    Alcohol use: No    Drug use: No    Sexual activity: Yes     Partners: Male     Birth control/protection: Surgical     Comment: , [facility] 9/2010   Other Topics Concern    Are you pregnant or think you may be? No    Breast-feeding No       MEDICATIONS & ALLERGIES:     Current Outpatient Medications on File Prior to Visit   Medication Sig  Dispense Refill    ascorbic acid, vitamin C, (VITAMIN C) 1000 MG tablet Take 1,000 mg by mouth once daily.      aspirin (ECOTRIN) 81 MG EC tablet Take 1 tablet (81 mg total) by mouth once daily. 30 tablet 0    atorvastatin (LIPITOR) 80 MG tablet Take 1 tablet (80 mg total) by mouth once daily. 90 tablet 3    Lactobacillus acidophilus 10 billion cell Cap Take by mouth.      levetiracetam XR (KEPPRA XR) 750 mg Tb24 tablet Take 1 tablet (750 mg total) by mouth every evening. 90 tablet 3    LIDOcaine (XYLOCAINE) 5 % Oint ointment Apply topically nightly as needed.      multivitamin capsule Take 1 capsule by mouth once daily.      omega-3 fatty acids 1,000 mg Cap Take 1,500 mg by mouth.       No current facility-administered medications on file prior to visit.        Review of patient's allergies indicates:   Allergen Reactions    Tramadol      Per neurologist    Latanoprost Itching     Red itchy eyes       Medications Reconciliation:   I have reconciled the patient's home medications and discharge medications with the patient/family. I have updated all changes.  Refer to After-Visit Medication List.    OBJECTIVE:     Vital Signs:  There were no vitals filed for this visit.  Wt Readings from Last 3 Encounters:   02/16/23 0812 54.7 kg (120 lb 9.5 oz)   12/21/22 0804 55 kg (121 lb 4.8 oz)   12/02/22 1250 55.5 kg (122 lb 5.7 oz)     There is no height or weight on file to calculate BMI.   Wt Readings from Last 3 Encounters:   04/11/23 54.3 kg (119 lb 11.4 oz)   02/16/23 54.7 kg (120 lb 9.5 oz)   12/21/22 55 kg (121 lb 4.8 oz)     Temp Readings from Last 3 Encounters:   03/16/22 98.1 °F (36.7 °C) (Temporal)   12/22/21 97.7 °F (36.5 °C) (Oral)   05/27/19 96.8 °F (36 °C) (Temporal)     BP Readings from Last 3 Encounters:   04/11/23 102/64   12/02/22 93/68   11/18/22 121/75     Pulse Readings from Last 3 Encounters:   04/11/23 67   12/02/22 75   11/18/22 72       Physical Exam:  General: Well developed, well nourished. No  distress.  HEENT: Head is normocephalic, atraumatic; ears are normal.   Eyes: Clear conjunctiva.  Neck: Supple, symmetrical neck; trachea midline.  Lungs: Clear to auscultation bilaterally and normal respiratory effort.  Cardiovascular: Heart with regular rate and rhythm. No murmurs, gallops or rubs  Extremities: No LE edema. Pulses 2+ and symmetric.   Abdomen: Abdomen is soft, non-tender non-distended with normal bowel sounds.  Skin: Skin color, texture, turgor normal. No rashes.  Musculoskeletal: Normal gait.   Neurologic:  No focal numbness or weakness.     Laboratory  Lab Results   Component Value Date    WBC 5.04 02/11/2022    HGB 12.9 02/11/2022    HCT 39.9 02/11/2022     02/11/2022    CHOL 211 (H) 09/30/2022    TRIG 46 09/30/2022     (H) 09/30/2022    ALT 16 09/30/2022    AST 20 09/30/2022     09/30/2022    K 4.4 09/30/2022     09/30/2022    CREATININE 1.0 09/30/2022    BUN 21 (H) 09/30/2022    CO2 29 09/30/2022    TSH 0.941 02/11/2022    INR 1.0 07/14/2017    HGBA1C 5.3 07/15/2017       ASSESSMENT & PLAN:     Annual physical exam  -     Comprehensive Metabolic Panel; Future; Expected date: 04/11/2023  -     CBC Auto Differential; Future; Expected date: 04/11/2023  -     Lipid Panel; Future; Expected date: 04/11/2023  -     Hemoglobin A1C; Future; Expected date: 04/11/2023  -     TSH; Future; Expected date: 04/11/2023  -     Vitamin D; Future; Expected date: 04/11/2023  -     IRON AND TIBC; Future; Expected date: 04/11/2023  -     FERRITIN; Future; Expected date: 04/11/2023  -     HEPATITIS C ANTIBODY; Future; Expected date: 04/11/2023    Seizure  Nocturnal Convulsion:   Followed by Dr. Bishop,Neurology (seen in 4/2022)  Keppra (noted recent level 7.7)      Hypercholesterolemia  Mixed dyslipidemia  Agatston CAC score, <100  -     aspirin (ECOTRIN) 81 MG EC tablet; Take 1 tablet (81 mg total) by mouth once daily.  Dispense: 30 tablet; Refill: 0  Lab Results   Component Value Date     CHOL 211 (H) 09/30/2022    CHOL 269 (H) 02/11/2022    CHOL 283 (H) 02/18/2021     Lab Results   Component Value Date     (H) 09/30/2022     (H) 02/11/2022     (H) 02/18/2021     Lab Results   Component Value Date    LDLCALC 101.8 09/30/2022    LDLCALC 151.4 02/11/2022    LDLCALC 159.4 (H) 02/18/2021     No results found for: DLDL  Lab Results   Component Value Date    TRIG 46 09/30/2022    TRIG 53 02/11/2022    TRIG 78 02/18/2021       Lab Results   Component Value Date    CHOLHDL 47.4 09/30/2022    CHOLHDL 39.8 02/11/2022    CHOLHDL 38.2 02/18/2021   *Being followed by Dr. Edmondson; panel in 9/2022; Lipitor 80 daily  -     Comprehensive Metabolic Panel; Future; Expected date: 04/11/2023  -     aspirin (ECOTRIN) 81 MG EC tablet; Take 1 tablet (81 mg total) by mouth once daily.  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 Ibarra     *Follow up with PCP in 4-6 months, sooner if indicated.     Vaccines:   Shingrix: she will d/w her PCP next visit.     Future Appointments   Date Time Provider Department Center   4/12/2023  9:10 AM LAB, APPOINTMENT [facility] LAB P [facility]   5/4/2023  8:30 AM Arcelia Cheng PA-C [facility] ORTHO [facility]   5/26/2023  3:00 PM Josey Camacho MD [facility] OPHTHAL [facility]   6/26/2023  2:00 PM David Edmondson MD [facility] CARDIO [facility]        Medication List            Accurate as of April 11, 2023  2:52 PM. If you have any questions, ask your nurse or doctor.                CONTINUE taking these medications      ascorbic acid (vitamin C) 1000 MG tablet  Commonly known as: VITAMIN C     aspirin 81 MG EC tablet  Commonly known as: ECOTRIN  Take 1 tablet (81 mg total) by mouth once daily.     atorvastatin 80 MG tablet  Commonly known as: LIPITOR  Take 1 tablet (80 mg total) by mouth once daily.     azithromycin 250 MG tablet  Commonly known as:  Z-GRAYSON  Take 2 tablets by mouth on day 1, then 1 tablet by mouth once daily days 2 through5.     Lactobacillus acidophilus 10 billion cell Cap     levetiracetam  mg Tb24 tablet  Commonly known as: KEPPRA XR  Take 1 tablet (750 mg total) by mouth every evening.     multivitamin capsule     omega-3 fatty acids 1,000 mg Cap            STOP taking these medications      LIDOcaine 5 % Oint ointment  Commonly known as: XYLOCAINE  Stopped by: MACKENZIE Head               Where to Get Your Medications        Information about where to get these medications is not yet available    Ask your nurse or doctor about these medications  aspirin 81 MG EC tablet           Signing Physician:  MACKENZIE Head

## 2023-04-10 ENCOUNTER — OFFICE VISIT (OUTPATIENT)
Dept: ORTHOPEDICS | Facility: CLINIC | Age: 58
End: 2023-04-10
Payer: COMMERCIAL

## 2023-04-10 DIAGNOSIS — M19.071 ARTHRITIS OF FIRST METATARSOPHALANGEAL (MTP) JOINT OF RIGHT FOOT: Primary | ICD-10-CM

## 2023-04-10 DIAGNOSIS — M62.461 GASTROCNEMIUS EQUINUS, RIGHT: ICD-10-CM

## 2023-04-10 DIAGNOSIS — M20.11 HALLUX VALGUS, RIGHT: ICD-10-CM

## 2023-04-10 PROCEDURE — 99999 PR PBB SHADOW E&M-EST. PATIENT-LVL II: CPT | Mod: PBBFAC,,, | Performed by: ORTHOPAEDIC SURGERY

## 2023-04-10 PROCEDURE — 99999 PR PBB SHADOW E&M-EST. PATIENT-LVL II: ICD-10-PCS | Mod: PBBFAC,,, | Performed by: ORTHOPAEDIC SURGERY

## 2023-04-10 PROCEDURE — 99215 PR OFFICE/OUTPT VISIT, EST, LEVL V, 40-54 MIN: ICD-10-PCS | Mod: S$GLB,,, | Performed by: ORTHOPAEDIC SURGERY

## 2023-04-10 PROCEDURE — 3044F PR MOST RECENT HEMOGLOBIN A1C LEVEL <7.0%: ICD-10-PCS | Mod: CPTII,S$GLB,, | Performed by: ORTHOPAEDIC SURGERY

## 2023-04-10 PROCEDURE — 99215 OFFICE O/P EST HI 40 MIN: CPT | Mod: S$GLB,,, | Performed by: ORTHOPAEDIC SURGERY

## 2023-04-10 PROCEDURE — 3044F HG A1C LEVEL LT 7.0%: CPT | Mod: CPTII,S$GLB,, | Performed by: ORTHOPAEDIC SURGERY

## 2023-04-10 NOTE — PATIENT INSTRUCTIONS
We have decided that surgery is an option to treat your bunion with arch instability, calf tightness and bone spurs .  Today, you decided to go forward with surgery after this discussion.  The surgery is called right calcaneus bone graft, lapiplasty, proximal gastroc recession, and 1st MTP release.  The surgery would be Outpatient (home same day).      I strongly advise you read more about this surgery - a great resource for learning about foot and ankle problems and their treatment options is www.footcaremd.org and https://orthoinfo.aaos.org/en/diseases--conditions/      In preparation for surgery, we went over the following today.      Activity after surgery  You will be Non-weight bearing for 2 weeks on the right lower extremity  You be in splint for 2 weeks and then boot until 6 weeks postop  Surgery is on your driving foot.  You will not be able to drive until off narcotic pain medications and until minimum 4-6 weeks.  Ultimately the decision about when to drive is a personal choice based on your assessment of your own ability/safety for operating a motor vehicle.  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 ([facility]); you will be contacted the day prior to surgery with time to arrive  I will alert your primary care provider and Dr. Bishop  I recommend that in preparation for surgery, you discuss this with family/friends and make sure your home is adapted/prepared for your needs after surgery  If applicable, you should also discuss this with your place of work and any documentation needed should be brought to your pre-operative visit  I strongly encourage patients to use the [facility] portal before and after surgery with any questions or concerns.  Surveys will also be sent to you and I appreciate you taking time to fill them out.

## 2023-04-10 NOTE — PROGRESS NOTES
Subjective:   Chief complaint: Follow-up right foot    HPI:   Darling Quach is a 57 y.o. female who presents today for follow-up.  Pain is 3/10, pt would like to discuss sx.  Notes only very temporary relief from the CSI in her MTP joint.  She continues to have multiple areas of pain - 1.) MTP pain- dorsal and medial eminence 2.) medial arch pain and cramps    Recall per HPI- long history of stable bunion with acute worsening of pain after stubbed toe.  It has become progressively pain ful and and interfered with ADLs, shoeware and decreased QOL.  Work as library in the school system.    ROS:  Musculoskeletal: per HPI     Objective:   Exam:  There were no vitals filed for this visit.  General: No acute distress, well-appearing  Musculoskeletal: Standing examination demonstrates neutral ankles.  There is no swelling or ecchymosis.  Medial longitudinal arch is slightly pronated, and asymmetric , and worse on right .  There is moderate bunion deformity present.  Slight pronation deformity present.      Focused exam of the right lower extremity demonstrates non-irritable ankle range of motion.  Hindfoot is flexible.  Ankle dorsiflexion is decreased with positive Silfverskiold .      Fires TA/GSC/PTT/peroneals.  SILT SP/DP/PT and able to localize.    Focused exam of the forefoot demonstrates limited hallux MPJ motion.  There is tenderness over the medial eminence.  Medial eminence skin changes: slight callus.  There is no first ray hypermobility.  Bunion is partially passively correctable.  MPJ grind does not cause pain.  There is no abnormal lesser toe callusing present.  There is no lesser toe deformities present.     Imaging:  Prior radiographs were independently interpreted by me.    Standing 3v foot demonstrates mild grade II at most MTP OA with increased HVA and SHARON and selective lateral joint space loss with osteophyte.  There is 1st TMT gapping.    Additional testing/results reviewed:  Previous treatment- MTP  CSI            Assessment:     1. Arthritis of first metatarsophalangeal (MTP) joint of right foot    2. Hallux valgus, right    3. Gastrocnemius equinus, right         Patient is seen for multiple problems (not at treatment goal) with uncertain prognosis and with ADLs and/or QOL likely to be impacted without treatment.    Data:  1 results independently interpreted    Treatment plan: Decision making for major surgery with procedural risk factors     I reviewed imaging, clinical history, and diagnosis as above with the patient. I attempted to use layman's terms to educate the patient as well as utilize foot models and/or pictures.   I personally went through imaging with the patient.      After further examination, clinical history and review of her x-rays, I think she would be better treated with 1st TMT arthrodesis/lapiplasty for arch stabilization.  I think she also needs gastroc lengthening (proximal variant to avoid weakness/cosmesis), cheilectomy. I want to minimal intra-articular work given stiffness and mild OA changes.    I had a lengthy discussion today with the patient regarding nonsurgical and surgical treatment options for a symptomatic bunion. We discussed continuing with nonsurgical treatment (spacers, shoe modifications), but given refractory symptoms, patient wishes to move forward with surgical discussion.  In their case, surgery would involve right calcaneus bone graft, lapiplasty, proximal gastroc recession, and 1st MTP release/cheilectomy. I discussed the risks benefits and alternatives to surgery. The potential benefits of surgery include improved alignment and improved pain.  I reviewed the medical and orthopedic risks of surgery. I reviewed the medical risks include but are not limited to the risks associated with anesthesia and any invasive procedure.  The orthopedic risks include but are not limited to the risk of infection and/or wound complications, bleeding, blood clots (and possible  pulmonary emboli), surrounding structure injury, the bones not healing or not healing correctly, postoperative pain and stiffness, nerve sensitivity, and potential need for revision surgery. Specifically discussed the up to 40% risk that bunion recurs.      We reviewed the postoperative course and associated restrictions and information provided via AVS.  Patient was given opportunity to ask questions both about the surgery details and the risks.  After this discussion, patient decided to move forward with surgical scheduling.       Plan:       See AVS  --notify PCP and neurology re: upcoming surgery    Total evaluation and management time performed on visit date =  55 minutes    This includes face to face time and non-face to face time preparing to see the patient (eg, review of tests), obtaining and/or reviewing separately obtained history, documenting clinical information in the electronic or other health record, independently interpreting results and communicating results to the patient/family/caregiver, or care coordinator.    No orders of the defined types were placed in this encounter.      Past Medical History:   Diagnosis Date    Glaucoma     Hemangioma     cavernous    History of acne 2004    dr lopez prescribed accutane    Hyperlipidemia     Seizures     Venous angioma of brain     Vertigo        Past Surgical History:   Procedure Laterality Date    ANAL SPHINCTEROTOMY  5/27/2019    Procedure: SPHINCTEROTOMY, ANAL;  Surgeon: Nirmal Chan MD;  Location: [facility] OR [address] [place] [address];  Service: Colon and Rectal;;    CATARACT EXTRACTION W/  INTRAOCULAR LENS IMPLANT Right 12/17/2014        CATARACT EXTRACTION W/  INTRAOCULAR LENS IMPLANT Left 03/17/2022        EXCISIONAL HEMORRHOIDECTOMY N/A 5/27/2019    Procedure: HEMORRHOIDECTOMY;  Surgeon: Nirmal Chan MD;  Location: [facility] OR [address] [place] [address];  Service: Colon and Rectal;  Laterality: N/A;    HYSTERECTOMY  2010    [facility] ov in situ      INTRAOCULAR PROSTHESES INSERTION Left 3/16/2022    Procedure: INSERTION, IOL PROSTHESIS;  Surgeon: Josey Camacho MD;  Location: [facility] OR [address] [place] [address];  Service: Ophthalmology;  Laterality: Left;    PHACOEMULSIFICATION OF CATARACT Left 3/16/2022    Procedure: PHACOEMULSIFICATION, CATARACT;  Surgeon: Josey Camacho MD;  Location: [facility] OR [address] [place] [address];  Service: Ophthalmology;  Laterality: Left;       Family History   Problem Relation Age of Onset    Fibroids Mother     Migraines Mother     Glaucoma Mother     Heart disease Father     Depression Brother     No Known Problems Sister     Diabetes Maternal Grandmother     Glaucoma Maternal Grandmother     Cancer Maternal Aunt         breast    Breast cancer Maternal Aunt 50    No Known Problems Maternal Uncle     No Known Problems Paternal Aunt     No Known Problems Paternal Uncle     No Known Problems Maternal Grandfather     No Known Problems Paternal Grandmother     No Known Problems Paternal Grandfather     Breast cancer Maternal Aunt     Macular degeneration Neg Hx     Blindness Neg Hx     Melanoma Neg Hx     Psoriasis Neg Hx     Lupus Neg Hx     Eczema Neg Hx     Acne Neg Hx     Cataracts Neg Hx     Amblyopia Neg Hx     Retinal detachment Neg Hx     Strabismus Neg Hx     Stroke Neg Hx     Thyroid disease Neg Hx     Hypertension Neg Hx     Ovarian cancer Neg Hx     Colon cancer Neg Hx        Social History     Socioeconomic History    Marital status:    Occupational History    Occupation: [employer] ([employer])   Tobacco Use    Smoking status: Never    Smokeless tobacco: Never   Substance and Sexual Activity    Alcohol use: No    Drug use: No    Sexual activity: Yes     Partners: Male     Birth control/protection: Surgical     Comment: , [facility] 9/2010   Other Topics Concern    Are you pregnant or think you may be? No    Breast-feeding No

## 2023-04-10 NOTE — LETTER
April 13, 2023        Shauna Rose MD  [address]  [facility] [address]             [facility] 5th Fl  [address], [address]TH FLO[address]  [facility] [address]  Phone: [phone]   Patient: Darling Quach   MR Number: [id number]   YOB: 1965   Date of Visit: 4/10/2023     Dear Dr. Rose,     I am involved the care of out mutual patient Darling Quach.  They have elected to go forward with surgery to address painful big toe/bunion.      No specific preoperative appointments are needed unless you feel something specific needs to be addressed/evaluated prior to their surgery.  If that is case, please don't hesitate to reach out to us.  Otherwise, my MELISSA will take care of their preoperative visit and orders.    Sincerely,      Aletha Hollins MD  Foot & Ankle Orthopedic Surgery  04/13/2023        Sincerely,      Aletha Hollins MD            CC  Lily Bishop MD PhD    Enclosure

## 2023-04-11 ENCOUNTER — TELEPHONE (OUTPATIENT)
Dept: OBSTETRICS AND GYNECOLOGY | Facility: CLINIC | Age: 58
End: 2023-04-11
Payer: COMMERCIAL

## 2023-04-11 ENCOUNTER — OFFICE VISIT (OUTPATIENT)
Dept: INTERNAL MEDICINE | Facility: CLINIC | Age: 58
End: 2023-04-11
Payer: COMMERCIAL

## 2023-04-11 VITALS
HEART RATE: 67 BPM | WEIGHT: 119.69 LBS | DIASTOLIC BLOOD PRESSURE: 64 MMHG | SYSTOLIC BLOOD PRESSURE: 102 MMHG | BODY MASS INDEX: 22.03 KG/M2 | OXYGEN SATURATION: 99 % | HEIGHT: 62 IN

## 2023-04-11 DIAGNOSIS — E78.00 HYPERCHOLESTEROLEMIA: ICD-10-CM

## 2023-04-11 DIAGNOSIS — E78.2 MIXED DYSLIPIDEMIA: ICD-10-CM

## 2023-04-11 DIAGNOSIS — R56.9 SEIZURE: ICD-10-CM

## 2023-04-11 DIAGNOSIS — H40.89 GLAUCOMA DUE TO COMBINATION OF MECHANISMS: ICD-10-CM

## 2023-04-11 DIAGNOSIS — Z00.00 ANNUAL PHYSICAL EXAM: Primary | ICD-10-CM

## 2023-04-11 DIAGNOSIS — R93.1 AGATSTON CAC SCORE, <100: ICD-10-CM

## 2023-04-11 PROCEDURE — 1159F PR MEDICATION LIST DOCUMENTED IN MEDICAL RECORD: ICD-10-PCS | Mod: CPTII,S$GLB,, | Performed by: NURSE PRACTITIONER

## 2023-04-11 PROCEDURE — 99999 PR PBB SHADOW E&M-EST. PATIENT-LVL IV: ICD-10-PCS | Mod: PBBFAC,,, | Performed by: NURSE PRACTITIONER

## 2023-04-11 PROCEDURE — 99999 PR PBB SHADOW E&M-EST. PATIENT-LVL IV: CPT | Mod: PBBFAC,,, | Performed by: NURSE PRACTITIONER

## 2023-04-11 PROCEDURE — 99396 PR PREVENTIVE VISIT,EST,40-64: ICD-10-PCS | Mod: S$GLB,,, | Performed by: NURSE PRACTITIONER

## 2023-04-11 PROCEDURE — 3074F PR MOST RECENT SYSTOLIC BLOOD PRESSURE < 130 MM HG: ICD-10-PCS | Mod: CPTII,S$GLB,, | Performed by: NURSE PRACTITIONER

## 2023-04-11 PROCEDURE — 3074F SYST BP LT 130 MM HG: CPT | Mod: CPTII,S$GLB,, | Performed by: NURSE PRACTITIONER

## 2023-04-11 PROCEDURE — 3008F BODY MASS INDEX DOCD: CPT | Mod: CPTII,S$GLB,, | Performed by: NURSE PRACTITIONER

## 2023-04-11 PROCEDURE — 3008F PR BODY MASS INDEX (BMI) DOCUMENTED: ICD-10-PCS | Mod: CPTII,S$GLB,, | Performed by: NURSE PRACTITIONER

## 2023-04-11 PROCEDURE — 3078F DIAST BP <80 MM HG: CPT | Mod: CPTII,S$GLB,, | Performed by: NURSE PRACTITIONER

## 2023-04-11 PROCEDURE — 1160F RVW MEDS BY RX/DR IN RCRD: CPT | Mod: CPTII,S$GLB,, | Performed by: NURSE PRACTITIONER

## 2023-04-11 PROCEDURE — 1159F MED LIST DOCD IN RCRD: CPT | Mod: CPTII,S$GLB,, | Performed by: NURSE PRACTITIONER

## 2023-04-11 PROCEDURE — 99396 PREV VISIT EST AGE 40-64: CPT | Mod: S$GLB,,, | Performed by: NURSE PRACTITIONER

## 2023-04-11 PROCEDURE — 1160F PR REVIEW ALL MEDS BY PRESCRIBER/CLIN PHARMACIST DOCUMENTED: ICD-10-PCS | Mod: CPTII,S$GLB,, | Performed by: NURSE PRACTITIONER

## 2023-04-11 PROCEDURE — 3078F PR MOST RECENT DIASTOLIC BLOOD PRESSURE < 80 MM HG: ICD-10-PCS | Mod: CPTII,S$GLB,, | Performed by: NURSE PRACTITIONER

## 2023-04-11 RX ORDER — ASPIRIN 81 MG/1
81 TABLET ORAL DAILY
Qty: 30 TABLET | Refills: 0
Start: 2023-04-11 | End: 2023-11-07 | Stop reason: SDUPTHER

## 2023-04-11 NOTE — TELEPHONE ENCOUNTER
----- Message from Puja Mckeon sent at 4/11/2023  3:59 PM CDT -----  Regarding: Appt Request  Contact: DARLING KENNEDY [[id number]]  Name of Who is Calling: Mrs. Darling Kennedy             What is the request in detail:   Pt states she is requesting an wwe appt , she needs it done before 05/29 due to her having a procedure on this date.   Please advise         Can the clinic reply by [facility]:No           What Number to Call Back if not in [facility]: Home Phone      [phone]  Work Phone      Not on file.  Mobile          [phone]

## 2023-04-12 ENCOUNTER — PATIENT MESSAGE (OUTPATIENT)
Dept: CARDIOLOGY | Facility: CLINIC | Age: 58
End: 2023-04-12
Payer: COMMERCIAL

## 2023-04-12 ENCOUNTER — LAB VISIT (OUTPATIENT)
Dept: LAB | Facility: HOSPITAL | Age: 58
End: 2023-04-12
Payer: COMMERCIAL

## 2023-04-12 DIAGNOSIS — E78.2 MIXED DYSLIPIDEMIA: ICD-10-CM

## 2023-04-12 DIAGNOSIS — Z00.00 ANNUAL PHYSICAL EXAM: ICD-10-CM

## 2023-04-12 DIAGNOSIS — H40.89 GLAUCOMA DUE TO COMBINATION OF MECHANISMS: ICD-10-CM

## 2023-04-12 DIAGNOSIS — R56.9 SEIZURE: ICD-10-CM

## 2023-04-12 DIAGNOSIS — E78.00 HYPERCHOLESTEROLEMIA: ICD-10-CM

## 2023-04-12 LAB
25(OH)D3+25(OH)D2 SERPL-MCNC: 44 NG/ML (ref 30–96)
ALBUMIN SERPL BCP-MCNC: 4.1 G/DL (ref 3.5–5.2)
ALP SERPL-CCNC: 62 U/L (ref 55–135)
ALT SERPL W/O P-5'-P-CCNC: 22 U/L (ref 10–44)
ANION GAP SERPL CALC-SCNC: 11 MMOL/L (ref 8–16)
AST SERPL-CCNC: 23 U/L (ref 10–40)
BASOPHILS # BLD AUTO: 0.07 K/UL (ref 0–0.2)
BASOPHILS NFR BLD: 1.2 % (ref 0–1.9)
BILIRUB SERPL-MCNC: 0.4 MG/DL (ref 0.1–1)
BUN SERPL-MCNC: 18 MG/DL (ref 6–20)
CALCIUM SERPL-MCNC: 10 MG/DL (ref 8.7–10.5)
CHLORIDE SERPL-SCNC: 105 MMOL/L (ref 95–110)
CHOLEST SERPL-MCNC: 218 MG/DL (ref 120–199)
CHOLEST/HDLC SERPL: 2.4 {RATIO} (ref 2–5)
CO2 SERPL-SCNC: 27 MMOL/L (ref 23–29)
CREAT SERPL-MCNC: 1 MG/DL (ref 0.5–1.4)
DIFFERENTIAL METHOD: NORMAL
EOSINOPHIL # BLD AUTO: 0.1 K/UL (ref 0–0.5)
EOSINOPHIL NFR BLD: 1.7 % (ref 0–8)
ERYTHROCYTE [DISTWIDTH] IN BLOOD BY AUTOMATED COUNT: 12.5 % (ref 11.5–14.5)
EST. GFR  (NO RACE VARIABLE): >60 ML/MIN/1.73 M^2
ESTIMATED AVG GLUCOSE: 105 MG/DL (ref 68–131)
FERRITIN SERPL-MCNC: 40 NG/ML (ref 20–300)
GLUCOSE SERPL-MCNC: 83 MG/DL (ref 70–110)
HBA1C MFR BLD: 5.3 % (ref 4–5.6)
HCT VFR BLD AUTO: 40.3 % (ref 37–48.5)
HCV AB SERPL QL IA: NORMAL
HDLC SERPL-MCNC: 91 MG/DL (ref 40–75)
HDLC SERPL: 41.7 % (ref 20–50)
HGB BLD-MCNC: 13.4 G/DL (ref 12–16)
IMM GRANULOCYTES # BLD AUTO: 0.02 K/UL (ref 0–0.04)
IMM GRANULOCYTES NFR BLD AUTO: 0.3 % (ref 0–0.5)
IRON SERPL-MCNC: 79 UG/DL (ref 30–160)
LDLC SERPL CALC-MCNC: 115.4 MG/DL (ref 63–159)
LYMPHOCYTES # BLD AUTO: 1.9 K/UL (ref 1–4.8)
LYMPHOCYTES NFR BLD: 31.8 % (ref 18–48)
MCH RBC QN AUTO: 29.2 PG (ref 27–31)
MCHC RBC AUTO-ENTMCNC: 33.3 G/DL (ref 32–36)
MCV RBC AUTO: 88 FL (ref 82–98)
MONOCYTES # BLD AUTO: 0.5 K/UL (ref 0.3–1)
MONOCYTES NFR BLD: 8.5 % (ref 4–15)
NEUTROPHILS # BLD AUTO: 3.4 K/UL (ref 1.8–7.7)
NEUTROPHILS NFR BLD: 56.5 % (ref 38–73)
NONHDLC SERPL-MCNC: 127 MG/DL
NRBC BLD-RTO: 0 /100 WBC
PLATELET # BLD AUTO: 220 K/UL (ref 150–450)
PMV BLD AUTO: 9.5 FL (ref 9.2–12.9)
POTASSIUM SERPL-SCNC: 3.7 MMOL/L (ref 3.5–5.1)
PROT SERPL-MCNC: 7.4 G/DL (ref 6–8.4)
RBC # BLD AUTO: 4.59 M/UL (ref 4–5.4)
SATURATED IRON: 21 % (ref 20–50)
SODIUM SERPL-SCNC: 143 MMOL/L (ref 136–145)
TOTAL IRON BINDING CAPACITY: 374 UG/DL (ref 250–450)
TRANSFERRIN SERPL-MCNC: 253 MG/DL (ref 200–375)
TRIGL SERPL-MCNC: 58 MG/DL (ref 30–150)
TSH SERPL DL<=0.005 MIU/L-ACNC: 1.27 UIU/ML (ref 0.4–4)
WBC # BLD AUTO: 6 K/UL (ref 3.9–12.7)

## 2023-04-12 PROCEDURE — 86803 HEPATITIS C AB TEST: CPT | Performed by: NURSE PRACTITIONER

## 2023-04-12 PROCEDURE — 83036 HEMOGLOBIN GLYCOSYLATED A1C: CPT | Performed by: NURSE PRACTITIONER

## 2023-04-12 PROCEDURE — 82728 ASSAY OF FERRITIN: CPT | Performed by: NURSE PRACTITIONER

## 2023-04-12 PROCEDURE — 84443 ASSAY THYROID STIM HORMONE: CPT | Performed by: NURSE PRACTITIONER

## 2023-04-12 PROCEDURE — 80061 LIPID PANEL: CPT | Performed by: NURSE PRACTITIONER

## 2023-04-12 PROCEDURE — 36415 COLL VENOUS BLD VENIPUNCTURE: CPT | Performed by: NURSE PRACTITIONER

## 2023-04-12 PROCEDURE — 85025 COMPLETE CBC W/AUTO DIFF WBC: CPT | Performed by: NURSE PRACTITIONER

## 2023-04-12 PROCEDURE — 84466 ASSAY OF TRANSFERRIN: CPT | Performed by: NURSE PRACTITIONER

## 2023-04-12 PROCEDURE — 82306 VITAMIN D 25 HYDROXY: CPT | Performed by: NURSE PRACTITIONER

## 2023-04-12 PROCEDURE — 80053 COMPREHEN METABOLIC PANEL: CPT | Performed by: NURSE PRACTITIONER

## 2023-04-18 ENCOUNTER — OFFICE VISIT (OUTPATIENT)
Dept: OBSTETRICS AND GYNECOLOGY | Facility: CLINIC | Age: 58
End: 2023-04-18
Payer: COMMERCIAL

## 2023-04-18 VITALS
BODY MASS INDEX: 21.94 KG/M2 | DIASTOLIC BLOOD PRESSURE: 64 MMHG | SYSTOLIC BLOOD PRESSURE: 110 MMHG | WEIGHT: 119.25 LBS | HEIGHT: 62 IN

## 2023-04-18 DIAGNOSIS — Z12.31 BREAST CANCER SCREENING BY MAMMOGRAM: ICD-10-CM

## 2023-04-18 DIAGNOSIS — N95.2 VAGINAL ATROPHY: ICD-10-CM

## 2023-04-18 DIAGNOSIS — Z01.411 ENCOUNTER FOR GYNECOLOGICAL EXAMINATION WITH ABNORMAL FINDING: Primary | ICD-10-CM

## 2023-04-18 PROCEDURE — 3078F DIAST BP <80 MM HG: CPT | Mod: CPTII,S$GLB,, | Performed by: OBSTETRICS & GYNECOLOGY

## 2023-04-18 PROCEDURE — 3074F SYST BP LT 130 MM HG: CPT | Mod: CPTII,S$GLB,, | Performed by: OBSTETRICS & GYNECOLOGY

## 2023-04-18 PROCEDURE — 3078F PR MOST RECENT DIASTOLIC BLOOD PRESSURE < 80 MM HG: ICD-10-PCS | Mod: CPTII,S$GLB,, | Performed by: OBSTETRICS & GYNECOLOGY

## 2023-04-18 PROCEDURE — 3074F PR MOST RECENT SYSTOLIC BLOOD PRESSURE < 130 MM HG: ICD-10-PCS | Mod: CPTII,S$GLB,, | Performed by: OBSTETRICS & GYNECOLOGY

## 2023-04-18 PROCEDURE — 1159F MED LIST DOCD IN RCRD: CPT | Mod: CPTII,S$GLB,, | Performed by: OBSTETRICS & GYNECOLOGY

## 2023-04-18 PROCEDURE — 99999 PR PBB SHADOW E&M-EST. PATIENT-LVL III: CPT | Mod: PBBFAC,,, | Performed by: OBSTETRICS & GYNECOLOGY

## 2023-04-18 PROCEDURE — 99999 PR PBB SHADOW E&M-EST. PATIENT-LVL III: ICD-10-PCS | Mod: PBBFAC,,, | Performed by: OBSTETRICS & GYNECOLOGY

## 2023-04-18 PROCEDURE — 99396 PR PREVENTIVE VISIT,EST,40-64: ICD-10-PCS | Mod: S$GLB,,, | Performed by: OBSTETRICS & GYNECOLOGY

## 2023-04-18 PROCEDURE — 3044F PR MOST RECENT HEMOGLOBIN A1C LEVEL <7.0%: ICD-10-PCS | Mod: CPTII,S$GLB,, | Performed by: OBSTETRICS & GYNECOLOGY

## 2023-04-18 PROCEDURE — 99396 PREV VISIT EST AGE 40-64: CPT | Mod: S$GLB,,, | Performed by: OBSTETRICS & GYNECOLOGY

## 2023-04-18 PROCEDURE — 3044F HG A1C LEVEL LT 7.0%: CPT | Mod: CPTII,S$GLB,, | Performed by: OBSTETRICS & GYNECOLOGY

## 2023-04-18 PROCEDURE — 3008F BODY MASS INDEX DOCD: CPT | Mod: CPTII,S$GLB,, | Performed by: OBSTETRICS & GYNECOLOGY

## 2023-04-18 PROCEDURE — 1159F PR MEDICATION LIST DOCUMENTED IN MEDICAL RECORD: ICD-10-PCS | Mod: CPTII,S$GLB,, | Performed by: OBSTETRICS & GYNECOLOGY

## 2023-04-18 PROCEDURE — 3008F PR BODY MASS INDEX (BMI) DOCUMENTED: ICD-10-PCS | Mod: CPTII,S$GLB,, | Performed by: OBSTETRICS & GYNECOLOGY

## 2023-04-18 NOTE — PROGRESS NOTES
Well-woman exam    Darling Quach  is a 58 y.o.  patient who presents for a well-woman exam.  She is S/P hysterectomy.  Patient has no gynecologic complaints today.    Past Medical History:   Diagnosis Date    Glaucoma     Hemangioma     cavernous    History of acne     dr lopez prescribed accutane    Hyperlipidemia     Seizures     Venous angioma of brain     Vertigo        Past Surgical History:   Procedure Laterality Date    ANAL SPHINCTEROTOMY  2019    Procedure: SPHINCTEROTOMY, ANAL;  Surgeon: Nirmal Chan MD;  [facility];  Service: Colon and Rectal;;    CATARACT EXTRACTION W/  INTRAOCULAR LENS IMPLANT Right 2014        CATARACT EXTRACTION W/  INTRAOCULAR LENS IMPLANT Left 2022        EXCISIONAL HEMORRHOIDECTOMY N/A 2019    Procedure: HEMORRHOIDECTOMY;  Surgeon: Nirmal Chan MD;  [facility];  Service: Colon and Rectal;  Laterality: N/A;    HYSTERECTOMY      [facility] ov in situ     INTRAOCULAR PROSTHESES INSERTION Left 3/16/2022    Procedure: INSERTION, IOL PROSTHESIS;  Surgeon: Josey Camacho MD;  [facility];  Service: Ophthalmology;  Laterality: Left;    PHACOEMULSIFICATION OF CATARACT Left 3/16/2022    Procedure: PHACOEMULSIFICATION, CATARACT;  Surgeon: Josey Camacho MD;  Location: [facility] OR [address] [place] [address];  Service: Ophthalmology;  Laterality: Left;       OB History          3    Para   3    Term   3            AB        Living   3         SAB        IAB        Ectopic        Multiple        Live Births                     ROS:  GENERAL: Feeling well overall.   SKIN: Denies rash or lesions.   HEAD: Denies head injury or headache.   NODES: Denies enlarged lymph nodes.   CHEST: Denies chest pain or shortness of breath.   CARDIOVASCULAR: Denies palpitations or left sided chest pain.   ABDOMEN: No abdominal pain, nausea, vomiting or rectal bleeding.   URINARY: No dysuria,  hematuria, or burning on urination.  REPRODUCTIVE: See HPI.   BREASTS: Denies pain, lumps, or nipple discharge.   HEMATOLOGIC: No easy bruisability or excessive bleeding.   MUSCULOSKELETAL: Denies joint pain or swelling.   NEUROLOGIC: Denies syncope or weakness.   PSYCHIATRIC: Denies depression.    PE:   APPEARANCE: Well nourished, well developed, in no acute distress.  NECK: Neck symmetric without masses or thyromegaly.  NODES: No inguinal lymph node enlargement.  ABDOMEN: Soft. No tenderness or masses. No hernias.  BREASTS: Symmetrical, no skin changes or visible lesions. No palpable masses, nipple discharge or adenopathy bilaterally.  PELVIC: Normal external female genitalia without lesions. Normal hair distribution. Adequate perineal body, normal urethral meatus. Vagina mildly atrophic without lesions or discharge.  Good vaginal cuff support.  No significant cystocele or rectocele. Uterus and cervix surgically absent. Bimanual exam revealed no masses, tenderness or abnormality.  ANUS: Normal.    Diagnosis:  1. Encounter for gynecological examination with abnormal finding    2. Vaginal atrophy    3. Breast cancer screening by mammogram        PLAN:    Orders Placed This Encounter    Mammo Digital Screening Bilat w/ Eric     Age specific counseling    Cancer screening recommendations reviewed with patient today.  Patient verbalized understanding of this discussion.  Patient is up-to-date on all screening.    Breast cancer screening by mammogram ordered today    Patient was counseled today on postmenopausal issues.  Patient counseled on vaginal atrophy findings and treatment options.  Patient declines treatment today    Follow up in about 1 year (around 4/18/2024) for Annual exam.    El Mayes IV, MD

## 2023-04-21 ENCOUNTER — PATIENT MESSAGE (OUTPATIENT)
Dept: ORTHOPEDICS | Facility: CLINIC | Age: 58
End: 2023-04-21
Payer: COMMERCIAL

## 2023-05-12 ENCOUNTER — PATIENT MESSAGE (OUTPATIENT)
Dept: ORTHOPEDICS | Facility: CLINIC | Age: 58
End: 2023-05-12
Payer: COMMERCIAL

## 2023-05-22 NOTE — PROGRESS NOTES
HPI    DLS: 11/04/2022    Pt here for 6 Month Check;  Pt states no eye pain or discomfort.     Meds;  No GTTS    1. MMG/OHT OU   2. PVD OU   3. Vitreous Opacity OD   4. PCIOL OU   5. Steroid Responder   6.  Hx Removal Left Eyelid Papilloma (Dr. Alvarado)     Last edited by Josey Camacho MD on 5/26/2023  3:28 PM.            Assessment /Plan     For exam results, see Encounter Report.    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 Mixed mechanism Glaucoma   H/O narrow angles - S/P PI's, + residual OHT   First HVF 1998   First photos 1999   Former [employer] , retired for a few years and went back to work as a    (mom with same problem with narrow angles and residual OHT)     Family history + mom - Narrow angles with residual OHT   Glaucoma meds - off gtts (use to use timolol)   H/O adverse rxn to glaucoma drops - latanoprost - eye irritation  LASERS PI's ou // yag cap od 4/25/2019  GLAUCOMA SURGERIES none   OTHER E[facility]GER[facility]  CDR 0.4/0.4   Tbase 23-36 / 23-35 ou   Tmax 36/35   Ttarget 30/30  HVF 16 test 24-2 ss ou 1996 to 2021 ([facility]) - hint SNS  od // full (w/ lens rim art)  os            3 SWAP test 2010 to 2012 - Full ou            ( repeat HVF from 8/2013 to 9/2013 - new SAD od confirmed)            [place] VF's - 1 test 2016 to 2016 - SNS /INS od // full os   2019 VF full od/near full os   Gonio +3 ou S/P PI's   /618   OCT 5 test 2005 -  2021 ([facility]) - RNFL - bord TI  od/ bord bord TS  os   HRT - 5 test - 2014 to 2020 - MR -  nl od // bord SN os /// CDR 0.55  od // 0.55 os   Mix of main campus and [facility]   Disc photos 1999, 2004, 2013 - slides // 5/13/2010 - // 2019 - OIS     - Ttoday  17/17  (usually higher)   - Test done today -IOP // gonio     2. PVD ou - with dense vitreous veil OD Saw [facility] 4/23/2012 4/2019 - pt is noticing the floaters again (had not noticed them for several years)     3. Vitreous opacity OD     4. Steroid responder - had an injection / ? Spironolactone / aldactone - not currently on any steroids    5. NS os    Pt c/o blurry vision - glasses need changing every 2-3 months    BCVA 20/40 // BAT h 20/400    Having trouble with driving and reading    She is a      6. Refractive error   Was bilateral hyperope / presbyope   Is anisopmetropic post CE od    Glasses from [employer]  - no longer working for her    7. H/O eyelid papilloma Left - S/P removal - [facility]     8. Pseudophakia od   PC IOL od 12/17/2014 - SN60wf 20.5   Had a large myopic shift pre surgery from +1.00 to -3.00    8.  I see her father in law - he has glaucoma - and I did his cataracts    I see her mother - she has similar issues to this patient    She would like me to see her adult daughter to evaluate her for OHT / glaucoma as well     Plan   MMG  / OHT ou  S/P PI's ou   HVF today near full OU    Intolerant to latanoprost - red irritated eye  Off all glaucoma gtts - (use to use timolol) 7/2019     [facility] Rx - Boudreuax - 5/9/2019 - says yag cap helped a lot     Photo file updated - [facility] - 5/25/2021     IOL calc - holder   SN60WF - 22.5  MTA4U0 - 18.5    Phaco / IOL OS Date: 3/17/2022 - sn60wf 22.5   POY > 1 - phaco/IOL        F/U 4-6 mos with HVF / DFE / OCT - Met

## 2023-05-26 ENCOUNTER — OFFICE VISIT (OUTPATIENT)
Dept: OPHTHALMOLOGY | Facility: CLINIC | Age: 58
End: 2023-05-26
Payer: COMMERCIAL

## 2023-05-26 DIAGNOSIS — H26.491 PCO (POSTERIOR CAPSULAR OPACIFICATION), RIGHT: ICD-10-CM

## 2023-05-26 DIAGNOSIS — H40.53X1 BILATERAL SECONDARY GLAUCOMA DUE TO COMBINATION MECHANISMS, MILD STAGE: Primary | ICD-10-CM

## 2023-05-26 DIAGNOSIS — H40.043 STEROID RESPONDERS BORDERLINE GLAUCOMA, BILATERAL: ICD-10-CM

## 2023-05-26 DIAGNOSIS — H43.813 VITREOUS DEGENERATION OF BOTH EYES: ICD-10-CM

## 2023-05-26 DIAGNOSIS — H43.813 POSTERIOR VITREOUS DETACHMENT OF BOTH EYES: ICD-10-CM

## 2023-05-26 DIAGNOSIS — Z96.1 PSEUDOPHAKIA OF BOTH EYES: ICD-10-CM

## 2023-05-26 PROCEDURE — 1159F PR MEDICATION LIST DOCUMENTED IN MEDICAL RECORD: ICD-10-PCS | Mod: CPTII,S$GLB,, | Performed by: OPHTHALMOLOGY

## 2023-05-26 PROCEDURE — 3044F HG A1C LEVEL LT 7.0%: CPT | Mod: CPTII,S$GLB,, | Performed by: OPHTHALMOLOGY

## 2023-05-26 PROCEDURE — 3044F PR MOST RECENT HEMOGLOBIN A1C LEVEL <7.0%: ICD-10-PCS | Mod: CPTII,S$GLB,, | Performed by: OPHTHALMOLOGY

## 2023-05-26 PROCEDURE — 92020 GONIOSCOPY: CPT | Mod: S$GLB,,, | Performed by: OPHTHALMOLOGY

## 2023-05-26 PROCEDURE — 1160F RVW MEDS BY RX/DR IN RCRD: CPT | Mod: CPTII,S$GLB,, | Performed by: OPHTHALMOLOGY

## 2023-05-26 PROCEDURE — 99213 PR OFFICE/OUTPT VISIT, EST, LEVL III, 20-29 MIN: ICD-10-PCS | Mod: S$GLB,,, | Performed by: OPHTHALMOLOGY

## 2023-05-26 PROCEDURE — 1159F MED LIST DOCD IN RCRD: CPT | Mod: CPTII,S$GLB,, | Performed by: OPHTHALMOLOGY

## 2023-05-26 PROCEDURE — 92020 PR SPECIAL EYE EVAL,GONIOSCOPY: ICD-10-PCS | Mod: S$GLB,,, | Performed by: OPHTHALMOLOGY

## 2023-05-26 PROCEDURE — 1160F PR REVIEW ALL MEDS BY PRESCRIBER/CLIN PHARMACIST DOCUMENTED: ICD-10-PCS | Mod: CPTII,S$GLB,, | Performed by: OPHTHALMOLOGY

## 2023-05-26 PROCEDURE — 99213 OFFICE O/P EST LOW 20 MIN: CPT | Mod: S$GLB,,, | Performed by: OPHTHALMOLOGY

## 2023-05-26 PROCEDURE — 99999 PR PBB SHADOW E&M-EST. PATIENT-LVL III: ICD-10-PCS | Mod: PBBFAC,,, | Performed by: OPHTHALMOLOGY

## 2023-05-26 PROCEDURE — 99999 PR PBB SHADOW E&M-EST. PATIENT-LVL III: CPT | Mod: PBBFAC,,, | Performed by: OPHTHALMOLOGY

## 2023-06-26 ENCOUNTER — OFFICE VISIT (OUTPATIENT)
Dept: CARDIOLOGY | Facility: CLINIC | Age: 58
End: 2023-06-26
Payer: COMMERCIAL

## 2023-06-26 VITALS
HEIGHT: 62 IN | SYSTOLIC BLOOD PRESSURE: 100 MMHG | BODY MASS INDEX: 22.19 KG/M2 | HEART RATE: 74 BPM | DIASTOLIC BLOOD PRESSURE: 60 MMHG | WEIGHT: 120.56 LBS | OXYGEN SATURATION: 97 %

## 2023-06-26 DIAGNOSIS — E78.00 HYPERCHOLESTEROLEMIA: ICD-10-CM

## 2023-06-26 DIAGNOSIS — E78.2 MIXED DYSLIPIDEMIA: ICD-10-CM

## 2023-06-26 DIAGNOSIS — R93.1 AGATSTON CAC SCORE, <100: Primary | ICD-10-CM

## 2023-06-26 DIAGNOSIS — M19.071 ARTHRITIS OF FIRST METATARSOPHALANGEAL (MTP) JOINT OF RIGHT FOOT: Primary | ICD-10-CM

## 2023-06-26 PROCEDURE — 3078F PR MOST RECENT DIASTOLIC BLOOD PRESSURE < 80 MM HG: ICD-10-PCS | Mod: CPTII,S$GLB,, | Performed by: INTERNAL MEDICINE

## 2023-06-26 PROCEDURE — 3074F PR MOST RECENT SYSTOLIC BLOOD PRESSURE < 130 MM HG: ICD-10-PCS | Mod: CPTII,S$GLB,, | Performed by: INTERNAL MEDICINE

## 2023-06-26 PROCEDURE — 99215 PR OFFICE/OUTPT VISIT, EST, LEVL V, 40-54 MIN: ICD-10-PCS | Mod: S$GLB,,, | Performed by: INTERNAL MEDICINE

## 2023-06-26 PROCEDURE — 1159F MED LIST DOCD IN RCRD: CPT | Mod: CPTII,S$GLB,, | Performed by: INTERNAL MEDICINE

## 2023-06-26 PROCEDURE — 1159F PR MEDICATION LIST DOCUMENTED IN MEDICAL RECORD: ICD-10-PCS | Mod: CPTII,S$GLB,, | Performed by: INTERNAL MEDICINE

## 2023-06-26 PROCEDURE — 99215 OFFICE O/P EST HI 40 MIN: CPT | Mod: S$GLB,,, | Performed by: INTERNAL MEDICINE

## 2023-06-26 PROCEDURE — 99999 PR PBB SHADOW E&M-EST. PATIENT-LVL IV: CPT | Mod: PBBFAC,,, | Performed by: INTERNAL MEDICINE

## 2023-06-26 PROCEDURE — 3044F PR MOST RECENT HEMOGLOBIN A1C LEVEL <7.0%: ICD-10-PCS | Mod: CPTII,S$GLB,, | Performed by: INTERNAL MEDICINE

## 2023-06-26 PROCEDURE — 3074F SYST BP LT 130 MM HG: CPT | Mod: CPTII,S$GLB,, | Performed by: INTERNAL MEDICINE

## 2023-06-26 PROCEDURE — 3044F HG A1C LEVEL LT 7.0%: CPT | Mod: CPTII,S$GLB,, | Performed by: INTERNAL MEDICINE

## 2023-06-26 PROCEDURE — 3078F DIAST BP <80 MM HG: CPT | Mod: CPTII,S$GLB,, | Performed by: INTERNAL MEDICINE

## 2023-06-26 PROCEDURE — 3008F BODY MASS INDEX DOCD: CPT | Mod: CPTII,S$GLB,, | Performed by: INTERNAL MEDICINE

## 2023-06-26 PROCEDURE — 99999 PR PBB SHADOW E&M-EST. PATIENT-LVL IV: ICD-10-PCS | Mod: PBBFAC,,, | Performed by: INTERNAL MEDICINE

## 2023-06-26 PROCEDURE — 3008F PR BODY MASS INDEX (BMI) DOCUMENTED: ICD-10-PCS | Mod: CPTII,S$GLB,, | Performed by: INTERNAL MEDICINE

## 2023-06-26 NOTE — PROGRESS NOTES
Subjective:   Patient ID:  Darling Quach is a 58 y.o. female who presents for follow-up of CAD risk    HPI: The patient is here for CAD risk factors, average CAC but Lpa 115.   The patient has no chest pain, SOB, TIA, palpitations, syncope or pre-syncope.Patient does exercise as much as ideal.        Review of Systems   Constitutional: Negative for chills, decreased appetite, diaphoresis, fever, malaise/fatigue, night sweats, weight gain and weight loss.   HENT:  Negative for congestion, hoarse voice, nosebleeds, sore throat and tinnitus.    Eyes:  Negative for blurred vision, double vision, vision loss in left eye, vision loss in right eye, visual disturbance and visual halos.   Cardiovascular:  Negative for chest pain, claudication, cyanosis, dyspnea on exertion, irregular heartbeat, leg swelling, near-syncope, orthopnea, palpitations, paroxysmal nocturnal dyspnea and syncope.   Respiratory:  Negative for cough, hemoptysis, shortness of breath, sleep disturbances due to breathing, snoring, sputum production and wheezing.    Endocrine: Negative for cold intolerance, heat intolerance, polydipsia, polyphagia and polyuria.   Hematologic/Lymphatic: Negative for adenopathy and bleeding problem. Does not bruise/bleed easily.   Skin:  Negative for color change, dry skin, flushing, itching, nail changes, poor wound healing, rash, skin cancer, suspicious lesions and unusual hair distribution.   Musculoskeletal:  Negative for arthritis, back pain, falls, gout, joint pain, joint swelling, muscle cramps, muscle weakness, myalgias and stiffness.   Gastrointestinal:  Negative for abdominal pain, anorexia, change in bowel habit, constipation, diarrhea, dysphagia, heartburn, hematemesis, hematochezia, melena and vomiting.   Genitourinary:  Negative for decreased libido, dysuria, hematuria, hesitancy and urgency.   Neurological:  Negative for excessive daytime sleepiness, dizziness, focal weakness, headaches, light-headedness,  "loss of balance, numbness, paresthesias, seizures, sensory change, tremors, vertigo and weakness.   Psychiatric/Behavioral:  Negative for altered mental status, depression, hallucinations, memory loss, substance abuse and suicidal ideas. The patient does not have insomnia and is not nervous/anxious.    Allergic/Immunologic: Negative for environmental allergies and hives.     Objective: /60   Pulse 74   Ht 5' 2" (1.575 m)   Wt 54.7 kg (120 lb 9.5 oz)   LMP  (LMP Unknown)   SpO2 97%   BMI 22.06 kg/m²      Physical Exam  Constitutional:       Appearance: She is well-developed.   HENT:      Head: Normocephalic.   Eyes:      Pupils: Pupils are equal, round, and reactive to light.   Neck:      Thyroid: No thyromegaly.      Vascular: Normal carotid pulses. No carotid bruit, hepatojugular reflux or JVD.   Cardiovascular:      Rate and Rhythm: Normal rate and regular rhythm.      Pulses: Intact distal pulses.      Heart sounds: Normal heart sounds. No murmur heard.    No friction rub. No gallop.   Pulmonary:      Effort: Pulmonary effort is normal. No tachypnea or respiratory distress.      Breath sounds: Normal breath sounds. No wheezing or rales.   Chest:      Chest wall: No tenderness.   Abdominal:      General: Bowel sounds are normal. There is no distension.      Palpations: Abdomen is soft. There is no mass.      Tenderness: There is no abdominal tenderness. There is no guarding or rebound.   Musculoskeletal:         General: No tenderness. Normal range of motion.      Cervical back: Normal range of motion.   Lymphadenopathy:      Cervical: No cervical adenopathy.   Skin:     General: Skin is warm.      Findings: No erythema or rash.   Neurological:      Mental Status: She is alert and oriented to person, place, and time.      Cranial Nerves: No cranial nerve deficit.      Coordination: Coordination normal.   Psychiatric:         Behavior: Behavior normal.         Thought Content: Thought content normal.    "      Judgment: Judgment normal.       Assessment:     1. Agatston CAC score, <100    2. Mixed dyslipidemia    3. Hypercholesterolemia        Plan:   Discussed diet , achieving and maintaining ideal body weight, and exercise.   We reviewed meds in detail.  Reassured-Discussed goals, options, plan.  Omega-3 > 3729-3722 mg/d combined EPA/DHA with Lpa so high and C 1000 mg once preferably twice.  Goal BP< 130/80.  Goal LDL < 70.  Baby ASA  LDL needs to be a lot lower  - can add half of Exetimide , change to Rosuva 40 ; later cange Ezetimide to Nexlizet 180/10  [place] was seen today for hyperlipidemia.    Diagnoses and all orders for this visit:    Agatston CAC score, <100  -     Lipid Panel; Future; Expected date: 08/07/2023  -     Comprehensive Metabolic Panel; Future; Expected date: 08/07/2023    Mixed dyslipidemia  -     Lipid Panel; Future; Expected date: 08/07/2023  -     Comprehensive Metabolic Panel; Future; Expected date: 08/07/2023    Hypercholesterolemia  -     Lipid Panel; Future; Expected date: 08/07/2023  -     Comprehensive Metabolic Panel; Future; Expected date: 08/07/2023            Follow up for Labs at [facility] in 6 weeks.

## 2023-06-26 NOTE — PATIENT INSTRUCTIONS
Discussed diet , achieving and maintaining ideal body weight, and exercise.   We reviewed meds in detail.  Reassured-Discussed goals, options, plan.  Omega-3 > 0687-0763 mg/d combined EPA/DHA with Lpa so high and C 1000 mg once preferably twice.  Goal BP< 130/80.  Goal LDL < 70.  Baby ASA  LDL needs to be a lot lower- can add half of Exetimide , change to Rosuva 40 ; later cange Ezetimide to Nexlizet 180/10

## 2023-06-27 ENCOUNTER — OFFICE VISIT (OUTPATIENT)
Dept: ORTHOPEDICS | Facility: CLINIC | Age: 58
End: 2023-06-27
Payer: COMMERCIAL

## 2023-06-27 ENCOUNTER — HOSPITAL ENCOUNTER (OUTPATIENT)
Dept: RADIOLOGY | Facility: HOSPITAL | Age: 58
Discharge: HOME | End: 2023-06-27
Attending: ORTHOPAEDIC SURGERY
Payer: COMMERCIAL

## 2023-06-27 VITALS — WEIGHT: 120 LBS | BODY MASS INDEX: 22.08 KG/M2 | HEIGHT: 62 IN

## 2023-06-27 DIAGNOSIS — G57.91 NEURITIS OF RIGHT FOOT: Primary | ICD-10-CM

## 2023-06-27 DIAGNOSIS — M20.11 ACQUIRED HALLUX VALGUS OF RIGHT FOOT: ICD-10-CM

## 2023-06-27 DIAGNOSIS — M19.071 ARTHRITIS OF FIRST METATARSOPHALANGEAL (MTP) JOINT OF RIGHT FOOT: ICD-10-CM

## 2023-06-27 DIAGNOSIS — M20.21 ACQUIRED HALLUX RIGIDUS OF RIGHT FOOT: ICD-10-CM

## 2023-06-27 PROCEDURE — 1160F PR REVIEW ALL MEDS BY PRESCRIBER/CLIN PHARMACIST DOCUMENTED: ICD-10-PCS | Mod: CPTII,S$GLB,, | Performed by: ORTHOPAEDIC SURGERY

## 2023-06-27 PROCEDURE — 73610 X-RAY EXAM OF ANKLE: CPT | Mod: TC,PO,RT

## 2023-06-27 PROCEDURE — 99214 PR OFFICE/OUTPT VISIT, EST, LEVL IV, 30-39 MIN: ICD-10-PCS | Mod: S$GLB,,, | Performed by: ORTHOPAEDIC SURGERY

## 2023-06-27 PROCEDURE — 73630 X-RAY EXAM OF FOOT: CPT | Mod: 26,RT,, | Performed by: RADIOLOGY

## 2023-06-27 PROCEDURE — 1159F MED LIST DOCD IN RCRD: CPT | Mod: CPTII,S$GLB,, | Performed by: ORTHOPAEDIC SURGERY

## 2023-06-27 PROCEDURE — 73610 XR ANKLE COMPLETE 3 VIEW RIGHT: ICD-10-PCS | Mod: 26,RT,, | Performed by: RADIOLOGY

## 2023-06-27 PROCEDURE — 73630 XR FOOT COMPLETE 3 VIEW RIGHT: ICD-10-PCS | Mod: 26,RT,, | Performed by: RADIOLOGY

## 2023-06-27 PROCEDURE — 99999 PR PBB SHADOW E&M-EST. PATIENT-LVL III: CPT | Mod: PBBFAC,,, | Performed by: ORTHOPAEDIC SURGERY

## 2023-06-27 PROCEDURE — 73630 X-RAY EXAM OF FOOT: CPT | Mod: TC,PO,RT

## 2023-06-27 PROCEDURE — 73610 X-RAY EXAM OF ANKLE: CPT | Mod: 26,RT,, | Performed by: RADIOLOGY

## 2023-06-27 PROCEDURE — 1160F RVW MEDS BY RX/DR IN RCRD: CPT | Mod: CPTII,S$GLB,, | Performed by: ORTHOPAEDIC SURGERY

## 2023-06-27 PROCEDURE — 3008F PR BODY MASS INDEX (BMI) DOCUMENTED: ICD-10-PCS | Mod: CPTII,S$GLB,, | Performed by: ORTHOPAEDIC SURGERY

## 2023-06-27 PROCEDURE — 1159F PR MEDICATION LIST DOCUMENTED IN MEDICAL RECORD: ICD-10-PCS | Mod: CPTII,S$GLB,, | Performed by: ORTHOPAEDIC SURGERY

## 2023-06-27 PROCEDURE — 99214 OFFICE O/P EST MOD 30 MIN: CPT | Mod: S$GLB,,, | Performed by: ORTHOPAEDIC SURGERY

## 2023-06-27 PROCEDURE — 3044F HG A1C LEVEL LT 7.0%: CPT | Mod: CPTII,S$GLB,, | Performed by: ORTHOPAEDIC SURGERY

## 2023-06-27 PROCEDURE — 3044F PR MOST RECENT HEMOGLOBIN A1C LEVEL <7.0%: ICD-10-PCS | Mod: CPTII,S$GLB,, | Performed by: ORTHOPAEDIC SURGERY

## 2023-06-27 PROCEDURE — 3008F BODY MASS INDEX DOCD: CPT | Mod: CPTII,S$GLB,, | Performed by: ORTHOPAEDIC SURGERY

## 2023-06-27 PROCEDURE — 99999 PR PBB SHADOW E&M-EST. PATIENT-LVL III: ICD-10-PCS | Mod: PBBFAC,,, | Performed by: ORTHOPAEDIC SURGERY

## 2023-06-27 NOTE — PROGRESS NOTES
Status/Diagnosis: Right hallux valgus/hallux rigidus; Questionable dorsomedial cutaneous neuritis.  Date of Surgery: none  Date of Injury: December 2021  Return visit: none  X-rays on Return: none    Chief Complaint:   Chief Complaint   Patient presents with    Right Foot - Pain     Present History:  Darling Quach is a 58 y.o. female who presents today for new patient evaluation.  Presents today for a 3rd opinion.  Previously seen by Dr. Aletha Hollins and more recently by Dr. Field Lane.   Patient has undergone shoe wear and activity modification, boot immobilization, corticosteroid injection etc.  Patient received fluoroscopic guided 1st MTP injection by Dr. Hollins in February 2023.  Endorses moderate relief but only for a few hours.  Otherwise minimal help.  Dr. Hollins had discussed potential Lapidus procedure for bunion correction.  More recently, Dr. Lane diagnosed the patient with sesamoiditis and recommended a sesamoid offloading pad and physical therapy.  Patient endorses zero relief with this.  Patient would like another evaluation and discuss possible treatment options.  Patient has pain at rest and with weight-bearing.  Endorses some degree of paresthesias involving radiating symptoms from the prominent medial eminence distally into the dorsal great toe.  Past medical history for seizures and vertigo.  Denies tobacco use.  Works on her feet as a .      Past Medical History:   Diagnosis Date    Glaucoma     Hemangioma     cavernous    History of acne 2004    dr lopez prescribed accutane    Hyperlipidemia     Seizures     Venous angioma of brain     Vertigo        Past Surgical History:   Procedure Laterality Date    ANAL SPHINCTEROTOMY  5/27/2019    Procedure: SPHINCTEROTOMY, ANAL;  Surgeon: Nirmal Chan MD;  Location: [facility] OR [address] [place] [address];  Service: Colon and Rectal;;    CATARACT EXTRACTION W/  INTRAOCULAR LENS IMPLANT Right 12/17/2014        CATARACT EXTRACTION W/   INTRAOCULAR LENS IMPLANT Left 03/17/2022        EXCISIONAL HEMORRHOIDECTOMY N/A 5/27/2019    Procedure: HEMORRHOIDECTOMY;  Surgeon: Nirmal Chan MD;  Location: [facility] OR [address] [place] [address];  Service: Colon and Rectal;  Laterality: N/A;    HYSTERECTOMY  2010    DLH ov in situ     INTRAOCULAR PROSTHESES INSERTION Left 3/16/2022    Procedure: INSERTION, IOL PROSTHESIS;  Surgeon: Josey Camacho MD;  Location: [facility] OR [address] [place] [address];  Service: Ophthalmology;  Laterality: Left;    PHACOEMULSIFICATION OF CATARACT Left 3/16/2022    Procedure: PHACOEMULSIFICATION, CATARACT;  Surgeon: Josey Camacho MD;  Location: [facility] OR [address] [place] [address];  Service: Ophthalmology;  Laterality: Left;       Current Outpatient Medications   Medication Sig    ascorbic acid, vitamin C, (VITAMIN C) 1000 MG tablet Take 1,000 mg by mouth once daily.    aspirin (ECOTRIN) 81 MG EC tablet Take 1 tablet (81 mg total) by mouth once daily.    atorvastatin (LIPITOR) 80 MG tablet Take 1 tablet (80 mg total) by mouth once daily.    azithromycin (Z-GRAYSON) 250 MG tablet Take 2 tablets by mouth on day 1, then 1 tablet by mouth once daily days 2 through5. (Patient not taking: Reported on 4/18/2023)    Lactobacillus acidophilus 10 billion cell Cap Take by mouth.    levetiracetam XR (KEPPRA XR) 750 mg Tb24 tablet Take 1 tablet (750 mg total) by mouth every evening.    multivitamin capsule Take 1 capsule by mouth once daily.    omega-3 fatty acids 1,000 mg Cap Take 1,500 mg by mouth.     No current facility-administered medications for this visit.       Review of patient's allergies indicates:   Allergen Reactions    Tramadol      Per neurologist    Latanoprost Itching     Red itchy eyes       Family History   Problem Relation Age of Onset    Fibroids Mother     Migraines Mother     Glaucoma Mother     Heart disease Father     Depression Brother     No Known Problems Sister     Diabetes Maternal Grandmother     Glaucoma Maternal Grandmother     Cancer Maternal Aunt          breast    Breast cancer Maternal Aunt 50    No Known Problems Maternal Uncle     No Known Problems Paternal Aunt     No Known Problems Paternal Uncle     No Known Problems Maternal Grandfather     No Known Problems Paternal Grandmother     No Known Problems Paternal Grandfather     Breast cancer Maternal Aunt     Macular degeneration Neg Hx     Blindness Neg Hx     Melanoma Neg Hx     Psoriasis Neg Hx     Lupus Neg Hx     Eczema Neg Hx     Acne Neg Hx     Cataracts Neg Hx     Amblyopia Neg Hx     Retinal detachment Neg Hx     Strabismus Neg Hx     Stroke Neg Hx     Thyroid disease Neg Hx     Hypertension Neg Hx     Ovarian cancer Neg Hx     Colon cancer Neg Hx        Social History     Socioeconomic History    Marital status:    Occupational History    Occupation: [employer]   Tobacco Use    Smoking status: Never    Smokeless tobacco: Never   Substance and Sexual Activity    Alcohol use: No    Drug use: No    Sexual activity: Yes     Partners: Male     Birth control/protection: Surgical     [facility]   Other Topics Concern    Are you pregnant or think you may be? No    Breast-feeding No       Physical exam:  There were no vitals filed for this visit.  Body mass index is 21.95 kg/m².  General: In no apparent distress; well developed and well nourished.  HEENT: normocephalic; atraumatic.  Cardiovascular: regular rate.  Respiratory: no increased work of breathing.  Musculoskeletal:   Gait: mild antalgic  Inspection:   Mild flatfoot deformity.  Good single limb heel rise.  Hindfoot passively corrects.  Patient with moderate prominent medial eminence involving the 1st metatarsal head.  No skin irritation or skin breakdown noted.  Patient localizes pain along the entire medial column.  Tenderness throughout somewhat out of proportion. Hallux valgus is passively correctable.  Patient with 1st MTP range of motion from 45° dorsiflexion to 30° plantar flexion with pain at  extremes, plantar flexion> dorsiflexion.  Negative grind testing.  Moderate positive Tinel's testing over the dorsal medial cutaneous nerve.  No pain referable to the ankle.  No laxity with anterior drawer testing.  Silfverskiold: negative  Alignment:  Knee: neutral               Ankle: neutral              Hindfoot: neutral              Forefoot: neutral   Strength:              Dorsiflexion 5/5  Plantar flexion 5/5  Inversion 5/5   Eversion 5/5   Sensation:              SILT distally  ROM:              Ankle: Full and painless.              Subtalar: Painless inversion and eversion   Pulses: 2+ DP/PT pulses.                   Imaging Studies/Outside documentation:  I have ordered/reviewed/interpreted the following images/outside documentation:  1. Weight bearing 3-views of Right foot and ankle:   On my independent review, no acute bony abnormality noted.  Mild decreased calcaneal pitch.  Mild-to-moderate hallux valgus.  Significant joint space narrowing of the 1st MTP joint with dorsal osteophyte formation at the 1st metatarsal head.  There is plantar gapping at the 1st TMT joint.  Ankle mortise remains congruent.  No other significant degenerative joint changes.    MRI right forefoot without contrast performed on 12/13/2022:  On my independent review, no acute bony abnormality noted.  There is moderate chondromalacia and increased signal on T2 imaging adjacent to the 1st MTP joint consistent with hallux rigidus as seen on plain films.        Assessment:  Darling Quach is a 58 y.o. female with Right hallux valgus/hallux rigidus; Questionable dorsomedial cutaneous neuritis.     Plan:   Clinical and radiographic findings were discussed.  While patient does have some degree of hallux valgus and hallux rigidus, history and physical exam were consistent with neuritic symptoms.  Recommend conservative management.  I did provide the patient with a steel shank handout to help minimize stress on the 1st MTP joint.  We  will also place a referral to my partner Dr. Adkins for possible perineural injection.  Patient voiced understanding.  All questions were answered.  She will follow up on an as-needed basis.  Pending improvement with above, patient may benefit from topical compound to help address neuritic symptoms.      This note was created using voice recognition software and may contain grammatical errors.

## 2023-06-30 ENCOUNTER — PATIENT MESSAGE (OUTPATIENT)
Dept: ORTHOPEDICS | Facility: CLINIC | Age: 58
End: 2023-06-30
Payer: COMMERCIAL

## 2023-07-17 ENCOUNTER — HOSPITAL ENCOUNTER (OUTPATIENT)
Dept: RADIOLOGY | Facility: HOSPITAL | Age: 58
Discharge: HOME | End: 2023-07-17
Attending: OBSTETRICS & GYNECOLOGY
Payer: COMMERCIAL

## 2023-07-17 VITALS — WEIGHT: 120 LBS | HEIGHT: 62 IN | BODY MASS INDEX: 22.08 KG/M2

## 2023-07-17 DIAGNOSIS — Z12.31 BREAST CANCER SCREENING BY MAMMOGRAM: ICD-10-CM

## 2023-07-17 PROCEDURE — 77067 MAMMO DIGITAL SCREENING BILAT WITH TOMO: ICD-10-PCS | Mod: 26,,, | Performed by: RADIOLOGY

## 2023-07-17 PROCEDURE — 77067 SCR MAMMO BI INCL CAD: CPT | Mod: 26,,, | Performed by: RADIOLOGY

## 2023-07-17 PROCEDURE — 77067 SCR MAMMO BI INCL CAD: CPT | Mod: TC

## 2023-07-17 PROCEDURE — 77063 BREAST TOMOSYNTHESIS BI: CPT | Mod: 26,,, | Performed by: RADIOLOGY

## 2023-07-17 PROCEDURE — 77063 MAMMO DIGITAL SCREENING BILAT WITH TOMO: ICD-10-PCS | Mod: 26,,, | Performed by: RADIOLOGY

## 2023-08-04 ENCOUNTER — LAB VISIT (OUTPATIENT)
Dept: LAB | Facility: HOSPITAL | Age: 58
End: 2023-08-04
Attending: INTERNAL MEDICINE
Payer: COMMERCIAL

## 2023-08-04 DIAGNOSIS — E78.2 MIXED DYSLIPIDEMIA: ICD-10-CM

## 2023-08-04 DIAGNOSIS — E78.00 HYPERCHOLESTEROLEMIA: ICD-10-CM

## 2023-08-04 DIAGNOSIS — R93.1 AGATSTON CAC SCORE, <100: ICD-10-CM

## 2023-08-04 LAB
ALBUMIN SERPL BCP-MCNC: 3.8 G/DL (ref 3.5–5.2)
ALP SERPL-CCNC: 57 U/L (ref 55–135)
ALT SERPL W/O P-5'-P-CCNC: 21 U/L (ref 10–44)
ANION GAP SERPL CALC-SCNC: 5 MMOL/L (ref 8–16)
AST SERPL-CCNC: 21 U/L (ref 10–40)
BILIRUB SERPL-MCNC: 0.6 MG/DL (ref 0.1–1)
BUN SERPL-MCNC: 17 MG/DL (ref 6–20)
CALCIUM SERPL-MCNC: 9.3 MG/DL (ref 8.7–10.5)
CHLORIDE SERPL-SCNC: 105 MMOL/L (ref 95–110)
CHOLEST SERPL-MCNC: 195 MG/DL (ref 120–199)
CHOLEST/HDLC SERPL: 2.1 {RATIO} (ref 2–5)
CO2 SERPL-SCNC: 29 MMOL/L (ref 23–29)
CREAT SERPL-MCNC: 0.9 MG/DL (ref 0.5–1.4)
EST. GFR  (NO RACE VARIABLE): >60 ML/MIN/1.73 M^2
GLUCOSE SERPL-MCNC: 95 MG/DL (ref 70–110)
HDLC SERPL-MCNC: 92 MG/DL (ref 40–75)
HDLC SERPL: 47.2 % (ref 20–50)
LDLC SERPL CALC-MCNC: 94.8 MG/DL (ref 63–159)
NONHDLC SERPL-MCNC: 103 MG/DL
POTASSIUM SERPL-SCNC: 4.4 MMOL/L (ref 3.5–5.1)
PROT SERPL-MCNC: 6.8 G/DL (ref 6–8.4)
SODIUM SERPL-SCNC: 139 MMOL/L (ref 136–145)
TRIGL SERPL-MCNC: 41 MG/DL (ref 30–150)

## 2023-08-04 PROCEDURE — 80053 COMPREHEN METABOLIC PANEL: CPT | Performed by: INTERNAL MEDICINE

## 2023-08-04 PROCEDURE — 36415 COLL VENOUS BLD VENIPUNCTURE: CPT | Performed by: INTERNAL MEDICINE

## 2023-08-04 PROCEDURE — 80061 LIPID PANEL: CPT | Performed by: INTERNAL MEDICINE

## 2023-08-04 NOTE — PROGRESS NOTES
Your results look fine and do not require any change in treatment. LDL better -let's just add Ezetimide just half -later we could go to the whole Ezetimide and or change the Atorva to Rosuva 40 which is also a bit better. Again , from the CAC score , we could leave well enough alone but with the Lpa so high , we really want to get the LDL as low as we can , ideally < 70.  Please contact me if you have any additional concerns.    Sincerely,    David Edmondson

## 2023-09-10 NOTE — PROGRESS NOTES
Assessment /Plan     For exam results, see Encounter Report.    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 Mixed mechanism Glaucoma   H/O narrow angles - S/P PI's, + residual OHT   First HVF 1998   First photos 1999   Former [facility] , retired for a few years and went back to work as a    (mom with same problem with narrow angles and residual OHT)     Family history + mom - Narrow angles with residual OHT   Glaucoma meds - off gtts (use to use timolol)   H/O adverse rxn to glaucoma drops - latanoprost - eye irritation  LASERS PI's ou // yag cap od 4/25/2019  GLAUCOMA SURGERIES none   OTHER EYE SURGERIES - PC IOL OD 12/17/2014 - [facility]  CDR 0.4/0.4   Tbase 23-36 / 23-35 ou   Tmax 36/35   Ttarget 30/30  HVF 17 test 24-2 ss ou 1996 to 2023 ([facility]) - hint SNS  od // full (w/ lens rim art)  os            3 SWAP test 2010 to 2012 - Full ou            ( repeat HVF from 8/2013 to 9/2013 - new SAD od confirmed)            [place] VF's - 1 test 2016 to 2016 - SNS /INS od // full os   2019 VF full od/near full os   Gonio +3 ou S/P PI's   /618   OCT 6 test 2005 -  2023 ([facility]) - RNFL - bord TI  od/ bord bord TS  os   HRT - 5 test - 2014 to 2020 - MR -  nl od // bord SN os /// CDR 0.55 od // 0.55 os   Mix of [facility] and [facility]   Disc photos 1999, 2004, 2013 - slides // 5/13/2010 - // 2019 - OIS     - Ttoday  18/19   (usually higher)   - Test done today -IOP // HVF / DFE / OCT     2. PVD ou - with dense vitreous veil OD Saw A[facility] 4/23/2012 4/2019 - pt is noticing the floaters again (had not  noticed them for several years)     3. Vitreous opacity OD     4. Steroid responder - had an injection / ? Spironolactone / aldactone - not currently on any steroids    5. NS os    Pt c/o blurry vision - glasses need changing every 2-3 months    BCVA 20/40 // BAT h 20/400    Having trouble with driving and reading    She is a      6. Refractive error   Was bilateral hyperope / presbyope   Is anisopmetropic post CE od    Glasses from [employer]  - no longer working for her    7. H/O eyelid papilloma Left - S/P removal - [facility]     8. Pseudophakia od   PC IOL od 12/17/2014 - SN60wf 20.5   Had a large myopic shift pre surgery from +1.00 to -3.00    8.  I see her father in law - he has glaucoma - and I did his cataracts    I see her mother - she has similar issues to this patient    She would like me to see her adult daughter to evaluate her for OHT / glaucoma as well     Plan   MMG  / OHT ou  S/P PI's ou   HVF today near full OU    Intolerant to latanoprost - red irritated eye  Off all glaucoma gtts - (use to use timolol) 7/2019     [facility] Rx - Boudreuax - 5/9/2019 - says yag cap helped a lot     Photo file updated - [facility] - 5/25/2021 - lost with Jaycee     Phaco / IOL OS Date: 3/17/2022 - sn60wf 22.5   POY > 1 - phaco/IOL      F/U 6 months with IOP and gonio

## 2023-09-15 ENCOUNTER — CLINICAL SUPPORT (OUTPATIENT)
Dept: OPHTHALMOLOGY | Facility: CLINIC | Age: 58
End: 2023-09-15
Payer: COMMERCIAL

## 2023-09-15 ENCOUNTER — OFFICE VISIT (OUTPATIENT)
Dept: OPHTHALMOLOGY | Facility: CLINIC | Age: 58
End: 2023-09-15
Payer: COMMERCIAL

## 2023-09-15 DIAGNOSIS — H40.043 STEROID RESPONDERS BORDERLINE GLAUCOMA, BILATERAL: ICD-10-CM

## 2023-09-15 DIAGNOSIS — H43.813 VITREOUS DEGENERATION OF BOTH EYES: ICD-10-CM

## 2023-09-15 DIAGNOSIS — H40.53X1 BILATERAL SECONDARY GLAUCOMA DUE TO COMBINATION MECHANISMS, MILD STAGE: ICD-10-CM

## 2023-09-15 DIAGNOSIS — Z96.1 PSEUDOPHAKIA OF BOTH EYES: ICD-10-CM

## 2023-09-15 DIAGNOSIS — H40.53X1 BILATERAL SECONDARY GLAUCOMA DUE TO COMBINATION MECHANISMS, MILD STAGE: Primary | ICD-10-CM

## 2023-09-15 DIAGNOSIS — H26.491 PCO (POSTERIOR CAPSULAR OPACIFICATION), RIGHT: ICD-10-CM

## 2023-09-15 DIAGNOSIS — H43.813 POSTERIOR VITREOUS DETACHMENT OF BOTH EYES: ICD-10-CM

## 2023-09-15 PROCEDURE — 99999 PR PBB SHADOW E&M-EST. PATIENT-LVL III: ICD-10-PCS | Mod: PBBFAC,,, | Performed by: OPHTHALMOLOGY

## 2023-09-15 PROCEDURE — 3044F HG A1C LEVEL LT 7.0%: CPT | Mod: CPTII,S$GLB,, | Performed by: OPHTHALMOLOGY

## 2023-09-15 PROCEDURE — 1160F RVW MEDS BY RX/DR IN RCRD: CPT | Mod: CPTII,S$GLB,, | Performed by: OPHTHALMOLOGY

## 2023-09-15 PROCEDURE — 99999 PR PBB SHADOW E&M-EST. PATIENT-LVL III: CPT | Mod: PBBFAC,,, | Performed by: OPHTHALMOLOGY

## 2023-09-15 PROCEDURE — 1159F PR MEDICATION LIST DOCUMENTED IN MEDICAL RECORD: ICD-10-PCS | Mod: CPTII,S$GLB,, | Performed by: OPHTHALMOLOGY

## 2023-09-15 PROCEDURE — 1159F MED LIST DOCD IN RCRD: CPT | Mod: CPTII,S$GLB,, | Performed by: OPHTHALMOLOGY

## 2023-09-15 PROCEDURE — 1160F PR REVIEW ALL MEDS BY PRESCRIBER/CLIN PHARMACIST DOCUMENTED: ICD-10-PCS | Mod: CPTII,S$GLB,, | Performed by: OPHTHALMOLOGY

## 2023-09-15 PROCEDURE — 92014 PR EYE EXAM, EST PATIENT,COMPREHESV: ICD-10-PCS | Mod: S$GLB,,, | Performed by: OPHTHALMOLOGY

## 2023-09-15 PROCEDURE — 3044F PR MOST RECENT HEMOGLOBIN A1C LEVEL <7.0%: ICD-10-PCS | Mod: CPTII,S$GLB,, | Performed by: OPHTHALMOLOGY

## 2023-09-15 PROCEDURE — 92014 COMPRE OPH EXAM EST PT 1/>: CPT | Mod: S$GLB,,, | Performed by: OPHTHALMOLOGY

## 2023-09-15 NOTE — PROGRESS NOTES
OCT DONE OU     24-2  SS DONE OU     REL & FIX =  GOOD OU     COOP=       GOOD     PATIENT HAS NO ALLERGIES TO LATEX OR ADHESIVES AT THIS TIME    JTHOMAS    MRX    +.75 + .50 X 178   OD     -.25 + 1.00 X 2     OS

## 2023-10-21 ENCOUNTER — PATIENT MESSAGE (OUTPATIENT)
Dept: OPHTHALMOLOGY | Facility: CLINIC | Age: 58
End: 2023-10-21
Payer: COMMERCIAL

## 2023-11-07 DIAGNOSIS — E78.2 MIXED DYSLIPIDEMIA: ICD-10-CM

## 2023-11-07 DIAGNOSIS — E78.00 HYPERCHOLESTEROLEMIA: ICD-10-CM

## 2023-11-07 DIAGNOSIS — R93.1 AGATSTON CAC SCORE, <100: ICD-10-CM

## 2023-11-07 RX ORDER — ATORVASTATIN CALCIUM 80 MG/1
80 TABLET, FILM COATED ORAL DAILY
Qty: 90 TABLET | Refills: 3 | Status: SHIPPED | OUTPATIENT
Start: 2023-11-07 | End: 2024-11-06

## 2023-11-07 RX ORDER — ASPIRIN 81 MG/1
81 TABLET ORAL DAILY
Qty: 100 TABLET | Refills: 3 | Status: SHIPPED | OUTPATIENT
Start: 2023-11-07

## 2023-11-07 NOTE — TELEPHONE ENCOUNTER
----- Message from [employer] sent at 2023  2:29 PM CST -----  Regarding: refill  atorvastatin (LIPITOR) 80 MG tablet (    [facility] - [facility]   Phone:  [phone]  Fax:  [phone]      LOV 23    Thank you

## 2023-12-18 ENCOUNTER — TELEPHONE (OUTPATIENT)
Dept: PHARMACY | Facility: CLINIC | Age: 58
End: 2023-12-18
Payer: COMMERCIAL

## 2024-03-17 NOTE — PROGRESS NOTES
HPI    DLS: 9/15/2023    Pt here for 6 Month Check;    Meds;      1. MMG/OHT OU   2. PVD OU   3. Vitreous Opacity OD   4. PCIOL OU   5. Steroid Responder   6.  Hx Removal Left Eyelid Papilloma (Dr. Alvarado)       Last edited by Nivia Cox on 3/21/2024  3:40 PM.              Assessment /Plan     For exam results, see Encounter Report.    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 Mixed mechanism Glaucoma   H/O narrow angles - S/P PI's, + residual OHT   First HVF 1998   First photos 1999   Former [facility] , retired for a few years and went back to work as a    (mom with same problem with narrow angles and residual OHT)     Family history + mom - Narrow angles with residual OHT   Glaucoma meds - off gtts (use to use timolol)   H/O adverse rxn to glaucoma drops - latanoprost - eye irritation  LASERS PI's ou // yag cap od 4/25/2019  GLAUCOMA SURGERIES none   OTHER EYE SURGERIES - PC IOL OD 12/17/2014 - [place] // PC IOL os 3/17/2022  CDR 0.4/0.4   Tbase 23-36 / 23-35 ou   Tmax 36/35   Ttarget 30/30  HVF 17 test 24-2 ss ou 1996 to 2023 ([facility]) - hint SNS  od // full (w/ lens rim art)  os            3 SWAP test 2010 to 2012 - Full ou            ( repeat HVF from 8/2013 to 9/2013 - new SAD od confirmed)            [place] VF's - 1 test 2016 to 2016 - SNS /INS od // full os   2019 VF full od/near full os   Gonio +3 ou S/P PI's   /618   OCT 6 test 2005 -  2023 ([facility]) - RNFL - bord TI  od/ bord bord TS  os   HRT - 5 test - 2014 to 2020 - MR -  nl od // bord SN os /// CDR 0.55 od // 0.55 os   Mix of main  [place] and [facility]   Disc photos 1999, 2004, 2013 - slides // 5/13/2010 - // 2019 - OIS     - Ttoday  19/19   (usually higher)   - Test done today -IOP //gonio     2. PVD ou - with dense vitreous veil OD Saw [facility]rend 4/23/2012 4/2019 - pt is noticing the floaters again (had not noticed them for several years)     3. Vitreous opacity OD     4. Steroid responder - had an injection / ? Spironolactone / aldactone - not currently on any steroids    5. Refractive error   Was bilateral hyperope / presbyope   Is anisopmetropic post CE od    Glasses from [employer]  - no longer working for her    6. H/O eyelid papilloma Left - S/P removal - [facility]     7. Pseudophakia od   PC IOL od 12/17/2014 - SN60wf 20.5   Had a large myopic shift pre surgery from +1.00 to -3.00      Pseudophakia OS   PC IOL os - 3/17/2022 - sn60wf 22.5    8.  I see her father in law - he has glaucoma - and I did his cataracts    I see her mother - she has similar issues to this patient    She would like me to see her adult daughter to evaluate her for OHT / glaucoma as well     Plan   MMG  / OHT ou  S/P PI's ou   HVF today near full OU    Intolerant to latanoprost - red irritated eye  Off all glaucoma gtts - (use to use timolol) 7/2019     [facility] Rx - Boudreuax - 5/9/2019 - says yag cap helped a lot     Photo file updated - [facility] - 5/25/2021 - lost with Jaycee     Phaco / IOL OS Date: 3/17/2022 - sn60wf 22.5   POY > 2 - phaco/IOL      F/U 6 months with IOP and HVF / DFE / OCT

## 2024-03-21 ENCOUNTER — OFFICE VISIT (OUTPATIENT)
Dept: OPHTHALMOLOGY | Facility: CLINIC | Age: 59
End: 2024-03-21
Payer: COMMERCIAL

## 2024-03-21 DIAGNOSIS — H26.491 PCO (POSTERIOR CAPSULAR OPACIFICATION), RIGHT: ICD-10-CM

## 2024-03-21 DIAGNOSIS — H43.813 POSTERIOR VITREOUS DETACHMENT OF BOTH EYES: ICD-10-CM

## 2024-03-21 DIAGNOSIS — Z96.1 PSEUDOPHAKIA OF BOTH EYES: ICD-10-CM

## 2024-03-21 DIAGNOSIS — H40.53X1 BILATERAL SECONDARY GLAUCOMA DUE TO COMBINATION MECHANISMS, MILD STAGE: Primary | ICD-10-CM

## 2024-03-21 DIAGNOSIS — H40.043 STEROID RESPONDERS BORDERLINE GLAUCOMA, BILATERAL: ICD-10-CM

## 2024-03-21 DIAGNOSIS — H43.813 VITREOUS DEGENERATION OF BOTH EYES: ICD-10-CM

## 2024-03-21 PROCEDURE — 99213 OFFICE O/P EST LOW 20 MIN: CPT | Mod: S$GLB,,, | Performed by: OPHTHALMOLOGY

## 2024-03-21 PROCEDURE — 92020 GONIOSCOPY: CPT | Mod: S$GLB,,, | Performed by: OPHTHALMOLOGY

## 2024-03-21 PROCEDURE — 1160F RVW MEDS BY RX/DR IN RCRD: CPT | Mod: CPTII,S$GLB,, | Performed by: OPHTHALMOLOGY

## 2024-03-21 PROCEDURE — 99999 PR PBB SHADOW E&M-EST. PATIENT-LVL III: CPT | Mod: PBBFAC,,, | Performed by: OPHTHALMOLOGY

## 2024-03-21 PROCEDURE — 1159F MED LIST DOCD IN RCRD: CPT | Mod: CPTII,S$GLB,, | Performed by: OPHTHALMOLOGY

## 2024-04-12 ENCOUNTER — OFFICE VISIT (OUTPATIENT)
Dept: INTERNAL MEDICINE | Facility: CLINIC | Age: 59
End: 2024-04-12
Payer: COMMERCIAL

## 2024-04-12 VITALS
HEART RATE: 72 BPM | BODY MASS INDEX: 21.54 KG/M2 | OXYGEN SATURATION: 99 % | SYSTOLIC BLOOD PRESSURE: 104 MMHG | WEIGHT: 117.06 LBS | DIASTOLIC BLOOD PRESSURE: 64 MMHG | HEIGHT: 62 IN

## 2024-04-12 DIAGNOSIS — E78.2 MIXED DYSLIPIDEMIA: ICD-10-CM

## 2024-04-12 DIAGNOSIS — Z00.00 ANNUAL PHYSICAL EXAM: Primary | ICD-10-CM

## 2024-04-12 DIAGNOSIS — M21.619 BUNION: ICD-10-CM

## 2024-04-12 DIAGNOSIS — D18.02 VENOUS ANGIOMA OF BRAIN: ICD-10-CM

## 2024-04-12 DIAGNOSIS — G40.909 SEIZURE DISORDER: ICD-10-CM

## 2024-04-12 PROCEDURE — 1159F MED LIST DOCD IN RCRD: CPT | Mod: CPTII,S$GLB,, | Performed by: INTERNAL MEDICINE

## 2024-04-12 PROCEDURE — 99396 PREV VISIT EST AGE 40-64: CPT | Mod: S$GLB,,, | Performed by: INTERNAL MEDICINE

## 2024-04-12 PROCEDURE — 99999 PR PBB SHADOW E&M-EST. PATIENT-LVL IV: CPT | Mod: PBBFAC,,, | Performed by: INTERNAL MEDICINE

## 2024-04-12 PROCEDURE — 3008F BODY MASS INDEX DOCD: CPT | Mod: CPTII,S$GLB,, | Performed by: INTERNAL MEDICINE

## 2024-04-12 PROCEDURE — 3074F SYST BP LT 130 MM HG: CPT | Mod: CPTII,S$GLB,, | Performed by: INTERNAL MEDICINE

## 2024-04-12 PROCEDURE — 3078F DIAST BP <80 MM HG: CPT | Mod: CPTII,S$GLB,, | Performed by: INTERNAL MEDICINE

## 2024-04-12 PROCEDURE — 1160F RVW MEDS BY RX/DR IN RCRD: CPT | Mod: CPTII,S$GLB,, | Performed by: INTERNAL MEDICINE

## 2024-04-12 NOTE — PROGRESS NOTES
"Subjective:       Patient ID: Darling Quach is a 58 y.o. female.    Chief Complaint: Annual Exam  This is a 58-year-old who presents today for physical she reports has been having some issues over the last year with her toe reports that she stubbed it and had a fracture developed bunion saw some specialist she had an injection and then went to see orthopedist was considering surgery but ultimately decided against it she continues have some discomfort she tends to wear tennis shoes now or wider shoes to help prevent the pain when she walks.  She denies known history of gout but sometimes the area does get more red or inflamed.  She is history of seizure disorder and previous venous angioma of the brain which was not operable she has been on Keppra and follows with Neurology she goes to Dr. Shipman at [facility]  But also saw a neurologist here as well if needed.  She continues to take her cholesterol medicine and has seen Dr. Riggs she does plan to make an annual follow-up.  She has been under some increased situational stress between  who has a rare hemolytic anemia and her father who is coming home to palliative care today so she will be assisting her mother and father as well.    HPI  Review of Systems   Musculoskeletal:         Right foot bunyon pain    Neurological:  Negative for seizures.   Psychiatric/Behavioral:          Family stressors         Objective:    Blood pressure 104/64, pulse 72, height 5' 2" (1.575 m), weight 53.1 kg (117 lb 1 oz), SpO2 99 %.   Physical Exam  Constitutional:       General: She is not in acute distress.  HENT:      Head: Normocephalic.      Mouth/Throat:      Pharynx: Oropharynx is clear.   Eyes:      General: No scleral icterus.  Cardiovascular:      Rate and Rhythm: Normal rate and regular rhythm.      Heart sounds: Normal heart sounds. No murmur heard.     No friction rub. No gallop.      Comments: Breast : normal no masses or tenderness      Pulmonary:      Effort: " Pulmonary effort is normal. No respiratory distress.      Breath sounds: Normal breath sounds.   Abdominal:      General: Bowel sounds are normal.      Palpations: Abdomen is soft. There is no mass.      Tenderness: There is no abdominal tenderness.   Musculoskeletal:      Cervical back: Neck supple.      Comments: Right toe bunion some discomfort    Skin:     Findings: No erythema.   Neurological:      Mental Status: She is alert.   Psychiatric:         Mood and Affect: Mood normal.         Assessment:       1. Annual physical exam    2. Mixed dyslipidemia    3. Venous angioma of brain    4. Seizure disorder    5. Bunion        Plan:       Darling was seen today for annual exam.    Diagnoses and all orders for this visit:    Annual physical exam  -     CBC Auto Differential; Future  -     Comprehensive Metabolic Panel; Future  -     Lipid Panel; Future  -     Iron and TIBC; Future  -     Ferritin; Future  -     TSH; Future  -     Vitamin D; Future  -     Hemoglobin A1C; Future  -     Levetiracetam level; Future  -     URIC ACID; Future    Mixed dyslipidemia  Family history of coronary heart disease  Remains on statin following with Cardiology    Venous angioma of brain  Seizure disorder  She continues to follow with her neurologist remains on Keppra    For her bunion foot pain she has seen multiple specialist  Is following with Dr. Lane has tried injections now using conservative footwear will add uric acid to her upcoming blood work    For situational home stressors with 's illness and taking care of family as well we discussed she will call if concerns    She is up-to-date on her colonoscopy and will schedule her mammogram when due

## 2024-04-13 ENCOUNTER — LAB VISIT (OUTPATIENT)
Dept: LAB | Facility: HOSPITAL | Age: 59
End: 2024-04-13
Attending: INTERNAL MEDICINE
Payer: COMMERCIAL

## 2024-04-13 DIAGNOSIS — Z00.00 ANNUAL PHYSICAL EXAM: ICD-10-CM

## 2024-04-13 LAB
25(OH)D3+25(OH)D2 SERPL-MCNC: 46 NG/ML (ref 30–96)
ALBUMIN SERPL BCP-MCNC: 3.6 G/DL (ref 3.5–5.2)
ALP SERPL-CCNC: 58 U/L (ref 55–135)
ALT SERPL W/O P-5'-P-CCNC: 24 U/L (ref 10–44)
ANION GAP SERPL CALC-SCNC: 8 MMOL/L (ref 8–16)
AST SERPL-CCNC: 28 U/L (ref 10–40)
BASOPHILS # BLD AUTO: 0.09 K/UL (ref 0–0.2)
BASOPHILS NFR BLD: 2.1 % (ref 0–1.9)
BILIRUB SERPL-MCNC: 0.4 MG/DL (ref 0.1–1)
BUN SERPL-MCNC: 21 MG/DL (ref 6–20)
CALCIUM SERPL-MCNC: 9.7 MG/DL (ref 8.7–10.5)
CHLORIDE SERPL-SCNC: 108 MMOL/L (ref 95–110)
CHOLEST SERPL-MCNC: 196 MG/DL (ref 120–199)
CHOLEST/HDLC SERPL: 2.1 {RATIO} (ref 2–5)
CO2 SERPL-SCNC: 27 MMOL/L (ref 23–29)
CREAT SERPL-MCNC: 1 MG/DL (ref 0.5–1.4)
DIFFERENTIAL METHOD BLD: ABNORMAL
EOSINOPHIL # BLD AUTO: 0.2 K/UL (ref 0–0.5)
EOSINOPHIL NFR BLD: 3.9 % (ref 0–8)
ERYTHROCYTE [DISTWIDTH] IN BLOOD BY AUTOMATED COUNT: 12.1 % (ref 11.5–14.5)
EST. GFR  (NO RACE VARIABLE): >60 ML/MIN/1.73 M^2
ESTIMATED AVG GLUCOSE: 108 MG/DL (ref 68–131)
FERRITIN SERPL-MCNC: 36 NG/ML (ref 20–300)
GLUCOSE SERPL-MCNC: 95 MG/DL (ref 70–110)
HBA1C MFR BLD: 5.4 % (ref 4–5.6)
HCT VFR BLD AUTO: 38.5 % (ref 37–48.5)
HDLC SERPL-MCNC: 95 MG/DL (ref 40–75)
HDLC SERPL: 48.5 % (ref 20–50)
HGB BLD-MCNC: 12.7 G/DL (ref 12–16)
IMM GRANULOCYTES # BLD AUTO: 0 K/UL (ref 0–0.04)
IMM GRANULOCYTES NFR BLD AUTO: 0 % (ref 0–0.5)
IRON SERPL-MCNC: 85 UG/DL (ref 30–160)
LDLC SERPL CALC-MCNC: 93.4 MG/DL (ref 63–159)
LYMPHOCYTES # BLD AUTO: 1.5 K/UL (ref 1–4.8)
LYMPHOCYTES NFR BLD: 33.9 % (ref 18–48)
MCH RBC QN AUTO: 29.1 PG (ref 27–31)
MCHC RBC AUTO-ENTMCNC: 33 G/DL (ref 32–36)
MCV RBC AUTO: 88 FL (ref 82–98)
MONOCYTES # BLD AUTO: 0.4 K/UL (ref 0.3–1)
MONOCYTES NFR BLD: 8.8 % (ref 4–15)
NEUTROPHILS # BLD AUTO: 2.2 K/UL (ref 1.8–7.7)
NEUTROPHILS NFR BLD: 51.3 % (ref 38–73)
NONHDLC SERPL-MCNC: 101 MG/DL
NRBC BLD-RTO: 0 /100 WBC
PLATELET # BLD AUTO: 209 K/UL (ref 150–450)
PMV BLD AUTO: 10.1 FL (ref 9.2–12.9)
POTASSIUM SERPL-SCNC: 4.3 MMOL/L (ref 3.5–5.1)
PROT SERPL-MCNC: 6.8 G/DL (ref 6–8.4)
RBC # BLD AUTO: 4.36 M/UL (ref 4–5.4)
SATURATED IRON: 24 % (ref 20–50)
SODIUM SERPL-SCNC: 143 MMOL/L (ref 136–145)
TOTAL IRON BINDING CAPACITY: 351 UG/DL (ref 250–450)
TRANSFERRIN SERPL-MCNC: 237 MG/DL (ref 200–375)
TRIGL SERPL-MCNC: 38 MG/DL (ref 30–150)
TSH SERPL DL<=0.005 MIU/L-ACNC: 0.95 UIU/ML (ref 0.4–4)
URATE SERPL-MCNC: 5.1 MG/DL (ref 2.4–5.7)
WBC # BLD AUTO: 4.34 K/UL (ref 3.9–12.7)

## 2024-04-13 PROCEDURE — 84443 ASSAY THYROID STIM HORMONE: CPT | Performed by: INTERNAL MEDICINE

## 2024-04-13 PROCEDURE — 80177 DRUG SCRN QUAN LEVETIRACETAM: CPT | Performed by: INTERNAL MEDICINE

## 2024-04-13 PROCEDURE — 83036 HEMOGLOBIN GLYCOSYLATED A1C: CPT | Performed by: INTERNAL MEDICINE

## 2024-04-13 PROCEDURE — 36415 COLL VENOUS BLD VENIPUNCTURE: CPT | Performed by: INTERNAL MEDICINE

## 2024-04-13 PROCEDURE — 83540 ASSAY OF IRON: CPT | Performed by: INTERNAL MEDICINE

## 2024-04-13 PROCEDURE — 84550 ASSAY OF BLOOD/URIC ACID: CPT | Performed by: INTERNAL MEDICINE

## 2024-04-13 PROCEDURE — 82728 ASSAY OF FERRITIN: CPT | Performed by: INTERNAL MEDICINE

## 2024-04-13 PROCEDURE — 82306 VITAMIN D 25 HYDROXY: CPT | Performed by: INTERNAL MEDICINE

## 2024-04-13 PROCEDURE — 85025 COMPLETE CBC W/AUTO DIFF WBC: CPT | Performed by: INTERNAL MEDICINE

## 2024-04-13 PROCEDURE — 80061 LIPID PANEL: CPT | Performed by: INTERNAL MEDICINE

## 2024-04-13 PROCEDURE — 80053 COMPREHEN METABOLIC PANEL: CPT | Performed by: INTERNAL MEDICINE

## 2024-04-15 ENCOUNTER — PATIENT MESSAGE (OUTPATIENT)
Dept: INTERNAL MEDICINE | Facility: CLINIC | Age: 59
End: 2024-04-15
Payer: COMMERCIAL

## 2024-04-15 ENCOUNTER — TELEPHONE (OUTPATIENT)
Dept: INTERNAL MEDICINE | Facility: CLINIC | Age: 59
End: 2024-04-15
Payer: COMMERCIAL

## 2024-04-15 ENCOUNTER — HOSPITAL ENCOUNTER (OUTPATIENT)
Dept: RADIOLOGY | Facility: HOSPITAL | Age: 59
Discharge: HOME | End: 2024-04-15
Attending: INTERNAL MEDICINE
Payer: COMMERCIAL

## 2024-04-15 DIAGNOSIS — R91.1 SOLITARY PULMONARY NODULE: Primary | ICD-10-CM

## 2024-04-15 DIAGNOSIS — R91.1 SOLITARY PULMONARY NODULE: ICD-10-CM

## 2024-04-15 DIAGNOSIS — R91.8 ABNORMAL CT SCAN OF LUNG: ICD-10-CM

## 2024-04-15 DIAGNOSIS — R93.2 ABNORMAL CT OF LIVER: ICD-10-CM

## 2024-04-15 PROCEDURE — 71250 CT THORAX DX C-: CPT | Mod: TC

## 2024-04-15 PROCEDURE — 71250 CT THORAX DX C-: CPT | Mod: 26,,, | Performed by: RADIOLOGY

## 2024-04-15 NOTE — TELEPHONE ENCOUNTER
----- Message from Shauna Rose MD sent at 4/15/2024 12:02 PM CDT -----  Called and reviewed with pt discussed labs  Discussed recommendation for prior ct  Order placed    Please call and leilani pt ct chest

## 2024-04-16 ENCOUNTER — OFFICE VISIT (OUTPATIENT)
Dept: PULMONOLOGY | Facility: CLINIC | Age: 59
End: 2024-04-16
Payer: COMMERCIAL

## 2024-04-16 ENCOUNTER — TELEPHONE (OUTPATIENT)
Dept: INTERNAL MEDICINE | Facility: CLINIC | Age: 59
End: 2024-04-16
Payer: COMMERCIAL

## 2024-04-16 VITALS
HEIGHT: 62 IN | OXYGEN SATURATION: 99 % | HEART RATE: 75 BPM | SYSTOLIC BLOOD PRESSURE: 120 MMHG | WEIGHT: 119.69 LBS | DIASTOLIC BLOOD PRESSURE: 72 MMHG | BODY MASS INDEX: 22.03 KG/M2

## 2024-04-16 DIAGNOSIS — R91.8 ABNORMAL CT SCAN OF LUNG: Primary | ICD-10-CM

## 2024-04-16 PROCEDURE — 3008F BODY MASS INDEX DOCD: CPT | Mod: CPTII,S$GLB,, | Performed by: INTERNAL MEDICINE

## 2024-04-16 PROCEDURE — 3044F HG A1C LEVEL LT 7.0%: CPT | Mod: CPTII,S$GLB,, | Performed by: INTERNAL MEDICINE

## 2024-04-16 PROCEDURE — 3078F DIAST BP <80 MM HG: CPT | Mod: CPTII,S$GLB,, | Performed by: INTERNAL MEDICINE

## 2024-04-16 PROCEDURE — 3074F SYST BP LT 130 MM HG: CPT | Mod: CPTII,S$GLB,, | Performed by: INTERNAL MEDICINE

## 2024-04-16 PROCEDURE — 99203 OFFICE O/P NEW LOW 30 MIN: CPT | Mod: S$GLB,,, | Performed by: INTERNAL MEDICINE

## 2024-04-16 PROCEDURE — 99999 PR PBB SHADOW E&M-EST. PATIENT-LVL IV: CPT | Mod: PBBFAC,,, | Performed by: INTERNAL MEDICINE

## 2024-04-16 PROCEDURE — 1159F MED LIST DOCD IN RCRD: CPT | Mod: CPTII,S$GLB,, | Performed by: INTERNAL MEDICINE

## 2024-04-16 NOTE — TELEPHONE ENCOUNTER
Called and reviweed with pt  Discussed lipids she will consider   Reduction to goal 70 plans to follow up with cardiology  Discussed ct scan nonspecific ground glass changes  She had covid twice last 12/23  No tobacco use   No productive cough or sob  Also discussed fatty liver changes, lft normal, weight normal discussed  Us or follow up for now monitor  Discussed pulmonary consult regarding changes and bleb noted   Pt already scheduled

## 2024-04-18 LAB — LEVETIRACETAM SERPL-MCNC: 18.9 UG/ML (ref 3–60)

## 2024-04-19 NOTE — PROGRESS NOTES
Subjective:      Patient ID: Darling Quach is a 59 y.o. female.    Chief Complaint: Pulmonary Nodules    HPI  The patient was seen/evaluated in person at this visit on 4/16/2024.    Ms. Quach comes to [facility] as a New Patient for evaluation of an abnormal chest CT scan from 4/15/2024.  She is accompanied by her daughter at this visit.      She denies any significant prior pulmonary history or risk factors for lung disease.  She underwent cardiac CT in March 2022 for cardiac risk assessment.  The Radiology report from that scan showed =>  Small hiatal hernia; Patulous air-filled esophagus; Large pulmonary cyst or bleb in the anteromedial segment of the left lower lobe measuring 7.5 cm (series 3, image 52); and a few bilateral solid pulmonary micro nodules measuring up to 3 mm, for example the nodule in the anteromedial segment of the left lower lobe (series 3, image 5).  The accompanying full field of view images reveal no convincing evidence of lymph node enlargement, cardiac decompensation, pneumothorax, pneumomediastinum, pneumoperitoneum, or subcutaneous emphysema.    At her most recent Annual Wellness visit in Primary Care on 4/12/2024, she reported no pulmonary symptoms.  Follow up chest CT was performed on 4/15 (findings below).  While the cardiac CT does not include the full thorax, there is no progression of findings over the last two years.  The nodules are all very small (2-3 mm) and mostly sub-solid/ground glass.  The basilar bleb above the left diaphragm is unchanged.      In reviewing past abdominal CT scan imaging from April 2013 also shows the bleb to be present.  The imaging technique does not allow high enough resolution to see the nodules that are present in 2022.         Selected findings on Chest CT from 4/15/2024 =>    Axilla/Cristina/Mediastinum: No axillary or mediastinal lymphadenopathy.  Evaluation of hilar lymph nodes is limited without IV contrast.  Airways: Trachea and mainstem  bronchi are patent.  Lungs/Pleura: 0.2-cm groundglass nodule in the left upper lobe (4:106).  0.2-cm groundglass nodule in the left lower lobe (4:114).  0.2-cm groundglass nodule in left upper lobe (4:134).  0.3-cm solid nodule in the right upper lobe (4:142).  0.2-cm groundglass nodule in left upper lobe (4:147).  0.3-cm groundglass nodule in the left upper lobe (4:166).  0.2-cm groundglass nodule in the right upper lobe (4:166).  0.3-cm solid nodule in the right upper lobe (4:170).  Adjacent 0.2-cm groundglass nodules in the right upper lobe (4:184).  0.3-cm ground-glass nodule left lower lobe (4:209).  0.2-cm ground-glass nodule left lower lobe (4:238).  0.2-cm ground-glass nodule left lower lobe (4:245).  0.2-cm groundglass nodule in the right lower lobe (4:294).  0.3-cm groundglass nodule in the right middle lobe (4:335).  0.2-cm groundglass nodule in left lower lobe (4:373).  7.1-cm left basilar pulmonary bleb.     Impression:     1. A few predominantly subcentimeter in ground-glass pulmonary nodules throughout the bilateral lung fields measuring between 0.2-cm and 0.3-cm.  For a ground glass nodule <6 mm, Fleischner Society 2017 guidelines recommend no routine follow up. However, suspicious features could warrant follow up with non-contrast chest CT at 2 years and 4 years after discovery.      Review of Systems   Constitutional:  Negative for weight loss, activity change, appetite change and fatigue.   Respiratory:  Negative for cough, hemoptysis, sputum production, shortness of breath, wheezing and dyspnea on extertion.    Cardiovascular:  Negative for chest pain.     Objective:     Physical Exam   Constitutional: She is oriented to person, place, and time. No distress.   Cardiovascular: Normal rate, regular rhythm and normal heart sounds. Exam reveals no gallop.   No murmur heard.  Pulmonary/Chest: Normal expansion, symmetric chest wall expansion, effort normal and breath sounds normal. No stridor. No  "respiratory distress. She has no decreased breath sounds. She has no wheezes. She has no rhonchi. She has no rales.   Musculoskeletal:         General: No edema.   Neurological: She is alert and oriented to person, place, and time. Gait normal.   Skin: No cyanosis. Nails show no clubbing.   Psychiatric: She has a normal mood and affect. Judgment normal.   Nursing note and vitals reviewed.      Personal Diagnostic Review    Review of past imaging shows the presence of the pulmonary bulla/bleb in the left lung base dating back to at least the abdominal CT scan from April 2013.  The cardiac CT from March 2022 shows the peripheral 2-3 mm nodules visible in the lower lung zones bilaterally and the bulla/bleb in the left base to be unchanged on the most recent study.  The upper lobe "nodules" described by Radiology on the April 2024 study were not captured on the cardiac CT that was limited to the middle/lower lung zones.     04/08/13 10:02 03/25/22 07:22 04/15/24 15:53   CT ABDOMEN WITH IV CONTRAST Rpt     CT CALCIUM SCORING CARDIAC  Rpt    CT CHEST WITHOUT CONTRAST   Rpt           4/16/2024     1:29 PM 4/12/2024    10:25 AM 7/17/2023     2:33 PM 6/27/2023    10:42 AM 6/26/2023     1:59 PM 4/18/2023     8:38 AM 4/11/2023     1:37 PM   Pulmonary Function Tests   SpO2 99 % 99 %   97 %  99 %   Height 5' 2" (1.575 m) 5' 2" (1.575 m) 5' 2" (1.575 m) 5' 2" (1.575 m) 5' 2" (1.575 m) 5' 2" (1.575 m) 5' 2" (1.575 m)   Weight 54.3 kg (119 lb 11.4 oz) 53.1 kg (117 lb 1 oz) 54.4 kg (120 lb) 54.4 kg (120 lb) 54.7 kg (120 lb 9.5 oz) 54.1 kg (119 lb 4.3 oz) 54.3 kg (119 lb 11.4 oz)   BMI (Calculated) 21.9 21.4 21.9 21.9 22.1 21.8 21.9        Assessment:     No diagnosis found.     Outpatient Encounter Medications as of 4/16/2024   Medication Sig Dispense Refill    ascorbic acid, vitamin C, (VITAMIN C) 1000 MG tablet Take 1,000 mg by mouth once daily.      aspirin (ECOTRIN) 81 MG EC tablet Take 1 tablet (81 mg total) by mouth once " daily. 100 tablet 3    atorvastatin (LIPITOR) 80 MG tablet Take 1 tablet (80 mg total) by mouth once daily. 90 tablet 3    Lactobacillus acidophilus 10 billion cell Cap Take 1 capsule by mouth once daily.      levetiracetam XR (KEPPRA XR) 750 mg Tb24 tablet Take 1 tablet (750 mg total) by mouth every evening. 90 tablet 3    multivitamin capsule Take 1 capsule by mouth once daily.      omega-3 fatty acids 1,000 mg Cap Take 1,500 mg by mouth.       No facility-administered encounter medications on file as of 4/16/2024.     No orders of the defined types were placed in this encounter.      Plan:     No further work up is necessary at this time.  I suspect that the basilar bleb is either congenital or a result of past infection/pneumonia. Although she is at some increased risk for left pneumothorax, there is no present indication for intervention in the meantime.  The multiple small nodules (2-3 mm) have been stable for 2 years.  Given the stable imaging and low risk for thoracic malignancy, I don't recommend intervention or ongoing surveillance imaging.  If concerns persist, it would be okay to recheck non-contrast chest CT in two years at the soonest.    Problem List Items Addressed This Visit       Abnormal CT scan of lung - Primary    Overview     Abdominal CT in April 2013 showed bleb in left lung base.  Cardiac CT in March 2022 showed 7 cm bleb in left lung base and several micro-nodules bilaterally.  Chest CT in April 2024 showed no change in left lower lobe bled and micro-nodules.         Current Assessment & Plan     No significant risk factors for thoracic malignancy.  Imaging is not suspicious for malignancy or active pulmonary infection and remains stable over the last 2 years.    Consider repeat non-contrast chest CT in 2 years.            Note copied to Dr. Rose (Primary Care)    Total Time = 30 minutes    Raffaele Dhaliwal MD  Pulmonary Disease  [facility] - Pulmonary [facility] 9th Fl

## 2024-04-20 ENCOUNTER — HOSPITAL ENCOUNTER (OUTPATIENT)
Dept: RADIOLOGY | Facility: HOSPITAL | Age: 59
Discharge: HOME | End: 2024-04-20
Attending: INTERNAL MEDICINE
Payer: COMMERCIAL

## 2024-04-20 DIAGNOSIS — R93.2 ABNORMAL CT OF LIVER: ICD-10-CM

## 2024-04-20 PROCEDURE — 76705 ECHO EXAM OF ABDOMEN: CPT | Mod: 26,,, | Performed by: RADIOLOGY

## 2024-04-20 PROCEDURE — 76705 ECHO EXAM OF ABDOMEN: CPT | Mod: TC

## 2024-04-22 ENCOUNTER — TELEPHONE (OUTPATIENT)
Dept: INTERNAL MEDICINE | Facility: CLINIC | Age: 59
End: 2024-04-22
Payer: COMMERCIAL

## 2024-04-22 NOTE — TELEPHONE ENCOUNTER
Called message left with pt    Detail Level: Detailed General Sunscreen Counseling: I recommended a broad spectrum sunscreen with a SPF of 30 or higher.  I explained that SPF 30 sunscreens block approximately 97 percent of the sun's harmful rays.  Sunscreens should be applied at least 15 minutes prior to expected sun exposure and then every 2 hours after that as long as sun exposure continues. If swimming or exercising sunscreen should be reapplied every 45 minutes to an hour after getting wet or sweating.  One ounce, or the equivalent of a shot glass full of sunscreen, is adequate to protect the skin not covered by a bathing suit. I also recommended a lip balm with a sunscreen as well. Sun protective clothing can be used in lieu of sunscreen but must be worn the entire time you are exposed to the sun's rays.

## 2024-04-30 PROBLEM — R91.8 ABNORMAL CT SCAN OF LUNG: Status: ACTIVE | Noted: 2024-04-30

## 2024-05-01 NOTE — ASSESSMENT & PLAN NOTE
No significant risk factors for thoracic malignancy.  Imaging is not suspicious for malignancy or active pulmonary infection and remains stable over the last 2 years.    Consider repeat non-contrast chest CT in 2 years.

## 2024-05-09 ENCOUNTER — OFFICE VISIT (OUTPATIENT)
Dept: OBSTETRICS AND GYNECOLOGY | Facility: CLINIC | Age: 59
End: 2024-05-09
Payer: COMMERCIAL

## 2024-05-09 VITALS
BODY MASS INDEX: 21.1 KG/M2 | HEIGHT: 62 IN | WEIGHT: 114.63 LBS | SYSTOLIC BLOOD PRESSURE: 114 MMHG | DIASTOLIC BLOOD PRESSURE: 76 MMHG

## 2024-05-09 DIAGNOSIS — Z01.411 ENCOUNTER FOR GYNECOLOGICAL EXAMINATION WITH ABNORMAL FINDING: Primary | ICD-10-CM

## 2024-05-09 DIAGNOSIS — Z12.31 BREAST CANCER SCREENING BY MAMMOGRAM: ICD-10-CM

## 2024-05-09 DIAGNOSIS — N95.2 VAGINAL ATROPHY: ICD-10-CM

## 2024-05-09 PROCEDURE — 99396 PREV VISIT EST AGE 40-64: CPT | Mod: S$GLB,,, | Performed by: OBSTETRICS & GYNECOLOGY

## 2024-05-09 PROCEDURE — 3044F HG A1C LEVEL LT 7.0%: CPT | Mod: CPTII,S$GLB,, | Performed by: OBSTETRICS & GYNECOLOGY

## 2024-05-09 PROCEDURE — 1159F MED LIST DOCD IN RCRD: CPT | Mod: CPTII,S$GLB,, | Performed by: OBSTETRICS & GYNECOLOGY

## 2024-05-09 PROCEDURE — 3078F DIAST BP <80 MM HG: CPT | Mod: CPTII,S$GLB,, | Performed by: OBSTETRICS & GYNECOLOGY

## 2024-05-09 PROCEDURE — 99999 PR PBB SHADOW E&M-EST. PATIENT-LVL III: CPT | Mod: PBBFAC,,, | Performed by: OBSTETRICS & GYNECOLOGY

## 2024-05-09 PROCEDURE — 3008F BODY MASS INDEX DOCD: CPT | Mod: CPTII,S$GLB,, | Performed by: OBSTETRICS & GYNECOLOGY

## 2024-05-09 PROCEDURE — 3074F SYST BP LT 130 MM HG: CPT | Mod: CPTII,S$GLB,, | Performed by: OBSTETRICS & GYNECOLOGY

## 2024-05-09 NOTE — PROGRESS NOTES
Well woman exam    Darling Quahc is a 59 y.o.   patient who presents for a well woman exam.  LMP: No LMP recorded (lmp unknown). Patient has had a hysterectomy.  No gynecologic issues, problems, or complaints.      Past Medical History:   Diagnosis Date    Glaucoma     Hemangioma     cavernous    History of acne     dr lopez prescribed accutane    Hyperlipidemia     Seizures     Venous angioma of brain     Vertigo      Past Surgical History:   Procedure Laterality Date    ANAL SPHINCTEROTOMY  2019    Procedure: SPHINCTEROTOMY, ANAL;  Surgeon: Nirmal Chan MD;  Location: [facility] OR [address] [place] [address];  Service: Colon and Rectal;;    CATARACT EXTRACTION W/  INTRAOCULAR LENS IMPLANT Right 2014        CATARACT EXTRACTION W/  INTRAOCULAR LENS IMPLANT Left 2022        EXCISIONAL HEMORRHOIDECTOMY N/A 2019    Procedure: HEMORRHOIDECTOMY;  Surgeon: Nirmal Chan MD;  Location: [facility] OR [address] [place] [address];  Service: Colon and Rectal;  Laterality: N/A;    HYSTERECTOMY      [facility] ov in situ     INTRAOCULAR PROSTHESES INSERTION Left 3/16/2022    Procedure: INSERTION, IOL PROSTHESIS;  Surgeon: Josey Camacho MD;  Location: [facility] OR [address] [place] [address];  Service: Ophthalmology;  Laterality: Left;    PHACOEMULSIFICATION OF CATARACT Left 3/16/2022    Procedure: PHACOEMULSIFICATION, CATARACT;  Surgeon: Josey Camacho MD;  Location: [facility] OR [address] [place] [address];  Service: Ophthalmology;  Laterality: Left;     Social History     Socioeconomic History    Marital status:    Occupational History    Occupation: [employer]   Tobacco Use    Smoking status: Never    Smokeless tobacco: Never   Substance and Sexual Activity    Alcohol use: No    Drug use: No    Sexual activity: Yes     Partners: Male     Birth control/protection: Surgical     Comment: , [facility] 2010   Other Topics Concern    Are you pregnant or think you may be? No    Breast-feeding No     Social  Determinants of Health     Financial Resource Strain: Low Risk  (3/27/2022)    Overall Financial Resource Strain (CARDIA)     Difficulty of Paying Living Expenses: Not hard at all   Food Insecurity: No Food Insecurity (3/27/2022)    Hunger Vital Sign     Worried About Running Out of Food in the Last Year: Never true     Ran Out of Food in the Last Year: Never true   Transportation Needs: No Transportation Needs (3/27/2022)    PRAPARE - Transportation     Lack of Transportation (Medical): No     Lack of Transportation (Non-Medical): No   Physical Activity: Insufficiently Active (3/27/2022)    Exercise Vital Sign     Days of Exercise per Week: 4 days     Minutes of Exercise per Session: 30 min   Stress: No Stress Concern Present (3/27/2022)    [employer] [place] [employer] - Occupational Stress Questionnaire     Feeling of Stress : Not at all   Housing Stability: Unknown (3/27/2022)    Housing Stability Vital Sign     Unable to Pay for Housing in the Last Year: No     Unstable Housing in the Last Year: No     Family History   Problem Relation Name Age of Onset    Fibroids Mother      Migraines Mother      Glaucoma Mother      Heart disease Father      Depression Brother 4     No Known Problems Sister 1     Diabetes Maternal Grandmother      Glaucoma Maternal Grandmother      Cancer Maternal Aunt          breast    Breast cancer Maternal Aunt  50    No Known Problems Maternal Uncle      No Known Problems Paternal Aunt      No Known Problems Paternal Uncle      No Known Problems Maternal Grandfather      No Known Problems Paternal Grandmother      No Known Problems Paternal Grandfather      Breast cancer Maternal Aunt      Macular degeneration Neg Hx      Blindness Neg Hx      Melanoma Neg Hx      Psoriasis Neg Hx      Lupus Neg Hx      Eczema Neg Hx      Acne Neg Hx      Cataracts Neg Hx      Amblyopia Neg Hx      Retinal detachment Neg Hx      Strabismus Neg Hx      Stroke Neg Hx      Thyroid disease Neg Hx  "     Hypertension Neg Hx      Ovarian cancer Neg Hx      Colon cancer Neg Hx       OB History          3    Para   3    Term   3            AB        Living   3         SAB        IAB        Ectopic        Multiple        Live Births                     /76   Ht 5' 2" (1.575 m)   Wt 52 kg (114 lb 10.2 oz)   LMP  (LMP Unknown)   BMI 20.97 kg/m²       ROS:  GENERAL: Denies weight gain or weight loss. Feeling well overall.   SKIN: Denies rash or lesions.   HEAD: Denies head injury or headache.   NODES: Denies enlarged lymph nodes.   CHEST: Denies chest pain or shortness of breath.   CARDIOVASCULAR: Denies palpitations or left sided chest pain.   ABDOMEN: No abdominal pain, constipation, diarrhea, nausea, vomiting or rectal bleeding.   URINARY: No frequency, dysuria, hematuria, or burning on urination.  REPRODUCTIVE: See HPI.   BREASTS: The patient performs breast self-examination and denies pain, lumps, or nipple discharge.   HEMATOLOGIC: No easy bruisability or excessive bleeding.   MUSCULOSKELETAL: Denies joint pain or swelling.   NEUROLOGIC: Denies syncope or weakness.   PSYCHIATRIC: Denies depression, anxiety or mood swings.    PHYSICAL EXAM:  APPEARANCE: Well nourished, well developed, in no acute distress.  AFFECT: WNL, alert and oriented x 3  SKIN: No acne or hirsutism  NECK: Neck symmetric without masses or thyromegaly  NODES: No inguinal, cervical, axillary, or femoral lymph node enlargement  CHEST: Good respiratory effect  ABDOMEN: Soft.  No tenderness or masses.  No hepatosplenomegaly.  No hernias.  BREASTS: Symmetrical, no skin changes or visible lesions.  No palpable masses, nipple discharge bilaterally.  PELVIC: Normal external genitalia without lesions.  Normal hair distribution.  Adequate perineal body, normal urethral meatus.  Vagina atrophic without lesions or discharge.  Cervix/uterus absent.  No significant cystocele or rectocele.  Bimanual exam reveals no adnexal mass or " tenderness bilaterally.    Encounter for gynecological examination with abnormal finding    Vaginal atrophy    Breast cancer screening by mammogram  -     Mammo Digital Screening Bilat w/ Eric; Future; Expected date: 05/09/2024      Age specific counseling    Vaginal atrophy/vaginal atrophy treatment options reviewed with patient today.  Patient declines treatment.  Patient instructed to contact office if treatment desired.    Patient was counseled today on A.C.S. Pap guidelines and recommendations for yearly pelvic exams, mammograms and monthly self breast exams; to see her PCP for other health maintenance.     Follow up in about 1 year (around 5/9/2025) for Annual exam.    El Mayes IV, MD

## 2024-06-10 ENCOUNTER — PATIENT MESSAGE (OUTPATIENT)
Dept: INTERNAL MEDICINE | Facility: CLINIC | Age: 59
End: 2024-06-10
Payer: COMMERCIAL

## 2024-06-11 RX ORDER — ATORVASTATIN CALCIUM 80 MG/1
80 TABLET, FILM COATED ORAL DAILY
Qty: 90 TABLET | Refills: 3 | Status: SHIPPED | OUTPATIENT
Start: 2024-06-11 | End: 2025-06-11

## 2024-06-22 NOTE — PROGRESS NOTES
Subjective:   Patient ID:  Darling Quach is a 59 y.o. female who presents for follow-up of CAC /Lpa    HPI:  The patient is here for CAD risk factors and average CAC but quite high Lpa .  The patient has no chest pain, SOB, TIA, palpitations, syncope or pre-syncope.Patient currently exercises many times per week.        Review of Systems   Constitutional: Negative for chills, decreased appetite, diaphoresis, fever, malaise/fatigue, night sweats, weight gain and weight loss.   HENT:  Negative for congestion, hoarse voice, nosebleeds, sore throat and tinnitus.    Eyes:  Negative for blurred vision, double vision, vision loss in left eye, vision loss in right eye, visual disturbance and visual halos.   Cardiovascular:  Negative for chest pain, claudication, cyanosis, dyspnea on exertion, irregular heartbeat, leg swelling, near-syncope, orthopnea, palpitations, paroxysmal nocturnal dyspnea and syncope.   Respiratory:  Negative for cough, hemoptysis, shortness of breath, sleep disturbances due to breathing, snoring, sputum production and wheezing.    Endocrine: Negative for cold intolerance, heat intolerance, polydipsia, polyphagia and polyuria.   Hematologic/Lymphatic: Negative for adenopathy and bleeding problem. Does not bruise/bleed easily.   Skin:  Negative for color change, dry skin, flushing, itching, nail changes, poor wound healing, rash, skin cancer, suspicious lesions and unusual hair distribution.   Musculoskeletal:  Negative for arthritis, back pain, falls, gout, joint pain, joint swelling, muscle cramps, muscle weakness, myalgias and stiffness.   Gastrointestinal:  Negative for abdominal pain, anorexia, change in bowel habit, constipation, diarrhea, dysphagia, heartburn, hematemesis, hematochezia, melena and vomiting.   Genitourinary:  Negative for decreased libido, dysuria, hematuria, hesitancy and urgency.   Neurological:  Negative for excessive daytime sleepiness, dizziness, focal weakness,  "headaches, light-headedness, loss of balance, numbness, paresthesias, seizures, sensory change, tremors, vertigo and weakness.   Psychiatric/Behavioral:  Negative for altered mental status, depression, hallucinations, memory loss, substance abuse and suicidal ideas. The patient does not have insomnia and is not nervous/anxious.    Allergic/Immunologic: Negative for environmental allergies and hives.       Objective: /63   Pulse 67   Ht 5' 2" (1.575 m)   Wt 53.6 kg (118 lb 2.7 oz)   LMP  (LMP Unknown)   BMI 21.61 kg/m²      Physical Exam  Constitutional:       Appearance: She is well-developed.   HENT:      Head: Normocephalic.   Eyes:      Pupils: Pupils are equal, round, and reactive to light.   Neck:      Thyroid: No thyromegaly.      Vascular: Normal carotid pulses. No carotid bruit, hepatojugular reflux or JVD.   Cardiovascular:      Rate and Rhythm: Normal rate and regular rhythm.      Pulses: Intact distal pulses.      Heart sounds: Normal heart sounds. No murmur heard.     No friction rub. No gallop.   Pulmonary:      Effort: Pulmonary effort is normal. No tachypnea or respiratory distress.      Breath sounds: Normal breath sounds. No wheezing or rales.   Chest:      Chest wall: No tenderness.   Abdominal:      General: Bowel sounds are normal. There is no distension.      Palpations: Abdomen is soft. There is no mass.      Tenderness: There is no abdominal tenderness. There is no guarding or rebound.   Musculoskeletal:         General: No tenderness. Normal range of motion.      Cervical back: Normal range of motion.   Lymphadenopathy:      Cervical: No cervical adenopathy.   Skin:     General: Skin is warm.      Findings: No erythema or rash.   Neurological:      Mental Status: She is alert and oriented to person, place, and time.      Cranial Nerves: No cranial nerve deficit.      Coordination: Coordination normal.   Psychiatric:         Behavior: Behavior normal.         Thought Content: " Thought content normal.         Judgment: Judgment normal.         Assessment:     1. Agatston CAC score, <100    2. Mixed dyslipidemia    3. Hypercholesterolemia        Plan:   Discussed diet , achieving and maintaining ideal body weight, and exercise.   We reviewed meds in detail.  Reassured-Discussed goals, options, plan.  Omega-3 > 1500 mg/d combined EPA/DHA.  Baby ASA/d  C 1000 twice  Goal LDL < 70.  Could get CAC again in mid 60s  Rosuva 40 after Atorva runs out  Ezetimide 10 mg now    Darling was seen today for dyslipidemia.    Diagnoses and all orders for this visit:    Agatston CAC score, <100  -     Lipid Panel; Standing  -     Comprehensive Metabolic Panel; Standing  -     TSH; Future  -     rosuvastatin (CRESTOR) 40 MG Tab; Take 1 tablet (40 mg total) by mouth once daily.  -     ezetimibe (ZETIA) 10 mg tablet; Take 1 tablet (10 mg total) by mouth once daily.    Mixed dyslipidemia  -     Lipid Panel; Standing  -     Comprehensive Metabolic Panel; Standing  -     TSH; Future  -     rosuvastatin (CRESTOR) 40 MG Tab; Take 1 tablet (40 mg total) by mouth once daily.  -     ezetimibe (ZETIA) 10 mg tablet; Take 1 tablet (10 mg total) by mouth once daily.    Hypercholesterolemia  -     Lipid Panel; Standing  -     Comprehensive Metabolic Panel; Standing  -     TSH; Future  -     rosuvastatin (CRESTOR) 40 MG Tab; Take 1 tablet (40 mg total) by mouth once daily.  -     ezetimibe (ZETIA) 10 mg tablet; Take 1 tablet (10 mg total) by mouth once daily.            Follow up in about 18 months (around 12/24/2025) for with labs; comp lipids in 6 months.

## 2024-06-24 ENCOUNTER — OFFICE VISIT (OUTPATIENT)
Dept: CARDIOLOGY | Facility: CLINIC | Age: 59
End: 2024-06-24
Payer: COMMERCIAL

## 2024-06-24 VITALS
DIASTOLIC BLOOD PRESSURE: 63 MMHG | HEIGHT: 62 IN | HEART RATE: 67 BPM | BODY MASS INDEX: 21.75 KG/M2 | SYSTOLIC BLOOD PRESSURE: 117 MMHG | WEIGHT: 118.19 LBS

## 2024-06-24 DIAGNOSIS — E78.2 MIXED DYSLIPIDEMIA: ICD-10-CM

## 2024-06-24 DIAGNOSIS — E78.00 HYPERCHOLESTEROLEMIA: ICD-10-CM

## 2024-06-24 DIAGNOSIS — R93.1 AGATSTON CAC SCORE, <100: Primary | ICD-10-CM

## 2024-06-24 PROCEDURE — 1159F MED LIST DOCD IN RCRD: CPT | Mod: CPTII,S$GLB,, | Performed by: INTERNAL MEDICINE

## 2024-06-24 PROCEDURE — 3044F HG A1C LEVEL LT 7.0%: CPT | Mod: CPTII,S$GLB,, | Performed by: INTERNAL MEDICINE

## 2024-06-24 PROCEDURE — 99999 PR PBB SHADOW E&M-EST. PATIENT-LVL IV: CPT | Mod: PBBFAC,,, | Performed by: INTERNAL MEDICINE

## 2024-06-24 PROCEDURE — 3078F DIAST BP <80 MM HG: CPT | Mod: CPTII,S$GLB,, | Performed by: INTERNAL MEDICINE

## 2024-06-24 PROCEDURE — 99215 OFFICE O/P EST HI 40 MIN: CPT | Mod: S$GLB,,, | Performed by: INTERNAL MEDICINE

## 2024-06-24 PROCEDURE — 3074F SYST BP LT 130 MM HG: CPT | Mod: CPTII,S$GLB,, | Performed by: INTERNAL MEDICINE

## 2024-06-24 PROCEDURE — 3008F BODY MASS INDEX DOCD: CPT | Mod: CPTII,S$GLB,, | Performed by: INTERNAL MEDICINE

## 2024-06-24 RX ORDER — ROSUVASTATIN CALCIUM 40 MG/1
40 TABLET, COATED ORAL DAILY
Qty: 90 TABLET | Refills: 3 | Status: SHIPPED | OUTPATIENT
Start: 2024-06-24

## 2024-06-24 RX ORDER — EZETIMIBE 10 MG/1
10 TABLET ORAL DAILY
Qty: 90 TABLET | Refills: 3 | Status: SHIPPED | OUTPATIENT
Start: 2024-06-24 | End: 2024-07-24

## 2024-06-24 NOTE — PATIENT INSTRUCTIONS
Discussed diet , achieving and maintaining ideal body weight, and exercise.   We reviewed meds in detail.  Reassured-Discussed goals, options, plan.  Omega-3 > 1500 mg/d combined EPA/DHA.  Baby ASA/d  C 1000 twice  Goal LDL < 70.  Could get CAC again in mid 60s  Rosuva 40 after Atorva runs out  Ezetimide 10 mg now

## 2024-07-05 ENCOUNTER — PATIENT MESSAGE (OUTPATIENT)
Dept: OPHTHALMOLOGY | Facility: CLINIC | Age: 59
End: 2024-07-05
Payer: COMMERCIAL

## 2024-07-19 ENCOUNTER — HOSPITAL ENCOUNTER (OUTPATIENT)
Dept: RADIOLOGY | Facility: HOSPITAL | Age: 59
Discharge: HOME | End: 2024-07-19
Attending: OBSTETRICS & GYNECOLOGY
Payer: COMMERCIAL

## 2024-07-19 DIAGNOSIS — Z12.31 BREAST CANCER SCREENING BY MAMMOGRAM: ICD-10-CM

## 2024-07-19 PROCEDURE — 77063 BREAST TOMOSYNTHESIS BI: CPT | Mod: 26,,, | Performed by: RADIOLOGY

## 2024-07-19 PROCEDURE — 77067 SCR MAMMO BI INCL CAD: CPT | Mod: 26,,, | Performed by: RADIOLOGY

## 2024-07-19 PROCEDURE — 77063 BREAST TOMOSYNTHESIS BI: CPT | Mod: TC

## 2024-07-19 PROCEDURE — 77067 SCR MAMMO BI INCL CAD: CPT | Mod: TC

## 2024-07-21 NOTE — PROGRESS NOTES
Assessment /Plan     For exam results, see Encounter Report.    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 Mixed mechanism Glaucoma   H/O narrow angles - S/P PI's, + residual OHT   First HVF 1998   First photos 1999   Former [facility] , retired for a few years and went back to work as a    (mom with same problem with narrow angles and residual OHT)     Family history + mom - Narrow angles with residual OHT   Glaucoma meds - off gtts (use to use timolol)   H/O adverse rxn to glaucoma drops - latanoprost - eye irritation  LASERS PI's ou // yag cap od 4/25/2019  GLAUCOMA SURGERIES none   OTHER EYE SURGERIES - PC IOL OD 12/17/2014 - [facility] // PC IOL os 3/17/2022  CDR 0.4/0.4   Tbase 23-36 / 23-35 ou   Tmax 36/35   Ttarget 30/30  HVF 17 test 24-2 ss ou 1996 to 2023 ([facility]) - hint SNS  od // full (w/ lens rim art)  os            3 SWAP test 2010 to 2012 - Full ou            ( repeat HVF from 8/2013 to 9/2013 - new SAD od confirmed)            [place] VF's - 1 test 2016 to 2016 - SNS /INS od // full os   2019 VF full od/near full os   Gonio +3 ou S/P PI's   /618   OCT 6 test 2005 -  2023 ([facility]) - RNFL - bord TI  od/ bord bord TS  os   HRT - 5 test - 2014 to 2020 - MR -  nl od // bord SN os /// CDR 0.55 od // 0.55 os   Mix of [facility] and [facility]   Disc photos 1999, 2004, 2013 - slides //  5/13/2010 - // 2019 - OIS     - Ttoday  20/20  (usually higher)   - Test done today -IOP / ucare - FB sensation od     2. PVD ou - with dense vitreous veil OD Saw [facility] 4/23/2012 4/2019 - pt is noticing the floaters again (had not noticed them for several years)     3. Vitreous opacity OD     4. Steroid responder - had an injection / ? Spironolactone / aldactone - not currently on any steroids    5. Refractive error   Was bilateral hyperope / presbyope   Is anisopmetropic post CE od    Glasses from [employer]  - no longer working for her    6. H/O eyelid papilloma Left - S/P removal - [facility]     7. Pseudophakia od   PC IOL od 12/17/2014 - SN60wf 20.5   Had a large myopic shift pre surgery from +1.00 to -3.00      Pseudophakia OS   PC IOL os - 3/17/2022 - sn60wf 22.5    8.  I see her father in law - he has glaucoma - and I did his cataracts    I see her mother - she has similar issues to this patient    She would like me to see her adult daughter to evaluate her for OHT / glaucoma as well     Plan   MMG  / OHT ou  S/P PI's ou   HVF today near full OU    Intolerant to latanoprost - red irritated eye  Off all glaucoma gtts - (use to use timolol) 7/2019     [facility] - Boudreuax - 5/9/2019 - says yag cap helped a lot     Photo file updated - [facility] - 5/25/2021 - lost with Jaycee     [facility] / IOL OS Date: 3/17/2022 - sn60wf 22.5   POY > 2  - phaco/IOL        Keep previously scheduled appt  for   F/U 6 months with IOP and HVF / DFE / OCT

## 2024-07-25 ENCOUNTER — OFFICE VISIT (OUTPATIENT)
Dept: OPHTHALMOLOGY | Facility: CLINIC | Age: 59
End: 2024-07-25
Payer: COMMERCIAL

## 2024-07-25 DIAGNOSIS — H40.53X1 BILATERAL SECONDARY GLAUCOMA DUE TO COMBINATION MECHANISMS, MILD STAGE: Primary | ICD-10-CM

## 2024-07-25 DIAGNOSIS — H26.491 PCO (POSTERIOR CAPSULAR OPACIFICATION), RIGHT: ICD-10-CM

## 2024-07-25 DIAGNOSIS — H40.043 STEROID RESPONDERS BORDERLINE GLAUCOMA, BILATERAL: ICD-10-CM

## 2024-07-25 DIAGNOSIS — H43.813 VITREOUS DEGENERATION OF BOTH EYES: ICD-10-CM

## 2024-07-25 DIAGNOSIS — Z96.1 PSEUDOPHAKIA OF BOTH EYES: ICD-10-CM

## 2024-07-25 DIAGNOSIS — H43.813 POSTERIOR VITREOUS DETACHMENT OF BOTH EYES: ICD-10-CM

## 2024-07-25 PROCEDURE — 92012 INTRM OPH EXAM EST PATIENT: CPT | Mod: S$GLB,,, | Performed by: OPHTHALMOLOGY

## 2024-07-25 PROCEDURE — 1160F RVW MEDS BY RX/DR IN RCRD: CPT | Mod: CPTII,S$GLB,, | Performed by: OPHTHALMOLOGY

## 2024-07-25 PROCEDURE — 1159F MED LIST DOCD IN RCRD: CPT | Mod: CPTII,S$GLB,, | Performed by: OPHTHALMOLOGY

## 2024-07-25 PROCEDURE — 3044F HG A1C LEVEL LT 7.0%: CPT | Mod: CPTII,S$GLB,, | Performed by: OPHTHALMOLOGY

## 2024-07-25 PROCEDURE — 99999 PR PBB SHADOW E&M-EST. PATIENT-LVL III: CPT | Mod: PBBFAC,,, | Performed by: OPHTHALMOLOGY

## 2024-09-08 NOTE — PROGRESS NOTES
HPI     Glaucoma            Comments: HVF and OCT review today and pt states no changes since last   exam          Comments    DLS: 7/25/24    1. MMG/OHT OU  2. PVD OU  3. Vitreous Opacity OD  4. PCIOL OU  5. Steroid Responder  6.  Hx Removal Left Eyelid Papilloma (Dr. Alvarado)    MEDS:  AT's PRN OU          Last edited by Ellie Dent MA on 9/12/2024  2:35 PM.            Assessment /Plan     For exam results, see Encounter Report.    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    (mom with same problem with narrow angles and residual OHT)     Family history + mom - Narrow angles with residual OHT   Glaucoma meds - off gtts (use to use timolol)   H/O adverse rxn to glaucoma drops - latanoprost - eye irritation  LASERS PI's ou // yag cap od 4/25/2019  GLAUCOMA SURGERIES none   OTHER EYE SURGERIES - PC IOL OD 12/17/2014 - [place] // PC IOL os 3/17/2022  CDR 0.4/0.4   Tbase 23-36 / 23-35 ou   Tmax 36/35   Ttarget 30/30  HVF 18 test 24-2 ss ou 1996 to 2024 ([facility]) - hint SNS  od // full (w/ lens rim art)  os            3 SWAP test 2010 to 2012 - Full ou            ( repeat HVF from 8/2013 to 9/2013 - new SAD od confirmed)            [place] VF's - 1 test 2016 to 2016 - SNS /INS od // full os   2019 VF full od/near full os   Gonio +3 ou S/P PI's   /618   OCT 7 test 2019 -  2024 ([facility]) - RNFL - bord TI  od/ bord TS  os   HRT - 5 test - 2014 to 2020 - MR -  nl od // bord SN os /// CDR 0.55 od // 0.55 os   Mix of [facility] and [employer] [facility]   Disc photos 1999, 2004, 2013 - slides //  5/13/2010 - // 2019 - OIS     - Ttoday  20/20   (usually higher)   - Test done today -IOP / HVF / DFE / OCT     2. Pterygium / pinguecula - OD near cataract incision    Cause FB sensation - AT's prn      3. PVD ou - with dense vitreous veil OD Saw [facility] 4/23/2012 4/2019 - pt is noticing the floaters again (had not noticed them for several years)     4. Steroid responder - had an injection / ? Spironolactone / aldactone - not currently on any steroids    5. Refractive error   Was bilateral hyperope / presbyope   Is anisopmetropic post CE od    Glasses from [employer]  - no longer working for her    6. H/O eyelid papilloma Left - S/P removal - [facility]     7. Pseudophakia od   PC IOL od 12/17/2014 - SN60wf 20.5   Had a large myopic shift pre surgery from +1.00 to -3.00      Pseudophakia OS   PC IOL os - 3/17/2022 - sn60wf 22.5    8.  I see her father in law - he has glaucoma - and I did his cataracts    I see her mother - she has similar issues to this patient    She would like me to see her adult daughter to evaluate her for OHT / glaucoma as well     Plan   MMG  / OHT ou  S/P PI's ou   HVF today near full OU    Intolerant to latanoprost - red irritated eye  Off all glaucoma gtts - (use to use timolol) 7/2019     [facility] - Boudreuax - 5/9/2019 - says yag cap helped a lot     Photo file updated - [facility] - 5/25/2021 - lost with Jaycee     Phaco / IOL OS Date: 3/17/2022 - sn60wf 22.5   POY > 2  - phaco/IOL     Has an appt with alicia - (Boudreuax retired) - glasses      9/12/2024   Stable IOP / VF / OCT   Rec F/U 9 months with IOP and gonio - sooner prn

## 2024-09-12 ENCOUNTER — OFFICE VISIT (OUTPATIENT)
Dept: OPHTHALMOLOGY | Facility: CLINIC | Age: 59
End: 2024-09-12
Payer: COMMERCIAL

## 2024-09-12 ENCOUNTER — CLINICAL SUPPORT (OUTPATIENT)
Dept: OPHTHALMOLOGY | Facility: CLINIC | Age: 59
End: 2024-09-12
Payer: COMMERCIAL

## 2024-09-12 DIAGNOSIS — H43.813 VITREOUS DEGENERATION OF BOTH EYES: ICD-10-CM

## 2024-09-12 DIAGNOSIS — H40.53X1 BILATERAL SECONDARY GLAUCOMA DUE TO COMBINATION MECHANISMS, MILD STAGE: ICD-10-CM

## 2024-09-12 DIAGNOSIS — H40.53X1 BILATERAL SECONDARY GLAUCOMA DUE TO COMBINATION MECHANISMS, MILD STAGE: Primary | ICD-10-CM

## 2024-09-12 DIAGNOSIS — H40.043 STEROID RESPONDERS BORDERLINE GLAUCOMA, BILATERAL: ICD-10-CM

## 2024-09-12 DIAGNOSIS — H43.813 POSTERIOR VITREOUS DETACHMENT OF BOTH EYES: ICD-10-CM

## 2024-09-12 DIAGNOSIS — H26.491 PCO (POSTERIOR CAPSULAR OPACIFICATION), RIGHT: ICD-10-CM

## 2024-09-12 PROCEDURE — 99999 PR PBB SHADOW E&M-EST. PATIENT-LVL III: CPT | Mod: PBBFAC,,, | Performed by: OPHTHALMOLOGY

## 2024-09-12 RX ORDER — CLINDAMYCIN PHOSPHATE 11.9 MG/ML
SOLUTION TOPICAL 2 TIMES DAILY
COMMUNITY
Start: 2024-07-30

## 2024-09-12 RX ORDER — CLOBETASOL PROPIONATE 0.5 MG/G
CREAM TOPICAL
COMMUNITY
Start: 2024-06-26

## 2024-09-12 NOTE — PROGRESS NOTES
HVF/OCT rel/fix poor OD good OS coop good OU/chart checked for latex allergy/1.00 + 0.50 x 166 OD -0.25 + 1.00 x 004 OS -BJ

## 2024-09-23 ENCOUNTER — PATIENT MESSAGE (OUTPATIENT)
Dept: OPHTHALMOLOGY | Facility: CLINIC | Age: 59
End: 2024-09-23
Payer: COMMERCIAL

## 2024-11-07 ENCOUNTER — TELEPHONE (OUTPATIENT)
Dept: OPTOMETRY | Facility: CLINIC | Age: 59
End: 2024-11-07
Payer: COMMERCIAL

## 2024-11-08 ENCOUNTER — OFFICE VISIT (OUTPATIENT)
Dept: OPTOMETRY | Facility: CLINIC | Age: 59
End: 2024-11-08
Payer: COMMERCIAL

## 2024-11-08 DIAGNOSIS — H18.30 CORNEAL EPITHELIAL DEFECT: Primary | ICD-10-CM

## 2024-11-08 DIAGNOSIS — H52.223 REGULAR ASTIGMATISM OF BOTH EYES: ICD-10-CM

## 2024-11-08 DIAGNOSIS — H40.89 GLAUCOMA DUE TO COMBINATION OF MECHANISMS: ICD-10-CM

## 2024-11-08 PROCEDURE — 99999 PR PBB SHADOW E&M-EST. PATIENT-LVL III: CPT | Mod: PBBFAC,,, | Performed by: OPTOMETRIST

## 2024-11-08 PROCEDURE — 92014 COMPRE OPH EXAM EST PT 1/>: CPT | Mod: S$GLB,,, | Performed by: OPTOMETRIST

## 2024-11-08 PROCEDURE — 1159F MED LIST DOCD IN RCRD: CPT | Mod: CPTII,S$GLB,, | Performed by: OPTOMETRIST

## 2024-11-08 PROCEDURE — 92015 DETERMINE REFRACTIVE STATE: CPT | Mod: S$GLB,,, | Performed by: OPTOMETRIST

## 2024-11-08 PROCEDURE — 3044F HG A1C LEVEL LT 7.0%: CPT | Mod: CPTII,S$GLB,, | Performed by: OPTOMETRIST

## 2024-11-10 NOTE — PROGRESS NOTES
HPI    CC:59 yr old female in today annual   MARIANA:09/12/2024 Dr. Camacho  (+) Changes in vision   (+) Pain, OD  (+) Irritation OD  (+) Itching OD  (-) Flashes  (+) Floaters OD   (+) Glasses wearer,  Progressives  (-) CL wearer  (+) Uses eye gtts,  Systane drops   (-) Eye injury  (+) Eye surgery Cataract and Glaucoma   (+)POHx Cataract and Glaucoma   (+)FOHx Paternal Grandmother (Glaucoma )Maternal (Glaucoma and Cataract)   Father ( Ocular Melanoma ), age 92  (-)DM  Last edited by Chiquis Avendano, OD on 11/10/2024  3:17 PM.            Assessment /Plan     For exam results, see Encounter Report.    Corneal epithelial defect    Regular astigmatism of both eyes    Glaucoma due to combination of mechanisms - Both Eyes      1. Educated pt on K findings today OD. Pt is symptomatic OD only. Pt does sleep on right side - possible nocturnal lag. Pt to begin erythromycin clint qhs OD x 1 week. Pt elects to be checked in with via phone call/pt portal message regarding remaining s/s after tx course. Will assess follow up schedule based on pt feedback at that time.    2.   Eyeglass Final Rx       Eyeglass Final Rx         Sphere Cylinder Axis Add    Right +0.75 +0.75 180 +2.50    Left -0.50 +1.25 010 +2.50      Type: PAL    Expiration Date: 5/8/2025   Opposite signs OK                  3. Continue care with Dr. Camacho as scheduled    RTC in 1 year for annual eye exam unless changes noted sooner.

## 2024-11-12 ENCOUNTER — PATIENT MESSAGE (OUTPATIENT)
Dept: OPTOMETRY | Facility: CLINIC | Age: 59
End: 2024-11-12
Payer: COMMERCIAL

## 2024-12-05 ENCOUNTER — OFFICE VISIT (OUTPATIENT)
Dept: OPTOMETRY | Facility: CLINIC | Age: 59
End: 2024-12-05
Payer: COMMERCIAL

## 2024-12-05 DIAGNOSIS — H04.123 DRY EYE SYNDROME OF BILATERAL LACRIMAL GLANDS: Primary | ICD-10-CM

## 2024-12-05 PROCEDURE — 3044F HG A1C LEVEL LT 7.0%: CPT | Mod: CPTII,S$GLB,, | Performed by: OPTOMETRIST

## 2024-12-05 PROCEDURE — 99999 PR PBB SHADOW E&M-EST. PATIENT-LVL III: CPT | Mod: PBBFAC,,, | Performed by: OPTOMETRIST

## 2024-12-05 PROCEDURE — 92012 INTRM OPH EXAM EST PATIENT: CPT | Mod: S$GLB,,, | Performed by: OPTOMETRIST

## 2024-12-05 PROCEDURE — 1159F MED LIST DOCD IN RCRD: CPT | Mod: CPTII,S$GLB,, | Performed by: OPTOMETRIST

## 2024-12-05 RX ORDER — PERFLUOROHEXYLOCTANE 1 MG/MG
1 SOLUTION OPHTHALMIC 4 TIMES DAILY
Qty: 3 ML | Refills: 11 | Status: SHIPPED | OUTPATIENT
Start: 2024-12-05 | End: 2025-01-04

## 2024-12-05 NOTE — PROGRESS NOTES
HPI    Pt is here today for dry eye eval. States that OD has been itchy and dry.   States that she used Erythromycin for a week after last appt and once   again last night. She visited [place] for Thanksgiving and wants to be   sure the weather didn't affect her eyes.  Last edited by Chiquis Avendano, OD on 12/5/2024 10:46 AM.            Assessment /Plan     For exam results, see Encounter Report.    Dry eye syndrome of bilateral lacrimal glands  -     MIEBO, PF, 100 % Drop; Apply 1 drop to eye 4 (four) times daily.  Dispense: 3 mL; Refill: 11      Educated pt on very mild ALEX findings OU today. Pt only symptomatic OD. Decreased TBUT today OD compared to OS. Rx [place]bo up to qid OU PRN.     RTC if symptoms persist, worsen, or recur despite tx

## 2024-12-27 ENCOUNTER — PATIENT MESSAGE (OUTPATIENT)
Dept: OPTOMETRY | Facility: CLINIC | Age: 59
End: 2024-12-27
Payer: COMMERCIAL

## 2025-01-08 ENCOUNTER — TELEPHONE (OUTPATIENT)
Dept: PHARMACY | Facility: CLINIC | Age: 60
End: 2025-01-08
Payer: COMMERCIAL

## 2025-01-08 NOTE — TELEPHONE ENCOUNTER
[facility]/Population Health Chart Review & Patient Outreach Details For Medication Adherence Project    Reason for Outreach Encounter: 3rd Party payor non-compliance report ([employer], BCBS, C, etc)  2.  Patient Outreach Method: Reviewed Patient Chart  3.   Medication in question: atorvastatin    LAST FILLED: n/a  Hyperlipidemia Medications               ezetimibe (ZETIA) 10 mg tablet Take 1 tablet (10 mg total) by mouth once daily.    omega-3 fatty acids 1,000 mg Cap Take 1,500 mg by mouth Daily.    rosuvastatin (CRESTOR) 40 MG Tab Take 1 tablet (40 mg total) by mouth once daily.               4.  Reviewed and or Updates Made To: Patient Chart  5. Outreach Outcomes and/or actions taken: Medication discontinued    Additional Notes:

## 2025-03-27 ENCOUNTER — TELEPHONE (OUTPATIENT)
Dept: PHARMACY | Facility: CLINIC | Age: 60
End: 2025-03-27
Payer: COMMERCIAL

## 2025-03-27 NOTE — TELEPHONE ENCOUNTER
[facility]/Population Health Chart Review & Patient Outreach Details For Medication Adherence Project    Reason for Outreach Encounter: 3rd Party payor non-compliance report ([employer], BCBS, [employer], etc)  2.  Patient Outreach Method: Reviewed patient chart   3.   Medication in question:    Hyperlipidemia Medications              ezetimibe (ZETIA) 10 mg tablet Take 1 tablet (10 mg total) by mouth once daily.    omega-3 fatty acids 1,000 mg Cap Take 1,500 mg by mouth Daily.    rosuvastatin (CRESTOR) 40 MG Tab Take 1 tablet (40 mg total) by mouth once daily.                  LF 90 ds 3/3/25    4.  Reviewed and or Updates Made To: Patient Chart  5. Outreach Outcomes and/or actions taken: Patient filled medication and is on track to be adherent  Additional Notes:

## 2025-04-02 ENCOUNTER — TELEPHONE (OUTPATIENT)
Dept: OBSTETRICS AND GYNECOLOGY | Facility: CLINIC | Age: 60
End: 2025-04-02
Payer: COMMERCIAL

## 2025-04-14 ENCOUNTER — OFFICE VISIT (OUTPATIENT)
Dept: INTERNAL MEDICINE | Facility: CLINIC | Age: 60
End: 2025-04-14
Payer: COMMERCIAL

## 2025-04-14 VITALS
BODY MASS INDEX: 22.03 KG/M2 | OXYGEN SATURATION: 99 % | WEIGHT: 119.69 LBS | SYSTOLIC BLOOD PRESSURE: 115 MMHG | HEART RATE: 62 BPM | HEIGHT: 62 IN | DIASTOLIC BLOOD PRESSURE: 62 MMHG

## 2025-04-14 DIAGNOSIS — G40.909 SEIZURE DISORDER: ICD-10-CM

## 2025-04-14 DIAGNOSIS — E78.5 HYPERLIPIDEMIA, UNSPECIFIED HYPERLIPIDEMIA TYPE: ICD-10-CM

## 2025-04-14 DIAGNOSIS — R91.1 PULMONARY NODULE: ICD-10-CM

## 2025-04-14 DIAGNOSIS — Z00.00 ANNUAL PHYSICAL EXAM: Primary | ICD-10-CM

## 2025-04-14 DIAGNOSIS — D18.02 VENOUS ANGIOMA OF BRAIN: ICD-10-CM

## 2025-04-14 PROCEDURE — 3008F BODY MASS INDEX DOCD: CPT | Mod: CPTII,S$GLB,, | Performed by: INTERNAL MEDICINE

## 2025-04-14 PROCEDURE — 3078F DIAST BP <80 MM HG: CPT | Mod: CPTII,S$GLB,, | Performed by: INTERNAL MEDICINE

## 2025-04-14 PROCEDURE — 99999 PR PBB SHADOW E&M-EST. PATIENT-LVL IV: CPT | Mod: PBBFAC,,, | Performed by: INTERNAL MEDICINE

## 2025-04-14 PROCEDURE — 1160F RVW MEDS BY RX/DR IN RCRD: CPT | Mod: CPTII,S$GLB,, | Performed by: INTERNAL MEDICINE

## 2025-04-14 PROCEDURE — 1159F MED LIST DOCD IN RCRD: CPT | Mod: CPTII,S$GLB,, | Performed by: INTERNAL MEDICINE

## 2025-04-14 PROCEDURE — 3074F SYST BP LT 130 MM HG: CPT | Mod: CPTII,S$GLB,, | Performed by: INTERNAL MEDICINE

## 2025-04-14 PROCEDURE — 99396 PREV VISIT EST AGE 40-64: CPT | Mod: S$GLB,,, | Performed by: INTERNAL MEDICINE

## 2025-04-14 RX ORDER — MUPIROCIN 20 MG/G
1 OINTMENT TOPICAL 2 TIMES DAILY PRN
COMMUNITY
Start: 2025-04-09

## 2025-04-14 NOTE — PROGRESS NOTES
Subjective:       Patient ID: Darling Quach is a 59 y.o. female.    Chief Complaint: Annual Exam  This is a 59-year-old who presents today for physical.  She reports in general has been doing well she retired last year and has been focusing on her family assisting in her 's health and her father who passed away.  She has been taking her cholesterol medicine without difficulty she stays pretty active and tries to do some walking for exercise which she enjoys.  She has history of seizures and venous angioma of the brain and she follows with her outlying neurologist the [facility] for which she reports they do imaging on her annually.  She denies active headaches or recent seizure activity.  She recently had an episode of pain in the bottom of her left foot when she stepped on a piece of mulch that went in her foot she saw Dr. Jamey thomas and he removed the piece of mulch and she has been using Bactroban to the area with improvement.  Also had a lesion burn from her chest wall from her dermatologist.  She had previous pulmonary nodule seen on imaging went to see the pulmonologist reports no significant concerns but they did recommend repeat scan in 2 years which would be next year    HPI  Review of Systems   Constitutional:  Negative for activity change and unexpected weight change.   HENT:  Negative for hearing loss, rhinorrhea and trouble swallowing.    Eyes:  Negative for discharge and visual disturbance.   Respiratory:  Negative for chest tightness and wheezing.    Cardiovascular:  Negative for chest pain and palpitations.   Gastrointestinal:  Negative for blood in stool, constipation, diarrhea and vomiting.   Endocrine: Negative for polydipsia and polyuria.   Genitourinary:  Negative for difficulty urinating, dysuria, hematuria and menstrual problem.   Musculoskeletal:  Negative for arthralgias, joint swelling and neck pain.   Neurological:  Negative for weakness and headaches.  "  Psychiatric/Behavioral:  Negative for confusion and dysphoric mood.        Objective:      Blood pressure 115/62, pulse 62, height 5' 2" (1.575 m), weight 54.3 kg (119 lb 11.4 oz), SpO2 99%.   Physical Exam  Constitutional:       General: She is not in acute distress.  HENT:      Head: Normocephalic.      Mouth/Throat:      Pharynx: Oropharynx is clear.   Eyes:      General: No scleral icterus.  Cardiovascular:      Rate and Rhythm: Normal rate and regular rhythm.      Heart sounds: Normal heart sounds. No murmur heard.     No friction rub. No gallop.      Comments: Breast : normal no masses or tenderness      Pulmonary:      Effort: Pulmonary effort is normal. No respiratory distress.      Breath sounds: Normal breath sounds.   Abdominal:      General: Bowel sounds are normal.      Palpations: Abdomen is soft. There is no mass.      Tenderness: There is no abdominal tenderness.   Musculoskeletal:      Cervical back: Neck supple.   Skin:     Findings: No erythema.      Comments: Lesion chest wall from derm treatment     Healing lesion left foot    [place]      Neurological:      Mental Status: She is alert.   Psychiatric:         Mood and Affect: Mood normal.         Assessment:       1. Annual physical exam    2. Seizure disorder    3. Hyperlipidemia, unspecified hyperlipidemia type    4. Venous angioma of brain    5. Pulmonary nodule        Plan:       Darling was seen today for annual exam.    Diagnoses and all orders for this visit:    Annual physical exam  -     CBC Auto Differential; Future  -     Comprehensive Metabolic Panel; Future  -     Hemoglobin A1C; Future  -     Lipid Panel; Future  -     TSH; Future  -     Vitamin D; Future  -     Levetiracetam Level; Future  -     Levetiracetam Level    Seizure disorder  Venous angioma brain  Has been stable over time she follows with her outlying neurologist and remains on Keppra will add level to her labs  -     Levetiracetam Level; Future  -     Levetiracetam " Level    Hyperlipidemia, unspecified hyperlipidemia type  Currently on statin and Zetia tolerating without difficulty she follows with Cardiology when needed she is asymptomatic at this time    Pulmonary nodule  History of seen on previous imaging she did see Pulmonary and they recommend repeat CT in 2 years so she will be due next year at her annual    For her recent foot injury it seems to be healing continue keeping area clean and topical antibiotic call or follow-up with signs of infection pain or drainage    She will be due for colonoscopy in 10/26     She has her mammogram scheduled his and her gyn appointment    We discussed COVID shingles pneumonia vaccines    Follow-up annually sooner if concern            Answers submitted by the patient for this visit:  Review of Systems Questionnaire (Submitted on 4/7/2025)  activity change: No  unexpected weight change: No  neck pain: No  hearing loss: No  rhinorrhea: No  trouble swallowing: No  eye discharge: No  visual disturbance: No  chest tightness: No  wheezing: No  chest pain: No  palpitations: No  blood in stool: No  constipation: No  vomiting: No  diarrhea: No  polydipsia: No  polyuria: No  difficulty urinating: No  hematuria: No  menstrual problem: No  dysuria: No  joint swelling: No  arthralgias: No  headaches: No  weakness: No  confusion: No  dysphoric mood: No

## 2025-04-15 ENCOUNTER — TELEPHONE (OUTPATIENT)
Dept: INTERNAL MEDICINE | Facility: CLINIC | Age: 60
End: 2025-04-15
Payer: COMMERCIAL

## 2025-04-15 ENCOUNTER — LAB VISIT (OUTPATIENT)
Dept: LAB | Facility: HOSPITAL | Age: 60
End: 2025-04-15
Attending: INTERNAL MEDICINE
Payer: COMMERCIAL

## 2025-04-15 ENCOUNTER — RESULTS FOLLOW-UP (OUTPATIENT)
Dept: INTERNAL MEDICINE | Facility: CLINIC | Age: 60
End: 2025-04-15

## 2025-04-15 DIAGNOSIS — Z00.00 ANNUAL PHYSICAL EXAM: ICD-10-CM

## 2025-04-15 DIAGNOSIS — R74.8 ELEVATED LIVER ENZYMES: ICD-10-CM

## 2025-04-15 DIAGNOSIS — G40.909 SEIZURE DISORDER: Primary | ICD-10-CM

## 2025-04-15 LAB
25(OH)D3+25(OH)D2 SERPL-MCNC: 45 NG/ML (ref 30–96)
ABSOLUTE EOSINOPHIL (OHS): 0.23 K/UL
ABSOLUTE MONOCYTE (OHS): 0.44 K/UL (ref 0.3–1)
ABSOLUTE NEUTROPHIL COUNT (OHS): 2.12 K/UL (ref 1.8–7.7)
ALBUMIN SERPL BCP-MCNC: 3.7 G/DL (ref 3.5–5.2)
ALP SERPL-CCNC: 61 UNIT/L (ref 40–150)
ALT SERPL W/O P-5'-P-CCNC: 57 UNIT/L (ref 10–44)
ANION GAP (OHS): 9 MMOL/L (ref 8–16)
AST SERPL-CCNC: 54 UNIT/L (ref 11–45)
BASOPHILS # BLD AUTO: 0.08 K/UL
BASOPHILS NFR BLD AUTO: 1.8 %
BILIRUB SERPL-MCNC: 0.4 MG/DL (ref 0.1–1)
BUN SERPL-MCNC: 18 MG/DL (ref 6–20)
CALCIUM SERPL-MCNC: 9.7 MG/DL (ref 8.7–10.5)
CHLORIDE SERPL-SCNC: 107 MMOL/L (ref 95–110)
CHOLEST SERPL-MCNC: 145 MG/DL (ref 120–199)
CHOLEST/HDLC SERPL: 2 {RATIO} (ref 2–5)
CO2 SERPL-SCNC: 26 MMOL/L (ref 23–29)
CREAT SERPL-MCNC: 0.9 MG/DL (ref 0.5–1.4)
EAG (OHS): 111 MG/DL (ref 68–131)
ERYTHROCYTE [DISTWIDTH] IN BLOOD BY AUTOMATED COUNT: 12.2 % (ref 11.5–14.5)
GFR SERPLBLD CREATININE-BSD FMLA CKD-EPI: >60 ML/MIN/1.73/M2
GLUCOSE SERPL-MCNC: 88 MG/DL (ref 70–110)
HBA1C MFR BLD: 5.5 % (ref 4–5.6)
HCT VFR BLD AUTO: 38.9 % (ref 37–48.5)
HDLC SERPL-MCNC: 72 MG/DL (ref 40–75)
HDLC SERPL: 49.7 % (ref 20–50)
HGB BLD-MCNC: 12.9 GM/DL (ref 12–16)
IMM GRANULOCYTES # BLD AUTO: 0.01 K/UL (ref 0–0.04)
IMM GRANULOCYTES NFR BLD AUTO: 0.2 % (ref 0–0.5)
LDLC SERPL CALC-MCNC: 60.4 MG/DL (ref 63–159)
LYMPHOCYTES # BLD AUTO: 1.57 K/UL (ref 1–4.8)
MCH RBC QN AUTO: 28.4 PG (ref 27–31)
MCHC RBC AUTO-ENTMCNC: 33.2 G/DL (ref 32–36)
MCV RBC AUTO: 86 FL (ref 82–98)
NONHDLC SERPL-MCNC: 73 MG/DL
NUCLEATED RBC (/100WBC) (OHS): 0 /100 WBC
PLATELET # BLD AUTO: 171 K/UL (ref 150–450)
PMV BLD AUTO: 10 FL (ref 9.2–12.9)
POTASSIUM SERPL-SCNC: 4.3 MMOL/L (ref 3.5–5.1)
PROT SERPL-MCNC: 6.8 GM/DL (ref 6–8.4)
RBC # BLD AUTO: 4.54 M/UL (ref 4–5.4)
RELATIVE EOSINOPHIL (OHS): 5.2 %
RELATIVE LYMPHOCYTE (OHS): 35.3 % (ref 18–48)
RELATIVE MONOCYTE (OHS): 9.9 % (ref 4–15)
RELATIVE NEUTROPHIL (OHS): 47.6 % (ref 38–73)
SODIUM SERPL-SCNC: 142 MMOL/L (ref 136–145)
TRIGL SERPL-MCNC: 63 MG/DL (ref 30–150)
TSH SERPL-ACNC: 1.22 UIU/ML (ref 0.4–4)
WBC # BLD AUTO: 4.45 K/UL (ref 3.9–12.7)

## 2025-04-15 PROCEDURE — 83036 HEMOGLOBIN GLYCOSYLATED A1C: CPT

## 2025-04-15 PROCEDURE — 85025 COMPLETE CBC W/AUTO DIFF WBC: CPT

## 2025-04-15 PROCEDURE — 82306 VITAMIN D 25 HYDROXY: CPT

## 2025-04-15 PROCEDURE — 84443 ASSAY THYROID STIM HORMONE: CPT

## 2025-04-15 PROCEDURE — 80053 COMPREHEN METABOLIC PANEL: CPT

## 2025-04-15 PROCEDURE — 80061 LIPID PANEL: CPT

## 2025-04-15 PROCEDURE — 36415 COLL VENOUS BLD VENIPUNCTURE: CPT

## 2025-04-15 NOTE — TELEPHONE ENCOUNTER
----- Message from Shauna Rose MD sent at 4/15/2025  4:11 PM CDT -----  Spoke with pt discussed labs liver ruy up she has been taking a lot of nsais/tylenol for her foot injty  She will back off and plan recheck in 1 month add keppra/lev level    Please scheudle hepatic function and keppra/level in mid may order in   ----- Message -----  From: Lab, Background User  Sent: 4/15/2025  12:30 PM CDT  To: Shauna Rose MD

## 2025-04-15 NOTE — TELEPHONE ENCOUNTER
----- Message from Jasmin sent at 4/15/2025  3:04 PM CDT -----  Contact: Self/ [phone]  Patient is returning a phone call.Who left a message for the patient: Dr Rose Does patient know what this is regarding:  Would you like a call back, or a response through your [facility] portal?:    call back Comments:

## 2025-04-17 ENCOUNTER — TELEPHONE (OUTPATIENT)
Dept: PHARMACY | Facility: CLINIC | Age: 60
End: 2025-04-17
Payer: COMMERCIAL

## 2025-04-17 NOTE — TELEPHONE ENCOUNTER
[facility]/Population Health Chart Review & Patient Outreach Details For Medication Adherence Project    Reason for Outreach Encounter: 3rd Party payor non-compliance report ([employer], BCBS, C, etc)  2.  Patient Outreach Method: Reviewed Patient Chart  3.   Medication in question: rosuvastatin   LAST FILLED: 3/3/25 for 90 day supply  Hyperlipidemia Medications              ezetimibe (ZETIA) 10 mg tablet Take 1 tablet (10 mg total) by mouth once daily.    omega-3 fatty acids 1,000 mg Cap Take 1,500 mg by mouth Daily.    rosuvastatin (CRESTOR) 40 MG Tab Take 1 tablet (40 mg total) by mouth once daily.               4.  Reviewed and or Updates Made To: Patient Chart  5. Outreach Outcomes and/or actions taken: Patient filled medication and is on track to be adherent

## 2025-05-12 ENCOUNTER — HOSPITAL ENCOUNTER (OUTPATIENT)
Dept: RADIOLOGY | Facility: OTHER | Age: 60
Discharge: HOME | End: 2025-05-12
Attending: ORTHOPAEDIC SURGERY
Payer: COMMERCIAL

## 2025-05-12 ENCOUNTER — PATIENT MESSAGE (OUTPATIENT)
Dept: ORTHOPEDICS | Facility: CLINIC | Age: 60
End: 2025-05-12

## 2025-05-12 ENCOUNTER — CLINICAL SUPPORT (OUTPATIENT)
Dept: REHABILITATION | Facility: HOSPITAL | Age: 60
End: 2025-05-12
Payer: COMMERCIAL

## 2025-05-12 ENCOUNTER — OFFICE VISIT (OUTPATIENT)
Dept: ORTHOPEDICS | Facility: CLINIC | Age: 60
End: 2025-05-12
Payer: COMMERCIAL

## 2025-05-12 VITALS — HEIGHT: 62 IN | WEIGHT: 119.69 LBS | BODY MASS INDEX: 22.03 KG/M2

## 2025-05-12 DIAGNOSIS — M79.646 CHRONIC THUMB PAIN, UNSPECIFIED LATERALITY: Primary | ICD-10-CM

## 2025-05-12 DIAGNOSIS — M67.441 GANGLION OF HAND, RIGHT: Primary | ICD-10-CM

## 2025-05-12 DIAGNOSIS — M79.641 RIGHT HAND PAIN: ICD-10-CM

## 2025-05-12 DIAGNOSIS — G89.29 CHRONIC THUMB PAIN, UNSPECIFIED LATERALITY: Primary | ICD-10-CM

## 2025-05-12 DIAGNOSIS — M79.641 RIGHT HAND PAIN: Primary | ICD-10-CM

## 2025-05-12 DIAGNOSIS — M18.11 ARTHRITIS OF CARPOMETACARPAL (CMC) JOINT OF RIGHT THUMB: ICD-10-CM

## 2025-05-12 DIAGNOSIS — M25.531 RIGHT WRIST PAIN: ICD-10-CM

## 2025-05-12 DIAGNOSIS — M25.531 RIGHT WRIST PAIN: Primary | ICD-10-CM

## 2025-05-12 PROCEDURE — 73110 X-RAY EXAM OF WRIST: CPT | Mod: TC,FY,RT

## 2025-05-12 PROCEDURE — 73130 X-RAY EXAM OF HAND: CPT | Mod: 26,RT,, | Performed by: RADIOLOGY

## 2025-05-12 PROCEDURE — 3044F HG A1C LEVEL LT 7.0%: CPT | Mod: CPTII,S$GLB,, | Performed by: ORTHOPAEDIC SURGERY

## 2025-05-12 PROCEDURE — 1159F MED LIST DOCD IN RCRD: CPT | Mod: CPTII,S$GLB,, | Performed by: ORTHOPAEDIC SURGERY

## 2025-05-12 PROCEDURE — L3921 HFO W/JOINT(S) CF: HCPCS

## 2025-05-12 PROCEDURE — 99999 PR PBB SHADOW E&M-EST. PATIENT-LVL III: CPT | Mod: PBBFAC,,, | Performed by: ORTHOPAEDIC SURGERY

## 2025-05-12 PROCEDURE — 73130 X-RAY EXAM OF HAND: CPT | Mod: TC,FY,RT

## 2025-05-12 PROCEDURE — 99204 OFFICE O/P NEW MOD 45 MIN: CPT | Mod: S$GLB,,, | Performed by: ORTHOPAEDIC SURGERY

## 2025-05-12 PROCEDURE — 73110 X-RAY EXAM OF WRIST: CPT | Mod: 26,RT,, | Performed by: RADIOLOGY

## 2025-05-12 PROCEDURE — 3008F BODY MASS INDEX DOCD: CPT | Mod: CPTII,S$GLB,, | Performed by: ORTHOPAEDIC SURGERY

## 2025-05-12 NOTE — PROGRESS NOTES
Outpatient Rehab    Occupational Therapy Evaluation (only) - Orthotic    Patient Name: Darling Quach  MRN: [id number]  YOB: 1965  Encounter Date: 5/12/2025    Therapy Diagnosis:   Encounter Diagnoses   Name Primary?    Arthritis of carpometacarpal (CMC) joint of right thumb     Chronic thumb pain, unspecified laterality Yes     Physician: Gavino Bray MD    Physician Orders: Eval and Treat  Medical Diagnosis: Arthritis of carpometacarpal (CMC) joint of right thumb    Visit # / Visits Authorized: 1 / 1  Insurance Authorization Period: 5/12/2025 to 5/12/2026  Date of Evaluation: 5/12/2025  Plan of Care Certification: 5/12/2025 to 6/30/25     Time In:     Time Out:    Total Time (in minutes):     Total Billable Time (in minutes):      Intake Outcome Measure for FOTO Survey    Therapist reviewed FOTO scores for Darling Quach on 5/12/2025.   FOTO report - see Media section or FOTO account episode details.     Intake Score:  %    Precautions:       Subjective   History of Present Illness  Darling is a 60 y.o. female who reports to occupational therapy with a chief concern of Reports thumb got worse over last month moving tables for a party.     The patient reports a medical diagnosis of Arthritis of carpometacarpal (CMC) joint of right thumb (M18.11). The patient has experienced this issue since 05/12/25. Patient reports a surgery of Arthritis of carpometacarpal (CMC) joint of right thumb (M18.11). Surgery occurred on 05/12/25.         Dominant Hand: Right    Activities of Daily Living  Social history was obtained from Patient.    General Prior Level of Function Comments: independent              Pain     Patient reports a current pain level of 3/10. Pain at best is reported as 3/10. Pain at worst is reported as 3/10.             Living Arrangements  Living Situation  Living Arrangements: Spouse/significant other        Employment  Employment Status: Retired          Past Medical History/Physical  Systems Review:   Darling Quach  has a past medical history of Glaucoma, Hemangioma, History of acne, Hyperlipidemia, Seizures, Venous angioma of brain, and Vertigo.    Darling Quach  has a past surgical history that includes Excisional hemorrhoidectomy (N/A, 05/27/2019); Anal sphincterotomy (05/27/2019); Hysterectomy (2010); Phacoemulsification of cataract (Left, 03/16/2022); Intraocular prosthesis insertion (Left, 03/16/2022); Cataract extraction w/  intraocular lens implant (Right, 12/17/2014); and Cataract extraction w/  intraocular lens implant (Left, 03/17/2022).    Darling has a current medication list which includes the following prescription(s): ascorbic acid (vitamin c), aspirin, clindamycin, clobetasol, ezetimibe, lactobacillus acidophilus, levetiracetam xr, multivitamin, mupirocin, omega-3 fatty acids, and rosuvastatin.    Review of patient's allergies indicates:  No Known Allergies     Objective   Orthosis Details             Orthosis Laterality: Right  Fabrication Status: Custom  Orthosis Type: Static  Orthosis Design: Hand-based              Orthosis Position: Hand based thumb spica splint right , to be worn during the day , also issues a cool comfort in case the rigidity of custom splint is too much to do task with , instructed to wear during the day.  She is having a MRI in 2 weeks as well and follow up with MD .      Orthosis Purpose  Purpose of the patient's orthosis is to Provide support to a painful body part.                Time Entry(in minutes):       Assessment & Plan   Assessment  Darling presents with a condition of Low complexity.   Presentation of Symptoms: Stable  Will Comorbidities Impact Care: No       ADL Limitations : Functional mobility  Rest and Sleep Limitations: Disrupted sleep pattern                            Plan  From an occupational therapy perspective, the patient would benefit from: Skilled Rehab Services    Planned therapy interventions include: Therapeutic  exercise, Therapeutic activities, Neuromuscular re-education, Manual therapy, ADLs/IADLs, Orthotic management and training, Work conditioning, Work hardening, and Wound care.    Planned modalities to include: Cryotherapy (cold pack), Electrical stimulation - attended, Electrical stimulation - passive/unattended, Fluidotherapy, Paraffin bath, Iontophoresis, Thermotherapy (hot pack), Ultrasound, and Whirlpool.   Recommended Iontophoresis Agent: dexamethsone  Recommended Iontophoresis Dosage: 2.5 cc X 40Ma/per min    Visit Frequency: 1 times In Total     Other/tapered frequency details: Pt will wear splint then follow up with MD as to whether he wants her to attend more therapy     This plan was discussed with Patient.   Discussion participants: Agreed Upon Plan of Care  Plan details: Pt will wear splint for next 2 weeks then follow up with MD .            Patient's spiritual, cultural, and educational needs considered and patient agreeable to plan of care and goals.           Goals:     Ira Pate, OT

## 2025-05-12 NOTE — PROGRESS NOTES
[facility]  History & Physical  Orthopedics    SUBJECTIVE:      History of Present Illness    CHIEF COMPLAINT:  - Right thumb pain and bump    HPI:  Mariana presents with thumb pain that initially started two years ago while moving books at work. About a month ago, pain recurred with increased intensity after moving tables for a party. Pain is localized primarily in the thumb, specifically in the area identified as the thumb CMC joint. She reports difficulty with various movements involving the thumb, including pouring from a coffee pot and buttoning clothes.    A small bump on the thumb has been present for about 4-5 years, growing slowly. The bump was particularly noticeable the night before the visit. She characterizes the pain as distinct from the bump.    She denies any numbness, tingling sensations, clicking, popping, locking, or getting stuck of the thumb. No previous hand surgeries are reported.    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  Review of patient's allergies indicates:   Allergen Reactions    Hydrochlorothiazide Itching     Social History     Social History Narrative    Not on file     Family History   Family history unknown: Yes       [Current Medications]    [Current Medications]    Current Outpatient Medications:     amLODIPine  "(NORVASC) 10 MG tablet, Take 1 tablet (10 mg total) by mouth once daily., Disp: 90 tablet, Rfl: 3    atorvastatin (LIPITOR) 40 MG tablet, Take 1 tablet (40 mg total) by mouth once daily., Disp: 90 tablet, Rfl: 3    blood sugar diagnostic Strp, Test blood sugar twice daily before meals., Disp: , Rfl:     blood-glucose meter kit, Use as instructed for one time daily testing, patient requesting one touch verio kit but whatever is preferred by insurance, Disp: , Rfl:     cholecalciferol, vitamin D3, (VITAMIN D3) 50 mcg (2,000 unit) Cap, Take 1 capsule (2,000 Units total) by mouth once daily., Disp: 30 capsule, Rfl: 1    ciclopirox (PENLAC) 8 % Soln, Apply topically nightly. Paint on affected nail(s) once per night, remove residue once per week with alcohol, Disp: 13.2 mL, Rfl: 11    clotrimazole-betamethasone 1-0.05% (LOTRISONE) cream, Apply topically once daily. To toenails, Disp: 45 g, Rfl: 1    diclofenac sodium (VOLTAREN ARTHRITIS PAIN) 1 % Gel, Apply 2 g topically once daily., Disp: 100 g, Rfl: 3    docusate sodium (COLACE) 100 MG capsule, Take 100 mg by mouth once daily., Disp: , Rfl:     fluocinonide (LIDEX) 0.05 % external solution, Apply topically., Disp: , Rfl:     lancets Misc, Test blood sugar twice daily before meals., Disp: , Rfl:     latanoprost 0.005 % ophthalmic solution, INSTILL 1 DROP INTO EACH EYE EVERY DAY AT BEDTIME, Disp: 9 mL, Rfl: 0    mirtazapine (REMERON) 7.5 MG Tab, Take 1 tablet (7.5 mg total) by mouth every evening., Disp: 90 tablet, Rfl: 3    prednisoLONE acetate (PRED FORTE) 1 % DrpS, INSTILL 1 DROP INTO RIGHT EYE THREE TIMES DAILY USE FOR 7 DAYS TO 10 DAYS, Disp: , Rfl:     valsartan (DIOVAN) 320 MG tablet, Take 1 tablet (320 mg total) by mouth every evening., Disp: 90 tablet, Rfl: 3    OBJECTIVE:      Vital Signs (Most Recent):  Vitals:    05/12/25 1006   Weight: 63.7 kg (140 lb 6.9 oz)   Height: 5' 6" (1.676 m)     Body mass index is 22.67 kg/m².    Physical Exam    Musculoskeletal: " Pain on palpation of thumb CMC joint. No clicking, popping, or locking in thumb.         Right Hand/Wrist Examination:    Observation/Inspection:  Swelling  patient with a proximally 1 cm by 7 mm x 2 mm mass in the subcutaneous tissues overlying the dorsal aspect of the right 1st metacarpal    Deformity  none  Discoloration  none     Scars   none    Atrophy  none    HAND/WRIST EXAMINATION:  Finkelstein's Test   Neg  WHAT Test    Neg  Snuff box tenderness   Neg  Cook's Test    Neg  Hook of Hamate Tenderness  Neg  CMC grind    positive  Circumduction test   positive    Neurovascular Exam:  Digits WWP, brisk CR < 3s throughout  NVI motor/LTS to M/R/U nerves, radial pulse 2+  Tinel's Test - Carpal Tunnel  Neg  Tinel's Test - Cubital Tunnel  Neg  Phalen's Test    Neg  Median Nerve Compression Test Neg    ROM hand full, painless    ROM wrist full, painless    ROM elbow full, painless    Abdomen not guarded  Respirations nonlabored  Perfusion intact    Diagnostic Results:     Imaging - I independently viewed the patient's imaging as well as the radiology report.  Xrays of the patient's right hand and wrist  demonstrate mild degenerative changes at the right thumb CMC joint     EMG - none    ASSESSMENT/PLAN:      89 y.o. yo female with right thumb CMC arthritis, right dorsal thumb mass  Plan: The patient and I had a thorough discussion today.  We discussed the working diagnosis as well as several other potential alternative diagnoses.  Treatment options were discussed, both conservative and surgical.  Conservative treatment options would include things such as activity modifications, workplace modifications, a period of rest, oral vs topical OTC and prescription anti-inflammatory medications, occupational therapy, splinting/bracing, immobilization, corticosteroid injections, and others.  Surgical options were discussed as well.     Assessment & Plan    IMAGING:  - Ordered MRI Thumb to evaluate ganglion cyst-like  bump.    REFERRALS:  - Referred to Ira (occupational therapist) for custom splint fitting.    PROCEDURES:  - Discussed steroid injection as a treatment option for thumb CMC arthritis.  - Potential side effects of injection include temporary skin pigmentation changes.    FOLLOW UP:  - Follow up after MRI results are available.          Should the patient's symptoms worsen, persist, or fail to improve they should return for reevaluation and I would be happy to see them back anytime.        Gavino Bray M.D.    Please be aware that this note has been generated with the assistance of [employer] voice-to-text.  Please excuse any spelling or grammatical errors.    Thank you for choosing Dr. Gavino Bray for your orthopedic hand and upper extremity care. It is our goal to provide you with exceptional care that will help keep you healthy, active, and get you back in the game.     If you felt that you received exemplary care today, please consider leaving feedback for Dr. Bray on [employer] at [email or web address].    Please do not hesitate to reach out to us via email, phone, or MyChart with any questions, concerns, or feedback.

## 2025-05-15 ENCOUNTER — LAB VISIT (OUTPATIENT)
Dept: LAB | Facility: HOSPITAL | Age: 60
End: 2025-05-15
Attending: INTERNAL MEDICINE
Payer: COMMERCIAL

## 2025-05-15 DIAGNOSIS — G40.909 SEIZURE DISORDER: ICD-10-CM

## 2025-05-15 DIAGNOSIS — R74.8 ELEVATED LIVER ENZYMES: ICD-10-CM

## 2025-05-15 LAB
ALBUMIN SERPL BCP-MCNC: 3.8 G/DL (ref 3.5–5.2)
ALP SERPL-CCNC: 63 UNIT/L (ref 40–150)
ALT SERPL W/O P-5'-P-CCNC: 34 UNIT/L (ref 10–44)
AST SERPL-CCNC: 36 UNIT/L (ref 11–45)
BILIRUB DIRECT SERPL-MCNC: 0.2 MG/DL (ref 0.1–0.3)
BILIRUB SERPL-MCNC: 0.4 MG/DL (ref 0.1–1)
PROT SERPL-MCNC: 6.8 GM/DL (ref 6–8.4)

## 2025-05-15 PROCEDURE — 36415 COLL VENOUS BLD VENIPUNCTURE: CPT

## 2025-05-15 PROCEDURE — 80076 HEPATIC FUNCTION PANEL: CPT

## 2025-05-15 PROCEDURE — 80177 DRUG SCRN QUAN LEVETIRACETAM: CPT

## 2025-05-16 ENCOUNTER — RESULTS FOLLOW-UP (OUTPATIENT)
Dept: INTERNAL MEDICINE | Facility: CLINIC | Age: 60
End: 2025-05-16

## 2025-05-17 NOTE — PROGRESS NOTES
HPI    DLS: 9/12/2024    Pt here for 9 Month Check:    Meds;  AT's PRN OU    1. MMG/OHT OU   2. PVD OU   3. Vitreous Opacity OD   4. PCIOL OU   5. Steroid Responder   6.  Hx Removal Left Eyelid Papilloma (Dr. Alvarado)   7. Laser PI's OU       Last edited by Josey Camacho MD on 5/19/2025  1:42 PM.            Assessment /Plan     For exam results, see Encounter Report.    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    (mom with same problem with narrow angles and residual OHT)     Family history + mom - Narrow angles with residual OHT   Glaucoma meds - off gtts (use to use timolol)   H/O adverse rxn to glaucoma drops - latanoprost - eye irritation  LASERS PI's ou // yag cap od 4/25/2019  GLAUCOMA SURGERIES none   OTHER EYE SURGERIES - PC IOL OD 12/17/2014 - Janice // PC IOL os 3/17/2022  CDR 0.4/0.4   Tbase 23-36 / 23-35 ou   Tmax 36/35   Ttarget 30/30  HVF 18 test 24-2 ss ou 1996 to 2024 ([facility]) - hint SNS  od // full (w/ lens rim art)  os            3 SWAP test 2010 to 2012 - Full ou            ( repeat HVF from 8/2013 to 9/2013 - new SAD od confirmed)            [place] VF's - 1 test 2016 to 2016 - SNS /INS od // full os   2019 VF full od/near full os   Gonio +3 ou S/P PI's   /618   OCT 7 test 2019 -  2024 ([facility]) - RNFL - bord TI  od/ bord TS  os   HRT - 5 test - 2014 to 2020 - MR -  nl od // bord SN os /// CDR 0.55 od // 0.55 os   Mix of [facility] and metaire   Disc photos 1999, 2004, 2013 - slides // 5/13/2010 - // 2019 - OIS     -  Ttoday  19/18   (usually higher)   - Test done today -IOP /  gonio     2. Pterygium / pinguecula - OD near cataract incision    Cause FB sensation - AT's prn      3. PVD ou - with dense vitreous veil OD Saw Arend 4/23/2012 4/2019 - pt is noticing the floaters again (had not noticed them for several years)     4. Steroid responder - had an injection / ? Spironolactone / aldactone - not currently on any steroids    5. Refractive error   Was bilateral hyperope / presbyope   Is anisopmetropic post CE od    Glasses from [email or web address]  - no longer working for her    6. H/O eyelid papilloma Left - S/P removal - Christiano     7. Pseudophakia od   PC IOL od 12/17/2014 - SN60wf 20.5   Had a large myopic shift pre surgery from +1.00 to -3.00      Pseudophakia OS   PC IOL os - 3/17/2022 - sn60wf 22.5    8.  I see her father in law - he has glaucoma - and I did his cataracts    I see her mother - she has similar issues to this patient    She would like me to see her adult daughter to evaluate her for OHT / glaucoma as well     Plan   MMG  / OHT ou  S/P PI's ou   HVF today near full OU    Intolerant to latanoprost - red irritated eye  Off all glaucoma gtts - (use to use timolol) 7/2019     [facility] Rx - Boudreuax - 5/9/2019 - says yag cap helped a lot     Photo file updated - noeire - 5/25/2021 - lost with Jaycee     Phaco / IOL OS Date: 3/17/2022 - sn60wf 22.5   POY > 3  - phaco/IOL     [place] - glasses and dry eyes =  (Shaylee - retired)      9/12/2024   Stable IOP / VF / OCT   Rec F/U 9 months with IOP and HVF / DFE / OCT

## 2025-05-19 ENCOUNTER — OFFICE VISIT (OUTPATIENT)
Dept: OPHTHALMOLOGY | Facility: CLINIC | Age: 60
End: 2025-05-19
Payer: COMMERCIAL

## 2025-05-19 DIAGNOSIS — H40.53X1 BILATERAL SECONDARY GLAUCOMA DUE TO COMBINATION MECHANISMS, MILD STAGE: Primary | ICD-10-CM

## 2025-05-19 DIAGNOSIS — H40.043 STEROID RESPONDERS BORDERLINE GLAUCOMA, BILATERAL: ICD-10-CM

## 2025-05-19 DIAGNOSIS — H43.813 VITREOUS DEGENERATION OF BOTH EYES: ICD-10-CM

## 2025-05-19 DIAGNOSIS — H26.491 PCO (POSTERIOR CAPSULAR OPACIFICATION), RIGHT: ICD-10-CM

## 2025-05-19 DIAGNOSIS — H43.813 POSTERIOR VITREOUS DETACHMENT OF BOTH EYES: ICD-10-CM

## 2025-05-19 DIAGNOSIS — Z96.1 PSEUDOPHAKIA OF BOTH EYES: ICD-10-CM

## 2025-05-19 LAB — W LEVETIRACETAM: 17.2 UG/ML

## 2025-05-19 PROCEDURE — 1160F RVW MEDS BY RX/DR IN RCRD: CPT | Mod: CPTII,S$GLB,, | Performed by: OPHTHALMOLOGY

## 2025-05-19 PROCEDURE — 92020 GONIOSCOPY: CPT | Mod: S$GLB,,, | Performed by: OPHTHALMOLOGY

## 2025-05-19 PROCEDURE — 99999 PR PBB SHADOW E&M-EST. PATIENT-LVL III: CPT | Mod: PBBFAC,,, | Performed by: OPHTHALMOLOGY

## 2025-05-19 PROCEDURE — 3044F HG A1C LEVEL LT 7.0%: CPT | Mod: CPTII,S$GLB,, | Performed by: OPHTHALMOLOGY

## 2025-05-19 PROCEDURE — 1159F MED LIST DOCD IN RCRD: CPT | Mod: CPTII,S$GLB,, | Performed by: OPHTHALMOLOGY

## 2025-05-19 PROCEDURE — 99214 OFFICE O/P EST MOD 30 MIN: CPT | Mod: S$GLB,,, | Performed by: OPHTHALMOLOGY

## 2025-05-20 ENCOUNTER — HOSPITAL ENCOUNTER (OUTPATIENT)
Dept: RADIOLOGY | Facility: HOSPITAL | Age: 60
Discharge: HOME | End: 2025-05-20
Attending: ORTHOPAEDIC SURGERY
Payer: COMMERCIAL

## 2025-05-20 DIAGNOSIS — M67.441 GANGLION OF HAND, RIGHT: ICD-10-CM

## 2025-05-20 PROCEDURE — 73218 MRI UPPER EXTREMITY W/O DYE: CPT | Mod: 26,RT,, | Performed by: INTERNAL MEDICINE

## 2025-05-20 PROCEDURE — 73218 MRI UPPER EXTREMITY W/O DYE: CPT | Mod: TC,RT

## 2025-05-22 ENCOUNTER — HOSPITAL ENCOUNTER (OUTPATIENT)
Dept: RADIOLOGY | Facility: HOSPITAL | Age: 60
Discharge: HOME | End: 2025-05-22
Attending: ORTHOPAEDIC SURGERY
Payer: COMMERCIAL

## 2025-05-22 ENCOUNTER — OFFICE VISIT (OUTPATIENT)
Dept: ORTHOPEDICS | Facility: CLINIC | Age: 60
End: 2025-05-22
Payer: COMMERCIAL

## 2025-05-22 ENCOUNTER — OFFICE VISIT (OUTPATIENT)
Dept: OBSTETRICS AND GYNECOLOGY | Facility: CLINIC | Age: 60
End: 2025-05-22
Payer: COMMERCIAL

## 2025-05-22 ENCOUNTER — PATIENT MESSAGE (OUTPATIENT)
Dept: ORTHOPEDICS | Facility: CLINIC | Age: 60
End: 2025-05-22

## 2025-05-22 VITALS
HEIGHT: 62 IN | DIASTOLIC BLOOD PRESSURE: 56 MMHG | WEIGHT: 118.81 LBS | SYSTOLIC BLOOD PRESSURE: 112 MMHG | BODY MASS INDEX: 21.86 KG/M2

## 2025-05-22 DIAGNOSIS — R22.31 SUBCUTANEOUS MASS OF RIGHT THUMB: ICD-10-CM

## 2025-05-22 DIAGNOSIS — N95.2 VAGINAL ATROPHY: ICD-10-CM

## 2025-05-22 DIAGNOSIS — R22.31 MASS OF FINGER OF RIGHT HAND: Primary | ICD-10-CM

## 2025-05-22 DIAGNOSIS — R22.31 SUBCUTANEOUS MASS OF RIGHT THUMB: Primary | ICD-10-CM

## 2025-05-22 DIAGNOSIS — Z01.411 ENCOUNTER FOR GYNECOLOGICAL EXAMINATION WITH ABNORMAL FINDING: Primary | ICD-10-CM

## 2025-05-22 PROCEDURE — 76882 US LMTD JT/FCL EVL NVASC XTR: CPT | Mod: TC,RT

## 2025-05-22 PROCEDURE — 3044F HG A1C LEVEL LT 7.0%: CPT | Mod: CPTII,S$GLB,, | Performed by: ORTHOPAEDIC SURGERY

## 2025-05-22 PROCEDURE — 76882 US LMTD JT/FCL EVL NVASC XTR: CPT | Mod: 26,RT,, | Performed by: RADIOLOGY

## 2025-05-22 PROCEDURE — 1159F MED LIST DOCD IN RCRD: CPT | Mod: CPTII,S$GLB,, | Performed by: ORTHOPAEDIC SURGERY

## 2025-05-22 PROCEDURE — 3074F SYST BP LT 130 MM HG: CPT | Mod: CPTII,S$GLB,, | Performed by: OBSTETRICS & GYNECOLOGY

## 2025-05-22 PROCEDURE — 1159F MED LIST DOCD IN RCRD: CPT | Mod: CPTII,S$GLB,, | Performed by: OBSTETRICS & GYNECOLOGY

## 2025-05-22 PROCEDURE — 99999 PR PBB SHADOW E&M-EST. PATIENT-LVL III: CPT | Mod: PBBFAC,,, | Performed by: OBSTETRICS & GYNECOLOGY

## 2025-05-22 PROCEDURE — 99213 OFFICE O/P EST LOW 20 MIN: CPT | Mod: S$GLB,,, | Performed by: ORTHOPAEDIC SURGERY

## 2025-05-22 PROCEDURE — 3044F HG A1C LEVEL LT 7.0%: CPT | Mod: CPTII,S$GLB,, | Performed by: OBSTETRICS & GYNECOLOGY

## 2025-05-22 PROCEDURE — 3008F BODY MASS INDEX DOCD: CPT | Mod: CPTII,S$GLB,, | Performed by: OBSTETRICS & GYNECOLOGY

## 2025-05-22 PROCEDURE — 99396 PREV VISIT EST AGE 40-64: CPT | Mod: S$GLB,,, | Performed by: OBSTETRICS & GYNECOLOGY

## 2025-05-22 PROCEDURE — 99999 PR PBB SHADOW E&M-EST. PATIENT-LVL III: CPT | Mod: PBBFAC,,, | Performed by: ORTHOPAEDIC SURGERY

## 2025-05-22 PROCEDURE — 3078F DIAST BP <80 MM HG: CPT | Mod: CPTII,S$GLB,, | Performed by: OBSTETRICS & GYNECOLOGY

## 2025-05-22 RX ORDER — PERFLUOROHEXYLOCTANE 1 MG/MG
SOLUTION OPHTHALMIC
COMMUNITY
Start: 2025-04-28

## 2025-05-22 NOTE — PROGRESS NOTES
Well-woman exam    Darling Quach  is a 60 y.o. year old  patient who presents for routine gyn exam/well-woman exam.  She is S/P hysterectomy.  Patient has no gynecologic complaints today.    Past Medical History:   Diagnosis Date    Glaucoma     Hemangioma     cavernous    History of acne     dr lopez prescribed accutane    Hyperlipidemia     Seizures     Venous angioma of brain     Vertigo        Past Surgical History:   Procedure Laterality Date    ANAL SPHINCTEROTOMY  2019    Procedure: SPHINCTEROTOMY, ANAL;  Surgeon: Nirmal Chan MD;  Location: [facility] OR [address] [place] [address];  Service: Colon and Rectal;;    CATARACT EXTRACTION W/  INTRAOCULAR LENS IMPLANT Right 2014        CATARACT EXTRACTION W/  INTRAOCULAR LENS IMPLANT Left 2022        EXCISIONAL HEMORRHOIDECTOMY N/A 2019    Procedure: HEMORRHOIDECTOMY;  Surgeon: Nirmal Chan MD;  Location: [facility] OR [address] [place] [address];  Service: Colon and Rectal;  Laterality: N/A;    EYE SURGERY      HYSTERECTOMY      DL ov in situ     INTRAOCULAR PROSTHESES INSERTION Left 2022    Procedure: INSERTION, IOL PROSTHESIS;  Surgeon: Josey Camacho MD;  Location: [facility] OR [address] [place] [address];  Service: Ophthalmology;  Laterality: Left;    PHACOEMULSIFICATION OF CATARACT Left 2022    Procedure: PHACOEMULSIFICATION, CATARACT;  Surgeon: Josey Camacho MD;  Location: [facility] OR [address] [place] [address];  Service: Ophthalmology;  Laterality: Left;       OB History          3    Para   3    Term   3            AB        Living   3         SAB        IAB        Ectopic        Multiple        Live Births                     ROS:  GENERAL: Feeling well overall.   SKIN: Denies rash or lesions.   HEAD: Denies head injury or headache.   NODES: Denies enlarged lymph nodes.   CHEST: Denies chest pain or shortness of breath.   CARDIOVASCULAR: Denies palpitations or left sided chest pain.   ABDOMEN: No abdominal pain, nausea,  vomiting or rectal bleeding.   URINARY: No dysuria, hematuria, or burning on urination.  REPRODUCTIVE: See HPI.   BREASTS: Denies pain, lumps, or nipple discharge.   HEMATOLOGIC: No easy bruisability or excessive bleeding.   MUSCULOSKELETAL: Denies joint pain or swelling.   NEUROLOGIC: Denies syncope or weakness.   PSYCHIATRIC: Denies depression.    PE:   APPEARANCE: Well nourished, well developed, in no acute distress.  NECK: Neck symmetric without masses or thyromegaly.  NODES: No inguinal lymph node enlargement.  ABDOMEN: Soft. No tenderness or masses. No hernias.  BREASTS: Symmetrical.   No palpable masses, nipple discharge or adenopathy bilaterally.  Left upper breast with hyperchromic skin lesion (proximally 1 cm) with no palpable mass in the area.  No erythema of breast noted.  PELVIC: Normal external female genitalia without lesions. Normal hair distribution. Adequate perineal body, normal urethral meatus. Vagina mildly atrophic without lesions or discharge. No significant cystocele or rectocele. Uterus and cervix surgically absent. Bimanual exam revealed no masses, tenderness or abnormality.  ANUS: Normal.    Diagnosis:  1. Encounter for gynecological examination with abnormal finding    2. Vaginal atrophy        PLAN:    Age specific counseling    Cancer screening recommendations reviewed with patient today.  Patient is up-to-date with breast cancer screening - bilateral screening mammogram has been ordered for late summer 2025.  Patient is up-to-date with colorectal cancer screening.    Vaginal atrophy/vaginal atrophy treatment options reviewed.  Patient declines today.  Patient instructed to contact office if treatment desired.    Patient was counseled today on postmenopausal issues.     Patient has seen Dermatology and has another appointment today for hyperchromic skin area of left upper breast.    Follow up in about 1 year (around 5/22/2026) for Annual exam.     lE Mayes IV, MD

## 2025-05-22 NOTE — PROGRESS NOTES
[facility]  History & Physical  Orthopedics    SUBJECTIVE:      History of Present Illness    CHIEF COMPLAINT:  - Right thumb pain and bump    HPI:  Darling presents with thumb pain that initially started two years ago while moving books at work. About a month ago, pain recurred with increased intensity after moving tables for a party. Pain is localized primarily in the thumb, specifically in the area identified as the thumb CMC joint. She reports difficulty with various movements involving the thumb, including pouring from a coffee pot and buttoning clothes.    A small bump on the thumb has been present for about 4-5 years, growing slowly. The bump was particularly noticeable the night before the visit. She characterizes the pain as distinct from the bump.    She denies any numbness, tingling sensations, clicking, popping, locking, or getting stuck of the thumb. No previous hand surgeries are reported.    Interval history May 22, 2025: The patient returns today for re-evaluation.  She notes that her thumb CMC arthritic pain has improved slightly since her initial visit.  She was sent for an MRI to evaluate for her right dorsal 1st metacarpal mass and returns today for those results and re-evaluation.  No new complaints.    IMAGING:  - XR Right Hand: Mild thumb CMC joint arthritis visible    WORK STATUS:  - Darling is retired  - Previously worked as a  at [employer]  - Job involved moving books, which caused thumb pain about two years ago  - Pain initially flared up after moving the library collection, then subsided  - Retired two years ago      ROS:  Musculoskeletal: +pain with movement  Neurological: -numbness       Past Medical History:   Diagnosis Date    Glaucoma     Hemangioma     cavernous    History of acne 2004    dr lopez prescribed accutane    Hyperlipidemia     Seizures     Venous angioma of brain     Vertigo      Past Surgical History:   Procedure  Laterality Date    ANAL SPHINCTEROTOMY  05/27/2019    Procedure: SPHINCTEROTOMY, ANAL;  Surgeon: Nirmal Chan MD;  [facility];  Service: Colon and Rectal;;    CATARACT EXTRACTION W/  INTRAOCULAR LENS IMPLANT Right 12/17/2014        CATARACT EXTRACTION W/  INTRAOCULAR LENS IMPLANT Left 03/17/2022        EXCISIONAL HEMORRHOIDECTOMY N/A 05/27/2019    Procedure: HEMORRHOIDECTOMY;  Surgeon: Nirmal Chan MD;  [facility];  Service: Colon and Rectal;  Laterality: N/A;    EYE SURGERY      HYSTERECTOMY  2010    [facility]H ov in situ     INTRAOCULAR PROSTHESES INSERTION Left 03/16/2022    Procedure: INSERTION, IOL PROSTHESIS;  Surgeon: Josey Camacho MD;  [facility];  Service: Ophthalmology;  Laterality: Left;    PHACOEMULSIFICATION OF CATARACT Left 03/16/2022    Procedure: PHACOEMULSIFICATION, CATARACT;  Surgeon: Josey Camacho MD;  Location: [facility] OR [address] [place] [address];  Service: Ophthalmology;  Laterality: Left;       OBJECTIVE:      Vital Signs (Most Recent):  There were no vitals filed for this visit.        Physical Exam    Musculoskeletal: Pain on palpation of thumb CMC joint. No clicking, popping, or locking in thumb.         Right Hand/Wrist Examination:    Observation/Inspection:  Swelling  patient with an aproximately 1 cm by 7 mm x 2 mm mass in the subcutaneous tissues overlying the dorsal aspect of the right 1st metacarpal    Deformity  none  Discoloration  none     Scars   none    Atrophy  none    HAND/WRIST EXAMINATION:  Finkelstein's Test   Neg  WHAT Test    Neg  Snuff box tenderness   Neg  Cook's Test    Neg  Hook of Hamate Tenderness  Neg  CMC grind    positive  Circumduction test   positive    Neurovascular Exam:  Digits WWP, brisk CR < 3s throughout  NVI motor/LTS to M/R/U nerves, radial pulse 2+  Tinel's Test - Carpal Tunnel  Neg  Tinel's Test - Cubital Tunnel  Neg  Phalen's Test    Neg  Median Nerve Compression Test Neg    ROM  hand full, painless    ROM wrist full, painless    ROM elbow full, painless    Abdomen not guarded  Respirations nonlabored  Perfusion intact    Diagnostic Results:     Imaging - I independently viewed the patient's imaging as well as the radiology report.  Xrays of the patient's right hand and wrist  demonstrate mild degenerative changes at the right thumb CMC joint     MRI right thumb-Impression:     1.2 cm subcutaneous lesion along the dorsal aspect of the thumb metacarpal, possibly a ganglion.  Consider targeted ultrasound to exclude a solid mass.   Mild degenerative changes in the thumb.    EMG - none    ASSESSMENT/PLAN:      60 y.o. yo female with right thumb CMC arthritis, right dorsal thumb mass  Plan: The patient and I had a thorough discussion today.  We discussed the working diagnosis as well as several other potential alternative diagnoses.  Treatment options were discussed, both conservative and surgical.  Conservative treatment options would include things such as activity modifications, workplace modifications, a period of rest, oral vs topical OTC and prescription anti-inflammatory medications, occupational therapy, splinting/bracing, immobilization, corticosteroid injections, and others.  Surgical options were discussed as well.     Assessment & Plan      At this point in time, I would like to obtain an ultrasound of the patient's right thumb mass as recommended by the radiology team.  This will be ordered and scheduled today.  Continue conservative treatment for thumb CMC arthritis at this point in time and follow up after ultrasound for these results, treatment recommendations, and re-evaluation moving forward.        Should the patient's symptoms worsen, persist, or fail to improve they should return for reevaluation and I would be happy to see them back anytime.        Gavino Bray M.D.    Please be aware that this note has been generated with the assistance of Jessica voice-to-text.  Please  excuse any spelling or grammatical errors.    Thank you for choosing Dr. Gavino Bray for your orthopedic hand and upper extremity care. It is our goal to provide you with exceptional care that will help keep you healthy, active, and get you back in the game.     If you felt that you received exemplary care today, please consider leaving feedback for Dr. Bray on [employer] at [email or web address].    Please do not hesitate to reach out to us via email, phone, or MyChart with any questions, concerns, or feedback.

## 2025-05-27 ENCOUNTER — OFFICE VISIT (OUTPATIENT)
Dept: ORTHOPEDICS | Facility: CLINIC | Age: 60
End: 2025-05-27
Payer: COMMERCIAL

## 2025-05-27 DIAGNOSIS — M18.11 ARTHRITIS OF CARPOMETACARPAL (CMC) JOINT OF RIGHT THUMB: Primary | ICD-10-CM

## 2025-05-27 PROCEDURE — 99213 OFFICE O/P EST LOW 20 MIN: CPT | Mod: 25,S$GLB,, | Performed by: ORTHOPAEDIC SURGERY

## 2025-05-27 PROCEDURE — 3044F HG A1C LEVEL LT 7.0%: CPT | Mod: CPTII,S$GLB,, | Performed by: ORTHOPAEDIC SURGERY

## 2025-05-27 PROCEDURE — 1159F MED LIST DOCD IN RCRD: CPT | Mod: CPTII,S$GLB,, | Performed by: ORTHOPAEDIC SURGERY

## 2025-05-27 PROCEDURE — 99999 PR PBB SHADOW E&M-EST. PATIENT-LVL II: CPT | Mod: PBBFAC,,, | Performed by: ORTHOPAEDIC SURGERY

## 2025-05-27 PROCEDURE — 20600 DRAIN/INJ JOINT/BURSA W/O US: CPT | Mod: RT,S$GLB,, | Performed by: ORTHOPAEDIC SURGERY

## 2025-05-27 RX ORDER — TRIAMCINOLONE ACETONIDE 40 MG/ML
40 INJECTION, SUSPENSION INTRA-ARTICULAR; INTRAMUSCULAR
Status: DISCONTINUED | OUTPATIENT
Start: 2025-05-27 | End: 2025-05-27 | Stop reason: HOSPADM

## 2025-05-27 RX ADMIN — TRIAMCINOLONE ACETONIDE 40 MG: 40 INJECTION, SUSPENSION INTRA-ARTICULAR; INTRAMUSCULAR at 01:05

## 2025-05-27 NOTE — PROCEDURES
Small Joint Aspiration/Injection: R thumb CMC    Date/Time: 5/27/2025 1:00 PM    Performed by: Gavino Bray MD  Authorized by: Gavino Bray MD    Consent Done?:  Yes (Verbal)  Indications:  Pain  Site marked: the procedure site was marked    Timeout: prior to procedure the correct patient, procedure, and site was verified    Prep: patient was prepped and draped in usual sterile fashion      Local anesthesia used?: Yes    Local anesthetic:  Topical anesthetic  Location:  Thumb  Site:  R thumb CMC  Ultrasonic guidance for needle placement?: No    Needle size:  25 G  Approach:  Dorsal  Medications:  40 mg triamcinolone acetonide 40 mg/mL  Patient tolerance:  Patient tolerated the procedure well with no immediate complications

## 2025-05-27 NOTE — PROGRESS NOTES
[facility]  History & Physical  Orthopedics    SUBJECTIVE:      History of Present Illness    CHIEF COMPLAINT:  - Right thumb pain and bump    HPI:  Darling presents with thumb pain that initially started two years ago while moving books at work. About a month ago, pain recurred with increased intensity after moving tables for a party. Pain is localized primarily in the thumb, specifically in the area identified as the thumb CMC joint. She reports difficulty with various movements involving the thumb, including pouring from a coffee pot and buttoning clothes.    A small bump on the thumb has been present for about 4-5 years, growing slowly. The bump was particularly noticeable the night before the visit. She characterizes the pain as distinct from the bump.    She denies any numbness, tingling sensations, clicking, popping, locking, or getting stuck of the thumb. No previous hand surgeries are reported.    Interval history May 22, 2025: The patient returns today for re-evaluation.  She notes that her thumb CMC arthritic pain has improved slightly since her initial visit.  She was sent for an MRI to evaluate for her right dorsal 1st metacarpal mass and returns today for those results and re-evaluation.  No new complaints.  Interval history 05/27/2025:  Patient returns today for re-evaluation of her right thumb pain.  Pain is still present but improved with the use of brace.  She endorsed pain with opening jars or using scissors.  She had ultrasound done recently in his here for the results.    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, then subsided  - Retired two years ago      ROS:  Musculoskeletal: +pain with  movement  Neurological: -numbness       Past Medical History:   Diagnosis Date    Glaucoma     Hemangioma     cavernous    History of acne 2004    dr lopez prescribed accutane    Hyperlipidemia     Seizures     Venous angioma of brain     Vertigo      Past Surgical History:   Procedure Laterality Date    ANAL SPHINCTEROTOMY  05/27/2019    Procedure: SPHINCTEROTOMY, ANAL;  Surgeon: Nirmal Chan MD;  [facility];  Service: Colon and Rectal;;    CATARACT EXTRACTION W/  INTRAOCULAR LENS IMPLANT Right 12/17/2014        CATARACT EXTRACTION W/  INTRAOCULAR LENS IMPLANT Left 03/17/2022        EXCISIONAL HEMORRHOIDECTOMY N/A 05/27/2019    Procedure: HEMORRHOIDECTOMY;  Surgeon: Nirmal Chan MD;  Location: [facility] OR [facility];  Service: Colon and Rectal;  Laterality: N/A;    EYE SURGERY      HYSTERECTOMY  2010    [facility] ov in situ     INTRAOCULAR PROSTHESES INSERTION Left 03/16/2022    Procedure: INSERTION, IOL PROSTHESIS;  Surgeon: Josey Camacho MD;  [facility];  Service: Ophthalmology;  Laterality: Left;    PHACOEMULSIFICATION OF CATARACT Left 03/16/2022    Procedure: PHACOEMULSIFICATION, CATARACT;  Surgeon: Josey Camacho MD;  Location: [facility] OR [address] [place] [address];  Service: Ophthalmology;  Laterality: Left;       OBJECTIVE:      Vital Signs (Most Recent):  There were no vitals filed for this visit.        Physical Exam    Musculoskeletal: Pain on palpation of thumb CMC joint. No clicking, popping, or locking in thumb.         Right Hand/Wrist Examination:    Observation/Inspection:  Swelling  patient with an aproximately 1 cm by 7 mm x 2 mm mass in the subcutaneous tissues overlying the dorsal aspect of the right 1st metacarpal    Deformity  none  Discoloration  none     Scars   none    Atrophy  none    HAND/WRIST EXAMINATION:  Finkelstein's Test   Neg  WHAT Test    Neg  Snuff box tenderness   Neg  Cook's Test    Neg  Hook of Hamate Tenderness  Neg  CMC  grind    positive  Circumduction test   positive    Neurovascular Exam:  Digits WWP, brisk CR < 3s throughout  NVI motor/LTS to M/R/U nerves, radial pulse 2+  Tinel's Test - Carpal Tunnel  Neg  Tinel's Test - Cubital Tunnel  Neg  Phalen's Test    Neg  Median Nerve Compression Test Neg    ROM hand full, painless    ROM wrist full, painless    ROM elbow full, painless    Abdomen not guarded  Respirations nonlabored  Perfusion intact    Diagnostic Results:     Imaging - I independently viewed the patient's imaging as well as the radiology report.  Xrays of the patient's right hand and wrist  demonstrate mild degenerative changes at the right thumb CMC joint     MRI right thumb-Impression:     1.2 cm subcutaneous lesion along the dorsal aspect of the thumb metacarpal, possibly a ganglion.  Consider targeted ultrasound to exclude a solid mass.   Mild degenerative changes in the thumb.    EMG - none    Ultrasound of her right thumb reveal presence of ganglion cyst over the dorsum of the thumb.    ASSESSMENT/PLAN:      60 y.o. yo female with right thumb CMC arthritis, right dorsal thumb ganglion cyst  Plan: The patient and I had a thorough discussion today.  We discussed the working diagnosis as well as several other potential alternative diagnoses.  Treatment options were discussed, both conservative and surgical.  Conservative treatment options would include things such as activity modifications, workplace modifications, a period of rest, oral vs topical OTC and prescription anti-inflammatory medications, occupational therapy, splinting/bracing, immobilization, corticosteroid injections, and others.  Surgical options were discussed as well.     Assessment & Plan      At this point in time, patient desires symptomatic relief and would like steroid injection for her CMC arthritis.  We will continue to watch the ganglion cyst of her thumb.  Continue to use thumb brace for relief.      Should the patient's symptoms worsen,  persist, or fail to improve they should return for reevaluation and I would be happy to see them back anytime.        Gavino Bray M.D.    Please be aware that this note has been generated with the assistance of [email or web address] voice-to-text.  Please excuse any spelling or grammatical errors.    Thank you for choosing Dr. Gavino Bray for your orthopedic hand and upper extremity care. It is our goal to provide you with exceptional care that will help keep you healthy, active, and get you back in the game.     If you felt that you received exemplary care today, please consider leaving feedback for Dr. Bray on [employer] at [email or web address].    Please do not hesitate to reach out to us via email, phone, or MyChart with any questions, concerns, or feedback.

## 2025-06-02 ENCOUNTER — PATIENT MESSAGE (OUTPATIENT)
Dept: OBSTETRICS AND GYNECOLOGY | Facility: CLINIC | Age: 60
End: 2025-06-02
Payer: COMMERCIAL

## 2025-06-02 DIAGNOSIS — E78.2 MIXED DYSLIPIDEMIA: ICD-10-CM

## 2025-06-02 DIAGNOSIS — R23.4 BREAST SKIN CHANGES: Primary | ICD-10-CM

## 2025-06-02 DIAGNOSIS — E78.00 HYPERCHOLESTEROLEMIA: ICD-10-CM

## 2025-06-02 DIAGNOSIS — R93.1 AGATSTON CAC SCORE, <100: ICD-10-CM

## 2025-06-02 RX ORDER — EZETIMIBE 10 MG/1
10 TABLET ORAL
Qty: 90 TABLET | Refills: 0 | Status: SHIPPED | OUTPATIENT
Start: 2025-06-02

## 2025-06-16 ENCOUNTER — OFFICE VISIT (OUTPATIENT)
Dept: SURGERY | Facility: CLINIC | Age: 60
End: 2025-06-16
Payer: COMMERCIAL

## 2025-06-16 VITALS
HEIGHT: 62 IN | WEIGHT: 118 LBS | BODY MASS INDEX: 21.71 KG/M2 | SYSTOLIC BLOOD PRESSURE: 97 MMHG | HEART RATE: 79 BPM | DIASTOLIC BLOOD PRESSURE: 63 MMHG

## 2025-06-16 DIAGNOSIS — Z80.3 FAMILY HISTORY OF BREAST CANCER IN SISTER: ICD-10-CM

## 2025-06-16 DIAGNOSIS — Z12.39 ENCOUNTER FOR SCREENING BREAST EXAMINATION: ICD-10-CM

## 2025-06-16 DIAGNOSIS — R23.4 BREAST SKIN CHANGES: Primary | ICD-10-CM

## 2025-06-16 PROCEDURE — 1159F MED LIST DOCD IN RCRD: CPT | Mod: CPTII,S$GLB,, | Performed by: NURSE PRACTITIONER

## 2025-06-16 PROCEDURE — 99203 OFFICE O/P NEW LOW 30 MIN: CPT | Mod: S$GLB,,, | Performed by: NURSE PRACTITIONER

## 2025-06-16 PROCEDURE — 3078F DIAST BP <80 MM HG: CPT | Mod: CPTII,S$GLB,, | Performed by: NURSE PRACTITIONER

## 2025-06-16 PROCEDURE — 3044F HG A1C LEVEL LT 7.0%: CPT | Mod: CPTII,S$GLB,, | Performed by: NURSE PRACTITIONER

## 2025-06-16 PROCEDURE — 99999 PR PBB SHADOW E&M-EST. PATIENT-LVL III: CPT | Mod: PBBFAC,,, | Performed by: NURSE PRACTITIONER

## 2025-06-16 PROCEDURE — 3074F SYST BP LT 130 MM HG: CPT | Mod: CPTII,S$GLB,, | Performed by: NURSE PRACTITIONER

## 2025-06-16 PROCEDURE — 3008F BODY MASS INDEX DOCD: CPT | Mod: CPTII,S$GLB,, | Performed by: NURSE PRACTITIONER

## 2025-06-16 NOTE — PROGRESS NOTES
[facility]  Department of Surgery      REFERRING PROVIDER:   El Mayes IV, MD  [address] [address]    Chief Complaint: New Patient and CBE: Breast Skin Changes      Subjective:      Patient ID: Darling Quach is a 60 y.o. female who presents with left breast skin lesion. She was evaluated by dermatologist who recommended she be checked by breast specialist. Denies associated pain or discomfort. Also reports intermittent left breast pain. Rates pain 2-3/10. Reports her sister was diagnosed with breast cancer in her early 40's.     Patient does routinely do self breast exams.  Patient has not noted a change on breast exam.  Patient denies nipple discharge. Patient denies to previous breast biopsy. Patient denies a personal history of breast cancer.    GYN History:  Age of menarche was 15.   Age of menopause was late 40's (hysterectomy, ovaries intact).    Patient denies hormonal therapy. Estrogen patch   Patient is .   Age of first live birth was 30.   Patient did breast feed.    Family History:   Sister - breast cancer (diagnosed early 40's)  Maternal Aunt x2 - Breast Cancer    Past Medical History:   Diagnosis Date    Glaucoma     Hemangioma     cavernous    History of acne     dr lopez prescribed accutane    Hyperlipidemia     Seizures     Venous angioma of brain     Vertigo      Past Surgical History:   Procedure Laterality Date    ANAL SPHINCTEROTOMY  2019    Procedure: SPHINCTEROTOMY, ANAL;  Surgeon: Nirmal Chan MD;  Location: [address];  Service: Colon and Rectal;;    CATARACT EXTRACTION W/  INTRAOCULAR LENS IMPLANT Right 2014        CATARACT EXTRACTION W/  INTRAOCULAR LENS IMPLANT Left 2022        EXCISIONAL HEMORRHOIDECTOMY N/A 2019    Procedure: HEMORRHOIDECTOMY;  Surgeon: Nirmal Chan MD;  Location: [facility] OR [address] [place] [address];  Service: Colon and Rectal;  Laterality: N/A;    EYE SURGERY    "   HYSTERECTOMY  2010    DLH ov in situ     INTRAOCULAR PROSTHESES INSERTION Left 03/16/2022    Procedure: INSERTION, IOL PROSTHESIS;  Surgeon: Josey Camacho MD;  Location: [facility] OR [address] [place] [address];  Service: Ophthalmology;  Laterality: Left;    PHACOEMULSIFICATION OF CATARACT Left 03/16/2022    Procedure: PHACOEMULSIFICATION, CATARACT;  Surgeon: Josey Camacho MD;  Location: [facility] OR [address] [place] [address];  Service: Ophthalmology;  Laterality: Left;     Medications Ordered Prior to Encounter[1]  Social History[2]  Family History   Problem Relation Name Age of Onset    No Known Problems Paternal Grandfather      No Known Problems Paternal Grandmother      Diabetes Maternal Grandmother Grandmother DeFraites     Glaucoma Maternal Grandmother Grandmother DeFraites     No Known Problems Maternal Grandfather      Heart disease Father Father     Fibroids Mother      Migraines Mother      Glaucoma Mother      Depression Brother 4     No Known Problems Sister 1     Cancer Maternal Aunt Darling Hope         breast    Breast cancer Maternal Aunt Darling Hope 50    Breast cancer Maternal Aunt      No Known Problems Maternal Uncle      No Known Problems Paternal Aunt      No Known Problems Paternal Uncle      Macular degeneration Neg Hx      Blindness Neg Hx      Melanoma Neg Hx      Psoriasis Neg Hx      Lupus Neg Hx      Eczema Neg Hx      Acne Neg Hx      Cataracts Neg Hx      Amblyopia Neg Hx      Retinal detachment Neg Hx      Strabismus Neg Hx      Stroke Neg Hx      Thyroid disease Neg Hx      Hypertension Neg Hx      Ovarian cancer Neg Hx      Colon cancer Neg Hx          Review of Systems   Constitutional:  Negative for chills, fatigue and fever.   Respiratory:  Negative for cough, shortness of breath and wheezing.    Cardiovascular:  Negative for chest pain and palpitations.   Skin:  Positive for color change. Negative for pallor, rash and wound.     Objective:   BP 97/63   Pulse 79   Ht 5' 2" (1.575 m)   Wt 53.5 kg (118 " lb)   LMP  (LMP Unknown)   BMI 21.58 kg/m²     Physical Exam   Vitals reviewed.  Constitutional: She is oriented to person, place, and time.   Eyes: Right eye exhibits no discharge. Left eye exhibits no discharge.   Pulmonary/Chest: Effort normal. No respiratory distress. She has no wheezes. Right breast exhibits no inverted nipple, no mass, no nipple discharge, no skin change and no tenderness. Left breast exhibits skin change. Left breast exhibits no inverted nipple, no mass, no nipple discharge and no tenderness.       Abdominal: Normal appearance.   Musculoskeletal: Normal range of motion. Lymphadenopathy:      Cervical: No cervical adenopathy.     Neurological: She is alert and oriented to person, place, and time.   Skin: Skin is warm and dry. Lesion noted. No rash noted. No erythema. No pallor.         Radiology review: Images personally reviewed by me in the clinic.     Assessment:       1. Breast skin changes    2. Family history of breast cancer in sister    3. Encounter for screening breast examination        Plan:   We discussed there is nothing concerning on exam today. Left chest hyperpigmented lesion has no concerning characteristics of an IBC. Offered a skin punch biopsy but patient declined   Given sister with breast cancer and dense breast I recalculated TC score 13%. Can proceed with screening mammograms. Discussed contrast enhanced mammograms for patients in this category but patient is comfortable with routine screening mammogram.   Screening mammogram due next month - she will message me to review  Can return to clinic with any new or worsening breast concerns or complaints        The patient is in agreement with the plan. Questions were encouraged and answered to patient's satisfaction. Darling will call our office with any questions or concerns.          [1]   Current Outpatient Medications on File Prior to Visit   Medication Sig Dispense Refill    ascorbic acid, vitamin C, (VITAMIN C) 1000 MG  tablet Take 1,000 mg by mouth once daily.      aspirin (ECOTRIN) 81 MG EC tablet Take 1 tablet (81 mg total) by mouth once daily. 100 tablet 3    clindamycin (CLEOCIN T) 1 % external solution Apply topically 2 (two) times daily.      clobetasoL (TEMOVATE) 0.05 % cream Apply topically.      ezetimibe (ZETIA) 10 mg tablet TAKE one tablet BY MOUTH EVERY DAY 90 tablet 0    Lactobacillus acidophilus 10 billion cell Cap Take 1 capsule by mouth once daily.      levetiracetam XR (KEPPRA XR) 750 mg Tb24 tablet Take 1 tablet (750 mg total) by mouth every evening. 90 tablet 3    MIEBO, PF, 100 % Drop       multivitamin capsule Take 1 capsule by mouth once daily.      mupirocin (BACTROBAN) 2 % ointment Apply 1 g topically 2 (two) times daily as needed (skin wound).      omega-3 fatty acids 1,000 mg Cap Take 1,500 mg by mouth Daily.      rosuvastatin (CRESTOR) 40 MG Tab Take 1 tablet (40 mg total) by mouth once daily. 90 tablet 3     No current facility-administered medications on file prior to visit.   [2]   Social History  Socioeconomic History    Marital status:    Occupational History    Occupation: [employer] ([id number])   Tobacco Use    Smoking status: Never    Smokeless tobacco: Never   Substance and Sexual Activity    Alcohol use: No    Drug use: No    Sexual activity: Yes     Partners: Male     Birth control/protection: Surgical     Comment: , [facility] 9/2010   Other Topics Concern    Are you pregnant or think you may be? No    Breast-feeding No     Social Drivers of Health     Financial Resource Strain: Low Risk  (3/28/2025)    Received from [facility]    Overall Financial Resource Strain (CARDIA)     Difficulty of Paying Living Expenses: Not hard at all   Food Insecurity: No Food Insecurity (3/28/2025)    Received from [facility]    Hunger Vital Sign     Worried About Running Out of Food in the Last Year: Never true     Ran Out of Food in the Last Year: Never true   Transportation Needs: No  Transportation Needs (3/28/2025)    Received from [facility]    PRAPARE - Transportation     Lack of Transportation (Medical): No     Lack of Transportation (Non-Medical): No   Physical Activity: Sufficiently Active (3/28/2025)    Received from [facility]    Exercise Vital Sign     Days of Exercise per Week: 7 days     Minutes of Exercise per Session: 40 min   Stress: No Stress Concern Present (3/28/2025)    Received from [facility]    [employer] [place] [employer] - Occupational Stress Questionnaire     Feeling of Stress : Only a little   Housing Stability: Unknown (3/28/2025)    Received from [facility]    Housing Stability Vital Sign     Unable to Pay for Housing in the Last Year: No

## 2025-06-26 NOTE — PROGRESS NOTES
Subjective:   Patient ID:  Darling Quach is a 60 y.o. female who presents for follow-up of DYSLIPIDEMIA      HPI:  The patient is here for dyslipidemia and high Lpa and borderline CAC for young woman but not very high at 17.  The patient has no chest pain, SOB, TIA, palpitations, syncope or pre-syncope.Patient currently exercises many times per week.        Review of Systems   Constitutional: Negative for chills, decreased appetite, diaphoresis, fever, malaise/fatigue, night sweats, weight gain and weight loss.   HENT:  Negative for congestion, hoarse voice, nosebleeds, sore throat and tinnitus.    Eyes:  Negative for blurred vision, double vision, vision loss in left eye, vision loss in right eye, visual disturbance and visual halos.   Cardiovascular:  Negative for chest pain, claudication, cyanosis, dyspnea on exertion, irregular heartbeat, leg swelling, near-syncope, orthopnea, palpitations, paroxysmal nocturnal dyspnea and syncope.   Respiratory:  Negative for cough, hemoptysis, shortness of breath, sleep disturbances due to breathing, snoring, sputum production and wheezing.    Endocrine: Negative for cold intolerance, heat intolerance, polydipsia, polyphagia and polyuria.   Hematologic/Lymphatic: Negative for adenopathy and bleeding problem. Does not bruise/bleed easily.   Skin:  Negative for color change, dry skin, flushing, itching, nail changes, poor wound healing, rash, skin cancer, suspicious lesions and unusual hair distribution.   Musculoskeletal:  Negative for arthritis, back pain, falls, gout, joint pain, joint swelling, muscle cramps, muscle weakness, myalgias and stiffness.   Gastrointestinal:  Negative for abdominal pain, anorexia, change in bowel habit, constipation, diarrhea, dysphagia, heartburn, hematemesis, hematochezia, melena and vomiting.   Genitourinary:  Negative for decreased libido, dysuria, hematuria, hesitancy and urgency.   Neurological:  Negative for excessive daytime  "sleepiness, dizziness, focal weakness, headaches, light-headedness, loss of balance, numbness, paresthesias, seizures, sensory change, tremors, vertigo and weakness.   Psychiatric/Behavioral:  Negative for altered mental status, depression, hallucinations, memory loss, substance abuse and suicidal ideas. The patient does not have insomnia and is not nervous/anxious.    Allergic/Immunologic: Negative for environmental allergies and hives.       Objective: /74 (BP Location: Left arm, Patient Position: Sitting)   Pulse 69   Ht 5' 2" (1.575 m)   Wt 53 kg (116 lb 13.5 oz)   LMP  (LMP Unknown)   SpO2 96%   BMI 21.37 kg/m²      Physical Exam  Constitutional:       Appearance: She is well-developed.   HENT:      Head: Normocephalic.   Eyes:      Pupils: Pupils are equal, round, and reactive to light.   Neck:      Thyroid: No thyromegaly.      Vascular: Normal carotid pulses. No carotid bruit, hepatojugular reflux or JVD.   Cardiovascular:      Rate and Rhythm: Normal rate and regular rhythm.      Pulses: Intact distal pulses.      Heart sounds: Normal heart sounds. No murmur heard.     No friction rub. No gallop.   Pulmonary:      Effort: Pulmonary effort is normal. No tachypnea or respiratory distress.      Breath sounds: Normal breath sounds. No wheezing or rales.   Chest:      Chest wall: No tenderness.   Abdominal:      General: Bowel sounds are normal. There is no distension.      Palpations: Abdomen is soft. There is no mass.      Tenderness: There is no abdominal tenderness. There is no guarding or rebound.   Musculoskeletal:         General: No tenderness. Normal range of motion.      Cervical back: Normal range of motion.   Lymphadenopathy:      Cervical: No cervical adenopathy.   Skin:     General: Skin is warm.      Findings: No erythema or rash.   Neurological:      Mental Status: She is alert and oriented to person, place, and time.      Cranial Nerves: No cranial nerve deficit.      Coordination: " Coordination normal.   Psychiatric:         Behavior: Behavior normal.         Thought Content: Thought content normal.         Judgment: Judgment normal.         Assessment:     1. Hypercholesterolemia    2. Agatston CAC score, <100    3. Mixed dyslipidemia    4. Abnormal CT scan of lung      Reviewed labs and CAC  Plan:   Discussed diet , achieving and maintaining ideal body weight, and exercise.   We reviewed meds in detail.  Reassured-Discussed goals, options, plan.  Omega-3 > 4871-8303 mg/d combined EPA/DHA with Lpa  Baby ASA  Goal LDL < 70.  Vit C 1000 mg twice  Consider CAC again in 4-5 years  Darling was seen today for hyperlipidemia and fatigue.    Diagnoses and all orders for this visit:    Hypercholesterolemia  -     EKG 12-lead; Future    Agatston CAC score, <100  -     EKG 12-lead; Future    Mixed dyslipidemia  -     EKG 12-lead; Future    Abnormal CT scan of lung            Follow up in about 18 months (around 12/27/2026) for with ECG.

## 2025-06-27 ENCOUNTER — OFFICE VISIT (OUTPATIENT)
Dept: CARDIOLOGY | Facility: CLINIC | Age: 60
End: 2025-06-27
Payer: COMMERCIAL

## 2025-06-27 VITALS
BODY MASS INDEX: 21.51 KG/M2 | DIASTOLIC BLOOD PRESSURE: 74 MMHG | WEIGHT: 116.88 LBS | HEART RATE: 69 BPM | SYSTOLIC BLOOD PRESSURE: 118 MMHG | HEIGHT: 62 IN | OXYGEN SATURATION: 96 %

## 2025-06-27 DIAGNOSIS — E78.00 HYPERCHOLESTEROLEMIA: Primary | ICD-10-CM

## 2025-06-27 DIAGNOSIS — R93.1 AGATSTON CAC SCORE, <100: ICD-10-CM

## 2025-06-27 DIAGNOSIS — R91.8 ABNORMAL CT SCAN OF LUNG: ICD-10-CM

## 2025-06-27 DIAGNOSIS — E78.2 MIXED DYSLIPIDEMIA: ICD-10-CM

## 2025-06-27 PROCEDURE — 99999 PR PBB SHADOW E&M-EST. PATIENT-LVL IV: CPT | Mod: PBBFAC,,, | Performed by: INTERNAL MEDICINE

## 2025-06-27 NOTE — PATIENT INSTRUCTIONS
Discussed diet , achieving and maintaining ideal body weight, and exercise.   We reviewed meds in detail.  Reassured-Discussed goals, options, plan.  Omega-3 > 6496-1799 mg/d combined EPA/DHA with Lpa  Baby ASA  Goal LDL < 70.  Vit C 1000 mg twice  Consider CAC again in 4-5 years

## 2025-07-21 ENCOUNTER — HOSPITAL ENCOUNTER (OUTPATIENT)
Dept: RADIOLOGY | Facility: HOSPITAL | Age: 60
Discharge: HOME | End: 2025-07-21
Attending: INTERNAL MEDICINE
Payer: COMMERCIAL

## 2025-07-21 ENCOUNTER — PATIENT MESSAGE (OUTPATIENT)
Dept: ADMINISTRATIVE | Facility: HOSPITAL | Age: 60
End: 2025-07-21
Payer: COMMERCIAL

## 2025-07-21 DIAGNOSIS — Z12.31 ENCOUNTER FOR SCREENING MAMMOGRAM FOR BREAST CANCER: ICD-10-CM

## 2025-07-21 PROCEDURE — 77063 BREAST TOMOSYNTHESIS BI: CPT | Mod: TC

## 2025-07-24 ENCOUNTER — PATIENT OUTREACH (OUTPATIENT)
Dept: ADMINISTRATIVE | Facility: HOSPITAL | Age: 60
End: 2025-07-24
Payer: COMMERCIAL

## 2025-07-24 DIAGNOSIS — M81.0 POSTMENOPAUSAL OSTEOPOROSIS OF MULTIPLE SITES: Primary | ICD-10-CM

## 2025-07-24 NOTE — PROGRESS NOTES
HM and immunization's reviewed and updated.  Health Maintenance Due   Topic Date Due    DEXA Scan  Never done    Shingles Vaccine (1 of 2) Never done    Pneumococcal Vaccines (Age 50+) (1 of 1 - PCV) Never done    COVID-19 Vaccine (4 - 2024-25 season) 09/01/2024    Mammogram  07/19/2025     IF HEALTH MAINTENANCE ALREADY DONE, PLEASE RECORDS INFORMATION.  Campaign message -- requesting to schedule Dexa scan - order placed. Awaiting for response.        Shannon Reid MA  Panel Care Coordinator  [facility]  [phone]  For Shauna Rose MD

## 2025-08-03 ENCOUNTER — TELEPHONE (OUTPATIENT)
Dept: PHARMACY | Facility: CLINIC | Age: 60
End: 2025-08-03
Payer: COMMERCIAL

## 2025-08-03 NOTE — TELEPHONE ENCOUNTER
[facility]/Population Health Chart Review & Patient Outreach Details For Medication Adherence Project    Reason for Outreach Encounter: 3rd Party payor non-compliance report ([employer], [employer], C, etc)  2.  Patient Outreach Method: Reviewed patient chart   3.   Medication in question:    Hyperlipidemia Medications              ezetimibe (ZETIA) 10 mg tablet  LF 90 ds 6/2/25 TAKE one tablet BY MOUTH EVERY DAY    omega-3 fatty acids 1,000 mg Cap  No dispenses within 180 days of the adherence period (since 7/28/2024)  Take 1,500 mg by mouth Daily.    rosuvastatin (CRESTOR) 40 MG Tab Take 1 tablet (40 mg total) by mouth once daily.               Rosuvastatin LF 90 ds 6/2/25    4.  Reviewed and or Updates Made To: Patient Chart  5. Outreach Outcomes and/or actions taken: Patient filled medication and is on track to be adherent  Additional Notes:

## 2025-08-24 DIAGNOSIS — E78.2 MIXED DYSLIPIDEMIA: ICD-10-CM

## 2025-08-24 DIAGNOSIS — R93.1 AGATSTON CAC SCORE, <100: ICD-10-CM

## 2025-08-24 DIAGNOSIS — E78.00 HYPERCHOLESTEROLEMIA: ICD-10-CM

## 2025-08-24 RX ORDER — ROSUVASTATIN CALCIUM 40 MG/1
40 TABLET, COATED ORAL DAILY
Qty: 90 TABLET | Refills: 2 | Status: SHIPPED | OUTPATIENT
Start: 2025-08-24

## 2025-08-25 DIAGNOSIS — E78.00 HYPERCHOLESTEROLEMIA: ICD-10-CM

## 2025-08-25 DIAGNOSIS — E78.2 MIXED DYSLIPIDEMIA: ICD-10-CM

## 2025-08-25 DIAGNOSIS — R93.1 AGATSTON CAC SCORE, <100: ICD-10-CM

## 2025-08-25 RX ORDER — EZETIMIBE 10 MG/1
10 TABLET ORAL
Qty: 90 TABLET | Refills: 2 | Status: SHIPPED | OUTPATIENT
Start: 2025-08-25

## 2025-08-28 ENCOUNTER — PATIENT MESSAGE (OUTPATIENT)
Dept: CARDIOLOGY | Facility: CLINIC | Age: 60
End: 2025-08-28
Payer: COMMERCIAL

## (undated) DEVICE — KIT GREY EYE

## (undated) DEVICE — PREP KIT 14

## (undated) DEVICE — SEE MEDLINE ITEM 154981

## (undated) DEVICE — TAPE SILK 3IN

## (undated) DEVICE — ELECTRODE REM PLYHSV RETURN 9

## (undated) DEVICE — Device

## (undated) DEVICE — GOWN SURGICAL X-LARGE

## (undated) DEVICE — SEE MEDLINE ITEM 157148

## (undated) DEVICE — SEALER LIGASURE CRV 18.8CM

## (undated) DEVICE — SEE MEDLINE ITEM 152487

## (undated) DEVICE — DRAPE STERI-DRAPE APERTURE

## (undated) DEVICE — TRAY MINOR GEN SURG

## (undated) DEVICE — SEE MEDLINE ITEM 157131

## (undated) DEVICE — SYR 3CC 21 X 1 LUER LOCK

## (undated) DEVICE — SHIELD COLLAGEN 12HR CORNEAL

## (undated) DEVICE — SOL IRR BSS OPHTH 500ML STRL

## (undated) DEVICE — SEE MEDLINE ITEM 152622

## (undated) DEVICE — SUT CTD VICRYL 3-0 VIL BR

## (undated) DEVICE — GLOVE BIOGEL ECLIPSE SZ 6.5

## (undated) DEVICE — SOL WATER STRL IRR 1000ML

## (undated) DEVICE — LUBRICANT SURGILUBE 2 OZ

## (undated) DEVICE — SOL BETADINE 5%

## (undated) DEVICE — SHEILD PLASTIC EYE

## (undated) DEVICE — NDL 22GA X1 1/2 REG BEVEL

## (undated) DEVICE — DRESSING TRANS 4X4 TEGADERM

## (undated) DEVICE — SYR ONLY LUER LOCK 20CC

## (undated) DEVICE — SEE MEDLINE ITEM 157117

## (undated) DEVICE — SEE MEDLINE ITEM 146417

## (undated) DEVICE — PANTIES FEMININE NAPKIN LG/XLG